# Patient Record
Sex: FEMALE | Race: WHITE | Employment: FULL TIME | URBAN - METROPOLITAN AREA
[De-identification: names, ages, dates, MRNs, and addresses within clinical notes are randomized per-mention and may not be internally consistent; named-entity substitution may affect disease eponyms.]

---

## 2017-01-03 ENCOUNTER — OFFICE VISIT (OUTPATIENT)
Dept: OBGYN | Facility: CLINIC | Age: 47
End: 2017-01-03
Payer: COMMERCIAL

## 2017-01-03 VITALS
BODY MASS INDEX: 23.32 KG/M2 | HEART RATE: 73 BPM | TEMPERATURE: 98.6 F | WEIGHT: 131.6 LBS | SYSTOLIC BLOOD PRESSURE: 147 MMHG | HEIGHT: 63 IN | DIASTOLIC BLOOD PRESSURE: 78 MMHG

## 2017-01-03 DIAGNOSIS — Z01.419 ENCOUNTER FOR GYNECOLOGICAL EXAMINATION WITHOUT ABNORMAL FINDING: Primary | ICD-10-CM

## 2017-01-03 DIAGNOSIS — E03.8 OTHER SPECIFIED HYPOTHYROIDISM: ICD-10-CM

## 2017-01-03 DIAGNOSIS — Z30.40 ENCOUNTER FOR SURVEILLANCE OF CONTRACEPTIVES: ICD-10-CM

## 2017-01-03 DIAGNOSIS — Z12.31 VISIT FOR SCREENING MAMMOGRAM: ICD-10-CM

## 2017-01-03 PROCEDURE — 36415 COLL VENOUS BLD VENIPUNCTURE: CPT | Performed by: NURSE PRACTITIONER

## 2017-01-03 PROCEDURE — 84443 ASSAY THYROID STIM HORMONE: CPT | Performed by: NURSE PRACTITIONER

## 2017-01-03 PROCEDURE — 99396 PREV VISIT EST AGE 40-64: CPT | Performed by: NURSE PRACTITIONER

## 2017-01-03 RX ORDER — LEVOTHYROXINE SODIUM 112 UG/1
112 TABLET ORAL DAILY
Qty: 90 TABLET | Refills: 3 | Status: SHIPPED | OUTPATIENT
Start: 2017-01-03 | End: 2017-12-21

## 2017-01-03 ASSESSMENT — PAIN SCALES - GENERAL: PAINLEVEL: NO PAIN (0)

## 2017-01-03 NOTE — Clinical Note
[facility]  [address] [facility] [id number] [phone]      January 4, 2017      Noni Mccormick  [address] [facility] [address]              Dear Noni,    Your thyroid level is normal. We will keep you on the same dose of medication. Please let me know if you have any questions.      Sincerely,      Miranda Uriostegui CNP

## 2017-01-03 NOTE — PROGRESS NOTES
S: Pt is a 46 year old  3 para 2 who presents today for an annual female exam. LMP: 16. Contraception: Pill.    Declines STD screening. Last Pap smear was 2014 and was NIL. No history of abnormal pap smear. Immunizations up to date. Dental exams: regular. Diet and calcium intake reviewed. Exercises: less in the winter.   Last mammogram: 3/2016. Last lipid panel: .   Past Medical History   Diagnosis Date     Grave's disease      Hypothyroid      Rheumatoid arthritis(714.0)      Contraception      Inflammatory arthritis      Nephrolithiasis 2011     Osteopenia 2013     Intermittent asthma 9/10/2013     Past Surgical History   Procedure Laterality Date     Joint replacement, hip rt/lt       (L)     C pelvis/hip joint surgery unlisted       Surgical history of -        Lithotripsy     Joint replacement, hip rt/lt  3/2013     (R)     Social History   Substance Use Topics     Smoking status: Never Smoker      Smokeless tobacco: Never Used     Alcohol Use: No       REVIEW OF SYSTEMS:  CONSTITUTIONAL:NEGATIVE for fever, chills, change in weight  EYES: NEGATIVE for vision changes or irritation  ENT/MOUTH: NEGATIVE for ear, mouth and throat problems  RESP:NEGATIVE for significant cough or SOB  CV: NEGATIVE for chest pain, palpitations or peripheral edema  GI: NEGATIVE for nausea, abdominal pain, heartburn, or change in bowel habits  Periods are regular q 28-30 days, Cyclic symptoms include none. No intermenstrual bleeding.  MUSCULOSKELATAL:NEGATIVE for significant arthralgias or myalgia  INTEGUMENTARY/SKIN: NEGATIVE for worrisome rashes, moles or lesions  NEURO: NEGATIVE for weakness, dizziness or paresthesias  ENDOCRINE: NEGATIVE for temperature intolerance, skin/hair changes  HEME/ALLERGY/IMMUNE: NEGATIVE for bleeding problems  PSYCHIATRIC: NEGATIVE for changes in mood or affect      OBJECTIVE: This is a well appearing female in no acute distress. Answers questions and maintains eye contact  appropriately. Vital signs noted.     EXAM:  EYES: Eyes grossly normal to inspection, PERRL and conjunctivae and sclerae normal  HENT: ear canals and TM's normal and nose and mouth without ulcers or lesions  NECK: no adenopathy, no asymmetry, masses, or scars and thyroid normal to palpation  RESP: lungs clear to auscultation - no rales, rhonchi or wheezes  CV: regular rates and rhythm, normal S1 S2, no S3 or S4 and no murmur, click or rub  LYMPH: normal ant/post cervical and supraclavicular nodes  ABD/GI: soft, nontender, without hepatosplenomegaly or masses  MS: extremities normal- no gross deformities noted  SKIN: no suspicious lesions or rashes  NEURO: Normal strength and tone, mentation intact and speech normal  PSYCH: mentation appears normal and affect normal/bright  Breast exam: Breasts are symmetrical without masses, lymphadenopathy, retraction, dimpling, or nipple discharge bilaterally.  Pelvic Exam: External genitalia without visible lesions or discharge. Normal BUS. Vaginal mucosa pink, rugated, moist, without lesions or discharge. Cervix is pink, multiparous, midline, without cervical motion tenderness. Pap smear is not obtained. Uterus normal size and shape without tenderness or masses. Adnexa without masses or tenderness bilaterally.    A/P:  1) Normal annual female exam. Health maintenance updated. Reviewed mammogram recommendations. Regular physical activity and healthy diet encouraged. Continue regular dental exams. Encouraged adequate calcium intake. New ACOG guidelines regarding cervical cancer screening discussed with patient. Discussed rationale for not doing pap smear annually as she is not high risk. Based on last pap smear, she is not due for pap smear this year.   2) Contraception Renewal. Refill oral contraceptive pills x 1 year.  3) Hypothyroid. Refill current dose of medication and check TSH today. If dose adjustment needed, will send in new prescription and notify patient.    Miranda  Moody UHSSEIN CNP

## 2017-01-03 NOTE — MR AVS SNAPSHOT
After Visit Summary   1/3/2017    Noni Mccormick    MRN: [id number]           Patient Information     Date Of Birth          1970        Visit Information        Provider Department      1/3/2017 3:50 PM Miranda Uriostegui APRN CNP [facility]        Today's Diagnoses     Encounter for gynecological examination without abnormal finding    -  1     Other specified hypothyroidism         Encounter for surveillance of contraceptives         Visit for screening mammogram           Care Instructions    Preventive Health Recommendations    Yearly exam:   See your health care provider every year in order to  1. Review health changes.   2. Discuss preventive care.   3. Review your medicines if your doctor prescribed any.  Get a Pap test every three years (unless you have an abnormal result and your provider advises testing more often).  If you get Pap tests with HPV test, you only need to test every 5 years, unless you have an abnormal result.  You should be tested each year for STDs (sexually transmitted diseases), if you're at risk.   Ask your doctor if you should have a mammogram.  Have a colonoscopy (test for colon cancer) if someone in your family has had colon cancer or polyps before age 50.   Have a cholesterol test every 5 years.   Have a diabetes test (fasting glucose) after age 45. If you are at risk for diabetes, you should have this test every 3 years.  Shots: Get a flu shot each year. Get a tetanus shot every 10 years.   Nutrition:   Eat at least 5 servings of fruits and vegetables each day.   Eat whole-grain bread, whole-wheat pasta and brown rice instead of white grains and rice.   For bone health: Eat calcium-rich foods or take calcium pills (500 to 600 mg) twice a day with food. Also take vitamin D (1000 IUs) each day.   Lifestyle   Exercise at least 150 minutes a week (an average of 30 minutes a day, 5 days a week). This will help you control your weight and  "prevent disease.  Limit alcohol to one drink per day.   No smoking.   Wear sunscreen to prevent skin cancer.   See your dentist every six months for an exam and cleaning.                  Follow-ups after your visit        Future tests that were ordered for you today     Open Future Orders        Priority Expected Expires Ordered    MA Screening Digital Bilateral Routine  1/3/2018 1/3/2017            Who to contact     If you have questions or need follow up information about today's clinic visit or your schedule please contact [facility] directly at [phone].  Normal or non-critical lab and imaging results will be communicated to you by [employer], letter or phone within 4 business days after the clinic has received the results. If you do not hear from us within 7 days, please contact the clinic through [employer] or phone. If you have a critical or abnormal lab result, we will notify you by phone as soon as possible.  Submit refill requests through [employer] or call your pharmacy and they will forward the refill request to us. Please allow 3 business days for your refill to be completed.          Additional Information About Your Visit        [employer] Information     [employer] lets you send messages to your doctor, view your test results, renew your prescriptions, schedule appointments and more. To sign up, go to [email or web address] . Click on \"Log in\" on the left side of the screen, which will take you to the Welcome page. Then click on \"Sign up Now\" on the right side of the page.     You will be asked to enter the access code listed below, as well as some personal information. Please follow the directions to create your username and password.     Your access code is: [email or web address]  Expires: 2017  9:24 AM     Your access code will  in 90 days. If you need help or a new code, please call your [facility] or [phone].        Care EveryWhere ID     This is your Care EveryWhere ID. This " "could be used by other organizations to access your [facility] medical records  [id number]        Your Vitals Were     Pulse Temperature Height BMI (Body Mass Index) Last Period       73 98.6  F (37  C) (Oral) 5' 3\" (1.6 m) 23.32 kg/m2 12/27/2016 (Exact Date)        Blood Pressure from Last 3 Encounters:   01/03/17 147/78   10/26/16 130/84   10/14/16 124/77    Weight from Last 3 Encounters:   01/03/17 131 lb 9.6 oz (59.693 kg)   10/26/16 131 lb (59.421 kg)   09/09/16 133 lb (60.328 kg)              We Performed the Following     TSH with free T4 reflex          Today's Medication Changes          These changes are accurate as of: 1/3/17  4:06 PM.  If you have any questions, ask your nurse or doctor.               Stop taking these medicines if you haven't already. Please contact your care team if you have questions.     azithromycin 250 MG tablet   Commonly known as:  ZITHROMAX   Stopped by:  Miranda Uriostegui APRN CNP                Where to get your medicines      These medications were sent to [facility] [address] [place] [address] [address]    Hours:  M-F 9-7 SAT 9-6 SUN 11-3 Phone:  [phone]    - levothyroxine 112 MCG tablet  - norethin-eth estrad triphasic 0.5/1/0.5-35 MG-MCG per tablet             Primary Care Provider Office Phone # Fax #    Sophia Baugh [phone]       [facility] [address] [facility] [address]        Thank you!     Thank you for choosing [facility]  for your care. Our goal is always to provide you with excellent care. Hearing back from our patients is one way we can continue to improve our services. Please take a few minutes to complete the written survey that you may receive in the mail after your visit with us. Thank you!             Your Updated Medication List - Protect others around you: Learn how to safely use, store and throw away your medicines at " www.disposemymeds.org.          This list is accurate as of: 1/3/17  4:06 PM.  Always use your most recent med list.                   Brand Name Dispense Instructions for use    albuterol 108 (90 BASE) MCG/ACT Inhaler    PROAIR HFA/PROVENTIL HFA/VENTOLIN HFA    1 Inhaler    Inhale 2 puffs into the lungs every 6 hours as needed for shortness of breath / dyspnea       folic acid 1 MG tablet    FOLVITE    90 tablet    Take 1 tablet (1 mg) by mouth daily       inFLIXimab 100 MG injection    REMICADE    30 mL    Inject 300 mg into the vein as needed       INFLIXIMAB IVPB NON-ONCOLOGY USE      Inject 300 mg into the vein once. Every 5 weeks.       levothyroxine 112 MCG tablet    SYNTHROID/LEVOTHROID    90 tablet    Take 1 tablet (112 mcg) by mouth daily       methotrexate 2.5 MG tablet CHEMO     72 tablet    6 tablets by mouth once weekly       norethin-eth estrad triphasic 0.5/1/0.5-35 MG-MCG per tablet    CHRISSY    84 tablet    Take 1 tablet by mouth daily       vitamin D 1000 UNITS capsule      Take 1 capsule by mouth daily.

## 2017-01-03 NOTE — PATIENT INSTRUCTIONS
Preventive Health Recommendations    Yearly exam:   See your health care provider every year in order to  1. Review health changes.   2. Discuss preventive care.   3. Review your medicines if your doctor prescribed any.  Get a Pap test every three years (unless you have an abnormal result and your provider advises testing more often).  If you get Pap tests with HPV test, you only need to test every 5 years, unless you have an abnormal result.  You should be tested each year for STDs (sexually transmitted diseases), if you're at risk.   Ask your doctor if you should have a mammogram.  Have a colonoscopy (test for colon cancer) if someone in your family has had colon cancer or polyps before age 50.   Have a cholesterol test every 5 years.   Have a diabetes test (fasting glucose) after age 45. If you are at risk for diabetes, you should have this test every 3 years.  Shots: Get a flu shot each year. Get a tetanus shot every 10 years.   Nutrition:   Eat at least 5 servings of fruits and vegetables each day.   Eat whole-grain bread, whole-wheat pasta and brown rice instead of white grains and rice.   For bone health: Eat calcium-rich foods or take calcium pills (500 to 600 mg) twice a day with food. Also take vitamin D (1000 IUs) each day.   Lifestyle   Exercise at least 150 minutes a week (an average of 30 minutes a day, 5 days a week). This will help you control your weight and prevent disease.  Limit alcohol to one drink per day.   No smoking.   Wear sunscreen to prevent skin cancer.   See your dentist every six months for an exam and cleaning.

## 2017-01-03 NOTE — NURSING NOTE
"Chief Complaint   Patient presents with     Physical       Initial /78 mmHg  Pulse 73  Temp(Src) 98.6  F (37  C) (Oral)  Ht 5' 3\" (1.6 m)  Wt 131 lb 9.6 oz (59.693 kg)  BMI 23.32 kg/m2  LMP 12/27/2016 (Exact Date) Estimated body mass index is 23.32 kg/(m^2) as calculated from the following:    Height as of this encounter: 5' 3\" (1.6 m).    Weight as of this encounter: 131 lb 9.6 oz (59.693 kg)..  BP completed using cuff size: regular    Last pap : 12/09/2014 DEEMTRIA Stockton CMA        "

## 2017-01-04 LAB — TSH SERPL DL<=0.005 MIU/L-ACNC: 0.61 MU/L (ref 0.4–4)

## 2017-01-26 ENCOUNTER — TELEPHONE (OUTPATIENT)
Dept: RHEUMATOLOGY | Facility: CLINIC | Age: 47
End: 2017-01-26

## 2017-01-26 DIAGNOSIS — M08.3 CHRONIC POLYARTICULAR JUVENILE RHEUMATOID ARTHRITIS (H): Primary | ICD-10-CM

## 2017-01-26 RX ORDER — INFLIXIMAB 100 MG/10ML
300 INJECTION, POWDER, LYOPHILIZED, FOR SOLUTION INTRAVENOUS PRN
Qty: 30 ML | Refills: 0 | Status: SHIPPED
Start: 2017-01-26

## 2017-01-26 NOTE — TELEPHONE ENCOUNTER
Home infusion is asking for an order to continue patient's Remicade.  They needed a verbal okay stat to get it ready for her to receive tomorrow and asked me to give the nod, knowing I'm a CMA.  I checked the chart and told them I didn't see any notes indicating a desire on your part to discontinue.  I told them I'd also have you send orders for tomorrow and however long you'd like her to continue.  She's scheduled to see you 2/13/17.

## 2017-01-26 NOTE — TELEPHONE ENCOUNTER
Reason for Call: Request for an order or referral:    Order or referral being requested: Pharmacy calling - pt is receiving a Remicade injection tomorrow.  Specialty pharmacy needs to make it up today and mail it out for tomorrow - states they faxed a form also - but would gladly take a verbal also.    Date needed: as soon as possible    Has the patient been seen by the PCP for this problem? YES    Additional comments:     Phone number Patient can be reached at:  [place] at pharmacy [phone]    Best Time:  any    Can we leave a detailed message on this number?  YES    Call taken on 1/26/2017 at 10:51 AM by Mimi Simon

## 2017-01-30 ENCOUNTER — MEDICAL CORRESPONDENCE (OUTPATIENT)
Dept: HEALTH INFORMATION MANAGEMENT | Facility: CLINIC | Age: 47
End: 2017-01-30

## 2017-02-07 ENCOUNTER — MEDICAL CORRESPONDENCE (OUTPATIENT)
Dept: HEALTH INFORMATION MANAGEMENT | Facility: CLINIC | Age: 47
End: 2017-02-07

## 2017-02-21 ENCOUNTER — OFFICE VISIT (OUTPATIENT)
Dept: FAMILY MEDICINE | Facility: CLINIC | Age: 47
End: 2017-02-21
Payer: COMMERCIAL

## 2017-02-21 VITALS
WEIGHT: 133 LBS | DIASTOLIC BLOOD PRESSURE: 84 MMHG | RESPIRATION RATE: 15 BRPM | SYSTOLIC BLOOD PRESSURE: 138 MMHG | TEMPERATURE: 98.7 F | BODY MASS INDEX: 23.56 KG/M2 | OXYGEN SATURATION: 97 % | HEART RATE: 90 BPM

## 2017-02-21 DIAGNOSIS — R05.9 COUGH: Primary | ICD-10-CM

## 2017-02-21 DIAGNOSIS — J11.1 INFLUENZA-LIKE ILLNESS: ICD-10-CM

## 2017-02-21 LAB
FLUAV+FLUBV AG SPEC QL: NORMAL
FLUAV+FLUBV AG SPEC QL: NORMAL
SPECIMEN SOURCE: NORMAL

## 2017-02-21 PROCEDURE — 99213 OFFICE O/P EST LOW 20 MIN: CPT | Performed by: PHYSICIAN ASSISTANT

## 2017-02-21 PROCEDURE — 87804 INFLUENZA ASSAY W/OPTIC: CPT | Performed by: PHYSICIAN ASSISTANT

## 2017-02-21 RX ORDER — OSELTAMIVIR PHOSPHATE 75 MG/1
75 CAPSULE ORAL 2 TIMES DAILY
Qty: 10 CAPSULE | Refills: 0 | Status: SHIPPED | OUTPATIENT
Start: 2017-02-21 | End: 2017-03-08

## 2017-02-21 ASSESSMENT — PAIN SCALES - GENERAL: PAINLEVEL: NO PAIN (0)

## 2017-02-21 NOTE — NURSING NOTE
"Chief Complaint   Patient presents with     URI     cough, congestion and fever x 3 days,  is being treated for influenza       Initial /84  Pulse 90  Temp 98.7  F (37.1  C) (Oral)  Resp 15  Wt 133 lb (60.3 kg)  SpO2 97%  Breastfeeding? No  BMI 23.56 kg/m2 Estimated body mass index is 23.56 kg/(m^2) as calculated from the following:    Height as of 1/3/17: 5' 3\" (1.6 m).    Weight as of this encounter: 133 lb (60.3 kg).  Medication Reconciliation: complete   Patient and/or MA were masked during rooming process  Marcin Levine MA        "

## 2017-02-21 NOTE — PROGRESS NOTES
SUBJECTIVE:                                                    Noni Mccormick is a 46 year old female who presents to clinic today for the following health issues:      RESPIRATORY SYMPTOMS      Duration: 3 days    Description  nasal congestion, cough, fever and fatigue/malaise    Severity: moderate    Accompanying signs and symptoms: None    History (predisposing factors):  Exposure to flu, asthma    Precipitating or alleviating factors: None    Therapies tried and outcome:  none      tested neg for influenza but they treated him and he feels much better    Allergies   Allergen Reactions     Amoxicillin Nausea and Cramps       Past Medical History   Diagnosis Date     Contraception      Grave's disease      Hypothyroid      Inflammatory arthritis      Intermittent asthma 9/10/2013     Nephrolithiasis 6/16/2011     Osteopenia 2/14/2013     Rheumatoid arthritis(714.0)          Current Outpatient Prescriptions on File Prior to Visit:  inFLIXimab (REMICADE) 100 MG injection Inject 300 mg into the vein as needed   levothyroxine (SYNTHROID/LEVOTHROID) 112 MCG tablet Take 1 tablet (112 mcg) by mouth daily   norethin-eth estrad triphasic (CHRISSY) 0.5/1/0.5-35 MG-MCG per tablet Take 1 tablet by mouth daily   folic acid (FOLVITE) 1 MG tablet Take 1 tablet (1 mg) by mouth daily   methotrexate 2.5 MG tablet 6 tablets by mouth once weekly   albuterol (PROAIR HFA, PROVENTIL HFA, VENTOLIN HFA) 108 (90 BASE) MCG/ACT inhaler Inhale 2 puffs into the lungs every 6 hours as needed for shortness of breath / dyspnea   Cholecalciferol (VITAMIN D) 1000 UNIT capsule Take 1 capsule by mouth daily.   INFLIXIMAB IVPB NON-ONCOLOGY USE Inject 300 mg into the vein once. Every 5 weeks.     No current facility-administered medications on file prior to visit.     Social History   Substance Use Topics     Smoking status: Never Smoker     Smokeless tobacco: Never Used     Alcohol use No       ROS:  Consitutional: As above  ENT: As  above  Respiratory: As above    OBJECTIVE:  /84  Pulse 90  Temp 98.7  F (37.1  C) (Oral)  Resp 15  Wt 133 lb (60.3 kg)  SpO2 97%  Breastfeeding? No  BMI 23.56 kg/m2  GENERAL APPEARANCE: healthy, alert and no distress  EYES: conjunctiva clear  EARS: No cerumen.   Ear canals w/o erythema, TM's intact w/o erythema.    NOSE/MOUTH: Nose and mouth without ulcers, erythema or lesions  SINUSES: No maxillary sinus tenderness.  THROAT: No erythema w/o tonsillar enlargement . No exudates  NECK: supple, nontender, no lymphadenopathy  RESP: lungs clear to auscultation - no rales, rhonchi or wheezes  CV: regular rates and rhythm, normal S1 S2, no murmur noted  NEURO: awake, alert    Results for orders placed or performed in visit on 02/21/17   Influenza A/B antigen   Result Value Ref Range    Influenza A/B Agn Specimen Nasal     Influenza A  NEG     Negative   Test results must be correlated with clinical data. If necessary, results   should be confirmed by a molecular assay or viral culture.      Influenza B  NEG     Negative   Test results must be correlated with clinical data. If necessary, results   should be confirmed by a molecular assay or viral culture.           ASSESSMENT: Well appearing.    ICD-10-CM    1. Cough R05 Influenza A/B antigen     oseltamivir (TAMIFLU) 75 MG capsule   2. Influenza-like illness R69        PLAN:Requests Tamiflu  Lots of rest and fluids.  RTC if any worsening symptoms or if not improving.    Rachael Flores PA-C

## 2017-02-21 NOTE — MR AVS SNAPSHOT
"              After Visit Summary   2/21/2017    Noni Mccormick    MRN: [id number]           Patient Information     Date Of Birth          1970        Visit Information        Provider Department      2/21/2017 4:00 PM Rachael Flores PA-C [facility]        Today's Diagnoses     Cough    -  1    Influenza-like illness           Follow-ups after your visit        Your next 10 appointments already scheduled     Feb 27, 2017  4:15 PM CST   [facility] Cano MD   [facility]    [address] [facility] [address] [phone]              Who to contact     If you have questions or need follow up information about today's clinic visit or your schedule please contact [facility] directly at [phone].  Normal or non-critical lab and imaging results will be communicated to you by MyChart, letter or phone within 4 business days after the clinic has received the results. If you do not hear from us within 7 days, please contact the clinic through MyChart or phone. If you have a critical or abnormal lab result, we will notify you by phone as soon as possible.  Submit refill requests through [employer] or call your pharmacy and they will forward the refill request to us. Please allow 3 business days for your refill to be completed.          Additional Information About Your Visit        MyChart Information     [employer] lets you send messages to your doctor, view your test results, renew your prescriptions, schedule appointments and more. To sign up, go to [email or web address] . Click on \"Log in\" on the left side of the screen, which will take you to the Welcome page. Then click on \"Sign up Now\" on the right side of the page.     You will be asked to enter the access code listed below, as well as some personal information. Please follow the directions to create your username and password.     Your access " code is: [email or web address]  Expires: 2017  4:53 PM     Your access code will  in 90 days. If you need help or a new code, please call your [facility] clinic or [phone].        Care EveryWhere ID     This is your Care EveryWhere ID. This could be used by other organizations to access your [facility] medical records  [id number]        Your Vitals Were     Pulse Temperature Respirations Pulse Oximetry Breastfeeding? BMI (Body Mass Index)    90 98.7  F (37.1  C) (Oral) 15 97% No 23.56 kg/m2       Blood Pressure from Last 3 Encounters:   17 138/84   17 147/78   10/26/16 130/84    Weight from Last 3 Encounters:   17 133 lb (60.3 kg)   17 131 lb 9.6 oz (59.7 kg)   10/26/16 131 lb (59.4 kg)              We Performed the Following     Influenza A/B antigen          Today's Medication Changes          These changes are accurate as of: 17  4:53 PM.  If you have any questions, ask your nurse or doctor.               Start taking these medicines.        Dose/Directions    oseltamivir 75 MG capsule   Commonly known as:  TAMIFLU   Used for:  Cough   Started by:  Rachael Flores PA-C        Dose:  75 mg   Take 1 capsule (75 mg) by mouth 2 times daily   Quantity:  10 capsule   Refills:  0            Where to get your medicines      These medications were sent to [address], [address]  [address] [place] [address], [facility] [address]     Phone:  [phone]     oseltamivir 75 MG capsule                Primary Care Provider Office Phone # Fax #    Sophia Baugh [phone]       [address] [address]        Thank you!     Thank you for choosing [facility]  for your care. Our goal is always to provide you with excellent care. Hearing back from our patients is one way we can continue to improve our services. Please take a few minutes to complete the written survey that you may  receive in the mail after your visit with us. Thank you!             Your Updated Medication List - Protect others around you: Learn how to safely use, store and throw away your medicines at www.disposemymeds.org.          This list is accurate as of: 2/21/17  4:53 PM.  Always use your most recent med list.                   Brand Name Dispense Instructions for use    albuterol 108 (90 BASE) MCG/ACT Inhaler    PROAIR HFA/PROVENTIL HFA/VENTOLIN HFA    1 Inhaler    Inhale 2 puffs into the lungs every 6 hours as needed for shortness of breath / dyspnea       folic acid 1 MG tablet    FOLVITE    90 tablet    Take 1 tablet (1 mg) by mouth daily       inFLIXimab 100 MG injection    REMICADE    30 mL    Inject 300 mg into the vein as needed       INFLIXIMAB IVPB NON-ONCOLOGY USE      Inject 300 mg into the vein once. Every 5 weeks.       levothyroxine 112 MCG tablet    SYNTHROID/LEVOTHROID    90 tablet    Take 1 tablet (112 mcg) by mouth daily       methotrexate 2.5 MG tablet CHEMO     72 tablet    6 tablets by mouth once weekly       norethin-eth estrad triphasic 0.5/1/0.5-35 MG-MCG per tablet    CHRISSY    84 tablet    Take 1 tablet by mouth daily       oseltamivir 75 MG capsule    TAMIFLU    10 capsule    Take 1 capsule (75 mg) by mouth 2 times daily       vitamin D 1000 UNITS capsule      Take 1 capsule by mouth daily.

## 2017-03-02 ENCOUNTER — TELEPHONE (OUTPATIENT)
Dept: RHEUMATOLOGY | Facility: CLINIC | Age: 47
End: 2017-03-02

## 2017-03-02 NOTE — TELEPHONE ENCOUNTER
Reason for Call:  Form, our goal is to have forms completed with 72 hours, however, some forms may require a visit or additional information.    Type of letter, form or note:  FMLA    Who is the form from?: Patient    Where did the form come from: form was faxed in    What clinic location was the form placed at?: [facility] (ENT, Neurology, Rheumatology, Surgery, Urology, Vascular Surgery)    Where the form was placed: Given to MA/RN    What number is listed as a contact on the form?: [phone]       Additional comments: NA    Call taken on 3/2/2017 at 8:15 AM by Idania Estevez

## 2017-03-07 NOTE — TELEPHONE ENCOUNTER
Repeated attempts to fax were unsuccessful.  Patient states she has has an appointment with Dr. Cano tomorrow.  I will set aside a copy for her to  at that time.

## 2017-03-08 ENCOUNTER — MEDICAL CORRESPONDENCE (OUTPATIENT)
Dept: HEALTH INFORMATION MANAGEMENT | Facility: CLINIC | Age: 47
End: 2017-03-08

## 2017-03-08 ENCOUNTER — OFFICE VISIT (OUTPATIENT)
Dept: RHEUMATOLOGY | Facility: CLINIC | Age: 47
End: 2017-03-08
Payer: COMMERCIAL

## 2017-03-08 ENCOUNTER — TELEPHONE (OUTPATIENT)
Dept: RHEUMATOLOGY | Facility: CLINIC | Age: 47
End: 2017-03-08

## 2017-03-08 VITALS
HEART RATE: 102 BPM | BODY MASS INDEX: 23.74 KG/M2 | TEMPERATURE: 97.6 F | WEIGHT: 134 LBS | SYSTOLIC BLOOD PRESSURE: 122 MMHG | DIASTOLIC BLOOD PRESSURE: 90 MMHG

## 2017-03-08 DIAGNOSIS — M08.3 CHRONIC POLYARTICULAR JUVENILE RHEUMATOID ARTHRITIS (H): ICD-10-CM

## 2017-03-08 LAB
ALBUMIN SERPL-MCNC: 3.2 G/DL (ref 3.4–5)
ALP SERPL-CCNC: 30 U/L (ref 40–150)
ALT SERPL W P-5'-P-CCNC: 19 U/L (ref 0–50)
ANION GAP SERPL CALCULATED.3IONS-SCNC: 8 MMOL/L (ref 3–14)
AST SERPL W P-5'-P-CCNC: 11 U/L (ref 0–45)
BASOPHILS # BLD AUTO: 0 10E9/L (ref 0–0.2)
BASOPHILS NFR BLD AUTO: 0.7 %
BILIRUB SERPL-MCNC: 0.3 MG/DL (ref 0.2–1.3)
BUN SERPL-MCNC: 9 MG/DL (ref 7–30)
CALCIUM SERPL-MCNC: 8.6 MG/DL (ref 8.5–10.1)
CHLORIDE SERPL-SCNC: 105 MMOL/L (ref 94–109)
CO2 SERPL-SCNC: 27 MMOL/L (ref 20–32)
CREAT SERPL-MCNC: 0.68 MG/DL (ref 0.52–1.04)
DIFFERENTIAL METHOD BLD: NORMAL
EOSINOPHIL # BLD AUTO: 0.1 10E9/L (ref 0–0.7)
EOSINOPHIL NFR BLD AUTO: 1.2 %
ERYTHROCYTE [DISTWIDTH] IN BLOOD BY AUTOMATED COUNT: 12.8 % (ref 10–15)
GFR SERPL CREATININE-BSD FRML MDRD: ABNORMAL ML/MIN/1.7M2
GLUCOSE SERPL-MCNC: 75 MG/DL (ref 70–99)
HCT VFR BLD AUTO: 42.9 % (ref 35–47)
HGB BLD-MCNC: 14.2 G/DL (ref 11.7–15.7)
LYMPHOCYTES # BLD AUTO: 1.2 10E9/L (ref 0.8–5.3)
LYMPHOCYTES NFR BLD AUTO: 19.7 %
MCH RBC QN AUTO: 29.6 PG (ref 26.5–33)
MCHC RBC AUTO-ENTMCNC: 33.1 G/DL (ref 31.5–36.5)
MCV RBC AUTO: 89 FL (ref 78–100)
MONOCYTES # BLD AUTO: 0.5 10E9/L (ref 0–1.3)
MONOCYTES NFR BLD AUTO: 7.5 %
NEUTROPHILS # BLD AUTO: 4.3 10E9/L (ref 1.6–8.3)
NEUTROPHILS NFR BLD AUTO: 70.9 %
PLATELET # BLD AUTO: 400 10E9/L (ref 150–450)
POTASSIUM SERPL-SCNC: 3.9 MMOL/L (ref 3.4–5.3)
PROT SERPL-MCNC: 8.1 G/DL (ref 6.8–8.8)
RBC # BLD AUTO: 4.8 10E12/L (ref 3.8–5.2)
SODIUM SERPL-SCNC: 140 MMOL/L (ref 133–144)
WBC # BLD AUTO: 6 10E9/L (ref 4–11)

## 2017-03-08 PROCEDURE — 36415 COLL VENOUS BLD VENIPUNCTURE: CPT | Performed by: INTERNAL MEDICINE

## 2017-03-08 PROCEDURE — 80053 COMPREHEN METABOLIC PANEL: CPT | Performed by: INTERNAL MEDICINE

## 2017-03-08 PROCEDURE — 85025 COMPLETE CBC W/AUTO DIFF WBC: CPT | Performed by: INTERNAL MEDICINE

## 2017-03-08 PROCEDURE — 99213 OFFICE O/P EST LOW 20 MIN: CPT | Performed by: INTERNAL MEDICINE

## 2017-03-08 NOTE — TELEPHONE ENCOUNTER
Request for infusion orders from [facility] infusion.  Orders signed by Dr. Cano and faxed back.  Anna Goodman LPN

## 2017-03-08 NOTE — NURSING NOTE
"Chief Complaint   Patient presents with     RECHECK     RA       Initial /90 (BP Location: Right arm, Patient Position: Chair, Cuff Size: Adult Regular)  Pulse 102  Temp 97.6  F (36.4  C) (Oral)  Wt 134 lb (60.8 kg)  BMI 23.74 kg/m2 Estimated body mass index is 23.74 kg/(m^2) as calculated from the following:    Height as of 1/3/17: 5' 3\" (1.6 m).    Weight as of this encounter: 134 lb (60.8 kg).  Medication Reconciliation: complete   Anna Goodman LPN    "

## 2017-03-08 NOTE — PROGRESS NOTES
HISTORY OF PRESENT ILLNESS:  Noni Mcnair is seen back in followup for chronic polyarticular juvenile arthritis, she remains on methotrexate 15 mg weekly and Remicade infusions every 5 weeks, things are going well.  She has no specific complaints.  The only real issue is that she has become difficult venous access, last visit did require 5 attempts.  She also been getting home infusions which she says has gone well.  She has no concerns regarding having it done in her home or the people caring for her and taking care of the infusion.      She has not had any infections from the Remicade.  No nausea, vomiting, diarrhea, stomatitis from the methotrexate.  No progressive dyspnea or chronic cough.      PHYSICAL EXAMINATION:   VITAL SIGNS:  Blood pressure 122/90, pulse 102, weight 134.     LUNGS:  Clear.   HEART:  Regular.   MUSCULOSKELETAL:  Joint exam there is no obvious synovitis of the wrists, MCPs, or PIPs.  There are no signs, shoulders, knees or ankles.   SKIN:  Without lesions.      IMPRESSION:  She does have the chronic deformities in the hands, especially the bilateral fifth PIP joints.      IMPRESSION:  Chronic polyarticular juvenile arthritis.      RECOMMENDATIONS:   1.  Continue methotrexate 15 mg weekly.   2.  Continue Remicade infusions.   3.  Check labs every 3 months.     4.  Followup with me in 6 months or sooner if there are problems.         ANGELA RUANO MD             D: 2017 12:44   T: 2017 12:59   MT: KAYLA#[id number]      Name:     NONI MCNAIR   MRN:      [id number]        Account:      [id number]   :      1970           Visit Date:   2017      Document: [id number]

## 2017-03-08 NOTE — LETTER
[facility]  [address] [address]  [phone]      March 9, 2017      Noni Mccormick  [address] [address]        Dear Noni,    Dr. Cano has reviewed the results of your labs of 3/8/17, and has made the following observations:      Labs are normal     Please don't hesitate to contact our office with any additional questions or concerns.           Sincerely,    Kathie Delcid, GUNNARA  For Ken Cano MD

## 2017-03-08 NOTE — MR AVS SNAPSHOT
"              After Visit Summary   3/8/2017    Noni Mccormick    MRN: [id number]           Patient Information     Date Of Birth          1970        Visit Information        Provider Department      3/8/2017 9:30 AM Ken Cano MD [facility]        Today's Diagnoses     Chronic polyarticular juvenile rheumatoid arthritis (H)           Follow-ups after your visit        Who to contact     If you have questions or need follow up information about today's clinic visit or your schedule please contact [facility] directly at [phone].  Normal or non-critical lab and imaging results will be communicated to you by Kimengihart, letter or phone within 4 business days after the clinic has received the results. If you do not hear from us within 7 days, please contact the clinic through Positron Dynamicst or phone. If you have a critical or abnormal lab result, we will notify you by phone as soon as possible.  Submit refill requests through Brazzlebox or call your pharmacy and they will forward the refill request to us. Please allow 3 business days for your refill to be completed.          Additional Information About Your Visit        MyChart Information     Brazzlebox lets you send messages to your doctor, view your test results, renew your prescriptions, schedule appointments and more. To sign up, go to [email or web address] . Click on \"Log in\" on the left side of the screen, which will take you to the Welcome page. Then click on \"Sign up Now\" on the right side of the page.     You will be asked to enter the access code listed below, as well as some personal information. Please follow the directions to create your username and password.     Your access code is: [email or web address]  Expires: 2017  4:53 PM     Your access code will  in 90 days. If you need help or a new code, please call your [facility] or [phone].        Care EveryWhere ID     This is your Care EveryWhere " ID. This could be used by other organizations to access your [place] medical records  [id number]        Your Vitals Were     Pulse Temperature BMI (Body Mass Index)             102 97.6  F (36.4  C) (Oral) 23.74 kg/m2          Blood Pressure from Last 3 Encounters:   03/08/17 122/90   02/21/17 138/84   01/03/17 147/78    Weight from Last 3 Encounters:   03/08/17 134 lb (60.8 kg)   02/21/17 133 lb (60.3 kg)   01/03/17 131 lb 9.6 oz (59.7 kg)              We Performed the Following     CBC with platelets differential     Comprehensive metabolic panel        Primary Care Provider Office Phone # Fax #    Sophia Baugh [phone]       [facility] [address] [address]        Thank you!     Thank you for choosing [facility]  for your care. Our goal is always to provide you with excellent care. Hearing back from our patients is one way we can continue to improve our services. Please take a few minutes to complete the written survey that you may receive in the mail after your visit with us. Thank you!             Your Updated Medication List - Protect others around you: Learn how to safely use, store and throw away your medicines at www.disposemymeds.org.          This list is accurate as of: 3/8/17 11:59 PM.  Always use your most recent med list.                   Brand Name Dispense Instructions for use    albuterol 108 (90 BASE) MCG/ACT Inhaler    PROAIR HFA/PROVENTIL HFA/VENTOLIN HFA    1 Inhaler    Inhale 2 puffs into the lungs every 6 hours as needed for shortness of breath / dyspnea       folic acid 1 MG tablet    FOLVITE    90 tablet    Take 1 tablet (1 mg) by mouth daily       inFLIXimab 100 MG injection    REMICADE    30 mL    Inject 300 mg into the vein as needed       INFLIXIMAB IVPB NON-ONCOLOGY USE      Inject 300 mg into the vein once. Every 5 weeks.       levothyroxine 112 MCG tablet    SYNTHROID/LEVOTHROID    90 tablet    Take 1 tablet (112  mcg) by mouth daily       methotrexate 2.5 MG tablet CHEMO     72 tablet    6 tablets by mouth once weekly       norethin-eth estrad triphasic 0.5/1/0.5-35 MG-MCG per tablet    CHRISSY    84 tablet    Take 1 tablet by mouth daily       vitamin D 1000 UNITS capsule      Take 1 capsule by mouth daily.

## 2017-03-13 DIAGNOSIS — M06.9 RHEUMATOID ARTHRITIS, INVOLVING UNSPECIFIED SITE, UNSPECIFIED RHEUMATOID FACTOR PRESENCE: ICD-10-CM

## 2017-03-15 RX ORDER — FOLIC ACID 1 MG/1
1 TABLET ORAL DAILY
Qty: 90 TABLET | Refills: 1 | Status: SHIPPED | OUTPATIENT
Start: 2017-03-15 | End: 2017-09-10

## 2017-03-27 ENCOUNTER — TELEPHONE (OUTPATIENT)
Dept: RHEUMATOLOGY | Facility: CLINIC | Age: 47
End: 2017-03-27

## 2017-03-27 NOTE — TELEPHONE ENCOUNTER
Reason for Call:  Form, our goal is to have forms completed with 72 hours, however, some forms may require a visit or additional information.    Type of letter, form or note:  employer    Who is the form from?: Patient    Where did the form come from: form was faxed in    What clinic location was the form placed at?: [facility] (ENT, Neurology, Rheumatology, Surgery, Urology, Vascular Surgery)    Where the form was placed: Given to MA/RN    What number is listed as a contact on the form? [phone]       Additional comments: NA    Call taken on 3/27/2017 at 10:31 AM by Idania Estevez

## 2017-04-07 ENCOUNTER — RADIANT APPOINTMENT (OUTPATIENT)
Dept: MAMMOGRAPHY | Facility: CLINIC | Age: 47
End: 2017-04-07
Attending: NURSE PRACTITIONER
Payer: COMMERCIAL

## 2017-04-07 DIAGNOSIS — Z12.31 VISIT FOR SCREENING MAMMOGRAM: ICD-10-CM

## 2017-04-07 PROCEDURE — G0202 SCR MAMMO BI INCL CAD: HCPCS | Mod: TC

## 2017-04-13 ENCOUNTER — HOSPITAL ENCOUNTER (OUTPATIENT)
Dept: MAMMOGRAPHY | Facility: CLINIC | Age: 47
Discharge: HOME | End: 2017-04-13
Attending: NURSE PRACTITIONER | Admitting: NURSE PRACTITIONER
Payer: COMMERCIAL

## 2017-04-13 ENCOUNTER — HOSPITAL ENCOUNTER (OUTPATIENT)
Dept: MAMMOGRAPHY | Facility: CLINIC | Age: 47
End: 2017-04-13
Attending: NURSE PRACTITIONER
Payer: COMMERCIAL

## 2017-04-13 DIAGNOSIS — R92.8 ABNORMAL MAMMOGRAM: ICD-10-CM

## 2017-04-13 PROCEDURE — 76642 ULTRASOUND BREAST LIMITED: CPT | Mod: RT

## 2017-04-13 PROCEDURE — G0279 TOMOSYNTHESIS, MAMMO: HCPCS

## 2017-05-12 LAB
ALBUMIN SERPL-MCNC: 3.1 G/DL (ref 3.4–5)
ALP SERPL-CCNC: 24 U/L (ref 40–150)
ALT SERPL W P-5'-P-CCNC: 12 U/L (ref 0–50)
ANION GAP SERPL CALCULATED.3IONS-SCNC: 9 MMOL/L (ref 3–14)
AST SERPL W P-5'-P-CCNC: 13 U/L (ref 0–45)
BASOPHILS # BLD AUTO: 0 10E9/L (ref 0–0.2)
BASOPHILS NFR BLD AUTO: 0.1 %
BILIRUB SERPL-MCNC: 0.2 MG/DL (ref 0.2–1.3)
BUN SERPL-MCNC: 7 MG/DL (ref 7–30)
CALCIUM SERPL-MCNC: 8.3 MG/DL (ref 8.5–10.1)
CHLORIDE SERPL-SCNC: 109 MMOL/L (ref 94–109)
CO2 SERPL-SCNC: 25 MMOL/L (ref 20–32)
CREAT SERPL-MCNC: 0.56 MG/DL (ref 0.52–1.04)
DIFFERENTIAL METHOD BLD: ABNORMAL
EOSINOPHIL # BLD AUTO: 0 10E9/L (ref 0–0.7)
EOSINOPHIL NFR BLD AUTO: 0 %
ERYTHROCYTE [DISTWIDTH] IN BLOOD BY AUTOMATED COUNT: 13.3 % (ref 10–15)
GFR SERPL CREATININE-BSD FRML MDRD: ABNORMAL ML/MIN/1.7M2
GLUCOSE SERPL-MCNC: 95 MG/DL (ref 70–99)
HCT VFR BLD AUTO: 40.7 % (ref 35–47)
HGB BLD-MCNC: 13.9 G/DL (ref 11.7–15.7)
IMM GRANULOCYTES # BLD: 0 10E9/L (ref 0–0.4)
IMM GRANULOCYTES NFR BLD: 0.3 %
LYMPHOCYTES # BLD AUTO: 0.4 10E9/L (ref 0.8–5.3)
LYMPHOCYTES NFR BLD AUTO: 6.3 %
MCH RBC QN AUTO: 31.4 PG (ref 26.5–33)
MCHC RBC AUTO-ENTMCNC: 34.2 G/DL (ref 31.5–36.5)
MCV RBC AUTO: 92 FL (ref 78–100)
MONOCYTES # BLD AUTO: 0 10E9/L (ref 0–1.3)
MONOCYTES NFR BLD AUTO: 0.4 %
NEUTROPHILS # BLD AUTO: 6.5 10E9/L (ref 1.6–8.3)
NEUTROPHILS NFR BLD AUTO: 92.9 %
NRBC # BLD AUTO: 0 10*3/UL
NRBC BLD AUTO-RTO: 0 /100
PLATELET # BLD AUTO: 408 10E9/L (ref 150–450)
POTASSIUM SERPL-SCNC: 3.4 MMOL/L (ref 3.4–5.3)
PROT SERPL-MCNC: 8 G/DL (ref 6.8–8.8)
RBC # BLD AUTO: 4.42 10E12/L (ref 3.8–5.2)
SODIUM SERPL-SCNC: 143 MMOL/L (ref 133–144)
WBC # BLD AUTO: 7 10E9/L (ref 4–11)

## 2017-05-12 PROCEDURE — 80053 COMPREHEN METABOLIC PANEL: CPT | Performed by: INTERNAL MEDICINE

## 2017-05-12 PROCEDURE — 85025 COMPLETE CBC W/AUTO DIFF WBC: CPT | Performed by: INTERNAL MEDICINE

## 2017-05-15 NOTE — PROGRESS NOTES
Tried to release results via UMicIt, but patient is inactive. Called and left , requested call back.     Claudette Mason MA

## 2017-06-15 ENCOUNTER — HOME INFUSION (PRE-WILLOW HOME INFUSION) (OUTPATIENT)
Dept: PHARMACY | Facility: CLINIC | Age: 47
End: 2017-06-15

## 2017-06-16 ENCOUNTER — HOME INFUSION (PRE-WILLOW HOME INFUSION) (OUTPATIENT)
Dept: PHARMACY | Facility: CLINIC | Age: 47
End: 2017-06-16

## 2017-07-21 LAB
ALBUMIN SERPL-MCNC: 3.3 G/DL (ref 3.4–5)
ALP SERPL-CCNC: 29 U/L (ref 40–150)
ALT SERPL W P-5'-P-CCNC: 19 U/L (ref 0–50)
ANION GAP SERPL CALCULATED.3IONS-SCNC: 11 MMOL/L (ref 3–14)
AST SERPL W P-5'-P-CCNC: 16 U/L (ref 0–45)
BASOPHILS # BLD AUTO: 0 10E9/L (ref 0–0.2)
BASOPHILS NFR BLD AUTO: 0.1 %
BILIRUB SERPL-MCNC: 0.2 MG/DL (ref 0.2–1.3)
BUN SERPL-MCNC: 10 MG/DL (ref 7–30)
CALCIUM SERPL-MCNC: 8.4 MG/DL (ref 8.5–10.1)
CHLORIDE SERPL-SCNC: 108 MMOL/L (ref 94–109)
CO2 SERPL-SCNC: 24 MMOL/L (ref 20–32)
CREAT SERPL-MCNC: 0.55 MG/DL (ref 0.52–1.04)
DIFFERENTIAL METHOD BLD: ABNORMAL
EOSINOPHIL # BLD AUTO: 0 10E9/L (ref 0–0.7)
EOSINOPHIL NFR BLD AUTO: 0 %
ERYTHROCYTE [DISTWIDTH] IN BLOOD BY AUTOMATED COUNT: 12.9 % (ref 10–15)
GFR SERPL CREATININE-BSD FRML MDRD: ABNORMAL ML/MIN/1.7M2
GLUCOSE SERPL-MCNC: 115 MG/DL (ref 70–99)
HCT VFR BLD AUTO: 37.4 % (ref 35–47)
HGB BLD-MCNC: 12.9 G/DL (ref 11.7–15.7)
IMM GRANULOCYTES # BLD: 0 10E9/L (ref 0–0.4)
IMM GRANULOCYTES NFR BLD: 0.1 %
LYMPHOCYTES # BLD AUTO: 0.3 10E9/L (ref 0.8–5.3)
LYMPHOCYTES NFR BLD AUTO: 3.4 %
MCH RBC QN AUTO: 32.3 PG (ref 26.5–33)
MCHC RBC AUTO-ENTMCNC: 34.5 G/DL (ref 31.5–36.5)
MCV RBC AUTO: 94 FL (ref 78–100)
MONOCYTES # BLD AUTO: 0.1 10E9/L (ref 0–1.3)
MONOCYTES NFR BLD AUTO: 0.5 %
NEUTROPHILS # BLD AUTO: 9.2 10E9/L (ref 1.6–8.3)
NEUTROPHILS NFR BLD AUTO: 95.9 %
NRBC # BLD AUTO: 0 10*3/UL
NRBC BLD AUTO-RTO: 0 /100
PLATELET # BLD AUTO: 407 10E9/L (ref 150–450)
POTASSIUM SERPL-SCNC: 3.4 MMOL/L (ref 3.4–5.3)
PROT SERPL-MCNC: 7.9 G/DL (ref 6.8–8.8)
RBC # BLD AUTO: 3.99 10E12/L (ref 3.8–5.2)
SODIUM SERPL-SCNC: 143 MMOL/L (ref 133–144)
WBC # BLD AUTO: 9.6 10E9/L (ref 4–11)

## 2017-07-21 PROCEDURE — 85025 COMPLETE CBC W/AUTO DIFF WBC: CPT | Performed by: INTERNAL MEDICINE

## 2017-07-21 PROCEDURE — 80053 COMPREHEN METABOLIC PANEL: CPT | Performed by: INTERNAL MEDICINE

## 2017-07-27 NOTE — PROGRESS NOTES
This is a recent snapshot of the patient's [facility] medical record.  For current drug dose and complete information and questions, call [phone] or In Basket pool, [facility] home infusion ([id number])  CSN Number:  [id number]

## 2017-08-14 DIAGNOSIS — M06.09 RHEUMATOID ARTHRITIS OF MULTIPLE SITES WITHOUT RHEUMATOID FACTOR (H): ICD-10-CM

## 2017-08-14 NOTE — TELEPHONE ENCOUNTER
methotrexate 2.5 MG tablet    Date Last Filled: 05/24/2017  QTY: 72    Natalie Northfield  [facility] [place]

## 2017-08-15 NOTE — TELEPHONE ENCOUNTER
Methotrexate        Last Written Prescription Date:  09-12-16  Last Fill Quantity: 72,   # refills: 1  Last Office Visit with [facility] prescribing provider: 03-08-17  Future Office visit:  Last office visit note indicates pt to f/u in 6 months (Sept) last labs done 07-21-17.       Routing refill request to provider for review/approval because:  Drug not on the [facility] refill protocol or controlled substance    Amparo Maldonado  [facility] RN

## 2017-08-15 NOTE — TELEPHONE ENCOUNTER
Called pt left message to return call.  Pt needs to schedule a 6 month f/u visit with Dr. Cano.    Amparo Maldonado  [facility] RN

## 2017-08-16 NOTE — TELEPHONE ENCOUNTER
Appt scheduled on 09-11-17 with Dr. Cano.    Amparo Maldonado  [facility] RN

## 2017-09-10 DIAGNOSIS — M06.9 RHEUMATOID ARTHRITIS, INVOLVING UNSPECIFIED SITE, UNSPECIFIED RHEUMATOID FACTOR PRESENCE: ICD-10-CM

## 2017-09-11 ENCOUNTER — OFFICE VISIT (OUTPATIENT)
Dept: RHEUMATOLOGY | Facility: CLINIC | Age: 47
End: 2017-09-11
Payer: COMMERCIAL

## 2017-09-11 VITALS
RESPIRATION RATE: 16 BRPM | BODY MASS INDEX: 23.38 KG/M2 | WEIGHT: 132 LBS | HEART RATE: 92 BPM | OXYGEN SATURATION: 99 % | DIASTOLIC BLOOD PRESSURE: 103 MMHG | SYSTOLIC BLOOD PRESSURE: 140 MMHG

## 2017-09-11 DIAGNOSIS — R03.0 ELEVATED BLOOD PRESSURE READING WITHOUT DIAGNOSIS OF HYPERTENSION: Primary | ICD-10-CM

## 2017-09-11 DIAGNOSIS — M06.09 RHEUMATOID ARTHRITIS OF MULTIPLE SITES WITHOUT RHEUMATOID FACTOR (H): ICD-10-CM

## 2017-09-11 PROCEDURE — 99214 OFFICE O/P EST MOD 30 MIN: CPT | Performed by: INTERNAL MEDICINE

## 2017-09-11 RX ORDER — IRBESARTAN 75 MG/1
75 TABLET ORAL DAILY
Qty: 30 TABLET | Refills: 1 | Status: SHIPPED | OUTPATIENT
Start: 2017-09-11 | End: 2017-09-27

## 2017-09-11 NOTE — NURSING NOTE
"Chief Complaint   Patient presents with     Recheck Medication     JRA       Initial BP (!) 145/98 (BP Location: Right arm, Patient Position: Chair)  Pulse 92  Resp 16  Wt 132 lb (59.9 kg)  SpO2 99%  BMI 23.38 kg/m2 Estimated body mass index is 23.38 kg/(m^2) as calculated from the following:    Height as of 1/3/17: 5' 3\" (1.6 m).    Weight as of this encounter: 132 lb (59.9 kg).  Medication Reconciliation: complete   Anna Goodman LPN    "

## 2017-09-11 NOTE — MR AVS SNAPSHOT
"              After Visit Summary   9/11/2017    Noni Mccormick    MRN: [id number]           Patient Information     Date Of Birth          1970        Visit Information        Provider Department      9/11/2017 2:45 PM Ken Cano MD [facility]        Today's Diagnoses     Elevated blood pressure reading without diagnosis of hypertension    -  1    Rheumatoid arthritis of multiple sites without rheumatoid factor (H)           Follow-ups after your visit        Your next 10 appointments already scheduled     Sep 27, 2017  3:40 PM CDT   SHORT with Sophia Baugh MD   [facility]    [address] [address] [phone]              Who to contact     If you have questions or need follow up information about today's clinic visit or your schedule please contact [facility] directly at [phone].  Normal or non-critical lab and imaging results will be communicated to you by MyChart, letter or phone within 4 business days after the clinic has received the results. If you do not hear from us within 7 days, please contact the clinic through MyChart or phone. If you have a critical or abnormal lab result, we will notify you by phone as soon as possible.  Submit refill requests through StackSafe or call your pharmacy and they will forward the refill request to us. Please allow 3 business days for your refill to be completed.          Additional Information About Your Visit        MyChart Information     StackSafe lets you send messages to your doctor, view your test results, renew your prescriptions, schedule appointments and more. To sign up, go to [email or web address] . Click on \"Log in\" on the left side of the screen, which will take you to the Welcome page. Then click on \"Sign up Now\" on the right side of the page.     You will be asked to enter the access code listed below, as well as some personal " information. Please follow the directions to create your username and password.     Your access code is: [email or web address]  Expires: 2017 10:22 AM     Your access code will  in 90 days. If you need help or a new code, please call your [place] clinic or [phone].        Care EveryWhere ID     This is your Care EveryWhere ID. This could be used by other organizations to access your [place] medical records  [id number]        Your Vitals Were     Pulse Respirations Pulse Oximetry BMI (Body Mass Index)          92 16 99% 23.38 kg/m2         Blood Pressure from Last 3 Encounters:   17 (!) 140/103   17 122/90   17 138/84    Weight from Last 3 Encounters:   17 59.9 kg (132 lb)   17 60.8 kg (134 lb)   17 60.3 kg (133 lb)              Today, you had the following     No orders found for display         Today's Medication Changes          These changes are accurate as of: 17 11:59 PM.  If you have any questions, ask your nurse or doctor.               Start taking these medicines.        Dose/Directions    irbesartan 75 MG tablet   Commonly known as:  AVAPRO   Used for:  Elevated blood pressure reading without diagnosis of hypertension   Started by:  Ken Cano MD        Dose:  75 mg   Take 1 tablet (75 mg) by mouth daily   Quantity:  30 tablet   Refills:  1            Where to get your medicines      These medications were sent to [place] [address] [place] [address] [address]    Hours:  M-F 9-7 SAT 9-6 SUN 11-3 Phone:  [phone]     irbesartan 75 MG tablet    methotrexate 2.5 MG tablet CHEMO                Primary Care Provider Office Phone # Fax #    Sophia Baugh [phone] [address] [facility] [address]        Equal Access to Services     ROSE JORGE AH: Pierre vincent Sochristina, waaxda luqadaha, qaybta radu pennington, flor meade  lalio deleon. So [facility] [phone].    ATENCIÓN: Si habla rupa, tiene a gutierrez disposición servicios gratuitos de asistencia lingüística. [place] [phone].    We comply with applicable federal civil rights laws and [place] laws. We do not discriminate on the basis of race, color, national origin, age, disability sex, sexual orientation or gender identity.            Thank you!     Thank you for choosing [facility]  for your care. Our goal is always to provide you with excellent care. Hearing back from our patients is one way we can continue to improve our services. Please take a few minutes to complete the written survey that you may receive in the mail after your visit with us. Thank you!             Your Updated Medication List - Protect others around you: Learn how to safely use, store and throw away your medicines at www.disposemymeds.org.          This list is accurate as of: 9/11/17 11:59 PM.  Always use your most recent med list.                   Brand Name Dispense Instructions for use Diagnosis    albuterol 108 (90 BASE) MCG/ACT Inhaler    PROAIR HFA/PROVENTIL HFA/VENTOLIN HFA    1 Inhaler    Inhale 2 puffs into the lungs every 6 hours as needed for shortness of breath / dyspnea    Intermittent asthma       folic acid 1 MG tablet    FOLVITE    90 tablet    Take 1 tablet (1 mg) by mouth daily    Rheumatoid arthritis, involving unspecified site, unspecified rheumatoid factor presence (H)       inFLIXimab 100 MG injection    REMICADE    30 mL    Inject 300 mg into the vein as needed    Chronic polyarticular juvenile rheumatoid arthritis (H)       irbesartan 75 MG tablet    AVAPRO    30 tablet    Take 1 tablet (75 mg) by mouth daily    Elevated blood pressure reading without diagnosis of hypertension       levothyroxine 112 MCG tablet    SYNTHROID/LEVOTHROID    90 tablet    Take 1 tablet (112 mcg) by mouth daily    Other specified hypothyroidism       methotrexate 2.5 MG tablet CHEMO     72  tablet    6 tablets by mouth once weekly    Rheumatoid arthritis of multiple sites without rheumatoid factor (H)       norethin-eth estrad triphasic 0.5/1/0.5-35 MG-MCG per tablet    CHRISSY    84 tablet    Take 1 tablet by mouth daily    Encounter for surveillance of contraceptives       vitamin D 1000 UNITS capsule      Take 1 capsule by mouth daily.

## 2017-09-12 RX ORDER — FOLIC ACID 1 MG/1
TABLET ORAL
Qty: 90 TABLET | Refills: 1 | Status: SHIPPED | OUTPATIENT
Start: 2017-09-12 | End: 2018-02-08

## 2017-09-12 NOTE — PROGRESS NOTES
REASON FOR VISIT:  Noni Mcnair is seen back in follow-up for chronic juvenile arthritis.      INTERVAL HISTORY:  She is doing reasonably well and does not really have any complaints as far as her arthritis is concerned.  However, it has been noted on her home infusions that she is hypertensive, and today, in fact, her blood pressure initially was 138/98, and with repeat it was 140/103.      She is otherwise on Remicade every 5 weeks, it is lasting the full interval, and methotrexate 15 mg weekly.  There is no nausea, vomiting, diarrhea, or stomatitis.  There is no progressive dyspnea or chronic cough.      PHYSICAL EXAM:   VITAL SIGNS:  Blood pressure is as noted.  Vital signs are otherwise stable.   NECK:  Supple without lymphadenopathy.   LUNGS:  Clear.   HEART:  Regular.   JOINTS:  There are deformities in her hands that are longstanding.  No active synovitis is noted.   SKIN:  Without lesions.      IMPRESSION:   1.  Chronic polyarticular juvenile arthritis.   2.  Hypertension.      RECOMMENDATIONS:   1.  We will start her on Avapro 75 mg day.  She will schedule a follow-up with her primary care physician to reassess in two weeks.   2.  Continue methotrexate.   3.  Check methotrexate labs with infusions every 10 weeks.   4.  Continue infusions every 5 weeks.         ANGELA RUANO MD             D: 2017 15:53   T: 2017 01:28   MT: KAYLA#101      Name:     NONI MCNAIR   MRN:      [id number]        Account:      [id number]   :      1970           Visit Date:   2017      Document: [id number]       cc: Sophia Baugh MD

## 2017-09-26 NOTE — PROGRESS NOTES
SUBJECTIVE:   Noni Mccormick is a 47 year old female who presents to clinic today for the following health issues:        Hypertension Follow-up      Outpatient blood pressures are being checked at store.  Results are <140/90.    Low Salt Diet: no added salt    Asthma Follow-Up    Was ACT completed today?    Yes    ACT Total Scores 9/27/2017   ACT TOTAL SCORE -   ASTHMA ER VISITS -   ASTHMA HOSPITALIZATIONS -   ACT TOTAL SCORE (Goal Greater than or Equal to 20) 18   In the past 12 months, how many times did you visit the emergency room for your asthma without being admitted to the hospital? 0   In the past 12 months, how many times were you hospitalized overnight because of your asthma? 0       Recent asthma triggers that patient is dealing with: dust mites, pollens, animal dander, humidity and Gastric Reflux              Amount of exercise or physical activity: climbing stairs at work. No set work out    Problems taking medications regularly: No    Medication side effects: none  Diet: regular (no restrictions)    Pt started on med for BP by rheumatology and pt is following up here  Is checking home BP and all at goal. Normal BP here  Pt tolerates medication  labwork utd  Will refill meds      Problem list and histories reviewed & adjusted, as indicated.  Additional history: as documented    Labs reviewed in EPIC    Reviewed and updated as needed this visit by clinical staffTobacco  Allergies  Meds  Med Hx  Surg Hx  Fam Hx  Soc Hx      Reviewed and updated as needed this visit by Provider         ROS:  Constitutional, HEENT, cardiovascular, pulmonary, gi and gu systems are negative, except as otherwise noted.      OBJECTIVE:   /78  Pulse 90  Temp 98.8  F (37.1  C) (Oral)  Wt 133 lb 6.4 oz (60.5 kg)  SpO2 100%  BMI 23.63 kg/m2  Body mass index is 23.63 kg/(m^2).  GENERAL: healthy, alert and no distress  NECK: no adenopathy, no asymmetry, masses, or scars and thyroid normal to palpation  RESP:  lungs clear to auscultation - no rales, rhonchi or wheezes  CV: regular rate and rhythm, normal S1 S2, no S3 or S4, no murmur, click or rub, no peripheral edema and peripheral pulses strong  ABDOMEN: soft, nontender, no hepatosplenomegaly, no masses and bowel sounds normal  MS: no gross musculoskeletal defects noted, no edema    Diagnostic Test Results:  none     ASSESSMENT/PLAN:     1. Hypertension goal BP (blood pressure) < 140/90  Continue meds, BP well controlled and pt tolerating  - irbesartan (AVAPRO) 75 MG tablet; Take 1 tablet (75 mg) by mouth daily  Dispense: 90 tablet; Refill: 1    2. Intermittent asthma, uncomplicated  Use as needed, Fu if using more then 4 times per week.  - albuterol (PROAIR HFA/PROVENTIL HFA/VENTOLIN HFA) 108 (90 BASE) MCG/ACT Inhaler; Inhale 2 puffs into the lungs every 6 hours as needed for shortness of breath / dyspnea  Dispense: 1 Inhaler; Refill: 4  - Asthma Action Plan (AAP)        Sophia Horne MD  [facility]

## 2017-09-27 ENCOUNTER — OFFICE VISIT (OUTPATIENT)
Dept: FAMILY MEDICINE | Facility: CLINIC | Age: 47
End: 2017-09-27
Payer: COMMERCIAL

## 2017-09-27 VITALS
BODY MASS INDEX: 23.63 KG/M2 | DIASTOLIC BLOOD PRESSURE: 78 MMHG | OXYGEN SATURATION: 100 % | WEIGHT: 133.4 LBS | HEART RATE: 90 BPM | TEMPERATURE: 98.8 F | SYSTOLIC BLOOD PRESSURE: 126 MMHG

## 2017-09-27 DIAGNOSIS — I10 HYPERTENSION GOAL BP (BLOOD PRESSURE) < 140/90: Primary | ICD-10-CM

## 2017-09-27 DIAGNOSIS — J45.20 INTERMITTENT ASTHMA, UNCOMPLICATED: ICD-10-CM

## 2017-09-27 PROCEDURE — 99213 OFFICE O/P EST LOW 20 MIN: CPT | Performed by: FAMILY MEDICINE

## 2017-09-27 RX ORDER — IRBESARTAN 75 MG/1
75 TABLET ORAL DAILY
Qty: 90 TABLET | Refills: 1 | Status: SHIPPED | OUTPATIENT
Start: 2017-09-27 | End: 2018-06-23

## 2017-09-27 RX ORDER — ALBUTEROL SULFATE 90 UG/1
2 AEROSOL, METERED RESPIRATORY (INHALATION) EVERY 6 HOURS PRN
Qty: 1 INHALER | Refills: 4 | Status: SHIPPED | OUTPATIENT
Start: 2017-09-27 | End: 2019-02-05

## 2017-09-27 RX ORDER — IRBESARTAN 75 MG/1
75 TABLET ORAL DAILY
Qty: 30 TABLET | Refills: 1 | Status: SHIPPED | OUTPATIENT
Start: 2017-09-27 | End: 2017-09-27

## 2017-09-27 NOTE — MR AVS SNAPSHOT
"              After Visit Summary   2017    Noni Mccormick    MRN: [id number]           Patient Information     Date Of Birth          1970        Visit Information        Provider Department      2017 3:40 PM Sophia Baugh MD [facility]        Today's Diagnoses     Hypertension goal BP (blood pressure) < 140/90    -  1    Intermittent asthma, uncomplicated           Follow-ups after your visit        Who to contact     If you have questions or need follow up information about today's clinic visit or your schedule please contact [facility] directly at [phone].  Normal or non-critical lab and imaging results will be communicated to you by [employer], letter or phone within 4 business days after the clinic has received the results. If you do not hear from us within 7 days, please contact the clinic through [employer] or phone. If you have a critical or abnormal lab result, we will notify you by phone as soon as possible.  Submit refill requests through [employer] or call your pharmacy and they will forward the refill request to us. Please allow 3 business days for your refill to be completed.          Additional Information About Your Visit        MyChart Information     [employer] lets you send messages to your doctor, view your test results, renew your prescriptions, schedule appointments and more. To sign up, go to [email or web address] . Click on \"Log in\" on the left side of the screen, which will take you to the Welcome page. Then click on \"Sign up Now\" on the right side of the page.     You will be asked to enter the access code listed below, as well as some personal information. Please follow the directions to create your username and password.     Your access code is: [email or web address]  Expires: 2017 10:22 AM     Your access code will  in 90 days. If you need help or a new code, please call your [facility] or [phone].        Care EveryWhere ID     " This is your Care EveryWhere ID. This could be used by other organizations to access your [place] medical records  [id number]        Your Vitals Were     Pulse Temperature Pulse Oximetry BMI (Body Mass Index)          90 98.8  F (37.1  C) (Oral) 100% 23.63 kg/m2         Blood Pressure from Last 3 Encounters:   09/27/17 126/78   09/11/17 (!) 140/103   03/08/17 122/90    Weight from Last 3 Encounters:   09/27/17 133 lb 6.4 oz (60.5 kg)   09/11/17 132 lb (59.9 kg)   03/08/17 134 lb (60.8 kg)              We Performed the Following     Asthma Action Plan (AAP)          Today's Medication Changes          These changes are accurate as of: 9/27/17  6:02 PM.  If you have any questions, ask your nurse or doctor.               Start taking these medicines.        Dose/Directions    irbesartan 75 MG tablet   Commonly known as:  AVAPRO   Used for:  Hypertension goal BP (blood pressure) < 140/90   Started by:  Sophia Baugh MD        Dose:  75 mg   Take 1 tablet (75 mg) by mouth daily   Quantity:  90 tablet   Refills:  1            Where to get your medicines      These medications were sent to [place] [address] [place] [address] [address]    Hours:  M-F 9-7 SAT 9-6 SUN 11-3 Phone:  [phone]     albuterol 108 (90 BASE) MCG/ACT Inhaler    irbesartan 75 MG tablet                Primary Care Provider Office Phone # Fax #    Sophia Baugh [phone] [address] [facility] [address]        Equal Access to Services     ROSE JORGE AH: Pierre Anne, true barbosa, qaflor cohen. So [facility] [phone].    ATENCIÓN: Si habla español, tiene a gutierrez disposición servicios gratuitos de asistencia lingüística. Ana Maria al [phone].    We comply with applicable federal civil rights laws and [place] laws. We do not discriminate on the basis of race, color, national origin,  age, disability sex, sexual orientation or gender identity.            Thank you!     [facility] you for [facility]  [facility]. Our goal is always to provide you with excellent care. Hearing back from our patients is one way we can continue to improve our services. Please take a few minutes to complete the written survey that you may receive in the mail after your visit with us. Thank you!             Your Updated Medication List - Protect others around you: Learn how to safely use, store and throw away your medicines at www.disposemymeds.org.          This list is accurate as of: 9/27/17  6:02 PM.  Always use your most recent med list.                   Brand Name Dispense Instructions for use Diagnosis    albuterol 108 (90 BASE) MCG/ACT Inhaler    PROAIR HFA/PROVENTIL HFA/VENTOLIN HFA    1 Inhaler    Inhale 2 puffs into the lungs every 6 hours as needed for shortness of breath / dyspnea    Intermittent asthma, uncomplicated       folic acid 1 MG tablet    FOLVITE    90 tablet    TAKE 1 TABLET (1 MG) BY MOUTH DAILY    Rheumatoid arthritis, involving unspecified site, unspecified rheumatoid factor presence (H)       inFLIXimab 100 MG injection    REMICADE    30 mL    Inject 300 mg into the vein as needed    Chronic polyarticular juvenile rheumatoid arthritis (H)       irbesartan 75 MG tablet    AVAPRO    90 tablet    Take 1 tablet (75 mg) by mouth daily    Hypertension goal BP (blood pressure) < 140/90       levothyroxine 112 MCG tablet    SYNTHROID/LEVOTHROID    90 tablet    Take 1 tablet (112 mcg) by mouth daily    Other specified hypothyroidism       methotrexate 2.5 MG tablet CHEMO     72 tablet    6 tablets by mouth once weekly    Rheumatoid arthritis of multiple sites without rheumatoid factor (H)       norethin-eth estrad triphasic 0.5/1/0.5-35 MG-MCG per tablet    CHRISSY    84 tablet    Take 1 tablet by mouth daily    Encounter for surveillance of contraceptives       vitamin D 1000  UNITS capsule      Take 1 capsule by mouth daily.

## 2017-09-27 NOTE — NURSING NOTE
"Chief Complaint   Patient presents with     Hypertension       Initial /78  Pulse 90  Temp 98.8  F (37.1  C) (Oral)  Wt 133 lb 6.4 oz (60.5 kg)  SpO2 100%  BMI 23.63 kg/m2 Estimated body mass index is 23.63 kg/(m^2) as calculated from the following:    Height as of 1/3/17: 5' 3\" (1.6 m).    Weight as of this encounter: 133 lb 6.4 oz (60.5 kg).  Medication Reconciliation: complete    Talya Bocanegra CMA  "

## 2017-09-28 ASSESSMENT — ASTHMA QUESTIONNAIRES: ACT_TOTALSCORE: 18

## 2017-10-02 ENCOUNTER — TELEPHONE (OUTPATIENT)
Dept: FAMILY MEDICINE | Facility: CLINIC | Age: 47
End: 2017-10-02

## 2017-10-02 NOTE — TELEPHONE ENCOUNTER
Patient was in to see Dr. Horne on 09-27-17 and failed their ACT by scoring 18. Please put in the failed ACT workflow for follow-up. Thank you.

## 2017-10-02 NOTE — LETTER
October 27, 2017      Noni Mccormick  [address] [facility] [address]      Dear Noni,     Your clinic record indicates that you are due for an asthma update. We have a survey tool called an ACT (or Asthma Control Test) we use to measure the level of control of your asthma. Please complete the enclosed questionnaire and mail it back to us in the self-addressed stamped envelope.     If you have questions about this letter please contact your provider.     Sincerely,       Your [facility] Team

## 2017-10-06 LAB
ALBUMIN SERPL-MCNC: 3.2 G/DL (ref 3.4–5)
ALP SERPL-CCNC: 32 U/L (ref 40–150)
ALT SERPL W P-5'-P-CCNC: 16 U/L (ref 0–50)
ANION GAP SERPL CALCULATED.3IONS-SCNC: 7 MMOL/L (ref 3–14)
AST SERPL W P-5'-P-CCNC: 38 U/L (ref 0–45)
BASOPHILS # BLD AUTO: 0 10E9/L (ref 0–0.2)
BASOPHILS NFR BLD AUTO: 0.1 %
BILIRUB SERPL-MCNC: 0.3 MG/DL (ref 0.2–1.3)
BUN SERPL-MCNC: 7 MG/DL (ref 7–30)
CALCIUM SERPL-MCNC: 8.1 MG/DL (ref 8.5–10.1)
CHLORIDE SERPL-SCNC: 108 MMOL/L (ref 94–109)
CO2 SERPL-SCNC: 24 MMOL/L (ref 20–32)
CREAT SERPL-MCNC: 0.46 MG/DL (ref 0.52–1.04)
DIFFERENTIAL METHOD BLD: ABNORMAL
EOSINOPHIL # BLD AUTO: 0 10E9/L (ref 0–0.7)
EOSINOPHIL NFR BLD AUTO: 0.1 %
ERYTHROCYTE [DISTWIDTH] IN BLOOD BY AUTOMATED COUNT: 13 % (ref 10–15)
GFR SERPL CREATININE-BSD FRML MDRD: >90 ML/MIN/1.7M2
GLUCOSE SERPL-MCNC: 139 MG/DL (ref 70–99)
HCT VFR BLD AUTO: 39.1 % (ref 35–47)
HGB BLD-MCNC: 13.4 G/DL (ref 11.7–15.7)
IMM GRANULOCYTES # BLD: 0 10E9/L (ref 0–0.4)
IMM GRANULOCYTES NFR BLD: 0.4 %
LYMPHOCYTES # BLD AUTO: 0.3 10E9/L (ref 0.8–5.3)
LYMPHOCYTES NFR BLD AUTO: 4.1 %
MCH RBC QN AUTO: 31.5 PG (ref 26.5–33)
MCHC RBC AUTO-ENTMCNC: 34.3 G/DL (ref 31.5–36.5)
MCV RBC AUTO: 92 FL (ref 78–100)
MONOCYTES # BLD AUTO: 0 10E9/L (ref 0–1.3)
MONOCYTES NFR BLD AUTO: 0.5 %
NEUTROPHILS # BLD AUTO: 7.6 10E9/L (ref 1.6–8.3)
NEUTROPHILS NFR BLD AUTO: 94.8 %
NRBC # BLD AUTO: 0 10*3/UL
NRBC BLD AUTO-RTO: 0 /100
PLATELET # BLD AUTO: 409 10E9/L (ref 150–450)
POTASSIUM SERPL-SCNC: 4.6 MMOL/L (ref 3.4–5.3)
PROT SERPL-MCNC: 8.3 G/DL (ref 6.8–8.8)
RBC # BLD AUTO: 4.26 10E12/L (ref 3.8–5.2)
SODIUM SERPL-SCNC: 139 MMOL/L (ref 133–144)
WBC # BLD AUTO: 8 10E9/L (ref 4–11)

## 2017-10-06 PROCEDURE — 80053 COMPREHEN METABOLIC PANEL: CPT | Performed by: INTERNAL MEDICINE

## 2017-10-06 PROCEDURE — 85025 COMPLETE CBC W/AUTO DIFF WBC: CPT | Performed by: INTERNAL MEDICINE

## 2017-10-18 RX ORDER — ACETAMINOPHEN 325 MG/1
650 TABLET ORAL ONCE
Status: CANCELLED
Start: 2017-10-18 | End: 2017-10-18

## 2017-11-09 ENCOUNTER — HOME INFUSION (PRE-WILLOW HOME INFUSION) (OUTPATIENT)
Dept: PHARMACY | Facility: CLINIC | Age: 47
End: 2017-11-09

## 2017-11-09 ASSESSMENT — ASTHMA QUESTIONNAIRES: ACT_TOTALSCORE: 23

## 2017-11-10 ENCOUNTER — HOME INFUSION (PRE-WILLOW HOME INFUSION) (OUTPATIENT)
Dept: PHARMACY | Facility: CLINIC | Age: 47
End: 2017-11-10

## 2017-11-10 NOTE — PROGRESS NOTES
This is a recent snapshot of the patient's [facility] medical record.  For current drug dose and complete information and questions, call [phone] or In Basket pool, fv home infusion ([id number])  CSN Number:  [id number]

## 2017-11-14 NOTE — PROGRESS NOTES
This is a recent snapshot of the patient's [facility] medical record.  For current drug dose and complete information and questions, call [phone] or In Basket pool, fv home infusion ([id number])  CSN Number:  [id number]

## 2017-12-14 ENCOUNTER — HOME INFUSION (PRE-WILLOW HOME INFUSION) (OUTPATIENT)
Dept: PHARMACY | Facility: CLINIC | Age: 47
End: 2017-12-14

## 2017-12-14 DIAGNOSIS — Z30.40 ENCOUNTER FOR SURVEILLANCE OF CONTRACEPTIVES: ICD-10-CM

## 2017-12-14 RX ORDER — NORETHINDRONE AND ETHINYL ESTRADIOL 7-9-5
KIT ORAL
Qty: 84 TABLET | Refills: 0 | Status: SHIPPED | OUTPATIENT
Start: 2017-12-14 | End: 2018-01-24

## 2017-12-15 ENCOUNTER — HOSPITAL ENCOUNTER (OUTPATIENT)
Dept: LAB | Facility: CLINIC | Age: 47
End: 2017-12-15
Attending: INTERNAL MEDICINE
Payer: COMMERCIAL

## 2017-12-15 ENCOUNTER — HOME INFUSION (PRE-WILLOW HOME INFUSION) (OUTPATIENT)
Dept: PHARMACY | Facility: CLINIC | Age: 47
End: 2017-12-15

## 2017-12-15 NOTE — PROGRESS NOTES
This is a recent snapshot of the patient's [facility] medical record.  For current drug dose and complete information and questions, call [phone] or In Basket pool, fv home infusion ([id number])  CSN Number:  [id number]

## 2017-12-19 NOTE — PROGRESS NOTES
This is a recent snapshot of the patient's [facility] medical record.  For current drug dose and complete information and questions, call [phone] or In Basket pool, fv home infusion ([id number])  CSN Number:  [id number]

## 2017-12-20 ENCOUNTER — TELEPHONE (OUTPATIENT)
Dept: FAMILY MEDICINE | Facility: CLINIC | Age: 47
End: 2017-12-20

## 2017-12-20 DIAGNOSIS — M06.09 RHEUMATOID ARTHRITIS OF MULTIPLE SITES WITHOUT RHEUMATOID FACTOR (H): Primary | ICD-10-CM

## 2017-12-20 RX ORDER — IRBESARTAN 75 MG/1
TABLET ORAL
Qty: 30 TABLET | Refills: 1 | OUTPATIENT
Start: 2017-12-20

## 2017-12-20 NOTE — TELEPHONE ENCOUNTER
Call placed to clarify if [facility] in [place]  (pt has appt with Moreno)  Generally not needed as [facility] refer is good for any of the [facility]. (Unless their insurance has told them they require Provider specific refer)  And they can view all of Dr Cano's notes and office visits.    Left message on answering machine for patient to call back.    Mandy Grey RN  [facility]

## 2017-12-20 NOTE — TELEPHONE ENCOUNTER
Patient states that she would like Dr. Ken Cano to put in a Rheumatology referral to the [facility].

## 2017-12-20 NOTE — TELEPHONE ENCOUNTER
Pt called back stated she called [facility] for an appt and they stated they cannot schedule without a refer in [facility].  Mandy Grey RN  [facility]

## 2017-12-21 DIAGNOSIS — E03.8 OTHER SPECIFIED HYPOTHYROIDISM: ICD-10-CM

## 2017-12-21 NOTE — LETTER
[facility] [place]  [address] [facility] [address]  Phone: [phone]    12/22/17    Noni Mccormick  [address] [facility] [address]      Dear Noni-    Regarding a recent refill request we received- one refill was sent to the pharmacy.  Please keep your upcoming scheduled appointment with Miranda.    Sincerely,      KEENAN Bah CNP

## 2017-12-21 NOTE — TELEPHONE ENCOUNTER
Patient's last AFE with Miranda Uriostegui on 1/3/17.  Patient has upcoming appointment with Miranda on 1/29/18.  Patient requesting extension refill to get her through to this appointment.

## 2017-12-22 RX ORDER — LEVOTHYROXINE SODIUM 112 UG/1
112 TABLET ORAL DAILY
Qty: 90 TABLET | Refills: 0 | Status: SHIPPED | OUTPATIENT
Start: 2017-12-22 | End: 2018-01-24

## 2018-01-01 NOTE — LETTER
[address] [address]  [phone]      January 22, 2018    Noni Mccormick                                                                                                                                                       [address] [place] [address]              Dear Noni,    Your Calcium is on the lower side for the past 8 months.   Your blood glucose and platelet count are elevated.   Otherwise, basic blood cell counts, liver and kidney function labs within normal limits.   Please follow up with your preferred rheumatologist. Dr. Cano is not available in [place] anymore.                      Sincerely,      Tony Gipson MD   Rheumatology

## 2018-01-18 ENCOUNTER — HOME INFUSION (PRE-WILLOW HOME INFUSION) (OUTPATIENT)
Dept: PHARMACY | Facility: CLINIC | Age: 48
End: 2018-01-18

## 2018-01-19 ENCOUNTER — HOME INFUSION (PRE-WILLOW HOME INFUSION) (OUTPATIENT)
Dept: PHARMACY | Facility: CLINIC | Age: 48
End: 2018-01-19

## 2018-01-19 LAB
ALBUMIN SERPL-MCNC: 3.1 G/DL (ref 3.4–5)
ALP SERPL-CCNC: 28 U/L (ref 40–150)
ALT SERPL W P-5'-P-CCNC: 12 U/L (ref 0–50)
ANION GAP SERPL CALCULATED.3IONS-SCNC: 9 MMOL/L (ref 3–14)
AST SERPL W P-5'-P-CCNC: 13 U/L (ref 0–45)
BASOPHILS # BLD AUTO: 0 10E9/L (ref 0–0.2)
BASOPHILS NFR BLD AUTO: 0.1 %
BILIRUB SERPL-MCNC: 0.2 MG/DL (ref 0.2–1.3)
BUN SERPL-MCNC: 9 MG/DL (ref 7–30)
CALCIUM SERPL-MCNC: 8.2 MG/DL (ref 8.5–10.1)
CHLORIDE SERPL-SCNC: 108 MMOL/L (ref 94–109)
CO2 SERPL-SCNC: 24 MMOL/L (ref 20–32)
CREAT SERPL-MCNC: 0.48 MG/DL (ref 0.52–1.04)
DIFFERENTIAL METHOD BLD: ABNORMAL
EOSINOPHIL # BLD AUTO: 0 10E9/L (ref 0–0.7)
EOSINOPHIL NFR BLD AUTO: 0 %
ERYTHROCYTE [DISTWIDTH] IN BLOOD BY AUTOMATED COUNT: 13.3 % (ref 10–15)
GFR SERPL CREATININE-BSD FRML MDRD: >90 ML/MIN/1.7M2
GLUCOSE SERPL-MCNC: 148 MG/DL (ref 70–99)
HCT VFR BLD AUTO: 38 % (ref 35–47)
HGB BLD-MCNC: 12.8 G/DL (ref 11.7–15.7)
IMM GRANULOCYTES # BLD: 0 10E9/L (ref 0–0.4)
IMM GRANULOCYTES NFR BLD: 0.5 %
LYMPHOCYTES # BLD AUTO: 0.2 10E9/L (ref 0.8–5.3)
LYMPHOCYTES NFR BLD AUTO: 2.7 %
MCH RBC QN AUTO: 31.7 PG (ref 26.5–33)
MCHC RBC AUTO-ENTMCNC: 33.7 G/DL (ref 31.5–36.5)
MCV RBC AUTO: 94 FL (ref 78–100)
MONOCYTES # BLD AUTO: 0 10E9/L (ref 0–1.3)
MONOCYTES NFR BLD AUTO: 0.3 %
NEUTROPHILS # BLD AUTO: 8.6 10E9/L (ref 1.6–8.3)
NEUTROPHILS NFR BLD AUTO: 96.4 %
NRBC # BLD AUTO: 0 10*3/UL
NRBC BLD AUTO-RTO: 0 /100
PLATELET # BLD AUTO: 486 10E9/L (ref 150–450)
POTASSIUM SERPL-SCNC: 3.6 MMOL/L (ref 3.4–5.3)
PROT SERPL-MCNC: 7.8 G/DL (ref 6.8–8.8)
RBC # BLD AUTO: 4.04 10E12/L (ref 3.8–5.2)
SODIUM SERPL-SCNC: 141 MMOL/L (ref 133–144)
WBC # BLD AUTO: 8.9 10E9/L (ref 4–11)

## 2018-01-19 PROCEDURE — 80053 COMPREHEN METABOLIC PANEL: CPT | Performed by: INTERNAL MEDICINE

## 2018-01-19 PROCEDURE — 85025 COMPLETE CBC W/AUTO DIFF WBC: CPT | Performed by: INTERNAL MEDICINE

## 2018-01-19 NOTE — PROGRESS NOTES
Please send a letter to the patient with a copy of the lab results and also the following note:  Calcium is on the lower side for the past 8 months.   Your blood glucose and platelet count are elevated.   Otherwise, basic blood cell counts, liver and kidney function labs within normal limits.  Please follow up with your preferred rheumatologist. Dr. Cano is not available in [place] anymore.

## 2018-01-19 NOTE — PROGRESS NOTES
This is a recent snapshot of the patient's [facility] medical record.  For current drug dose and complete information and questions, call [phone] or In Basket pool, fv home infusion ([id number])  CSN Number:  [id number]

## 2018-01-22 ENCOUNTER — PRE VISIT (OUTPATIENT)
Dept: RHEUMATOLOGY | Facility: CLINIC | Age: 48
End: 2018-01-22

## 2018-01-22 NOTE — TELEPHONE ENCOUNTER
PREVISIT INFORMATION                                                    Noni Mccormick scheduled for future visit at [facility].    Patient is scheduled to see Dr Gonzalez on Thurs 1/25/18  Reason for visit: Consult Arthritis, transfer from Dr Cano  Referring provider Dr Cano  Has patient seen previous specialist? Dr Horne  Medical Records:  Available in chart.  Patient was previously seen at a [facility] or [facility] facility.    REVIEW                                                      New patient packet mailed to patient: N/A  Medication reconciliation complete: Yes      Current Outpatient Prescriptions   Medication Sig Dispense Refill     levothyroxine (SYNTHROID/LEVOTHROID) 112 MCG tablet Take 1 tablet (112 mcg) by mouth daily Needs to be seen for further refills. 90 tablet 0     ARANELLE 0.5/1/0.5-35 MG-MCG per tablet TAKE 1 TABLET BY MOUTH DAILY 84 tablet 0     albuterol (PROAIR HFA/PROVENTIL HFA/VENTOLIN HFA) 108 (90 BASE) MCG/ACT Inhaler Inhale 2 puffs into the lungs every 6 hours as needed for shortness of breath / dyspnea 1 Inhaler 4     irbesartan (AVAPRO) 75 MG tablet Take 1 tablet (75 mg) by mouth daily 90 tablet 1     folic acid (FOLVITE) 1 MG tablet TAKE 1 TABLET (1 MG) BY MOUTH DAILY 90 tablet 1     methotrexate 2.5 MG tablet CHEMO 6 tablets by mouth once weekly 72 tablet 1     inFLIXimab (REMICADE) 100 MG injection Inject 300 mg into the vein as needed 30 mL 0     Cholecalciferol (VITAMIN D) 1000 UNIT capsule Take 1 capsule by mouth daily.         Allergies: Amoxicillin      Patient review:  Who is currently managing your care (update PCP if needed)? Dr Horne    Are you currently taking any medications to manage your rheumatology condition? Yes  MTX, Arava, Remicade  If not, have you previously taken any rheumatology medications like DMARD or biologic (type, dates, length of tx, effective or not)? Yes      Are you taking any  medications over-the-counter? No     Update home medications and allergies info: Yes     Have you had any recent rheumatology labs? Yes   Last lab results in chart:    Hemoglobin   Date Value Ref Range Status   01/19/2018 12.8 11.7 - 15.7 g/dL Final   10/06/2017 13.4 11.7 - 15.7 g/dL Final   07/21/2017 12.9 11.7 - 15.7 g/dL Final     Urea Nitrogen   Date Value Ref Range Status   01/19/2018 9 7 - 30 mg/dL Final   10/06/2017 7 7 - 30 mg/dL Final   07/21/2017 10 7 - 30 mg/dL Final     Sed Rate   Date Value Ref Range Status   02/27/2013 28 (H) 0 - 20 mm/h Final   01/02/2013 32 (H) 0 - 20 mm/h Final   11/07/2012 22 (H) 0 - 20 mm/h Final     C-Reactive Protein   Date Value Ref Range Status   11/04/2009 1.2 (A) 0 - 0.8 mg/dL      CRP Inflammation   Date Value Ref Range Status   02/27/2013 15.0 (H) 0.0 - 8.0 mg/L Final   01/02/2013 15.8 (H) 0.0 - 8.0 mg/L Final   11/07/2012 10.3 (H) 0.0 - 8.0 mg/L Final     AST   Date Value Ref Range Status   01/19/2018 13 0 - 45 U/L Final   10/06/2017 38 0 - 45 U/L Final     Comment:     Specimen is hemolyzed which can falsely elevate AST. Analysis of a   non-hemolyzed specimen may result in a lower value.     07/21/2017 16 0 - 45 U/L Final     Albumin   Date Value Ref Range Status   01/19/2018 3.1 (L) 3.4 - 5.0 g/dL Final   10/06/2017 3.2 (L) 3.4 - 5.0 g/dL Final   07/21/2017 3.3 (L) 3.4 - 5.0 g/dL Final     Alkaline Phosphatase   Date Value Ref Range Status   01/19/2018 28 (L) 40 - 150 U/L Final   10/06/2017 32 (L) 40 - 150 U/L Final   07/21/2017 29 (L) 40 - 150 U/L Final     ALT   Date Value Ref Range Status   01/19/2018 12 0 - 50 U/L Final   10/06/2017 16 0 - 50 U/L Final   07/21/2017 19 0 - 50 U/L Final     Rheumatoid Factor   Date Value Ref Range Status   12/13/2010 7 0 - 14 IU/mL Final     Recent Labs   Lab Test  01/19/18   1330  10/06/17   1120  07/21/17   1130   01/03/17   1614   12/16/15   0746   12/09/14   1604   WBC  8.9  8.0  9.6   < >   --    < >   --    < >   --    HGB  12.8   13.4  12.9   < >   --    < >   --    < >   --    HCT  38.0  39.1  37.4   < >   --    < >   --    < >   --    MCV  94  92  94   < >   --    < >   --    < >   --    PLT  486*  409  407   < >   --    < >   --    < >   --    BUN  9  7  10   < >   --    < >   --    < >   --    TSH   --    --    --    --   0.61   --   1.62   --   0.50   AST  13  38  16   < >   --    < >   --    < >   --    ALT  12  16  19   < >   --    < >   --    < >   --    ALKPHOS  28*  32*  29*   < >   --    < >   --    < >   --     < > = values in this interval not displayed.       Color Urine (no units)   Date Value   08/26/2014 Yellow     Appearance Urine (no units)   Date Value   08/26/2014 Clear     Glucose Urine (mg/dL)   Date Value   08/26/2014 Negative     Bilirubin Urine (no units)   Date Value   08/26/2014 Negative     Ketones Urine (mg/dL)   Date Value   08/26/2014 Negative     Specific Gravity Urine (no units)   Date Value   08/26/2014 1.010     pH Urine (pH)   Date Value   08/26/2014 7.0     Protein Albumin Urine (mg/dL)   Date Value   08/26/2014 Negative     Urobilinogen Urine (EU/dL)   Date Value   08/26/2014 0.2     Nitrite Urine (no units)   Date Value   08/26/2014 Negative     Leukocyte Esterase Urine (no units)   Date Value   08/26/2014 Negative       Have you had any recent x-rays related to your visit? No     Are your immunizations up-to-date? Yes    Do you have copy of your immunizations? N/A   Immunization History   Administered Date(s) Administered     Influenza (H1N1) 01/04/2010     Influenza (IIV3) PF 09/20/2010, 10/14/2012     Influenza Vaccine IM 3yrs+ 4 Valent IIV4 10/23/2017     Pneumococcal 23 valent 10/24/2007     TD (ADULT, 7+) 07/13/2004     TDAP Vaccine (Adacel) 11/14/2012           PLAN/FOLLOW-UP NEEDED                                                      Previsit review complete.  Patient will see provider at future scheduled appointment.     Patient Reminders Given:  Please, make sure you bring an updated list of  your medications.   Please, present 15 minutes early.  If you need to cancel or reschedule,please call [phone].

## 2018-01-24 NOTE — PROGRESS NOTES
This is a recent snapshot of the patient's [facility] medical record.  For current drug dose and complete information and questions, call [phone] or In Basket pool, fv home infusion ([id number])  CSN Number:  [id number]

## 2018-01-25 ENCOUNTER — OFFICE VISIT (OUTPATIENT)
Dept: RHEUMATOLOGY | Facility: CLINIC | Age: 48
End: 2018-01-25
Payer: COMMERCIAL

## 2018-01-25 VITALS
SYSTOLIC BLOOD PRESSURE: 129 MMHG | DIASTOLIC BLOOD PRESSURE: 82 MMHG | HEART RATE: 86 BPM | HEIGHT: 63 IN | TEMPERATURE: 97.6 F | BODY MASS INDEX: 23.96 KG/M2 | WEIGHT: 135.2 LBS | OXYGEN SATURATION: 96 %

## 2018-01-25 DIAGNOSIS — M08.00 JUVENILE RHEUMATOID ARTHRITIS (H): Primary | ICD-10-CM

## 2018-01-25 PROCEDURE — 99214 OFFICE O/P EST MOD 30 MIN: CPT | Performed by: STUDENT IN AN ORGANIZED HEALTH CARE EDUCATION/TRAINING PROGRAM

## 2018-01-25 ASSESSMENT — PAIN SCALES - GENERAL: PAINLEVEL: NO PAIN (0)

## 2018-01-25 NOTE — PATIENT INSTRUCTIONS
-- Will continue MTX + Remecaide infusions for JRA     -- Disease in remission.     -- Talk to your PCP regarding chronic cough. Last chest Xray in 2016 was normal.     -- RTC in 4 months

## 2018-01-25 NOTE — MR AVS SNAPSHOT
After Visit Summary   1/25/2018    Noni Mccormick    MRN: [id number]           Patient Information     Date Of Birth          1970        Visit Information        Provider Department      1/25/2018 3:00 PM Sterling Gonzalez MD [facility]        Today's Diagnoses     Juvenile rheumatoid arthritis (H)    -  1      Care Instructions    -- Will continue MTX + Remecaide infusions for JRA     -- Disease in remission.     -- Talk to your PCP regarding chronic cough. Last chest Xray in 2016 was normal.     -- RTC in 4 months           Follow-ups after your visit        Follow-up notes from your care team     Return in about 4 months (around 5/25/2018).      Your next 10 appointments already scheduled     Jan 29, 2018  8:10 AM CST   PHYSICAL with KEENAN Bah [facility]    [address] [facility] [id number] [phone]            Mar 20, 2018 12:40 PM CDT   [facility]    [address] [facility] [address] [phone]            May 23, 2018  4:00 PM CDT   Return Visit with Sterling Gonzalez MD   [facility]    [id number] [place] [address] [phone]              Who to contact     If you have questions or need follow up information about today's clinic visit or your schedule please contact [facility] directly at [phone].  Normal or non-critical lab and imaging results will be communicated to you by MyChart, letter or phone within 4 business days after the clinic has received the results. If you do not hear from us within 7 days, please contact the clinic through MyChart or phone. If you have a critical or abnormal lab result, we will notify you by phone as soon as possible.  Submit refill requests through  "[employer] or call your pharmacy and they will forward the refill request to us. Please allow 3 business days for your refill to be completed.          Additional Information About Your Visit        [employer] Information     [employer] is an electronic gateway that provides easy, online access to your medical records. With [employer], you can request a clinic appointment, read your test results, renew a prescription or communicate with your care team.     To sign up for [employer] visit the website at [email or web address]   You will be asked to enter the access code listed below, as well as some personal information. Please follow the directions to create your username and password.     Your access code is: [email or web address]  Expires: 2018  3:29 PM     Your access code will  in 90 days. If you need help or a new code, please contact your [facility] or call [phone] for assistance.        Care EveryWhere ID     This is your Care EveryWhere ID. This could be used by other organizations to access your [place] medical records  [id number]        Your Vitals Were     Pulse Temperature Height Pulse Oximetry BMI (Body Mass Index)       86 97.6  F (36.4  C) 1.6 m (5' 3\") 96% 23.95 kg/m2        Blood Pressure from Last 3 Encounters:   18 129/82   17 126/78   17 (!) 140/103    Weight from Last 3 Encounters:   18 61.3 kg (135 lb 3.2 oz)   17 60.5 kg (133 lb 6.4 oz)   17 59.9 kg (132 lb)              We Performed the Following     Protein  random urine with Creat Ratio     Routine UA with Micro Reflex to Culture        Primary Care Provider Office Phone # Fax #    Sophia Baugh [phone] [address] [facility] [address]        Equal Access to Services     ROSE JORGE : Pierre Anne, washanice luqjeny, qaybta kaflor sullivan. [facility] [phone].    ATENCIÓN: Si " samreen goode, tiene a gutierrez disposición servicios gratuitos de asistencia lingüística. Ana Maria grove [phone].    We comply with applicable federal civil rights laws and [place] laws. We do not discriminate on the basis of race, color, national origin, age, disability, sex, sexual orientation, or gender identity.            Thank you!     Thank you for choosing [facility]  for your care. Our goal is always to provide you with excellent care. Hearing back from our patients is one way we can continue to improve our services. Please take a few minutes to complete the written survey that you may receive in the mail after your visit with us. Thank you!             Your Updated Medication List - Protect others around you: Learn how to safely use, store and throw away your medicines at www.disposemymeds.org.          This list is accurate as of 1/25/18  3:29 PM.  Always use your most recent med list.                   Brand Name Dispense Instructions for use Diagnosis    albuterol 108 (90 BASE) MCG/ACT Inhaler    PROAIR HFA/PROVENTIL HFA/VENTOLIN HFA    1 Inhaler    Inhale 2 puffs into the lungs every 6 hours as needed for shortness of breath / dyspnea    Intermittent asthma, uncomplicated       ARANELLE 0.5/1/0.5-35 MG-MCG per tablet   Generic drug:  norethin-eth estrad triphasic     84 tablet    TAKE 1 TABLET BY MOUTH DAILY    Encounter for surveillance of contraceptives       folic acid 1 MG tablet    FOLVITE    90 tablet    TAKE 1 TABLET (1 MG) BY MOUTH DAILY    Rheumatoid arthritis, involving unspecified site, unspecified rheumatoid factor presence (H)       inFLIXimab 100 MG injection    REMICADE    30 mL    Inject 300 mg into the vein as needed    Chronic polyarticular juvenile rheumatoid arthritis (H)       irbesartan 75 MG tablet    AVAPRO    90 tablet    Take 1 tablet (75 mg) by mouth daily    Hypertension goal BP (blood pressure) < 140/90       levothyroxine 112 MCG tablet     SYNTHROID/LEVOTHROID    90 tablet    Take 1 tablet (112 mcg) by mouth daily Needs to be seen for further refills.    Other specified hypothyroidism       methotrexate 2.5 MG tablet CHEMO     72 tablet    6 tablets by mouth once weekly    Rheumatoid arthritis of multiple sites without rheumatoid factor (H)       vitamin D 1000 UNITS capsule      Take 1 capsule by mouth daily.

## 2018-01-25 NOTE — LETTER
1/25/2018      RE: Noni Mccormick  [address] [facility] [address]     Dear Colleague,    Thank you for referring your patient, Noni Mccormick, to the [facility]. Please see a copy of my visit note below.    Rheumatology Clinic Visit     Noni Mccormick MRN# [id number]   YOB: 1970 Age: 47 year old     Date of Visit: January 25, 2018  Primary care provider: Sophia Baugh          Assessment and Plan:   Assessment     -- Juvenile rheumatoid arthritis  -- high risk medications - Remicade infusion 300 mg every 5 weeks + methotrexate 15 mg/week  -- Chronic cough  -- Hypothyroidism    Ms Mccormick is 47-year-old female seen in clinic for management of juvenile rheumatoid arthritis.  She was diagnosed with JRA at 3 years of age.  She has been on a stable dose of Remicade 300 mg q. 5 weeks plus methotrexate 15 g per week for more than 10 years.  She denies any side effects with either of these drugs.  She takes Remicade as home infusions and take her methotrexate every Sunday.    She does have a chronic cough for more than 5 years.  She had a chest x-ray to evaluate for methotrexate associated lung disease which And was normal.  She tried antacids which seemed to help to some extent with her cough.  I recommended her to follow-up with her PCP and consider to see ENT or lung specialist for her cough.      Overall her joint pains are well controlled.  JRA is in remission.    Plan    -- Will continue MTX + Remecaide infusions for JRA     -- Disease in remission.     -- Follow up with PCP regarding chronic cough. Last chest Xray in 2016 was normal.     -- RTC in 4 months           Active Problem List:     Patient Active Problem List    Diagnosis Date Noted     Chronic polyarticular juvenile rheumatoid arthritis (H) 07/25/2016     Priority: Medium     Intermittent asthma 09/10/2013     Priority: Medium     Osteopenia 02/14/2013     Priority: Medium     Injury  of left hip 04/12/2012     Priority: Medium     High risk medications (not anticoagulants) long-term use 07/19/2011     Priority: Medium     Nephrolithiasis 06/16/2011     Priority: Medium     CARDIOVASCULAR SCREENING; LDL GOAL LESS THAN 160 10/31/2010     Priority: Medium     Hypothyroidism 10/15/2010     Priority: Medium            History of Present Illness:   Noni Mccormick is a 47 year old female with PMH of JRA seen in the clinic in consultation at request of Sophia Baugh MD for evaluation and management of her JRA.     She was diagnosed with JRA at 3 years of age. No hx of uveitis. Has been on Remeciade and MTX for more than 10 years. No side effects. She was seeing Dr Cano, Last visit was in September 2017.  At that time due to elevated blood pressure she was started on Avapro and since then her blood pressure is controlled.  She has chronic cough for more than 5 years, had a chest x-ray in 2016 which was unremarkable.  Dr. Cano prescribed her antacids to see if GERD is responsible for her symptoms, it seemed to help but then she discontinued it after few weeks.  She denies any history of smoking, chest pain, hemoptysis.    She is on Remicade every 5 weeks, it is lasting the full interval, and methotrexate 15 mg weekly.  There is no nausea, vomiting, diarrhea, or stomatitis.     No history of psoriasis, ulcerative colitis or chron's disease. No h/o iritis, enthesitis, finger or toe swelling like dactylitis, plantar fascitis or heel pain.                  Review of Systems:   Review Of Systems  Constitutional: denies fever, chills, night sweats and weight loss.  Skin: No skin rash.  Eyes: No dryness or irritation in eyes. No episode of eye inflammation or redness.   Ears/Nose/Throat: no recurrent sinus infections.  Respiratory: No shortness of breath, dyspnea on exertion, + cough, or hemoptysis  Cardiovascular: no chest pain or palpitations.  Gastrointestinal: no nausea, vomiting, abdominal pain.   Normal bowel movements.  Genitourinary: no dysuria, frequency  or hematuria.  Musculoskeletal: as in HPI  Neurologic: no numbness, tingling.  Psychiatric: no mood disorders.  Hematologic/Lymphatic/Immunologic: no history of easy bruising, petechia or purpura.  No abnormal bleeding.   Endocrine: no h/o thyroid disease or Diabetes.                  Past Medical History:     Past Medical History:   Diagnosis Date     Contraception      Grave's disease      Hypothyroid      Inflammatory arthritis      Intermittent asthma 9/10/2013     Nephrolithiasis 6/16/2011     Osteopenia 2/14/2013     Rheumatoid arthritis(714.0)      Past Surgical History:   Procedure Laterality Date     C PELVIS/HIP JOINT SURGERY UNLISTED       JOINT REPLACEMENT, HIP RT/LT      (L)     JOINT REPLACEMENT, HIP RT/LT  3/2013    (R)     SURGICAL HISTORY OF -       Lithotripsy            Social History:     Social History     Occupational History     Not on file.     Social History Main Topics     Smoking status: Never Smoker     Smokeless tobacco: Never Used     Alcohol use No     Drug use: No     Sexual activity: Yes     Partners: Male     Birth control/ protection: Pill            Family History:     Family History   Problem Relation Age of Onset     Breast Cancer Mother      C.A.D. Father             Allergies:     Allergies   Allergen Reactions     Amoxicillin Nausea and Cramps            Medications:     Current Outpatient Prescriptions   Medication Sig Dispense Refill     levothyroxine (SYNTHROID/LEVOTHROID) 112 MCG tablet Take 1 tablet (112 mcg) by mouth daily Needs to be seen for further refills. 90 tablet 0     ARANELLE 0.5/1/0.5-35 MG-MCG per tablet TAKE 1 TABLET BY MOUTH DAILY 84 tablet 0     albuterol (PROAIR HFA/PROVENTIL HFA/VENTOLIN HFA) 108 (90 BASE) MCG/ACT Inhaler Inhale 2 puffs into the lungs every 6 hours as needed for shortness of breath / dyspnea 1 Inhaler 4     irbesartan (AVAPRO) 75 MG tablet Take 1 tablet (75 mg) by mouth  "daily 90 tablet 1     folic acid (FOLVITE) 1 MG tablet TAKE 1 TABLET (1 MG) BY MOUTH DAILY 90 tablet 1     methotrexate 2.5 MG tablet CHEMO 6 tablets by mouth once weekly 72 tablet 1     inFLIXimab (REMICADE) 100 MG injection Inject 300 mg into the vein as needed 30 mL 0     Cholecalciferol (VITAMIN D) 1000 UNIT capsule Take 1 capsule by mouth daily.              Physical Exam:   Blood pressure 129/82, pulse 86, temperature 97.6  F (36.4  C), height 1.6 m (5' 3\"), weight 61.3 kg (135 lb 3.2 oz), SpO2 96 %, not currently breastfeeding.  Wt Readings from Last 4 Encounters:   01/25/18 61.3 kg (135 lb 3.2 oz)   09/27/17 60.5 kg (133 lb 6.4 oz)   09/11/17 59.9 kg (132 lb)   03/08/17 60.8 kg (134 lb)       Constitutional: well-developed, appearing stated age; cooperative  Eyes: nl EOM, PERRLA, vision, conjunctiva, sclera  ENT: nl external ears, nose, hearing, lips, teeth, gums, throat  No mucous membrane lesions, normal saliva pool  Neck: no mass or thyroid enlargement  Resp: lungs clear to auscultation, nl to palpation  CV: RRR, no murmurs, rubs or gallops, no edema  GI: no ABD mass or tenderness, no HSM  : not tested  Lymph: no cervical, supraclavicular, inguinal or epitrochlear nodes    MS: All TMJ, neck, shoulder, elbow, wrist, MCP/PIP/DIP, spine, hip, knee, ankle, and foot MTP/IP joints were examined.   -- She has congenital fifth finger flexion contracture B/L.  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  --She has hammertoes on both feet.  -- No active synovitis or deformity present over MCP, PIP joints, wrists, elbows, shoulders, ankles and MTP joints.. Full ROM.  Normal  strength.   -- No dactylitis,  tenosynovitis, enthesopathy.    Skin: no nail pitting, alopecia, rash, nodules or lesions  Neuro: nl cranial nerves, strength, sensation, DTRs.   Psych: nl judgement, orientation, memory, affect.         Data:     Results for orders placed or performed in visit on 01/01/18   CBC with " platelets differential   Result Value Ref Range    WBC 8.9 4.0 - 11.0 10e9/L    RBC Count 4.04 3.8 - 5.2 10e12/L    Hemoglobin 12.8 11.7 - 15.7 g/dL    Hematocrit 38.0 35.0 - 47.0 %    MCV 94 78 - 100 fl    MCH 31.7 26.5 - 33.0 pg    MCHC 33.7 31.5 - 36.5 g/dL    RDW 13.3 10.0 - 15.0 %    Platelet Count 486 (H) 150 - 450 10e9/L    Diff Method Automated Method     % Neutrophils 96.4 %    % Lymphocytes 2.7 %    % Monocytes 0.3 %    % Eosinophils 0.0 %    % Basophils 0.1 %    % Immature Granulocytes 0.5 %    Nucleated RBCs 0 0 /100    Absolute Neutrophil 8.6 (H) 1.6 - 8.3 10e9/L    Absolute Lymphocytes 0.2 (L) 0.8 - 5.3 10e9/L    Absolute Monocytes 0.0 0.0 - 1.3 10e9/L    Absolute Eosinophils 0.0 0.0 - 0.7 10e9/L    Absolute Basophils 0.0 0.0 - 0.2 10e9/L    Abs Immature Granulocytes 0.0 0 - 0.4 10e9/L    Absolute Nucleated RBC 0.0    Comprehensive metabolic panel   Result Value Ref Range    Sodium 141 133 - 144 mmol/L    Potassium 3.6 3.4 - 5.3 mmol/L    Chloride 108 94 - 109 mmol/L    Carbon Dioxide 24 20 - 32 mmol/L    Anion Gap 9 3 - 14 mmol/L    Glucose 148 (H) 70 - 99 mg/dL    Urea Nitrogen 9 7 - 30 mg/dL    Creatinine 0.48 (L) 0.52 - 1.04 mg/dL    GFR Estimate >90 >60 mL/min/1.7m2    GFR Estimate If Black >90 >60 mL/min/1.7m2    Calcium 8.2 (L) 8.5 - 10.1 mg/dL    Bilirubin Total 0.2 0.2 - 1.3 mg/dL    Albumin 3.1 (L) 3.4 - 5.0 g/dL    Protein Total 7.8 6.8 - 8.8 g/dL    Alkaline Phosphatase 28 (L) 40 - 150 U/L    ALT 12 0 - 50 U/L    AST 13 0 - 45 U/L       Recent Labs   Lab Test  01/19/18   1330  10/06/17   1120  07/21/17   1130   02/27/13   0934  01/02/13   0940  11/07/12   0957   WBC  8.9  8.0  9.6   < >  4.6  4.5  5.7   RBC  4.04  4.26  3.99   < >  4.78  4.43  4.71   HGB  12.8  13.4  12.9   < >  14.2  13.1  13.7   HCT  38.0  39.1  37.4   < >  42.5  39.4  41.8   MCV  94  92  94   < >  89  89  89   RDW  13.3  13.0  12.9   < >  12.9  13.0  12.8   PLT  486*  409  407   < >  409  383  448   ALBUMIN  3.1*  3.2*   3.3*   < >  4.4  4.0  4.1   CRP   --    --    --    --   15.0*  15.8*  10.3*   BUN  9  7  10   < >   --    --    --     < > = values in this interval not displayed.      Recent Labs   Lab Test  01/03/17   1614  12/16/15   0746  12/09/14   1604   TSH  0.61  1.62  0.50     Hemoglobin   Date Value Ref Range Status   01/19/2018 12.8 11.7 - 15.7 g/dL Final   10/06/2017 13.4 11.7 - 15.7 g/dL Final   07/21/2017 12.9 11.7 - 15.7 g/dL Final     Urea Nitrogen   Date Value Ref Range Status   01/19/2018 9 7 - 30 mg/dL Final   10/06/2017 7 7 - 30 mg/dL Final   07/21/2017 10 7 - 30 mg/dL Final     Sed Rate   Date Value Ref Range Status   02/27/2013 28 (H) 0 - 20 mm/h Final   01/02/2013 32 (H) 0 - 20 mm/h Final   11/07/2012 22 (H) 0 - 20 mm/h Final     C-Reactive Protein   Date Value Ref Range Status   11/04/2009 1.2 (A) 0 - 0.8 mg/dL      CRP Inflammation   Date Value Ref Range Status   02/27/2013 15.0 (H) 0.0 - 8.0 mg/L Final   01/02/2013 15.8 (H) 0.0 - 8.0 mg/L Final   11/07/2012 10.3 (H) 0.0 - 8.0 mg/L Final     AST   Date Value Ref Range Status   01/19/2018 13 0 - 45 U/L Final   10/06/2017 38 0 - 45 U/L Final     Comment:     Specimen is hemolyzed which can falsely elevate AST. Analysis of a   non-hemolyzed specimen may result in a lower value.     07/21/2017 16 0 - 45 U/L Final     Albumin   Date Value Ref Range Status   01/19/2018 3.1 (L) 3.4 - 5.0 g/dL Final   10/06/2017 3.2 (L) 3.4 - 5.0 g/dL Final   07/21/2017 3.3 (L) 3.4 - 5.0 g/dL Final     Alkaline Phosphatase   Date Value Ref Range Status   01/19/2018 28 (L) 40 - 150 U/L Final   10/06/2017 32 (L) 40 - 150 U/L Final   07/21/2017 29 (L) 40 - 150 U/L Final     ALT   Date Value Ref Range Status   01/19/2018 12 0 - 50 U/L Final   10/06/2017 16 0 - 50 U/L Final   07/21/2017 19 0 - 50 U/L Final     Rheumatoid Factor   Date Value Ref Range Status   12/13/2010 7 0 - 14 IU/mL Final     Recent Labs   Lab Test  01/19/18   1330  10/06/17   1120  07/21/17   1130   01/03/17   1614    12/16/15   0746   12/09/14   1604   WBC  8.9  8.0  9.6   < >   --    < >   --    < >   --    HGB  12.8  13.4  12.9   < >   --    < >   --    < >   --    HCT  38.0  39.1  37.4   < >   --    < >   --    < >   --    MCV  94  92  94   < >   --    < >   --    < >   --    PLT  486*  409  407   < >   --    < >   --    < >   --    BUN  9  7  10   < >   --    < >   --    < >   --    TSH   --    --    --    --   0.61   --   1.62   --   0.50   AST  13  38  16   < >   --    < >   --    < >   --    ALT  12  16  19   < >   --    < >   --    < >   --    ALKPHOS  28*  32*  29*   < >   --    < >   --    < >   --     < > = values in this interval not displayed.     Component      Latest Ref Rng & Units 12/13/2010   Rheumatoid Factor      0 - 14 IU/mL 7       Reviewed Rheumatology lab flowsheet    Sterling Gonzalez MD  [facility]  Department of Rheumatology & Autoimmune Disorders  [facility] [place]: [phone]   Pager - [phone]        Again, thank you for allowing me to participate in the care of your patient.      Sincerely,    Sterling Gonzalez MD

## 2018-01-25 NOTE — PROGRESS NOTES
Rheumatology Clinic Visit     Noni Mccormick MRN# [id number]   YOB: 1970 Age: 47 year old     Date of Visit: January 25, 2018  Primary care provider: Sophia Baugh          Assessment and Plan:   Assessment     -- Juvenile rheumatoid arthritis  -- high risk medications - Remicade infusion 300 mg every 5 weeks + methotrexate 15 mg/week  -- Chronic cough  -- Hypothyroidism    Ms Mccormick is 47-year-old female seen in clinic for management of juvenile rheumatoid arthritis.  She was diagnosed with JRA at 3 years of age.  She has been on a stable dose of Remicade 300 mg q. 5 weeks plus methotrexate 15 g per week for more than 10 years.  She denies any side effects with either of these drugs.  She takes Remicade as home infusions and take her methotrexate every Sunday.    She does have a chronic cough for more than 5 years.  She had a chest x-ray to evaluate for methotrexate associated lung disease which And was normal.  She tried antacids which seemed to help to some extent with her cough.  I recommended her to follow-up with her PCP and consider to see ENT or lung specialist for her cough.      Overall her joint pains are well controlled.  JRA is in remission.    Plan    -- Will continue MTX + Remecaide infusions for JRA     -- Disease in remission.     -- Follow up with PCP regarding chronic cough. Last chest Xray in 2016 was normal.     -- RTC in 4 months           Active Problem List:     Patient Active Problem List    Diagnosis Date Noted     Chronic polyarticular juvenile rheumatoid arthritis (H) 07/25/2016     Priority: Medium     Intermittent asthma 09/10/2013     Priority: Medium     Osteopenia 02/14/2013     Priority: Medium     Injury of left hip 04/12/2012     Priority: Medium     High risk medications (not anticoagulants) long-term use 07/19/2011     Priority: Medium     Nephrolithiasis 06/16/2011     Priority: Medium     CARDIOVASCULAR SCREENING; LDL GOAL LESS THAN 160 10/31/2010      Priority: Medium     Hypothyroidism 10/15/2010     Priority: Medium            History of Present Illness:   Noni Mccormick is a 47 year old female with PMH of JRA seen in the clinic in consultation at request of Sophia Baugh MD for evaluation and management of her JRA.     She was diagnosed with JRA at 3 years of age. No hx of uveitis. Has been on Remeciade and MTX for more than 10 years. No side effects. She was seeing Dr Cano, Last visit was in September 2017.  At that time due to elevated blood pressure she was started on Avapro and since then her blood pressure is controlled.  She has chronic cough for more than 5 years, had a chest x-ray in 2016 which was unremarkable.  Dr. Cano prescribed her antacids to see if GERD is responsible for her symptoms, it seemed to help but then she discontinued it after few weeks.  She denies any history of smoking, chest pain, hemoptysis.    She is on Remicade every 5 weeks, it is lasting the full interval, and methotrexate 15 mg weekly.  There is no nausea, vomiting, diarrhea, or stomatitis.     No history of psoriasis, ulcerative colitis or chron's disease. No h/o iritis, enthesitis, finger or toe swelling like dactylitis, plantar fascitis or heel pain.                  Review of Systems:   Review Of Systems  Constitutional: denies fever, chills, night sweats and weight loss.  Skin: No skin rash.  Eyes: No dryness or irritation in eyes. No episode of eye inflammation or redness.   Ears/Nose/Throat: no recurrent sinus infections.  Respiratory: No shortness of breath, dyspnea on exertion, + cough, or hemoptysis  Cardiovascular: no chest pain or palpitations.  Gastrointestinal: no nausea, vomiting, abdominal pain.  Normal bowel movements.  Genitourinary: no dysuria, frequency  or hematuria.  Musculoskeletal: as in HPI  Neurologic: no numbness, tingling.  Psychiatric: no mood disorders.  Hematologic/Lymphatic/Immunologic: no history of easy bruising, petechia or  purpura.  No abnormal bleeding.   Endocrine: no h/o thyroid disease or Diabetes.                  Past Medical History:     Past Medical History:   Diagnosis Date     Contraception      Grave's disease      Hypothyroid      Inflammatory arthritis      Intermittent asthma 9/10/2013     Nephrolithiasis 6/16/2011     Osteopenia 2/14/2013     Rheumatoid arthritis(714.0)      Past Surgical History:   Procedure Laterality Date     C PELVIS/HIP JOINT SURGERY UNLISTED       JOINT REPLACEMENT, HIP RT/LT      (L)     JOINT REPLACEMENT, HIP RT/LT  3/2013    (R)     SURGICAL HISTORY OF -       Lithotripsy            Social History:     Social History     Occupational History     Not on file.     Social History Main Topics     Smoking status: Never Smoker     Smokeless tobacco: Never Used     Alcohol use No     Drug use: No     Sexual activity: Yes     Partners: Male     Birth control/ protection: Pill            Family History:     Family History   Problem Relation Age of Onset     Breast Cancer Mother      C.A.D. Father             Allergies:     Allergies   Allergen Reactions     Amoxicillin Nausea and Cramps            Medications:     Current Outpatient Prescriptions   Medication Sig Dispense Refill     levothyroxine (SYNTHROID/LEVOTHROID) 112 MCG tablet Take 1 tablet (112 mcg) by mouth daily Needs to be seen for further refills. 90 tablet 0     ARANELLE 0.5/1/0.5-35 MG-MCG per tablet TAKE 1 TABLET BY MOUTH DAILY 84 tablet 0     albuterol (PROAIR HFA/PROVENTIL HFA/VENTOLIN HFA) 108 (90 BASE) MCG/ACT Inhaler Inhale 2 puffs into the lungs every 6 hours as needed for shortness of breath / dyspnea 1 Inhaler 4     irbesartan (AVAPRO) 75 MG tablet Take 1 tablet (75 mg) by mouth daily 90 tablet 1     folic acid (FOLVITE) 1 MG tablet TAKE 1 TABLET (1 MG) BY MOUTH DAILY 90 tablet 1     methotrexate 2.5 MG tablet CHEMO 6 tablets by mouth once weekly 72 tablet 1     inFLIXimab (REMICADE) 100 MG injection Inject 300 mg into the vein as  "needed 30 mL 0     Cholecalciferol (VITAMIN D) 1000 UNIT capsule Take 1 capsule by mouth daily.              Physical Exam:   Blood pressure 129/82, pulse 86, temperature 97.6  F (36.4  C), height 1.6 m (5' 3\"), weight 61.3 kg (135 lb 3.2 oz), SpO2 96 %, not currently breastfeeding.  Wt Readings from Last 4 Encounters:   01/25/18 61.3 kg (135 lb 3.2 oz)   09/27/17 60.5 kg (133 lb 6.4 oz)   09/11/17 59.9 kg (132 lb)   03/08/17 60.8 kg (134 lb)       Constitutional: well-developed, appearing stated age; cooperative  Eyes: nl EOM, PERRLA, vision, conjunctiva, sclera  ENT: nl external ears, nose, hearing, lips, teeth, gums, throat  No mucous membrane lesions, normal saliva pool  Neck: no mass or thyroid enlargement  Resp: lungs clear to auscultation, nl to palpation  CV: RRR, no murmurs, rubs or gallops, no edema  GI: no ABD mass or tenderness, no HSM  : not tested  Lymph: no cervical, supraclavicular, inguinal or epitrochlear nodes    MS: All TMJ, neck, shoulder, elbow, wrist, MCP/PIP/DIP, spine, hip, knee, ankle, and foot MTP/IP joints were examined.   -- She has congenital fifth finger flexion contracture B/L.  [place]-neck deformities present over third fourth fingers bilaterally.  --She has hammertoes on both feet.  -- No active synovitis or deformity present over MCP, PIP joints, wrists, elbows, shoulders, ankles and MTP joints.. Full ROM.  Normal  strength.   -- No dactylitis,  tenosynovitis, enthesopathy.    Skin: no nail pitting, alopecia, rash, nodules or lesions  Neuro: nl cranial nerves, strength, sensation, DTRs.   Psych: nl judgement, orientation, memory, affect.         Data:     Results for orders placed or performed in visit on 01/01/18   CBC with platelets differential   Result Value Ref Range    WBC 8.9 4.0 - 11.0 10e9/L    RBC Count 4.04 3.8 - 5.2 10e12/L    Hemoglobin 12.8 11.7 - 15.7 g/dL    Hematocrit 38.0 35.0 - 47.0 %    MCV 94 78 - 100 fl    MCH 31.7 26.5 - 33.0 pg    MCHC 33.7 31.5 - 36.5 " g/dL    RDW 13.3 10.0 - 15.0 %    Platelet Count 486 (H) 150 - 450 10e9/L    Diff Method Automated Method     % Neutrophils 96.4 %    % Lymphocytes 2.7 %    % Monocytes 0.3 %    % Eosinophils 0.0 %    % Basophils 0.1 %    % Immature Granulocytes 0.5 %    Nucleated RBCs 0 0 /100    Absolute Neutrophil 8.6 (H) 1.6 - 8.3 10e9/L    Absolute Lymphocytes 0.2 (L) 0.8 - 5.3 10e9/L    Absolute Monocytes 0.0 0.0 - 1.3 10e9/L    Absolute Eosinophils 0.0 0.0 - 0.7 10e9/L    Absolute Basophils 0.0 0.0 - 0.2 10e9/L    Abs Immature Granulocytes 0.0 0 - 0.4 10e9/L    Absolute Nucleated RBC 0.0    Comprehensive metabolic panel   Result Value Ref Range    Sodium 141 133 - 144 mmol/L    Potassium 3.6 3.4 - 5.3 mmol/L    Chloride 108 94 - 109 mmol/L    Carbon Dioxide 24 20 - 32 mmol/L    Anion Gap 9 3 - 14 mmol/L    Glucose 148 (H) 70 - 99 mg/dL    Urea Nitrogen 9 7 - 30 mg/dL    Creatinine 0.48 (L) 0.52 - 1.04 mg/dL    GFR Estimate >90 >60 mL/min/1.7m2    GFR Estimate If Black >90 >60 mL/min/1.7m2    Calcium 8.2 (L) 8.5 - 10.1 mg/dL    Bilirubin Total 0.2 0.2 - 1.3 mg/dL    Albumin 3.1 (L) 3.4 - 5.0 g/dL    Protein Total 7.8 6.8 - 8.8 g/dL    Alkaline Phosphatase 28 (L) 40 - 150 U/L    ALT 12 0 - 50 U/L    AST 13 0 - 45 U/L       Recent Labs   Lab Test  01/19/18   1330  10/06/17   1120  07/21/17   1130   02/27/13   0934  01/02/13   0940  11/07/12   0957   WBC  8.9  8.0  9.6   < >  4.6  4.5  5.7   RBC  4.04  4.26  3.99   < >  4.78  4.43  4.71   HGB  12.8  13.4  12.9   < >  14.2  13.1  13.7   HCT  38.0  39.1  37.4   < >  42.5  39.4  41.8   MCV  94  92  94   < >  89  89  89   RDW  13.3  13.0  12.9   < >  12.9  13.0  12.8   PLT  486*  409  407   < >  409  383  448   ALBUMIN  3.1*  3.2*  3.3*   < >  4.4  4.0  4.1   CRP   --    --    --    --   15.0*  15.8*  10.3*   BUN  9  7  10   < >   --    --    --     < > = values in this interval not displayed.      Recent Labs   Lab Test  01/03/17   1614  12/16/15   0746  12/09/14   1604   TSH  0.61   1.62  0.50     Hemoglobin   Date Value Ref Range Status   01/19/2018 12.8 11.7 - 15.7 g/dL Final   10/06/2017 13.4 11.7 - 15.7 g/dL Final   07/21/2017 12.9 11.7 - 15.7 g/dL Final     Urea Nitrogen   Date Value Ref Range Status   01/19/2018 9 7 - 30 mg/dL Final   10/06/2017 7 7 - 30 mg/dL Final   07/21/2017 10 7 - 30 mg/dL Final     Sed Rate   Date Value Ref Range Status   02/27/2013 28 (H) 0 - 20 mm/h Final   01/02/2013 32 (H) 0 - 20 mm/h Final   11/07/2012 22 (H) 0 - 20 mm/h Final     C-Reactive Protein   Date Value Ref Range Status   11/04/2009 1.2 (A) 0 - 0.8 mg/dL      CRP Inflammation   Date Value Ref Range Status   02/27/2013 15.0 (H) 0.0 - 8.0 mg/L Final   01/02/2013 15.8 (H) 0.0 - 8.0 mg/L Final   11/07/2012 10.3 (H) 0.0 - 8.0 mg/L Final     AST   Date Value Ref Range Status   01/19/2018 13 0 - 45 U/L Final   10/06/2017 38 0 - 45 U/L Final     Comment:     Specimen is hemolyzed which can falsely elevate AST. Analysis of a   non-hemolyzed specimen may result in a lower value.     07/21/2017 16 0 - 45 U/L Final     Albumin   Date Value Ref Range Status   01/19/2018 3.1 (L) 3.4 - 5.0 g/dL Final   10/06/2017 3.2 (L) 3.4 - 5.0 g/dL Final   07/21/2017 3.3 (L) 3.4 - 5.0 g/dL Final     Alkaline Phosphatase   Date Value Ref Range Status   01/19/2018 28 (L) 40 - 150 U/L Final   10/06/2017 32 (L) 40 - 150 U/L Final   07/21/2017 29 (L) 40 - 150 U/L Final     ALT   Date Value Ref Range Status   01/19/2018 12 0 - 50 U/L Final   10/06/2017 16 0 - 50 U/L Final   07/21/2017 19 0 - 50 U/L Final     Rheumatoid Factor   Date Value Ref Range Status   12/13/2010 7 0 - 14 IU/mL Final     Recent Labs   Lab Test  01/19/18   1330  10/06/17   1120  07/21/17   1130   01/03/17   1614   12/16/15   0746   12/09/14   1604   WBC  8.9  8.0  9.6   < >   --    < >   --    < >   --    HGB  12.8  13.4  12.9   < >   --    < >   --    < >   --    HCT  38.0  39.1  37.4   < >   --    < >   --    < >   --    MCV  94  92  94   < >   --    < >   --     < >   --    PLT  486*  409  407   < >   --    < >   --    < >   --    BUN  9  7  10   < >   --    < >   --    < >   --    TSH   --    --    --    --   0.61   --   1.62   --   0.50   AST  13  38  16   < >   --    < >   --    < >   --    ALT  12  16  19   < >   --    < >   --    < >   --    ALKPHOS  28*  32*  29*   < >   --    < >   --    < >   --     < > = values in this interval not displayed.     Component      Latest Ref Rng & Units 12/13/2010   Rheumatoid Factor      0 - 14 IU/mL 7       Reviewed Rheumatology lab flowsheet    Sterling Gonzalez MD  [facility]  Department of Rheumatology & Autoimmune Disorders  [facility]: [phone]   Pager - [phone]

## 2018-01-25 NOTE — NURSING NOTE
"Noni Mccormick's goals for this visit include:   She requests these members of her care team be copied on today's visit information:    PCP: Sophia Baugh    Referring Provider:  No referring provider defined for this encounter.    Chief Complaint   Patient presents with     Consult     Transfer from Dr Rachael Winston       Initial /82 (Patient Position: Sitting, Cuff Size: Adult Regular)  Pulse 86  Temp 97.6  F (36.4  C)  Ht 1.6 m (5' 3\")  Wt 61.3 kg (135 lb 3.2 oz)  SpO2 96%  BMI 23.95 kg/m2 Estimated body mass index is 23.95 kg/(m^2) as calculated from the following:    Height as of this encounter: 1.6 m (5' 3\").    Weight as of this encounter: 61.3 kg (135 lb 3.2 oz).  Medication Reconciliation: complete    Do you need any medication refills at today's visit? No    Chief Complaint   Patient presents with     Consult     Transfer from Dr Rachael Winston       "

## 2018-01-29 ENCOUNTER — OFFICE VISIT (OUTPATIENT)
Dept: OBGYN | Facility: CLINIC | Age: 48
End: 2018-01-29
Payer: COMMERCIAL

## 2018-01-29 VITALS
SYSTOLIC BLOOD PRESSURE: 138 MMHG | TEMPERATURE: 97.6 F | WEIGHT: 133.6 LBS | HEART RATE: 88 BPM | DIASTOLIC BLOOD PRESSURE: 84 MMHG | BODY MASS INDEX: 23.67 KG/M2 | HEIGHT: 63 IN

## 2018-01-29 DIAGNOSIS — Z12.39 BREAST CANCER SCREENING: ICD-10-CM

## 2018-01-29 DIAGNOSIS — Z30.41 ENCOUNTER FOR SURVEILLANCE OF CONTRACEPTIVE PILLS: ICD-10-CM

## 2018-01-29 DIAGNOSIS — E03.8 OTHER SPECIFIED HYPOTHYROIDISM: ICD-10-CM

## 2018-01-29 DIAGNOSIS — Z01.419 ENCOUNTER FOR GYNECOLOGICAL EXAMINATION WITHOUT ABNORMAL FINDING: Primary | ICD-10-CM

## 2018-01-29 DIAGNOSIS — Z78.0 ASYMPTOMATIC POSTMENOPAUSAL STATUS: ICD-10-CM

## 2018-01-29 LAB
T4 FREE SERPL-MCNC: 1.54 NG/DL (ref 0.76–1.46)
TSH SERPL DL<=0.005 MIU/L-ACNC: 0.38 MU/L (ref 0.4–4)

## 2018-01-29 PROCEDURE — G0145 SCR C/V CYTO,THINLAYER,RESCR: HCPCS | Performed by: NURSE PRACTITIONER

## 2018-01-29 PROCEDURE — 84443 ASSAY THYROID STIM HORMONE: CPT | Performed by: NURSE PRACTITIONER

## 2018-01-29 PROCEDURE — 99396 PREV VISIT EST AGE 40-64: CPT | Performed by: NURSE PRACTITIONER

## 2018-01-29 PROCEDURE — 84439 ASSAY OF FREE THYROXINE: CPT | Performed by: NURSE PRACTITIONER

## 2018-01-29 PROCEDURE — 87624 HPV HI-RISK TYP POOLED RSLT: CPT | Performed by: NURSE PRACTITIONER

## 2018-01-29 PROCEDURE — 36415 COLL VENOUS BLD VENIPUNCTURE: CPT | Performed by: NURSE PRACTITIONER

## 2018-01-29 PROCEDURE — G0124 SCREEN C/V THIN LAYER BY MD: HCPCS | Performed by: NURSE PRACTITIONER

## 2018-01-29 RX ORDER — LEVOTHYROXINE SODIUM 112 UG/1
112 TABLET ORAL DAILY
Qty: 90 TABLET | Refills: 3 | Status: SHIPPED | OUTPATIENT
Start: 2018-01-29 | End: 2019-02-15

## 2018-01-29 ASSESSMENT — PAIN SCALES - GENERAL: PAINLEVEL: NO PAIN (0)

## 2018-01-29 NOTE — MR AVS SNAPSHOT
After Visit Summary   1/29/2018    Noni Mccormick    MRN: [id number]           Patient Information     Date Of Birth          1970        Visit Information        Provider Department      1/29/2018 8:10 AM Miranda Uriostegui APRN CNP [facility]        Today's Diagnoses     Encounter for gynecological examination without abnormal finding    -  1    Asymptomatic postmenopausal status        Other specified hypothyroidism        Breast cancer screening        Encounter for surveillance of contraceptive pills           Follow-ups after your visit        Your next 10 appointments already scheduled     Mar 20, 2018 12:40 PM CDT   [facility] Visit [facility] Jeffery [facility]no MD   [facility]    [address] [facility] [id number] [phone]            May 23, 2018  4:00 PM CDT   [facility]    [address] [address] [place] [id number] [phone]              Future tests that were ordered for you today     Open Future Orders        Priority Expected Expires Ordered    DEXA HIP/PELVIS/SPINE - Future Routine  1/24/2019 1/29/2018    MA Screening Digital Bilateral Routine  1/29/2019 1/29/2018            Who to contact     If you have questions or need follow up information about today's clinic visit or your schedule please contact [facility] directly at [phone].  Normal or non-critical lab and imaging results will be communicated to you by MyChart, letter or phone within 4 business days after the clinic has received the results. If you do not hear from us within 7 days, please contact the clinic through MyChart or phone. If you have a critical or abnormal lab result, we will notify you by phone as soon as possible.  Submit refill requests through [employer] or call your pharmacy and they will forward the refill  "request to us. Please allow 3 business days for your refill to be completed.          Additional Information About Your Visit        [employer] Information     [employer] lets you send messages to your doctor, view your test results, renew your prescriptions, schedule appointments and more. To sign up, go to [email or web address] . Click on \"Log in\" on the left side of the screen, which will take you to the Welcome page. Then click on \"Sign up Now\" on the right side of the page.     You will be asked to enter the access code listed below, as well as some personal information. Please follow the directions to create your username and password.     Your access code is: [email or web address]  Expires: 2018  3:29 PM     Your access code will  in 90 days. If you need help or a new code, please call your [place] clinic or [phone].        Care EveryWhere ID     This is your Care EveryWhere ID. This could be used by other organizations to access your [place] medical records  [id number]        Your Vitals Were     Pulse Temperature Height Last Period BMI (Body Mass Index)       88 97.6  F (36.4  C) (Oral) 5' 3.25\" (1.607 m) 2018 (Exact Date) 23.48 kg/m2        Blood Pressure from Last 3 Encounters:   18 138/84   18 129/82   17 126/78    Weight from Last 3 Encounters:   18 133 lb 9.6 oz (60.6 kg)   18 135 lb 3.2 oz (61.3 kg)   17 133 lb 6.4 oz (60.5 kg)              We Performed the Following     HPV High Risk Types DNA Cervical     Pap imaged thin layer screen with HPV - recommended age 30 - 65 years (select HPV order below)     TSH WITH FREE T4 REFLEX          Today's Medication Changes          These changes are accurate as of 18  8:34 AM.  If you have any questions, ask your nurse or doctor.               These medicines have changed or have updated prescriptions.        Dose/Directions    levothyroxine 112 MCG tablet   Commonly known as:  SYNTHROID/LEVOTHROID   This " may have changed:  additional instructions   Used for:  Other specified hypothyroidism   Changed by:  Miranda Uriostegui APRN CNP        Dose:  112 mcg   Take 1 tablet (112 mcg) by mouth daily   Quantity:  90 tablet   Refills:  3       norethin-eth estrad triphasic 0.5/1/0.5-35 MG-MCG per tablet   Commonly known as:  ARANELLE   This may have changed:  See the new instructions.   Used for:  Encounter for surveillance of contraceptive pills   Changed by:  Miranda Uriostegui APRN CNP        Dose:  1 tablet   Take 1 tablet by mouth daily   Quantity:  84 tablet   Refills:  3            Where to get your medicines      These medications were sent to [place] [address] [place] [address] [address]    Hours:  M-F 9-7 SAT 9-6 SUN 11-3 Phone:  [phone]     levothyroxine 112 MCG tablet    norethin-eth estrad triphasic 0.5/1/0.5-35 MG-MCG per tablet                Primary Care Provider Office Phone # Fax #    Sophia Baugh [phone] [address] [facility] [address]        Equal Access to Services     [facility] ANIA : Hadii shaun shawo Sochristina, waaxda luqadaha, qaybta kaalmada adeegyada, flor deleon. So [facility] [phone].    ATENCIÓN: Si habla español, tiene a gutierrez disposición servicios gratuitos de asistencia lingüística. [facility] [phone].    We comply with applicable federal civil rights laws and [place] laws. We do not discriminate on the basis of race, color, national origin, age, disability, sex, sexual orientation, or gender identity.            Thank you!     Thank you for choosing [facility]  for your care. Our goal is always to provide you with excellent care. Hearing back from our patients is one way we can continue to improve our services. Please take a few minutes to complete the written survey that you may receive in the mail after your visit with us. Thank you!              Your Updated Medication List - Protect others around you: Learn how to safely use, store and throw away your medicines at www.disposemymeds.org.          This list is accurate as of 1/29/18  8:34 AM.  Always use your most recent med list.                   Brand Name Dispense Instructions for use Diagnosis    albuterol 108 (90 BASE) MCG/ACT Inhaler    PROAIR HFA/PROVENTIL HFA/VENTOLIN HFA    1 Inhaler    Inhale 2 puffs into the lungs every 6 hours as needed for shortness of breath / dyspnea    Intermittent asthma, uncomplicated       folic acid 1 MG tablet    FOLVITE    90 tablet    TAKE 1 TABLET (1 MG) BY MOUTH DAILY    Rheumatoid arthritis, involving unspecified site, unspecified rheumatoid factor presence (H)       inFLIXimab 100 MG injection    REMICADE    30 mL    Inject 300 mg into the vein as needed    Chronic polyarticular juvenile rheumatoid arthritis (H)       irbesartan 75 MG tablet    AVAPRO    90 tablet    Take 1 tablet (75 mg) by mouth daily    Hypertension goal BP (blood pressure) < 140/90       levothyroxine 112 MCG tablet    SYNTHROID/LEVOTHROID    90 tablet    Take 1 tablet (112 mcg) by mouth daily    Other specified hypothyroidism       methotrexate 2.5 MG tablet CHEMO     72 tablet    6 tablets by mouth once weekly    Rheumatoid arthritis of multiple sites without rheumatoid factor (H)       norethin-eth estrad triphasic 0.5/1/0.5-35 MG-MCG per tablet    ARANELLE    84 tablet    Take 1 tablet by mouth daily    Encounter for surveillance of contraceptive pills       vitamin D 1000 UNITS capsule      Take 1 capsule by mouth daily.

## 2018-01-29 NOTE — LETTER
February 7, 2018    Noni Mccormick  [address] [facility] [id number]    Dear Noni,  We are happy to inform you that your PAP smear result from 1/29/18 is normal.  We are now able to do a follow up test on PAP smears. The DNA test is for HPV (Human Papilloma Virus). Cervical cancer is closely linked with certain types of HPV. Your result showed no evidence of high risk HPV.  Therefore we recommend you return in 3 years for your next pap smear and HPV test.  You will still need to return to the clinic every year for an annual exam and other preventive tests.  Please contact the clinic at [phone] with any questions.  Sincerely,    KEENAN Bah CNP/rlm

## 2018-01-29 NOTE — PROGRESS NOTES
SUBJECTIVE:   CC: Noni Mccormick is an 47 year old woman who presents for preventive health visit.     Physical   Annual:     Getting at least 3 servings of Calcium per day::  Yes    Bi-annual eye exam::  Yes    Dental care twice a year::  Yes    Sleep apnea or symptoms of sleep apnea::  None    Diet::  Regular (no restrictions)    Frequency of exercise::  None    Taking medications regularly::  Yes    Medication side effects::  None    Additional concerns today::  No              Today's PHQ-2 Score:   PHQ-2 ( 1999 [employer]izer) 1/29/2018   Q1: Little interest or pleasure in doing things 0   Q2: Feeling down, depressed or hopeless 0   PHQ-2 Score 0   Q1: Little interest or pleasure in doing things Not at all   Q2: Feeling down, depressed or hopeless Not at all   PHQ-2 Score 0       Abuse: Current or Past(Physical, Sexual or Emotional)- No  Do you feel safe in your environment - Yes    Social History   Substance Use Topics     Smoking status: Never Smoker     Smokeless tobacco: Never Used     Alcohol use No     Alcohol Use 1/29/2018   If you drink alcohol, do you typically have greater than 3 drinks per day OR greater than 7 drinks per week?   No       Reviewed orders with patient.  Reviewed health maintenance and updated orders accordingly - Yes  Patient Active Problem List   Diagnosis     Hypothyroidism     CARDIOVASCULAR SCREENING; LDL GOAL LESS THAN 160     Nephrolithiasis     High risk medications (not anticoagulants) long-term use     Injury of left hip     Osteopenia     Intermittent asthma     Chronic polyarticular juvenile rheumatoid arthritis (H)     Past Surgical History:   Procedure Laterality Date     C PELVIS/HIP JOINT SURGERY UNLISTED       JOINT REPLACEMENT, HIP RT/LT      (L)     JOINT REPLACEMENT, HIP RT/LT  3/2013    (R)     SURGICAL HISTORY OF -       Lithotripsy       Social History   Substance Use Topics     Smoking status: Never Smoker     Smokeless tobacco: Never Used     Alcohol use No     " Family History   Problem Relation Age of Onset     Breast Cancer Mother      C.A.D. Father            Patient under age 50, mutual decision reflected in health maintenance.      Pertinent mammograms are reviewed under the imaging tab.  History of abnormal Pap smear:   NO - age 30-65 PAP every 5 years with negative HPV co-testing recommended  Last 3 Pap and HPV Results:   PAP / HPV 12/9/2014 11/2/2011 10/27/2010   PAP NIL NIL ASC-US(A)       Reviewed and updated as needed this visit by clinical staff  Tobacco  Allergies  Meds  Problems  Med Hx  Surg Hx  Fam Hx  Soc Hx          Reviewed and updated as needed this visit by Provider  Allergies  Meds  Problems        Past Medical History:   Diagnosis Date     Contraception      Grave's disease      Hypothyroid      Inflammatory arthritis      Intermittent asthma 9/10/2013     Nephrolithiasis 6/16/2011     Osteopenia 2/14/2013     Rheumatoid arthritis(714.0)       Past Surgical History:   Procedure Laterality Date     C PELVIS/HIP JOINT SURGERY UNLISTED       JOINT REPLACEMENT, HIP RT/LT      (L)     JOINT REPLACEMENT, HIP RT/LT  3/2013    (R)     SURGICAL HISTORY OF -       Lithotripsy       Review of Systems  C: NEGATIVE for fever, chills, change in weight  I: NEGATIVE for worrisome rashes, moles or lesions  E: NEGATIVE for vision changes or irritation  ENT: NEGATIVE for ear, mouth and throat problems  R: NEGATIVE for significant cough or SOB  B: NEGATIVE for masses, tenderness or discharge  CV: NEGATIVE for chest pain, palpitations or peripheral edema  GI: NEGATIVE for nausea, abdominal pain, heartburn, or change in bowel habits  : NEGATIVE for unusual urinary or vaginal symptoms. Periods are regular.  M: NEGATIVE for significant arthralgias or myalgia  N: NEGATIVE for weakness, dizziness or paresthesias  P: NEGATIVE for changes in mood or affect     OBJECTIVE:   /84  Pulse 88  Temp 97.6  F (36.4  C) (Oral)  Ht 5' 3.25\" (1.607 m)  Wt 133 lb 9.6 " oz (60.6 kg)  LMP 01/23/2018 (Exact Date)  BMI 23.48 kg/m2  Physical Exam  GENERAL: healthy, alert and no distress  EYES: Eyes grossly normal to inspection, PERRL and conjunctivae and sclerae normal  HENT: ear canals and TM's normal, nose and mouth without ulcers or lesions  NECK: no adenopathy, no asymmetry, masses, or scars and thyroid normal to palpation  RESP: lungs clear to auscultation - no rales, rhonchi or wheezes  BREAST: normal without masses, tenderness or nipple discharge and no palpable axillary masses or adenopathy  CV: regular rate and rhythm, normal S1 S2, no S3 or S4, no murmur, click or rub, no peripheral edema and peripheral pulses strong  ABDOMEN: soft, nontender, no hepatosplenomegaly, no masses and bowel sounds normal   (female): normal female external genitalia, normal urethral meatus, vaginal mucosa pink, moist, well rugated, and normal cervix/adnexa/uterus without masses or discharge  MS: no gross musculoskeletal defects noted, no edema  SKIN: no suspicious lesions or rashes  NEURO: Normal strength and tone, mentation intact and speech normal  PSYCH: mentation appears normal, affect normal/bright    ASSESSMENT/PLAN:   1. Encounter for gynecological examination without abnormal finding  - Pap imaged thin layer screen with HPV - recommended age 30 - 65 years (select HPV order below)  - HPV High Risk Types DNA Cervical    2. Asymptomatic postmenopausal status  Discussed imaging and patient will schedule  - DEXA HIP/PELVIS/SPINE - Future; Future    3. Other specified hypothyroidism  Refill current dose of medication and check TSH today. If dose adjustment needed, will send in new prescription and notify patient.  - TSH WITH FREE T4 REFLEX  - levothyroxine (SYNTHROID/LEVOTHROID) 112 MCG tablet; Take 1 tablet (112 mcg) by mouth daily  Dispense: 90 tablet; Refill: 3    4. Breast cancer screening  Aware of when imaging is due  - MA Screening Digital Bilateral; Future    5. Encounter for  surveillance of contraceptive pills  No issues with her pills, desires to continue. Refill x 1 year.  - norethin-eth estrad triphasic (ARANELLE) 0.5/1/0.5-35 MG-MCG per tablet; Take 1 tablet by mouth daily  Dispense: 84 tablet; Refill: 3    COUNSELING:  Reviewed preventive health counseling, as reflected in patient instructions  Special attention given to:        Regular exercise       Healthy diet/nutrition       Contraception    Counseling Resources:  ATP IV Guidelines  Pooled Cohorts Equation Calculator  Breast Cancer Risk Calculator  FRAX Risk Assessment  ICSI Preventive Guidelines  Dietary Guidelines for Americans, 2010  USDA's MyPlate  ASA Prophylaxis  Lung CA Screening    KEENAN Bah CNP  [facility]

## 2018-01-29 NOTE — NURSING NOTE
"Chief Complaint   Patient presents with     Physical       Initial /84  Pulse 88  Temp 97.6  F (36.4  C) (Oral)  Ht 5' 3.25\" (1.607 m)  Wt 133 lb 9.6 oz (60.6 kg)  LMP 01/23/2018 (Exact Date)  BMI 23.48 kg/m2 Estimated body mass index is 23.48 kg/(m^2) as calculated from the following:    Height as of this encounter: 5' 3.25\" (1.607 m).    Weight as of this encounter: 133 lb 9.6 oz (60.6 kg)..  BP completed using cuff size: rayna Stockton CMA    "

## 2018-01-29 NOTE — LETTER
February 7, 2018    Noni Mccormick  [address] [address] [address]    Dear Noni,  We are happy to inform you that your PAP smear result from 1/29/18 is normal.  The presence of endometrial cells was seen on your current pap smear.  This is most likely due to your menstrual cycle.  No follow up is recommended unless you have irregular periods or spotting/bleeding in between your cycles.  If this occurs, please contact the clinic for a follow up appointment.  We are now able to do a follow up test on PAP smears. The DNA test is for HPV (Human Papilloma Virus). Cervical cancer is closely linked with certain types of HPV. Your result showed no evidence of high risk HPV.  Therefore we recommend you return in 3 years for your next pap smear and HPV test.  You will still need to return to the clinic every year for an annual exam and other preventive tests.  Please contact the clinic at [phone] with any questions.  Sincerely,    KEENAN Bah CNP/rlm

## 2018-01-30 ENCOUNTER — TELEPHONE (OUTPATIENT)
Dept: OBGYN | Facility: CLINIC | Age: 48
End: 2018-01-30

## 2018-01-30 NOTE — TELEPHONE ENCOUNTER
Return call from pt. Gave results and orders per KEENAN Metzger, CNP as below. Pt verbalized understanding and agreed to follow plan. Lashell Crowder RN, BAN

## 2018-01-30 NOTE — TELEPHONE ENCOUNTER
Notes Recorded by Miranda Uriostegui APRN CNP on 1/30/2018 at 9:41 AM  Please notify patient. Her TSH is just slightly too low and free T4 slightly elevated. As she has been on the same dose of medication for a long time, want to have her continue this dose and recheck in 6 months with lab only appointment. If still abnormal at that time, would consider dose change. Thank you. Miranda HUSSEIN CNP    Left message asking pt to call back to clinic for information. Left clinic call back phone number and RN hours. Lashell Crowder RN, BAN

## 2018-02-01 LAB
COPATH REPORT: NORMAL
PAP: NORMAL

## 2018-02-02 LAB
FINAL DIAGNOSIS: NORMAL
HPV HR 12 DNA CVX QL NAA+PROBE: NEGATIVE
HPV16 DNA SPEC QL NAA+PROBE: NEGATIVE
HPV18 DNA SPEC QL NAA+PROBE: NEGATIVE
SPECIMEN DESCRIPTION: NORMAL
SPECIMEN SOURCE CVX/VAG CYTO: NORMAL

## 2018-02-06 ENCOUNTER — TELEPHONE (OUTPATIENT)
Dept: RHEUMATOLOGY | Facility: CLINIC | Age: 48
End: 2018-02-06

## 2018-02-06 NOTE — PROGRESS NOTES
"  SUBJECTIVE:   Noni Mccormick is a 47 year old female who presents to clinic today for the following health issues:      ENT Symptoms follow up  Patient saw rheumatology and they would like her to see a lung specialist              Symptoms: cc Present Absent Comment   Fever/Chills   x    Fatigue   x    Muscle Aches   x    Eye Irritation   x    Sneezing   x    Nasal Lazaro/Drg   x    Sinus Pressure/Pain   x    Loss of smell   x    Dental pain   x    Sore Throat   x    Swollen Glands   x    Ear Pain/Fullness   x    Cough  x     Wheeze   x    Chest Pain   x    Shortness of breath   x    Rash   x    Other         Symptom duration:  2 years   Symptom severity:  when she eats, several times a day   Treatments tried:  inhaler, drinking water   Contacts:  no       Pt with rheumatoid arthritis and was c/o chronic cough.  Rheumatologist worried about lung disease given meds she is on    Recommended she do trial of prilosec 20 mg daily on empty stomach to r/o GERD as cause of symptoms  If not helpful,consider trial of allergy med like claritin and flonase for 2 weeks  Cold also do trial of stopping BP medication to see if that is causing cough    Albuterol does not help her cough      Problem list and histories reviewed & adjusted, as indicated.  Additional history: as documented    Labs reviewed in EPIC    Reviewed and updated as needed this visit by clinical staff       Reviewed and updated as needed this visit by Provider         ROS:  Constitutional, HEENT, cardiovascular, pulmonary, gi and gu systems are negative, except as otherwise noted.    OBJECTIVE:     /86  Pulse 89  Temp 97.4  F (36.3  C) (Oral)  Ht 5' 3.25\" (1.607 m)  Wt 134 lb (60.8 kg)  LMP 01/23/2018 (Exact Date)  SpO2 99%  BMI 23.55 kg/m2  Body mass index is 23.55 kg/(m^2).  GENERAL: healthy, alert and no distress  HENT: ear canals and TM's normal, nose and mouth without ulcers or lesions  NECK: no adenopathy, no asymmetry, masses, or scars " and thyroid normal to palpation  RESP: lungs clear to auscultation - no rales, rhonchi or wheezes  CV: regular rate and rhythm, normal S1 S2, no S3 or S4, no murmur, click or rub, no peripheral edema and peripheral pulses strong    Diagnostic Test Results:  CXR - negative    ASSESSMENT/PLAN:     1. Chronic cough  As above, fu with pulm if above strategies do not resolve cough  - PULMONARY MEDICINE REFERRAL  - XR Chest 2 Views; Future    2. Mild intermittent asthma without complication    - Asthma Action Plan (AAP)    (M08.3) Chronic polyarticular juvenile rheumatoid arthritis (H)  Comment: per rheum.   Plan:     (Z79.899) High risk medications (not anticoagulants) long-term use  Comment: per rheum  Plan: ? Contributing to cough?        Sophia Horne MD  [facility]

## 2018-02-06 NOTE — TELEPHONE ENCOUNTER
Received faxed orders from [facility] office with notation that Dr. Cano's orders will be expiring and will need new orders.      Dr. Cano is no longer employed with [facility].  I spoke to Lisa with  home infusion and informed her that she would need to fax these orders to be reviewed by her current rheumatologist Dr. Sterling Gonzalez out of the [place] office and to call [phone] to get fax number to there office.    Amparo Maldonado  [facility] RN

## 2018-02-08 ENCOUNTER — RADIANT APPOINTMENT (OUTPATIENT)
Dept: GENERAL RADIOLOGY | Facility: CLINIC | Age: 48
End: 2018-02-08
Attending: FAMILY MEDICINE
Payer: COMMERCIAL

## 2018-02-08 ENCOUNTER — OFFICE VISIT (OUTPATIENT)
Dept: FAMILY MEDICINE | Facility: CLINIC | Age: 48
End: 2018-02-08
Payer: COMMERCIAL

## 2018-02-08 VITALS
OXYGEN SATURATION: 99 % | HEART RATE: 89 BPM | BODY MASS INDEX: 23.74 KG/M2 | TEMPERATURE: 97.4 F | DIASTOLIC BLOOD PRESSURE: 86 MMHG | SYSTOLIC BLOOD PRESSURE: 139 MMHG | HEIGHT: 63 IN | WEIGHT: 134 LBS

## 2018-02-08 DIAGNOSIS — M08.00 JUVENILE RHEUMATOID ARTHRITIS (H): ICD-10-CM

## 2018-02-08 DIAGNOSIS — R05.3 CHRONIC COUGH: ICD-10-CM

## 2018-02-08 DIAGNOSIS — M06.09 RHEUMATOID ARTHRITIS OF MULTIPLE SITES WITHOUT RHEUMATOID FACTOR (H): ICD-10-CM

## 2018-02-08 DIAGNOSIS — R05.3 CHRONIC COUGH: Primary | ICD-10-CM

## 2018-02-08 DIAGNOSIS — Z79.899 HIGH RISK MEDICATIONS (NOT ANTICOAGULANTS) LONG-TERM USE: ICD-10-CM

## 2018-02-08 DIAGNOSIS — J45.20 MILD INTERMITTENT ASTHMA WITHOUT COMPLICATION: ICD-10-CM

## 2018-02-08 DIAGNOSIS — M08.3 CHRONIC POLYARTICULAR JUVENILE RHEUMATOID ARTHRITIS (H): ICD-10-CM

## 2018-02-08 LAB
ALBUMIN UR-MCNC: NEGATIVE MG/DL
APPEARANCE UR: CLEAR
BILIRUB UR QL STRIP: NEGATIVE
COLOR UR AUTO: YELLOW
CREAT UR-MCNC: 11 MG/DL
GLUCOSE UR STRIP-MCNC: NEGATIVE MG/DL
HGB UR QL STRIP: ABNORMAL
KETONES UR STRIP-MCNC: NEGATIVE MG/DL
LEUKOCYTE ESTERASE UR QL STRIP: NEGATIVE
NITRATE UR QL: NEGATIVE
PH UR STRIP: 5.5 PH (ref 5–7)
PROT UR-MCNC: <0.05 G/L
PROT/CREAT 24H UR: NORMAL G/G CR (ref 0–0.2)
RBC #/AREA URNS AUTO: NORMAL /HPF
SOURCE: ABNORMAL
SP GR UR STRIP: <=1.005 (ref 1–1.03)
UROBILINOGEN UR STRIP-ACNC: 0.2 EU/DL (ref 0.2–1)
WBC #/AREA URNS AUTO: NORMAL /HPF

## 2018-02-08 PROCEDURE — 71046 X-RAY EXAM CHEST 2 VIEWS: CPT | Mod: FY

## 2018-02-08 PROCEDURE — 81001 URINALYSIS AUTO W/SCOPE: CPT | Performed by: STUDENT IN AN ORGANIZED HEALTH CARE EDUCATION/TRAINING PROGRAM

## 2018-02-08 PROCEDURE — 99213 OFFICE O/P EST LOW 20 MIN: CPT | Performed by: FAMILY MEDICINE

## 2018-02-08 PROCEDURE — 84156 ASSAY OF PROTEIN URINE: CPT | Performed by: STUDENT IN AN ORGANIZED HEALTH CARE EDUCATION/TRAINING PROGRAM

## 2018-02-08 NOTE — LETTER
My Asthma Action Plan  Name: Noni Mccormick   YOB: 1970  Date: 2/8/2018   My doctor: Sophia Horne MD   My clinic: [facility]        My Control Medicine: None  My Rescue Medicine: Albuterol (Proair/Ventolin/Proventil) inhaler 5dw6ltr   My Asthma Severity: intermittent  Avoid your asthma triggers: Patient is unaware of triggers  dust mites  pollens  animal dander  humidity  Gastric Reflux            GREEN ZONE   Good Control    I feel good    No cough or wheeze    Can work, sleep and play without asthma symptoms       Take your asthma control medicine every day.     1. If exercise triggers your asthma, take your rescue medication    15 minutes before exercise or sports, and    During exercise if you have asthma symptoms  2. Spacer to use with inhaler: If you have a spacer, make sure to use it with your inhaler             YELLOW ZONE Getting Worse  I have ANY of these:    I do not feel good    Cough or wheeze    Chest feels tight    Wake up at night   1. Keep taking your Green Zone medications  2. Start taking your rescue medicine:    every 20 minutes for up to 1 hour. Then every 4 hours for 24-48 hours.  3. If you stay in the Yellow Zone for more than 12-24 hours, contact your doctor.  4. If you do not return to the Green Zone in 12-24 hours or you get worse, start taking your oral steroid medicine if prescribed by your provider.           RED ZONE Medical Alert - Get Help  I have ANY of these:    I feel awful    Medicine is not helping    Breathing getting harder    Trouble walking or talking    Nose opens wide to breathe       1. Take your rescue medicine NOW  2. If your provider has prescribed an oral steroid medicine, start taking it NOW  3. Call your doctor NOW  4. If you are still in the Red Zone after 20 minutes and you have not reached your doctor:    Take your rescue medicine again and    Call 911 or go to the emergency room right away    See your regular doctor within 2  weeks of an Emergency Room or Urgent Care visit for follow-up treatment.        Electronically signed by: Sophia Horne, February 8, 2018    Annual Reminders:  Meet with Asthma Educator,  Flu Shot in the Fall, consider Pneumonia Vaccination for patients with asthma (aged 19 and older).    Pharmacy:    [id number] [place], [place] [facility] [address] [facility] [address] [facility]  [id number] [place] [facility] [address] [address] [facility] [facility]                    Asthma Triggers  How To Control Things That Make Your Asthma Worse    Triggers are things that make your asthma worse.  Look at the list below to help you find your triggers and what you can do about them.  You can help prevent asthma flare-ups by staying away from your triggers.      Trigger                                                          What you can do   Cigarette Smoke  Tobacco smoke can make asthma worse. Do not allow smoking in your home, car or around you.  Be sure no one smokes at a child s day care or school.  If you smoke, ask your health care provider for ways to help you quit.  Ask family members to quit too.  Ask your health care provider for a referral to Quit Plan to help you quit smoking, or call 0-426-997-PLAN.     Colds, Flu, Bronchitis  These are common triggers of asthma. Wash your hands often.  Don t touch your eyes, nose or mouth.  Get a flu shot every year.     Dust Mites  These are tiny bugs that live in cloth or carpet. They are too small to see. Wash sheets and blankets in hot water every week.   Encase pillows and mattress in dust mite proof covers.  Avoid having carpet if you can. If you have carpet, vacuum weekly.   Use a dust mask and HEPA vacuum.   Pollen and Outdoor Mold  Some people are allergic to trees, grass, or weed pollen, or molds. Try to keep your windows closed.  Limit time out doors when pollen count is high.   Ask you health care provider about taking medicine during allergy season.     Animal  Dander  Some people are allergic to skin flakes, urine or saliva from pets with fur or feathers. Keep pets with fur or feathers out of your home.    If you can t keep the pet outdoors, then keep the pet out of your bedroom.  Keep the bedroom door closed.  Keep pets off cloth furniture and away from stuffed toys.     Mice, Rats, and Cockroaches  Some people are allergic to the waste from these pests.   Cover food and garbage.  Clean up spills and food crumbs.  Store grease in the refrigerator.   Keep food out of the bedroom.   Indoor Mold  This can be a trigger if your home has high moisture. Fix leaking faucets, pipes, or other sources of water.   Clean moldy surfaces.  Dehumidify basement if it is damp and smelly.   Smoke, Strong Odors, and Sprays  These can reduce air quality. Stay away from strong odors and sprays, such as perfume, powder, hair spray, paints, smoke incense, paint, cleaning products, candles and new carpet.   Exercise or Sports  Some people with asthma have this trigger. Be active!  Ask your doctor about taking medicine before sports or exercise to prevent symptoms.    Warm up for 5-10 minutes before and after sports or exercise.     Other Triggers of Asthma  Cold air:  Cover your nose and mouth with a scarf.  Sometimes laughing or crying can be a trigger.  Some medicines and food can trigger asthma.

## 2018-02-08 NOTE — MR AVS SNAPSHOT
After Visit Summary   2/8/2018    Noni Mccormick    MRN: [id number]           Patient Information     Date Of Birth          1970        Visit Information        Provider Department      2/8/2018 8:00 AM Sophia Baugh MD [facility]        Today's Diagnoses     Chronic cough    -  1    Mild intermittent asthma without complication           Follow-ups after your visit        Additional Services     PULMONARY MEDICINE REFERRAL       Your provider has referred you to: [facility]: [facility] (Adults & Pediatrics) [facility] [phone]   [email or web address]    Please be aware that coverage of these services is subject to the terms and limitations of your health insurance plan.  Call member services at your health plan with any benefit or coverage questions.      Please bring the following with you to your appointment:    (1) Any X-Rays, CTs or MRIs which have been performed.  Contact the facility where they were done to arrange for  prior to your scheduled appointment.    (2) List of current medications   (3) This referral request   (4) Any documents/labs given to you for this referral                  Your next 10 appointments already scheduled     May 23, 2018  4:00 PM CDT   [facility]    [address] [place] [id number] [phone]              Future tests that were ordered for you today     Open Future Orders        Priority Expected Expires Ordered    XR Chest 2 Views Routine 2/8/2018 2/8/2019 2/8/2018            Who to contact     If you have questions or need follow up information about today's clinic visit or your schedule please contact [facility] directly at [phone].  Normal or non-critical lab and imaging results will be communicated to you by MyChart, letter or phone  "within 4 business days after the clinic has received the results. If you do not hear from us within 7 days, please contact the clinic through [employer] or phone. If you have a critical or abnormal lab result, we will notify you by phone as soon as possible.  Submit refill requests through [employer] or call your pharmacy and they will forward the refill request to us. Please allow 3 business days for your refill to be completed.          Additional Information About Your Visit        [employer] Information     [employer] lets you send messages to your doctor, view your test results, renew your prescriptions, schedule appointments and more. To sign up, go to [email or web address] . Click on \"Log in\" on the left side of the screen, which will take you to the Welcome page. Then click on \"Sign up Now\" on the right side of the page.     You will be asked to enter the access code listed below, as well as some personal information. Please follow the directions to create your username and password.     Your access code is: [email or web address]  Expires: 2018  3:29 PM     Your access code will  in 90 days. If you need help or a new code, please call your [facility] clinic or [phone].        Care EveryWhere ID     This is your Care EveryWhere ID. This could be used by other organizations to access your [facility] medical records  [id number]        Your Vitals Were     Pulse Temperature Height Last Period Pulse Oximetry BMI (Body Mass Index)    89 97.4  F (36.3  C) (Oral) 5' 3.25\" (1.607 m) 2018 (Exact Date) 99% 23.55 kg/m2       Blood Pressure from Last 3 Encounters:   18 139/86   18 138/84   18 129/82    Weight from Last 3 Encounters:   18 134 lb (60.8 kg)   18 133 lb 9.6 oz (60.6 kg)   18 135 lb 3.2 oz (61.3 kg)              We Performed the Following     Asthma Action Plan (AAP)     PULMONARY MEDICINE REFERRAL        Primary Care Provider Office Phone # Fax #    Sophia Baugh MD " [phone]       [address] [facility] [place] [address]        Equal Access to Services     ROSE JORGE : Hadcarlos aad ku hadham Anne, warockyda luivis, qaybta kaalmada aditi, flor law alfredokatie meade laAnamariastephy deleon. So [facility] [phone].    ATENCIÓN: Si habla español, tiene a gutierrez disposición servicios gratuitos de asistencia lingüística. Llame al [phone].    We comply with applicable federal civil rights laws and [place] laws. We do not discriminate on the basis of race, color, national origin, age, disability, sex, sexual orientation, or gender identity.            Thank you!     Thank you for choosing [facility]  for your care. Our goal is always to provide you with excellent care. Hearing back from our patients is one way we can continue to improve our services. Please take a few minutes to complete the written survey that you may receive in the mail after your visit with us. Thank you!             Your Updated Medication List - Protect others around you: Learn how to safely use, store and throw away your medicines at www.disposemymeds.org.          This list is accurate as of 2/8/18  8:31 AM.  Always use your most recent med list.                   Brand Name Dispense Instructions for use Diagnosis    albuterol 108 (90 BASE) MCG/ACT Inhaler    PROAIR HFA/PROVENTIL HFA/VENTOLIN HFA    1 Inhaler    Inhale 2 puffs into the lungs every 6 hours as needed for shortness of breath / dyspnea    Intermittent asthma, uncomplicated       folic acid 1 MG tablet    FOLVITE    90 tablet    TAKE 1 TABLET (1 MG) BY MOUTH DAILY    Rheumatoid arthritis, involving unspecified site, unspecified rheumatoid factor presence (H)       inFLIXimab 100 MG injection    REMICADE    30 mL    Inject 300 mg into the vein as needed    Chronic polyarticular juvenile rheumatoid arthritis (H)       irbesartan 75 MG tablet    AVAPRO    90 tablet    Take 1 tablet (75 mg) by mouth daily    Hypertension goal  BP (blood pressure) < 140/90       levothyroxine 112 MCG tablet    SYNTHROID/LEVOTHROID    90 tablet    Take 1 tablet (112 mcg) by mouth daily    Other specified hypothyroidism       methotrexate 2.5 MG tablet CHEMO     72 tablet    6 tablets by mouth once weekly    Rheumatoid arthritis of multiple sites without rheumatoid factor (H)       norethin-eth estrad triphasic 0.5/1/0.5-35 MG-MCG per tablet    ARANELLE    84 tablet    Take 1 tablet by mouth daily    Encounter for surveillance of contraceptive pills       vitamin D 1000 UNITS capsule      Take 1 capsule by mouth daily.

## 2018-02-08 NOTE — NURSING NOTE
"Chief Complaint   Patient presents with     Cough       Initial /86  Pulse 89  Temp 97.4  F (36.3  C) (Oral)  Ht 5' 3.25\" (1.607 m)  Wt 134 lb (60.8 kg)  LMP 01/23/2018 (Exact Date)  SpO2 99%  BMI 23.55 kg/m2 Estimated body mass index is 23.55 kg/(m^2) as calculated from the following:    Height as of this encounter: 5' 3.25\" (1.607 m).    Weight as of this encounter: 134 lb (60.8 kg).  Medication Reconciliation: complete  Sierra Mccarthy CMA    "

## 2018-02-09 ASSESSMENT — ASTHMA QUESTIONNAIRES: ACT_TOTALSCORE: 23

## 2018-02-09 NOTE — TELEPHONE ENCOUNTER
Medication/Dose:  METHOTREXATE 2.5 MG TABLET  Will file in chart as: methotrexate 2.5 MG tablet CHEMO  6 TABLETS BY MOUTH ONCE WEEKLY  Last Written Prescription Date: 9/11/17  Last Fill Quantity: 72, # refills: 1  Last Office Visit with Rheumatology Provider:  1/25/18      WBC   Date Value Ref Range Status   01/19/2018 8.9 4.0 - 11.0 10e9/L Final     RBC Count   Date Value Ref Range Status   01/19/2018 4.04 3.8 - 5.2 10e12/L Final     Hemoglobin   Date Value Ref Range Status   01/19/2018 12.8 11.7 - 15.7 g/dL Final     Hematocrit   Date Value Ref Range Status   01/19/2018 38.0 35.0 - 47.0 % Final     MCV   Date Value Ref Range Status   01/19/2018 94 78 - 100 fl Final     MCH   Date Value Ref Range Status   01/19/2018 31.7 26.5 - 33.0 pg Final     MCHC   Date Value Ref Range Status   01/19/2018 33.7 31.5 - 36.5 g/dL Final     RDW   Date Value Ref Range Status   01/19/2018 13.3 10.0 - 15.0 % Final     Platelet Count   Date Value Ref Range Status   01/19/2018 486 (H) 150 - 450 10e9/L Final     AST   Date Value Ref Range Status   01/19/2018 13 0 - 45 U/L Final     ALT   Date Value Ref Range Status   01/19/2018 12 0 - 50 U/L Final     Creatinine   Date Value Ref Range Status   01/19/2018 0.48 (L) 0.52 - 1.04 mg/dL Final     Albumin   Date Value Ref Range Status   01/19/2018 3.1 (L) 3.4 - 5.0 g/dL Final     Color Urine (no units)   Date Value   02/08/2018 Yellow     Appearance Urine (no units)   Date Value   02/08/2018 Clear     Glucose Urine (mg/dL)   Date Value   02/08/2018 Negative     Bilirubin Urine (no units)   Date Value   02/08/2018 Negative     Ketones Urine (mg/dL)   Date Value   02/08/2018 Negative     Specific Gravity Urine (no units)   Date Value   02/08/2018 <=1.005     pH Urine (pH)   Date Value   02/08/2018 5.5     Protein Albumin Urine (mg/dL)   Date Value   02/08/2018 Negative     Urobilinogen Urine (EU/dL)   Date Value   02/08/2018 0.2     Nitrite Urine (no units)   Date Value   02/08/2018 Negative      Leukocyte Esterase Urine (no units)   Date Value   02/08/2018 Negative       Prescription pended per Rheumatology Refill Protocol for 90 day supply and 1 refills.  Esther العلي LPN  [facility]  Rheumatology

## 2018-02-15 ENCOUNTER — TELEPHONE (OUTPATIENT)
Dept: RHEUMATOLOGY | Facility: CLINIC | Age: 48
End: 2018-02-15

## 2018-02-15 RX ORDER — DIPHENHYDRAMINE HCL 25 MG
25 CAPSULE ORAL ONCE
Status: CANCELLED
Start: 2018-02-15 | End: 2018-02-15

## 2018-02-15 RX ORDER — METHYLPREDNISOLONE SODIUM SUCCINATE 125 MG/2ML
125 INJECTION, POWDER, LYOPHILIZED, FOR SOLUTION INTRAMUSCULAR; INTRAVENOUS ONCE
Status: CANCELLED | OUTPATIENT
Start: 2018-02-15 | End: 2018-02-15

## 2018-02-15 RX ORDER — ACETAMINOPHEN 325 MG/1
650 TABLET ORAL ONCE
Status: CANCELLED
Start: 2018-02-15 | End: 2018-02-15

## 2018-02-15 NOTE — TELEPHONE ENCOUNTER
[facility]     Therapy plan:   Received fax orders from [facility] home infusion requests to update Remicade therapy plan orders and sign.       Medication: INFLIXIBAM (REMICADE) ORDERS  Dose: 300 mg  Frequency: every 5 weeks  Route:IV  Pre-medications: tylenol, benadryl, solu-medrol PRN  Standing lab orders: see open orders    Rheumatology Provider: Dr. Gonzalez    Last infusion: unknown date  Next infusion: unknown    Last Office Visit:  1/25/2018  Next Enc:  Visit date not found          Routed to provider for review and completion of therapy plan.      Monica Betancur, BSN, RN, PHN  Rheumatology Care Coordinator    [facility]

## 2018-02-19 NOTE — TELEPHONE ENCOUNTER
Therapy plan completed and signed by Dr. Gonzalez. Sent encounter to  home care as FYI.     Monica Betancur, ARIEN, RN, PHN  Rheumatology Care Coordinator    [facility]

## 2018-02-22 ENCOUNTER — HOME INFUSION (PRE-WILLOW HOME INFUSION) (OUTPATIENT)
Dept: PHARMACY | Facility: CLINIC | Age: 48
End: 2018-02-22

## 2018-02-22 ENCOUNTER — TELEPHONE (OUTPATIENT)
Dept: RHEUMATOLOGY | Facility: CLINIC | Age: 48
End: 2018-02-22

## 2018-02-22 NOTE — TELEPHONE ENCOUNTER
Chart reviewed with Dr. Gonzalez. Request more information re: home infusions. Is remicade reconstituted with NS if not giving additional fluids? Is it given over 2 hour period? Contact Denny See RN CC at Adult Rheumatology to discuss home infusions.   Discussed with [place] pharmacist with  home care; remicade is reconstituted in 250 mLs given over 2 hours, pre medication given, monitoring and assessed by trained home infusion nurse. Patient are called prior to each infusion-see scanned Media tab.     Reviewed home infusion orders with Denny See RN CC; we follow the ACR position statement, Patient Safety and Site of Services for Biologic recommendations. According to ACR, Intravenous (IV) biologic agents should be administered in a monitored health care setting with onsite supervision by a provider with appropriate training in biologic infusions.     Notified Dr. Gonzalez of Home infusion and [facility] Rheumatology follows ACR guidelines for patient safety. Ok for one time home infusion.   Plan to review with [facility] Rheumatology physicians to discuss whether we will follow ACR guidelines or will approve home infusions in our practice.     [facility] notified of remicade plan for one time dose through home infusion.    Attempted to contact patient, left detailed voicemail to return call to discuss therapy plan and next steps.    ARIE BrownN, RN, PHN  Rheumatology Care Coordinator    [facility]

## 2018-02-22 NOTE — TELEPHONE ENCOUNTER
Patient notified of remicade orders and plan. We will contact patient with next steps in therapy plan orders. She verbalizes understanding and agrees with plan.    ARIE BrownN, RN, PHN  Rheumatology Care Coordinator    [facility]  '

## 2018-02-22 NOTE — TELEPHONE ENCOUNTER
----- Message from Unique Estevez RPH sent at 2/22/2018  9:11 AM CST -----  Regarding: FW: Order for Noni Anderson-    I have not heard back from you and the patient is infusing tomorrow morning, therefore I will be sending her drug tonight. I will exclude the fluids at this time since she has never had this before and we are on a fluid shortage. I cannot open your dot phrase in the therapy plan, so please send that to me stat there are specifics you are needing.    Thanks,  [place]      ----- Message -----     From: Unique Estevez RPH     Sent: 2/21/2018  10:11 AM       To: Monica Betancur RN  Subject: RE: Order for Noni Anderson-    I do not have access to the dot phrase, but I do ask pts about any illness, exposure to TB, SE, vaccinations, preg/breastfeeding prior to the infusion. If questionable answers I will call and authorize with provider before the infusion. If you have more specific questions, please enter the dot phrase into your response and I can add it to my orders.    In regards to the concomitant fluids, we typically see the infusion centers use these to start the PIV, however, even they are cutting back due to the shortage. In past infusions for Noni, the nurse starts the PIV, and then starts the Remicade without giving the fluids. If it can't be removed from the order set, I would be happy to take an order clarification from you (your response to this email will work as the order) and I can make the change on my end.     Thank you,  [facility]            ----- Message -----     From: Monica Betancur RN     Sent: 2/21/2018   8:16 AM       To: Unique Estevez RPH  Subject: RE: Order for Noni [place],    This is my first home infusion order.     What is the alternative? Straight Remicade, no NS?     I don't know if we can change it in the therapy plan orders.     Also, we have pre and post infusion check list? Do you have  access to the dot phrase? (its in the therapy plan).     Let me know if you need anything else.     Thank you,    ARIE BrownN, RN, PHN  Rheumatology Care Coordinator    [facility]  [phone]  ----- Message -----     From: Unique Estevez, [facility], located within [facility]     Sent: 2/20/2018  11:10 AM       To: Monica Betancur, RN  Subject: Order for Noni Jace                        Hi Monica,    I saw Dr Iqbal's therapy plan for Noni Mccormick's orders. Thank you! I wanted to clarify one item on the orders... NS 0.9% 250ml as concomitant fluids with Remicade. The patient has never had this before, and currently we are in a national fluid shortage. We would prefer to reserve fluids if it is not necessary.    Please let me know if they can be removed from the order set.    Thank you!    [place] Estevez, PharmD  [facility]  [phone]

## 2018-02-23 ENCOUNTER — HOME INFUSION (PRE-WILLOW HOME INFUSION) (OUTPATIENT)
Dept: PHARMACY | Facility: CLINIC | Age: 48
End: 2018-02-23

## 2018-02-23 NOTE — PROGRESS NOTES
This is a recent snapshot of the patient's [facility] medical record.  For current drug dose and complete information and questions, call [phone] or In Basket pool, [facility]v home infusion ([id number])  CSN Number:  [id number]

## 2018-02-26 NOTE — PROGRESS NOTES
This is a recent snapshot of the patient's [facility] medical record.  For current drug dose and complete information and questions, call [phone] or In [facility] pool, fv home infusion ([id number])  CSN Number:  [id number]

## 2018-03-01 ENCOUNTER — APPOINTMENT (OUTPATIENT)
Dept: LAB | Facility: CLINIC | Age: 48
End: 2018-03-01
Attending: STUDENT IN AN ORGANIZED HEALTH CARE EDUCATION/TRAINING PROGRAM
Payer: COMMERCIAL

## 2018-03-07 NOTE — TELEPHONE ENCOUNTER
We will continue her home remeciade infusions since she is getting it for last many years.   But generally we do not order home infusions due to question of adequate physician supervision. If she can come to the infusion center and get it done here will prefer.     Will make exception in this case if she prefers to choose home infusions.

## 2018-03-09 NOTE — TELEPHONE ENCOUNTER
Attempted to contact patient, left detailed voicemail to discuss Dr. Gonzalez's message regarding home infusion. Advised to return call to discuss and will notify home infusion.    ARIE BrownN, RN, PHN  Rheumatology Care Coordinator    [facility]

## 2018-03-28 ENCOUNTER — TELEPHONE (OUTPATIENT)
Dept: RHEUMATOLOGY | Facility: CLINIC | Age: 48
End: 2018-03-28

## 2018-03-28 NOTE — LETTER
April 2, 2018      Noni Mccormick  [address] [address]              Dear Noni,      We faxed your FMLA forms. Here is the original forms and we saved a copy to your chart for future reference.    Please feel free to contact the office if you have any questions or concerns.     Sincerely,    PATTIE Adnerson, RN, PHN  Rheumatology Care Coordinator    [facility]  [phone]   For urgent after hour care please call the [place] [facility] at [phone].

## 2018-03-28 NOTE — TELEPHONE ENCOUNTER
Received by fax from pt FMLA forms from [employer] and [facility] for completing. Forms are given to Dr Gonzalez to review and sign.

## 2018-03-29 ENCOUNTER — HOME INFUSION (PRE-WILLOW HOME INFUSION) (OUTPATIENT)
Dept: PHARMACY | Facility: CLINIC | Age: 48
End: 2018-03-29

## 2018-03-29 NOTE — TELEPHONE ENCOUNTER
Patient is calling to follow up on FMLA forms. States that the forms need to be dated for 3/29/18. If they are dated 3/30 or after then she will lose her FMLA benefits. She states they are not due back to her workplace until 4/6 but need to be dated 3/29. Please advise.

## 2018-03-30 ENCOUNTER — HOME INFUSION (PRE-WILLOW HOME INFUSION) (OUTPATIENT)
Dept: PHARMACY | Facility: CLINIC | Age: 48
End: 2018-03-30

## 2018-03-30 LAB
ALBUMIN SERPL-MCNC: 3.2 G/DL (ref 3.4–5)
ALP SERPL-CCNC: 27 U/L (ref 40–150)
ALT SERPL W P-5'-P-CCNC: 18 U/L (ref 0–50)
ANION GAP SERPL CALCULATED.3IONS-SCNC: 11 MMOL/L (ref 3–14)
AST SERPL W P-5'-P-CCNC: 16 U/L (ref 0–45)
BASOPHILS # BLD AUTO: 0 10E9/L (ref 0–0.2)
BASOPHILS NFR BLD AUTO: 0.7 %
BILIRUB SERPL-MCNC: 0.5 MG/DL (ref 0.2–1.3)
BUN SERPL-MCNC: 8 MG/DL (ref 7–30)
CALCIUM SERPL-MCNC: 8.6 MG/DL (ref 8.5–10.1)
CHLORIDE SERPL-SCNC: 107 MMOL/L (ref 94–109)
CO2 SERPL-SCNC: 22 MMOL/L (ref 20–32)
CREAT SERPL-MCNC: 0.61 MG/DL (ref 0.52–1.04)
DIFFERENTIAL METHOD BLD: ABNORMAL
EOSINOPHIL # BLD AUTO: 0.1 10E9/L (ref 0–0.7)
EOSINOPHIL NFR BLD AUTO: 0.9 %
ERYTHROCYTE [DISTWIDTH] IN BLOOD BY AUTOMATED COUNT: 14 % (ref 10–15)
GFR SERPL CREATININE-BSD FRML MDRD: >90 ML/MIN/1.7M2
GLUCOSE SERPL-MCNC: 66 MG/DL (ref 70–99)
HCT VFR BLD AUTO: 40.6 % (ref 35–47)
HGB BLD-MCNC: 13.6 G/DL (ref 11.7–15.7)
IMM GRANULOCYTES # BLD: 0 10E9/L (ref 0–0.4)
IMM GRANULOCYTES NFR BLD: 0.2 %
LYMPHOCYTES # BLD AUTO: 1 10E9/L (ref 0.8–5.3)
LYMPHOCYTES NFR BLD AUTO: 17.6 %
MCH RBC QN AUTO: 33.5 PG (ref 26.5–33)
MCHC RBC AUTO-ENTMCNC: 33.5 G/DL (ref 31.5–36.5)
MCV RBC AUTO: 100 FL (ref 78–100)
MONOCYTES # BLD AUTO: 0.4 10E9/L (ref 0–1.3)
MONOCYTES NFR BLD AUTO: 7.1 %
NEUTROPHILS # BLD AUTO: 4 10E9/L (ref 1.6–8.3)
NEUTROPHILS NFR BLD AUTO: 73.5 %
NRBC # BLD AUTO: 0 10*3/UL
NRBC BLD AUTO-RTO: 0 /100
PLATELET # BLD AUTO: 362 10E9/L (ref 150–450)
POTASSIUM SERPL-SCNC: 3.8 MMOL/L (ref 3.4–5.3)
PROT SERPL-MCNC: 7.6 G/DL (ref 6.8–8.8)
RBC # BLD AUTO: 4.06 10E12/L (ref 3.8–5.2)
SODIUM SERPL-SCNC: 140 MMOL/L (ref 133–144)
WBC # BLD AUTO: 5.5 10E9/L (ref 4–11)

## 2018-03-30 PROCEDURE — 85025 COMPLETE CBC W/AUTO DIFF WBC: CPT | Performed by: STUDENT IN AN ORGANIZED HEALTH CARE EDUCATION/TRAINING PROGRAM

## 2018-03-30 PROCEDURE — 80053 COMPREHEN METABOLIC PANEL: CPT | Performed by: STUDENT IN AN ORGANIZED HEALTH CARE EDUCATION/TRAINING PROGRAM

## 2018-04-02 NOTE — PROGRESS NOTES
This is a recent snapshot of the patient's [facility] medical record.  For current drug dose and complete information and questions, call [phone] or In Basket pool, fv home infusion ([id number])  CSN Number:  [id number]

## 2018-04-02 NOTE — TELEPHONE ENCOUNTER
Forms completed by Dr. Gonzalez. Faxed to [phone], fax confirmation received. Original mailed to patient, copy sent to HIMs.    ARIE BrownN, RN, PHN  Rheumatology Care Coordinator    [facility]

## 2018-04-11 ENCOUNTER — RADIANT APPOINTMENT (OUTPATIENT)
Dept: BONE DENSITY | Facility: CLINIC | Age: 48
End: 2018-04-11
Attending: NURSE PRACTITIONER
Payer: COMMERCIAL

## 2018-04-11 ENCOUNTER — RADIANT APPOINTMENT (OUTPATIENT)
Dept: MAMMOGRAPHY | Facility: CLINIC | Age: 48
End: 2018-04-11
Payer: COMMERCIAL

## 2018-04-11 DIAGNOSIS — Z78.0 ASYMPTOMATIC POSTMENOPAUSAL STATUS: ICD-10-CM

## 2018-04-11 DIAGNOSIS — Z12.31 VISIT FOR SCREENING MAMMOGRAM: ICD-10-CM

## 2018-04-11 PROCEDURE — 77067 SCR MAMMO BI INCL CAD: CPT | Mod: TC

## 2018-04-11 PROCEDURE — 77081 DXA BONE DENSITY APPENDICULR: CPT | Performed by: INTERNAL MEDICINE

## 2018-04-11 PROCEDURE — 77080 DXA BONE DENSITY AXIAL: CPT | Mod: 59 | Performed by: INTERNAL MEDICINE

## 2018-04-13 ENCOUNTER — TELEPHONE (OUTPATIENT)
Dept: OBGYN | Facility: CLINIC | Age: 48
End: 2018-04-13

## 2018-04-13 NOTE — TELEPHONE ENCOUNTER
Notes Recorded by Miranda Uriostegui APRN CNP on 4/13/2018 at 2:23 PM  Noni,    Your DEXA is fairly stable when compared to imaging from 2015. Continue ensuring adequate calcium and Vitamin D intake. They recommended your next scan be 3-5 years after menopause. Please let me know if you have any questions.    Sincerely,    Miranda HUSSEIN CNP    Notified patient of above message from KEENAN Metzger, KATHY Cruz RN

## 2018-04-17 ENCOUNTER — RADIANT APPOINTMENT (OUTPATIENT)
Dept: MAMMOGRAPHY | Facility: CLINIC | Age: 48
End: 2018-04-17
Attending: NURSE PRACTITIONER
Payer: COMMERCIAL

## 2018-04-17 ENCOUNTER — RADIANT APPOINTMENT (OUTPATIENT)
Dept: ULTRASOUND IMAGING | Facility: CLINIC | Age: 48
End: 2018-04-17
Attending: NURSE PRACTITIONER
Payer: COMMERCIAL

## 2018-04-17 DIAGNOSIS — R92.8 ABNORMAL MAMMOGRAM: ICD-10-CM

## 2018-04-17 PROCEDURE — 76642 ULTRASOUND BREAST LIMITED: CPT | Mod: RT | Performed by: STUDENT IN AN ORGANIZED HEALTH CARE EDUCATION/TRAINING PROGRAM

## 2018-04-17 PROCEDURE — G0279 TOMOSYNTHESIS, MAMMO: HCPCS | Performed by: STUDENT IN AN ORGANIZED HEALTH CARE EDUCATION/TRAINING PROGRAM

## 2018-04-17 PROCEDURE — 77065 DX MAMMO INCL CAD UNI: CPT | Performed by: STUDENT IN AN ORGANIZED HEALTH CARE EDUCATION/TRAINING PROGRAM

## 2018-04-18 NOTE — PROGRESS NOTES
This is a recent snapshot of the patient's [facility] medical record.  For current drug dose and complete information and questions, call [phone] or In Basket pool, fv home infusion ([id number])  CSN Number:  [id number]

## 2018-04-20 ENCOUNTER — RADIANT APPOINTMENT (OUTPATIENT)
Dept: MAMMOGRAPHY | Facility: CLINIC | Age: 48
End: 2018-04-20
Attending: NURSE PRACTITIONER
Payer: COMMERCIAL

## 2018-04-20 ENCOUNTER — RADIANT APPOINTMENT (OUTPATIENT)
Dept: ULTRASOUND IMAGING | Facility: CLINIC | Age: 48
End: 2018-04-20
Attending: NURSE PRACTITIONER
Payer: COMMERCIAL

## 2018-04-20 DIAGNOSIS — R92.8 ABNORMAL MAMMOGRAM: ICD-10-CM

## 2018-04-20 DIAGNOSIS — N63.0 BREAST MASS: Primary | ICD-10-CM

## 2018-04-20 PROCEDURE — 19083 BX BREAST 1ST LESION US IMAG: CPT | Mod: RT | Performed by: RADIOLOGY

## 2018-04-20 PROCEDURE — 88305 TISSUE EXAM BY PATHOLOGIST: CPT | Performed by: NURSE PRACTITIONER

## 2018-04-20 PROCEDURE — 88341 IMHCHEM/IMCYTCHM EA ADD ANTB: CPT | Mod: 59 | Performed by: NURSE PRACTITIONER

## 2018-04-20 PROCEDURE — 88360 TUMOR IMMUNOHISTOCHEM/MANUAL: CPT | Performed by: NURSE PRACTITIONER

## 2018-04-20 PROCEDURE — 77066 DX MAMMO INCL CAD BI: CPT | Performed by: RADIOLOGY

## 2018-04-20 PROCEDURE — 88342 IMHCHEM/IMCYTCHM 1ST ANTB: CPT | Mod: 59 | Performed by: NURSE PRACTITIONER

## 2018-04-20 RX ORDER — IOPAMIDOL 755 MG/ML
82 INJECTION, SOLUTION INTRAVASCULAR ONCE
Status: COMPLETED | OUTPATIENT
Start: 2018-04-20 | End: 2018-04-20

## 2018-04-20 RX ADMIN — IOPAMIDOL 82 ML: 755 INJECTION, SOLUTION INTRAVASCULAR at 10:29

## 2018-04-25 ENCOUNTER — TELEPHONE (OUTPATIENT)
Dept: GENERAL RADIOLOGY | Facility: CLINIC | Age: 48
End: 2018-04-25

## 2018-04-25 LAB — COPATH REPORT: NORMAL

## 2018-04-25 NOTE — TELEPHONE ENCOUNTER
Spoke with patient regarding right breast biopsy results, which indicate a benign fibroadenoma with usual ductal hyperplasia. Notified pt that the radiologist recommendation is continued yearly screening mammogram. Pt verbalized understanding of these results and all questions answered to her satisfaction.

## 2018-05-03 ENCOUNTER — TELEPHONE (OUTPATIENT)
Dept: RHEUMATOLOGY | Facility: CLINIC | Age: 48
End: 2018-05-03

## 2018-05-03 ENCOUNTER — HOME INFUSION (PRE-WILLOW HOME INFUSION) (OUTPATIENT)
Dept: PHARMACY | Facility: CLINIC | Age: 48
End: 2018-05-03

## 2018-05-03 NOTE — TELEPHONE ENCOUNTER
Infusion Nurse Christal calling and has questions if Noni should get infusion tomorrow, due to having symptoms. Nurse can be called back at [phone]. Thanks!

## 2018-05-03 NOTE — TELEPHONE ENCOUNTER
Spoke with patient, notified of message received from Ria noted below regarding cold, congestion. She denies any signs or symptoms of infection: no fever, chills, night sweats, shortness of breath, productive cough, thighness of chests, no green/yellow drainage from sinuses or any other signs of illness at this time. No antibiotics. She reports history of seasonal allergies and asthma. She is taking zyrtec. Provided pharmacy direct line [phone].    Date: 5/3/2018    Time of Call: 4:22 PM     Diagnosis:  Possible allergies vs cold symptoms     [ VORB ] Ordering provider: Dr. Gonzalez  Order:   Ok to receive remicade infusion tomorrow. No signs or symptoms of infection reported at this time.     Order received by: PATTIE Brown RN     Follow-up/additional notes: Patient notified. Verbalizes understanding and agrees with plan. Contacted  home infusion pharmacist Roro, notified of Dr. Gonzalez's approval to proceed with Remicade infusion tomorrow.     PATTIE Brown, RN, PHN  Rheumatology Care Coordinator    [facility]

## 2018-05-04 ENCOUNTER — HOME INFUSION (PRE-WILLOW HOME INFUSION) (OUTPATIENT)
Dept: PHARMACY | Facility: CLINIC | Age: 48
End: 2018-05-04

## 2018-05-04 NOTE — PROGRESS NOTES
This is a recent snapshot of the patient's [facility] medical record.  For current drug dose and complete information and questions, call [phone] or In Basket pool, fv home infusion ([id number])  CSN Number:  [id number]

## 2018-05-07 ENCOUNTER — DOCUMENTATION ONLY (OUTPATIENT)
Dept: RHEUMATOLOGY | Facility: CLINIC | Age: 48
End: 2018-05-07

## 2018-05-07 NOTE — PROGRESS NOTES
This is a recent snapshot of the patient's [facility] medical record.  For current drug dose and complete information and questions, call [phone] or In Basket pool, fv home infusion ([id number])  CSN Number:  [id number]

## 2018-05-07 NOTE — PROGRESS NOTES
Dr. Gonzalez reviewed and signed home infusion orders. Faxed to [facility].     Monica Betancur, ARIEN, RN, PHN  Rheumatology Care Coordinator    [facility]

## 2018-05-22 ENCOUNTER — MEDICAL CORRESPONDENCE (OUTPATIENT)
Dept: HEALTH INFORMATION MANAGEMENT | Facility: CLINIC | Age: 48
End: 2018-05-22

## 2018-05-23 ENCOUNTER — OFFICE VISIT (OUTPATIENT)
Dept: RHEUMATOLOGY | Facility: CLINIC | Age: 48
End: 2018-05-23
Payer: COMMERCIAL

## 2018-05-23 VITALS
WEIGHT: 134.2 LBS | DIASTOLIC BLOOD PRESSURE: 78 MMHG | SYSTOLIC BLOOD PRESSURE: 120 MMHG | BODY MASS INDEX: 23.78 KG/M2 | HEART RATE: 91 BPM | OXYGEN SATURATION: 97 % | HEIGHT: 63 IN

## 2018-05-23 DIAGNOSIS — M08.3 CHRONIC POLYARTICULAR JUVENILE RHEUMATOID ARTHRITIS (H): ICD-10-CM

## 2018-05-23 DIAGNOSIS — Z79.899 HIGH RISK MEDICATIONS (NOT ANTICOAGULANTS) LONG-TERM USE: Primary | ICD-10-CM

## 2018-05-23 PROCEDURE — 99214 OFFICE O/P EST MOD 30 MIN: CPT | Performed by: STUDENT IN AN ORGANIZED HEALTH CARE EDUCATION/TRAINING PROGRAM

## 2018-05-23 ASSESSMENT — PAIN SCALES - GENERAL: PAINLEVEL: NO PAIN (0)

## 2018-05-23 NOTE — MR AVS SNAPSHOT
After Visit Summary   5/23/2018    Noni Mccormick    MRN: [id number]           Patient Information     Date Of Birth          1970        Visit Information        Provider Department      5/23/2018 4:00 PM Sterling Gonzalez MD [facility]        Today's Diagnoses     High risk medications (not anticoagulants) long-term use    -  1    Chronic polyarticular juvenile rheumatoid arthritis (H)          Care Instructions    The following is a summary of your office visit:    Medications started today:    Medications stopped today:    Medication dose change:     Appointments made today:     Patient instructions:      If you have had any blood work, imaging or other testing completed we will be in touch within 1-2 weeks regarding the results.     If you need refills please contact your pharmacy.     Urgent after hour care please call the [place] [facility] at [phone].    It was a pleasure meeting with you today. Please let us know if there is anything else we can do for you so that we can be sure you are leaving completely satisfied with your care experience.     If you have any questions, concerns or need to schedule a follow up, please contact us at [phone] and our fax [phone].    Your Rheumatology Team at [facility]            Follow-ups after your visit        Your next 10 appointments already scheduled     Nov 21, 2018  4:00 PM CST   Return Visit with Sterling Gonzalez MD   [facility] ([facility]    [address] [place] [id number] [phone]              Future tests that were ordered for you today     Open Standing Orders        Priority Remaining Interval Expires Ordered    AST Routine 99/99 10 weeks  5/23/2020 5/23/2018    ALT Routine 99/99 10 weeks  5/23/2020 5/23/2018    Creatinine Routine 99/99 10 weeks  5/23/2020 5/23/2018    CBC with platelets Routine 99/99 10 weeks  5/23/2020  "2018            Who to contact     If you have questions or need follow up information about today's clinic visit or your schedule please contact [facility] directly at [phone].  Normal or non-critical lab and imaging results will be communicated to you by MyChart, letter or phone within 4 business days after the clinic has received the results. If you do not hear from us within 7 days, please contact the clinic through MyChart or phone. If you have a critical or abnormal lab result, we will notify you by phone as soon as possible.  Submit refill requests through [employer] or call your pharmacy and they will forward the refill request to us. Please allow 3 business days for your refill to be completed.          Additional Information About Your Visit        [employer]ze Information     [employer]ze is an electronic gateway that provides easy, online access to your medical records. With Folloze, you can request a clinic appointment, read your test results, renew a prescription or communicate with your care team.     To sign up for [employer]ze visit the website at [email or web address]   You will be asked to enter the access code listed below, as well as some personal information. Please follow the directions to create your username and password.     Your access code is: [email or web address]  Expires: 2018  4:07 PM     Your access code will  in 90 days. If you need help or a new code, please contact your [facility] or call [phone] for assistance.        Care EveryWhere ID     This is your Care EveryWhere ID. This could be used by other organizations to access your [facility] medical records  [id number]        Your Vitals Were     Pulse Height Pulse Oximetry BMI (Body Mass Index)          91 1.607 m (5' 3.25\") 97% 23.58 kg/m2         Blood Pressure from Last 3 Encounters:   18 120/78   18 139/86   18 138/84    Weight from Last 3 Encounters: "   05/23/18 60.9 kg (134 lb 3.2 oz)   02/08/18 60.8 kg (134 lb)   01/29/18 60.6 kg (133 lb 9.6 oz)               Primary Care Provider Office Phone # Fax #    Sophia Baugh [phone] [address] [facility] [address]        Equal Access to Services     JOSE JORGE : Hadii aad ku hadasho Soomaali, waaxda luqadaha, qaybta kaalmada adeegyada, waxay idiin hayaan adeeg kharash la'aan ah. So [facility] [phone].    ATENCIÓN: Si habla espjohann, tiene a gutierrez disposición servicios gratuitos de asistencia lingüística. Llame al [phone].    We comply with applicable federal civil rights laws and [place] laws. We do not discriminate on the basis of race, color, national origin, age, disability, sex, sexual orientation, or gender identity.            Thank you!     Thank you for choosing [facility]  for your care. Our goal is always to provide you with excellent care. Hearing back from our patients is one way we can continue to improve our services. Please take a few minutes to complete the written survey that you may receive in the mail after your visit with us. Thank you!             Your Updated Medication List - Protect others around you: Learn how to safely use, store and throw away your medicines at www.disposemymeds.org.          This list is accurate as of 5/23/18  4:23 PM.  Always use your most recent med list.                   Brand Name Dispense Instructions for use Diagnosis    albuterol 108 (90 Base) MCG/ACT Inhaler    PROAIR HFA/PROVENTIL HFA/VENTOLIN HFA    1 Inhaler    Inhale 2 puffs into the lungs every 6 hours as needed for shortness of breath / dyspnea    Intermittent asthma, uncomplicated       folic acid 1 MG tablet    FOLVITE    90 tablet    TAKE 1 TABLET (1 MG) BY MOUTH DAILY    Rheumatoid arthritis, involving unspecified site, unspecified rheumatoid factor presence (H)       inFLIXimab 100 MG injection    REMICADE    30 mL    Inject 300 mg into the vein as  needed    Chronic polyarticular juvenile rheumatoid arthritis (H)       irbesartan 75 MG tablet    AVAPRO    90 tablet    Take 1 tablet (75 mg) by mouth daily    Hypertension goal BP (blood pressure) < 140/90       levothyroxine 112 MCG tablet    SYNTHROID/LEVOTHROID    90 tablet    Take 1 tablet (112 mcg) by mouth daily    Other specified hypothyroidism       methotrexate 2.5 MG tablet CHEMO     72 tablet    6 TABLETS BY MOUTH ONCE WEEKLY    Rheumatoid arthritis of multiple sites without rheumatoid factor (H)       norethin-eth estrad triphasic 0.5/1/0.5-35 MG-MCG per tablet    ARANELLE    84 tablet    Take 1 tablet by mouth daily    Encounter for surveillance of contraceptive pills       vitamin D 1000 units capsule      Take 1 capsule by mouth daily.

## 2018-05-23 NOTE — PATIENT INSTRUCTIONS
The following is a summary of your office visit:    Medications started today:    Medications stopped today:    Medication dose change:     Appointments made today:     Patient instructions:    Continue methotrexate 50 mg once a week.  Remicade infusions 300 mg every 5 weeks.     We will try to figure out about her Remicade infusions to be given in the clinic    RTC in 6 months and labs every 3 months      If you have had any blood work, imaging or other testing completed we will be in touch within 1-2 weeks regarding the results.     If you need refills please contact your pharmacy.     Urgent after hour care please call the [place] [facility] at [phone].    It was a pleasure meeting with you today. Please let us know if there is anything else we can do for you so that we can be sure you are leaving completely satisfied with your care experience.     If you have any questions, concerns or need to schedule a follow up, please contact us at [phone] and our fax [phone].    Your Rheumatology Team at [facility]

## 2018-05-23 NOTE — NURSING NOTE
"Noni Mccormick's goals for this visit include:   She requests these members of her care team be copied on today's visit information:     PCP: Sophia Baugh    Referring Provider:  No referring provider defined for this encounter.    /78  Pulse 91  Ht 1.607 m (5' 3.25\")  Wt 60.9 kg (134 lb 3.2 oz)  SpO2 97%  BMI 23.58 kg/m2    "

## 2018-05-23 NOTE — PROGRESS NOTES
Rheumatology Clinic Visit     Noni Mccormick MRN# [id number]   YOB: 1970 Age: 47 year old     Date of Visit:May 23, 2018  Primary care provider: Sophia Baugh          Assessment and Plan:   Assessment     -- Juvenile rheumatoid arthritis  -- high risk medications - Remicade infusion 300 mg every 5 weeks + methotrexate 15 mg/week  -- Chronic cough  -- Hypothyroidism    Ms Mccormick is 47-year-old female seen in clinic for management of juvenile rheumatoid arthritis.  She was diagnosed with JRA at 3 years of age.  She has been on a stable dose of Remicade 300 mg q. 5 weeks plus methotrexate 15 g per week for more than 10 years.  She denies any side effects with either of these drugs.  She takes Remicade as home infusions and take her methotrexate every Sunday. Lately she has been having difficulty with home infusions due to venous access. She is wondering about getting Remicade infusions in the clinics rather than at home.     She does have a chronic cough for more than 5 years.  She had a chest x-ray to evaluate for methotrexate associated lung disease which was normal.  She tried antacids which seemed to help to some extent with her cough.  I recommended her to follow-up with her PCP and consider to see ENT or lung specialist for her cough.      Overall her joint pains are well controlled.  JRA is in remission.    Plan    -- Will continue MTX 15 mg once a week+ Remecaide infusions for JRA   --q 3 mo AST/ALT, Albumin, CBC with plts  --Limit EtOH intake to 2 drinks weekly; use folate 1 mg daily.  --Tylenol 500 mg tid prn nausea/HA associated with dosing.      -- Disease in remission.     -- MTX labs every 10 weeks.    -- RTC in 6 months           Active Problem List:     Patient Active Problem List    Diagnosis Date Noted     Chronic polyarticular juvenile rheumatoid arthritis (H) 07/25/2016     Priority: Medium     Intermittent asthma 09/10/2013     Priority: Medium     Osteopenia 02/14/2013      Priority: Medium     Injury of left hip 04/12/2012     Priority: Medium     High risk medications (not anticoagulants) long-term use 07/19/2011     Priority: Medium     Nephrolithiasis 06/16/2011     Priority: Medium     CARDIOVASCULAR SCREENING; LDL GOAL LESS THAN 160 10/31/2010     Priority: Medium     Hypothyroidism 10/15/2010     Priority: Medium            History of Present Illness:   Noni Mccormick is a 47 year old female with PMH of JRA seen in the clinic in consultation at request of Sophia Baugh MD for evaluation and management of her JRA.     May 23, 2018 : She is doing fine. Denies any joint pains or morning stiffness.  She takes 15 mg methotrexate once a week along with Remicade infusion 300 mg every 5 weeks.  She was wondering about getting her Remicade infusions in the clinic rather than at home due to venous access issues that she has experienced with the past few infusions at home. She does not want to change to subcu injections like Humira or other TNF inhibitors as the Remicade works very well for her.    HPI from initial visit: She was diagnosed with JRA at 3 years of age. No hx of uveitis. Has been on Remeciade and MTX for more than 10 years. No side effects. She was seeing Dr Cano, Last visit was in September 2017.  At that time due to elevated blood pressure she was started on Avapro and since then her blood pressure is controlled.  She has chronic cough for more than 5 years, had a chest x-ray in 2016 which was unremarkable.  Dr. Cano prescribed her antacids to see if GERD is responsible for her symptoms, it seemed to help but then she discontinued it after few weeks.  She denies any history of smoking, chest pain, hemoptysis.    She is on Remicade every 5 weeks, it is lasting the full interval, and methotrexate 15 mg weekly.  There is no nausea, vomiting, diarrhea, or stomatitis.     No history of psoriasis, ulcerative colitis or chron's disease. No h/o iritis, enthesitis,  finger or toe swelling like dactylitis, plantar fascitis or heel pain.                  Review of Systems:   Review Of Systems  Constitutional: denies fever, chills, night sweats and weight loss.  Skin: No skin rash.  Eyes: No dryness or irritation in eyes. No episode of eye inflammation or redness.   Ears/Nose/Throat: no recurrent sinus infections.  Respiratory: No shortness of breath, dyspnea on exertion, + cough, or hemoptysis  Cardiovascular: no chest pain or palpitations.  Gastrointestinal: no nausea, vomiting, abdominal pain.  Normal bowel movements.  Genitourinary: no dysuria, frequency  or hematuria.  Musculoskeletal: as in HPI  Neurologic: no numbness, tingling.  Psychiatric: no mood disorders.  Hematologic/Lymphatic/Immunologic: no history of easy bruising, petechia or purpura.  No abnormal bleeding.   Endocrine: no h/o thyroid disease or Diabetes.                  Past Medical History:     Past Medical History:   Diagnosis Date     Contraception      Grave's disease      Hypothyroid      Inflammatory arthritis      Intermittent asthma 9/10/2013     Nephrolithiasis 6/16/2011     Osteopenia 2/14/2013     Rheumatoid arthritis(714.0)      Past Surgical History:   Procedure Laterality Date     C PELVIS/HIP JOINT SURGERY UNLISTED       JOINT REPLACEMENT, HIP RT/LT      (L)     JOINT REPLACEMENT, HIP RT/LT  3/2013    (R)     SURGICAL HISTORY OF -       Lithotripsy            Social History:     Social History     Occupational History     Not on file.     Social History Main Topics     Smoking status: Never Smoker     Smokeless tobacco: Never Used     Alcohol use No     Drug use: No     Sexual activity: Yes     Partners: Male     Birth control/ protection: Pill            Family History:     Family History   Problem Relation Age of Onset     Breast Cancer Mother      C.A.D. Father             Allergies:     Allergies   Allergen Reactions     Amoxicillin Nausea and Cramps            Medications:     Current  "Outpatient Prescriptions   Medication Sig Dispense Refill     albuterol (PROAIR HFA/PROVENTIL HFA/VENTOLIN HFA) 108 (90 BASE) MCG/ACT Inhaler Inhale 2 puffs into the lungs every 6 hours as needed for shortness of breath / dyspnea 1 Inhaler 4     Cholecalciferol (VITAMIN D) 1000 UNIT capsule Take 1 capsule by mouth daily.       folic acid (FOLVITE) 1 MG tablet TAKE 1 TABLET (1 MG) BY MOUTH DAILY 90 tablet 1     inFLIXimab (REMICADE) 100 MG injection Inject 300 mg into the vein as needed 30 mL 0     irbesartan (AVAPRO) 75 MG tablet Take 1 tablet (75 mg) by mouth daily 90 tablet 1     levothyroxine (SYNTHROID/LEVOTHROID) 112 MCG tablet Take 1 tablet (112 mcg) by mouth daily 90 tablet 3     methotrexate 2.5 MG tablet CHEMO 6 TABLETS BY MOUTH ONCE WEEKLY 72 tablet 1     norethin-eth estrad triphasic (ARANELLE) 0.5/1/0.5-35 MG-MCG per tablet Take 1 tablet by mouth daily 84 tablet 3            Physical Exam:   Blood pressure 120/78, pulse 91, height 1.607 m (5' 3.25\"), weight 60.9 kg (134 lb 3.2 oz), SpO2 97 %, not currently breastfeeding.  Wt Readings from Last 4 Encounters:   05/23/18 60.9 kg (134 lb 3.2 oz)   02/08/18 60.8 kg (134 lb)   01/29/18 60.6 kg (133 lb 9.6 oz)   01/25/18 61.3 kg (135 lb 3.2 oz)       Constitutional: well-developed, appearing stated age; cooperative  Eyes: nl EOM, PERRLA, vision, conjunctiva, sclera  ENT: nl external ears, nose, hearing, lips, teeth, gums, throat  No mucous membrane lesions, normal saliva pool  Neck: no mass or thyroid enlargement  Resp: lungs clear to auscultation, nl to palpation  CV: RRR, no murmurs, rubs or gallops, no edema  GI: no ABD mass or tenderness, no HSM  : not tested  Lymph: no cervical, supraclavicular, inguinal or epitrochlear nodes    MS: All TMJ, neck, shoulder, elbow, wrist, MCP/PIP/DIP, spine, hip, knee, ankle, and foot MTP/IP joints were examined.   -- She has congenital fifth finger flexion contracture B/L.  [place]-neck deformities present over third fourth " fingers bilaterally.  --She has hammertoes on both feet.  -- No active synovitis or deformity present over MCP, PIP joints, wrists, elbows, shoulders, ankles and MTP joints.. Full ROM.  Normal  strength.   -- No dactylitis,  tenosynovitis, enthesopathy.    Skin: no nail pitting, alopecia, rash, nodules or lesions  Neuro: nl cranial nerves, strength, sensation, DTRs.   Psych: nl judgement, orientation, memory, affect.         Data:     Results for orders placed or performed in visit on 04/20/18   MA Post Procedure Right    Addendum: 4/25/2018    Addendum: Pathology result of fibroadenoma is concordant with imaging.    Recommendations: Routine yearly mammography.    LAVONNE HERNANDEZ MD      Narrative    Ultrasound guided core needle right breast biopsy.    Comparisons: Contrast-enhanced mammogram 4/20/2018, mammogram and  ultrasound 4/17/2018    FINDINGS: 2nd look ultrasound of the right upper quadrant performed to  evaluate subtle equivocal asymmetric background enhancement seen on  contrast enhanced mammogram performed immediately prior.  No  suspicious findings in the upper outer quadrant were identified.     Procedure, risks, benefits and alternatives were discussed with the  patient and the patient gave written and verbal consent. Aseptic  technique was utilized. Approximately 10 cc of 1% lidocaine were  utilized for local anesthesia.  Under ultrasound guidance, a 14-gauge  core needle biopsy system was utilized to sample lesion located at the  8:00 position 3 cm from the nipple in the right breast. A biopsy  marking clip was placed.  Pressure was held over this area for  approximately 15 minutes. A dressing was placed and care instructions  were discussed with the patient.    Post biopsy mammogram demonstrates clip deployment at the targeted  biopsy site.      Impression    IMPRESSION:    Uncomplicated ultrasound guided core needle biopsy of  the right breast.    VANESSA BANDA MD ([place])       Recent  Labs   Lab Test  01/19/18   1330  10/06/17   1120  07/21/17   1130   02/27/13   0934  01/02/13   0940  11/07/12   0957   WBC  8.9  8.0  9.6   < >  4.6  4.5  5.7   RBC  4.04  4.26  3.99   < >  4.78  4.43  4.71   HGB  12.8  13.4  12.9   < >  14.2  13.1  13.7   HCT  38.0  39.1  37.4   < >  42.5  39.4  41.8   MCV  94  92  94   < >  89  89  89   RDW  13.3  13.0  12.9   < >  12.9  13.0  12.8   PLT  486*  409  407   < >  409  383  448   ALBUMIN  3.1*  3.2*  3.3*   < >  4.4  4.0  4.1   CRP   --    --    --    --   15.0*  15.8*  10.3*   BUN  9  7  10   < >   --    --    --     < > = values in this interval not displayed.      Recent Labs   Lab Test  01/03/17   1614  12/16/15   0746  12/09/14   1604   TSH  0.61  1.62  0.50     Hemoglobin   Date Value Ref Range Status   03/30/2018 13.6 11.7 - 15.7 g/dL Final   01/19/2018 12.8 11.7 - 15.7 g/dL Final   10/06/2017 13.4 11.7 - 15.7 g/dL Final     Urea Nitrogen   Date Value Ref Range Status   03/30/2018 8 7 - 30 mg/dL Final   01/19/2018 9 7 - 30 mg/dL Final   10/06/2017 7 7 - 30 mg/dL Final     Sed Rate   Date Value Ref Range Status   02/27/2013 28 (H) 0 - 20 mm/h Final   01/02/2013 32 (H) 0 - 20 mm/h Final   11/07/2012 22 (H) 0 - 20 mm/h Final     C-Reactive Protein   Date Value Ref Range Status   11/04/2009 1.2 (A) 0 - 0.8 mg/dL      CRP Inflammation   Date Value Ref Range Status   02/27/2013 15.0 (H) 0.0 - 8.0 mg/L Final   01/02/2013 15.8 (H) 0.0 - 8.0 mg/L Final   11/07/2012 10.3 (H) 0.0 - 8.0 mg/L Final     AST   Date Value Ref Range Status   03/30/2018 16 0 - 45 U/L Final   01/19/2018 13 0 - 45 U/L Final   10/06/2017 38 0 - 45 U/L Final     Comment:     Specimen is hemolyzed which can falsely elevate AST. Analysis of a   non-hemolyzed specimen may result in a lower value.       Albumin   Date Value Ref Range Status   03/30/2018 3.2 (L) 3.4 - 5.0 g/dL Final   01/19/2018 3.1 (L) 3.4 - 5.0 g/dL Final   10/06/2017 3.2 (L) 3.4 - 5.0 g/dL Final     Alkaline Phosphatase   Date Value  Ref Range Status   03/30/2018 27 (L) 40 - 150 U/L Final   01/19/2018 28 (L) 40 - 150 U/L Final   10/06/2017 32 (L) 40 - 150 U/L Final     ALT   Date Value Ref Range Status   03/30/2018 18 0 - 50 U/L Final   01/19/2018 12 0 - 50 U/L Final   10/06/2017 16 0 - 50 U/L Final     Rheumatoid Factor   Date Value Ref Range Status   12/13/2010 7 0 - 14 IU/mL Final     Recent Labs   Lab Test  03/30/18   0820  01/29/18   0837  01/19/18   1330  10/06/17   1120   01/03/17   1614   12/16/15   0746   WBC  5.5   --   8.9  8.0   < >   --    < >   --    HGB  13.6   --   12.8  13.4   < >   --    < >   --    HCT  40.6   --   38.0  39.1   < >   --    < >   --    MCV  100   --   94  92   < >   --    < >   --    PLT  362   --   486*  409   < >   --    < >   --    BUN  8   --   9  7   < >   --    < >   --    TSH   --   0.38*   --    --    --   0.61   --   1.62   AST  16   --   13  38   < >   --    < >   --    ALT  18   --   12  16   < >   --    < >   --    ALKPHOS  27*   --   28*  32*   < >   --    < >   --     < > = values in this interval not displayed.     Component      Latest Ref Rng & Units 12/13/2010   Rheumatoid Factor      0 - 14 IU/mL 7       Reviewed Rheumatology lab flowsheet    Sterling Gonzalez MD  [facility]  Department of Rheumatology & Autoimmune Disorders  [place]: [phone]   Pager - [phone]

## 2018-05-25 ENCOUNTER — TELEPHONE (OUTPATIENT)
Dept: RHEUMATOLOGY | Facility: CLINIC | Age: 48
End: 2018-05-25

## 2018-05-25 NOTE — TELEPHONE ENCOUNTER
Spoke with patient, notified of message received and discuss with Dr. Gonzalez about home infusion issues with accessing her veins. Dr. Gonzalez and patient talked about long term venous access such as port but first would like to try the infusion center, since more support staff is available to start IV. If patient requires port in the future then she will not be able to get an infusion in home setting due to safety.     Writer advises patient, first she needs to schedule an appointment then the infusion  will initiate PA if needed. She currently receives her infusions from [facility] and said her insurance requested her to receive them at home. She feels that it is getting complicated with venous access and worried about her infusions. Transferred call to infusion scheduling line to coordinate appointment. Will send message to infusion  to give up date.     ARIE BrownN, RN, PHN  Rheumatology Care Coordinator    [facility]

## 2018-05-25 NOTE — TELEPHONE ENCOUNTER
----- Message from Sterling Gonzalez MD sent at 5/23/2018  4:26 PM CDT -----  Lokesh Anderson,     Can you figure out with her insurance about Remecaide infusions in the clinic.     Sterling Gonzalez

## 2018-06-05 ENCOUNTER — HOME INFUSION (PRE-WILLOW HOME INFUSION) (OUTPATIENT)
Dept: PHARMACY | Facility: CLINIC | Age: 48
End: 2018-06-05

## 2018-06-05 ENCOUNTER — TELEPHONE (OUTPATIENT)
Dept: RHEUMATOLOGY | Facility: CLINIC | Age: 48
End: 2018-06-05

## 2018-06-05 NOTE — TELEPHONE ENCOUNTER
Reason for call:  Other   Patient called regarding (reason for call): [facility] CALLING ABOUT PATIENT. ORTEGA PT.  TRANSFERRING TO [facility]. NEXT APPT IS 06.15.18  Additional comments: ANY    Phone number to reach patient:  Other phone number:  [phone]    BLADE    Best Time:  ANY     Can we leave a detailed message on this number?  YES

## 2018-06-11 NOTE — TELEPHONE ENCOUNTER
"Called back number provided and spoke to Elba. She states call was \"a courtesy call\" to inform clinic that patient is transferring her infusion service to [facility]. She denies any further questions or concerns for this clinic at this time. Thanked Elba for this information.    Of note, patient is scheduled for Remicade infusion at [facility] 6/15/18.    Dee GARCIA RN, BSN  Pulmonary Care Coordinator     "

## 2018-06-15 ENCOUNTER — INFUSION THERAPY VISIT (OUTPATIENT)
Dept: INFUSION THERAPY | Facility: CLINIC | Age: 48
End: 2018-06-15
Payer: COMMERCIAL

## 2018-06-15 VITALS
RESPIRATION RATE: 16 BRPM | TEMPERATURE: 97.1 F | SYSTOLIC BLOOD PRESSURE: 131 MMHG | BODY MASS INDEX: 23.2 KG/M2 | HEART RATE: 89 BPM | OXYGEN SATURATION: 97 % | WEIGHT: 132 LBS | DIASTOLIC BLOOD PRESSURE: 82 MMHG

## 2018-06-15 DIAGNOSIS — M08.3 CHRONIC POLYARTICULAR JUVENILE RHEUMATOID ARTHRITIS (H): ICD-10-CM

## 2018-06-15 DIAGNOSIS — Z79.899 HIGH RISK MEDICATIONS (NOT ANTICOAGULANTS) LONG-TERM USE: ICD-10-CM

## 2018-06-15 DIAGNOSIS — M08.3 CHRONIC POLYARTICULAR JUVENILE RHEUMATOID ARTHRITIS (H): Primary | ICD-10-CM

## 2018-06-15 LAB
ALT SERPL W P-5'-P-CCNC: 14 U/L (ref 0–50)
AST SERPL W P-5'-P-CCNC: 15 U/L (ref 0–45)
CREAT SERPL-MCNC: 0.54 MG/DL (ref 0.52–1.04)
ERYTHROCYTE [DISTWIDTH] IN BLOOD BY AUTOMATED COUNT: 13.1 % (ref 10–15)
GFR SERPL CREATININE-BSD FRML MDRD: >90 ML/MIN/1.7M2
HCT VFR BLD AUTO: 40 % (ref 35–47)
HGB BLD-MCNC: 13.1 G/DL (ref 11.7–15.7)
MCH RBC QN AUTO: 29.8 PG (ref 26.5–33)
MCHC RBC AUTO-ENTMCNC: 32.8 G/DL (ref 31.5–36.5)
MCV RBC AUTO: 91 FL (ref 78–100)
PLATELET # BLD AUTO: 474 10E9/L (ref 150–450)
RBC # BLD AUTO: 4.4 10E12/L (ref 3.8–5.2)
WBC # BLD AUTO: 9 10E9/L (ref 4–11)

## 2018-06-15 PROCEDURE — 84460 ALANINE AMINO (ALT) (SGPT): CPT | Performed by: STUDENT IN AN ORGANIZED HEALTH CARE EDUCATION/TRAINING PROGRAM

## 2018-06-15 PROCEDURE — 96415 CHEMO IV INFUSION ADDL HR: CPT | Performed by: INTERNAL MEDICINE

## 2018-06-15 PROCEDURE — 36415 COLL VENOUS BLD VENIPUNCTURE: CPT | Performed by: STUDENT IN AN ORGANIZED HEALTH CARE EDUCATION/TRAINING PROGRAM

## 2018-06-15 PROCEDURE — 96413 CHEMO IV INFUSION 1 HR: CPT | Performed by: INTERNAL MEDICINE

## 2018-06-15 PROCEDURE — 84450 TRANSFERASE (AST) (SGOT): CPT | Performed by: STUDENT IN AN ORGANIZED HEALTH CARE EDUCATION/TRAINING PROGRAM

## 2018-06-15 PROCEDURE — 82565 ASSAY OF CREATININE: CPT | Performed by: STUDENT IN AN ORGANIZED HEALTH CARE EDUCATION/TRAINING PROGRAM

## 2018-06-15 PROCEDURE — 99207 ZZC NO CHARGE LOS: CPT

## 2018-06-15 PROCEDURE — 85027 COMPLETE CBC AUTOMATED: CPT | Performed by: STUDENT IN AN ORGANIZED HEALTH CARE EDUCATION/TRAINING PROGRAM

## 2018-06-15 PROCEDURE — 96375 TX/PRO/DX INJ NEW DRUG ADDON: CPT | Performed by: INTERNAL MEDICINE

## 2018-06-15 RX ORDER — ACETAMINOPHEN 325 MG/1
650 TABLET ORAL ONCE
Status: CANCELLED
Start: 2018-06-15 | End: 2018-06-15

## 2018-06-15 RX ORDER — DIPHENHYDRAMINE HCL 25 MG
25 CAPSULE ORAL ONCE
Status: CANCELLED
Start: 2018-06-15 | End: 2018-06-15

## 2018-06-15 RX ORDER — METHYLPREDNISOLONE SODIUM SUCCINATE 125 MG/2ML
125 INJECTION, POWDER, LYOPHILIZED, FOR SOLUTION INTRAMUSCULAR; INTRAVENOUS ONCE
Status: CANCELLED | OUTPATIENT
Start: 2018-06-15 | End: 2018-06-15

## 2018-06-15 RX ORDER — METHYLPREDNISOLONE SODIUM SUCCINATE 125 MG/2ML
125 INJECTION, POWDER, LYOPHILIZED, FOR SOLUTION INTRAMUSCULAR; INTRAVENOUS ONCE
Status: COMPLETED | OUTPATIENT
Start: 2018-06-15 | End: 2018-06-15

## 2018-06-15 RX ORDER — DIPHENHYDRAMINE HCL 25 MG
25 CAPSULE ORAL ONCE
Status: COMPLETED | OUTPATIENT
Start: 2018-06-15 | End: 2018-06-15

## 2018-06-15 RX ADMIN — Medication 250 ML: at 13:08

## 2018-06-15 RX ADMIN — Medication 25 MG: at 12:46

## 2018-06-15 RX ADMIN — METHYLPREDNISOLONE SODIUM SUCCINATE 125 MG: 125 INJECTION INTRAMUSCULAR; INTRAVENOUS at 13:09

## 2018-06-15 NOTE — PROGRESS NOTES
"Infusion Nursing Note:  Noni Mccormick presents today for Remicade.    Patient seen by provider today: No   present during visit today: Not Applicable.    Note: Noni informed me that she takes tylenol 650mg and 25 mg PO benadryl prior to coming in for Remicade. Then infusion is to administer an additional 25 mg PO benadryl and steroids prior to starting.     Intravenous Access:  Peripheral IV placed.    Treatment Conditions:  Lab Results   Component Value Date    HGB 13.1 06/15/2018     Lab Results   Component Value Date    WBC 9.0 06/15/2018      Lab Results   Component Value Date    ANEU 4.0 03/30/2018     Lab Results   Component Value Date     06/15/2018      Lab Results   Component Value Date     03/30/2018                   Lab Results   Component Value Date    POTASSIUM 3.8 03/30/2018           No results found for: MAG         Lab Results   Component Value Date    CR 0.54 06/15/2018                   Lab Results   Component Value Date    LUIS ENRIQUE 8.6 03/30/2018                Lab Results   Component Value Date    BILITOTAL 0.5 03/30/2018           Lab Results   Component Value Date    ALBUMIN 3.2 03/30/2018                    Lab Results   Component Value Date    ALT 14 06/15/2018           Lab Results   Component Value Date    AST 15 06/15/2018       Results reviewed, labs MET treatment parameters, ok to proceed with treatment.  Rheumatology Infusion Checklist: PRIOR TO INFUSION OF BIOLOGICAL MEDICATIONS OR ANY OF THESE AS LISTED: Remicaide (infliximab) \".rheumbiologicalchecklist\"    Prior to Infusion of biological medications or any of these as listed:    1. Elevated temperature, fever, chills, productive cough or abnormal vital signs, night sweats, coughing up blood or sputum, no appetite or abnormal vital signs : NO    2. Open wounds or new incisions: NO    3. Recent hospitalization: NO    4.  Recent surgeries:  NO    5. Any upcoming surgeries or dental procedures?:NO    6. Any " current or recent bouts of illness or infection? On any antibiotics? : NO    7. Any new, sudden or worsening abdominal pain :NO    8. Vaccination within 4 weeks? Patient or someone in the household is scheduled to receive vaccination? No live virus vaccines prior to or during treatment :NO    9. Any nervous system diseases [i.e. multiple sclerosis, Guillain-Lutz, seizures, neurological  changes]: NO    10. Pregnant or breast feeding; or plans on pregnancy in the future: NO    11. Signs of worsening depression or suicidal ideations while taking benlysta:N/A    12. New-onset medical symptoms: NO    13.  New cancer diagnosis or on chemotherapy or radiation NO    14.  Evaluate for any sign of active TB [Unexplained weight loss, Loss of appetite, Night sweats, Fever, Fatigue, Chills, Coughing for 3 weeks or longer, Hemoptysis (coughing up blood), Chest pain]: NO    **Note: If answered yes to any of the above, hold the infusion and contact ordering rheumatologist or on-call rheumatologist.   .      Post Infusion Assessment:  Patient tolerated infusion without incident.  No evidence of extravasations.  Access discontinued per protocol.  Rheumatology Post Infusion: POST-INFUSION OF BIOLOGICAL MEDICATION:    Reviewed with patient.Inform patient if any fever, chills or signs of infection, new symptoms, abdominal pain, heart palpitations, shortness of breath, reaction, weakness, neurological changes, seek medical attention immediately and should not receive infusions. No live virus vaccines prior to or during treatment or up to 6 months post infusion. If the patient has an upcoming procedure or surgery, this should be discussed with the rheumatologist and surgeon or provider..    Discharge Plan:   Discharge instructions reviewed with: Patient.  Patient and/or family verbalized understanding of discharge instructions and all questions answered.  Patient discharged in stable condition accompanied by: self.  Departure Mode:  Ambulatory.    Unique Min RN

## 2018-06-15 NOTE — LETTER
June 19, 2018      Noni Mccormick  [id number] [address] [id number]        Dear ,    We are writing to inform you of your test results.    Normal labs. Platelet number slightly elevated. Will follow over time. It can be high in chronic inflammation.     Resulted Orders   CBC with platelets   Result Value Ref Range    WBC 9.0 4.0 - 11.0 10e9/L    RBC Count 4.40 3.8 - 5.2 10e12/L    Hemoglobin 13.1 11.7 - 15.7 g/dL    Hematocrit 40.0 35.0 - 47.0 %    MCV 91 78 - 100 fl    MCH 29.8 26.5 - 33.0 pg    MCHC 32.8 31.5 - 36.5 g/dL    RDW 13.1 10.0 - 15.0 %    Platelet Count 474 (H) 150 - 450 10e9/L   AST   Result Value Ref Range    AST 15 0 - 45 U/L   ALT   Result Value Ref Range    ALT 14 0 - 50 U/L   Creatinine   Result Value Ref Range    Creatinine 0.54 0.52 - 1.04 mg/dL    GFR Estimate >90 >60 mL/min/1.7m2      Comment:      Non  GFR Calc    GFR Estimate If Black >90 >60 mL/min/1.7m2      Comment:       GFR Calc       If you have any questions or concerns, please call the clinic at the number listed above.       Sincerely,        Thanks,   Cindy Olmedo CMA.  [facility]   Rheumatology  Phone: [phone]  Fax: [phone]  (staff for Dr. Gonzalez)

## 2018-06-15 NOTE — MR AVS SNAPSHOT
After Visit Summary   6/15/2018    Noni Mccormick    MRN: [id number]           Patient Information     Date Of Birth          1970        Visit Information        Provider Department      6/15/2018 12:30 PM [address]        Today's Diagnoses     Chronic polyarticular juvenile rheumatoid arthritis (H)    -  1    High risk medications (not anticoagulants) long-term use           Follow-ups after your visit        Your next 10 appointments already scheduled     Jul 20, 2018  1:30 PM CDT   Level 3 with [address])    [address] [address] [place] [phone]            Aug 24, 2018  1:00 PM CDT   LAB with LAB ONC [facility])    [address] [place] [address] [phone]           Please do not eat 10-12 hours before your appointment if you are coming in fasting for labs on lipids, cholesterol, or glucose (sugar). This does not apply to pregnant women. Water, hot tea and black coffee (with nothing added) are okay. Do not drink other fluids, diet soda or chew gum.            Aug 24, 2018  1:30 PM CDT   Level 3 with [address])    [address] [address] [place] [address] [phone]            Nov 21, 2018  4:00 PM CST   Return Visit with Sterling Gonzalez MD   [facility])    [address] [place] [address] [phone]              Who to contact     If you have questions or need follow up information about today's clinic visit or your schedule please contact [facility] directly at [phone].  Normal or non-critical lab and imaging results will be communicated to you by MyChart, letter or phone within 4 business days after the clinic has received the  results. If you do not hear from us within 7 days, please contact the clinic through Att[employer] Live or phone. If you have a critical or abnormal lab result, we will notify you by phone as soon as possible.  Submit refill requests through Attune Live or call your pharmacy and they will forward the refill request to us. Please allow 3 business days for your refill to be completed.          Additional Information About Your Visit        Attune Live Information     Attune Live is an electronic gateway that provides easy, online access to your medical records. With Attune Live, you can request a clinic appointment, read your test results, renew a prescription or communicate with your care team.     To sign up for Attune Live visit the website at [email or web address]   You will be asked to enter the access code listed below, as well as some personal information. Please follow the directions to create your username and password.     Your access code is: [email or web address]  Expires: 2018  4:07 PM     Your access code will  in 90 days. If you need help or a new code, please contact your [facility] or call [phone] for assistance.        Care EveryWhere ID     This is your Care EveryWhere ID. This could be used by other organizations to access your [place] medical records  [id number]        Your Vitals Were     Pulse Temperature Respirations Pulse Oximetry BMI (Body Mass Index)       89 97.1  F (36.2  C) (Oral) 16 97% 23.2 kg/m2        Blood Pressure from Last 3 Encounters:   06/15/18 131/82   18 120/78   18 139/86    Weight from Last 3 Encounters:   06/15/18 59.9 kg (132 lb)   18 60.9 kg (134 lb 3.2 oz)   18 60.8 kg (134 lb)              Today, you had the following     No orders found for display       Primary Care Provider Office Phone # Fax #    Sophia Baugh [phone] [address] [address] [facility] [address]        Equal Access to Services     ROSE  GAAR : Hadii aad ku hadham Anne, waaxda luqadaha, qaybta kaahmetda aditi, flor domingo faheemtori beard filibertomanuel carver . So [facility] [phone].    ATENCIÓN: Si habla español, tiene a gutierrez disposición servicios gratuitos de asistencia lingüística. Llame al [phone].    We comply with applicable federal civil rights laws and [place] laws. We do not discriminate on the basis of race, color, national origin, age, disability, sex, sexual orientation, or gender identity.            Thank you!     Thank you for choosing [facility]  for your care. Our goal is always to provide you with excellent care. Hearing back from our patients is one way we can continue to improve our services. Please take a few minutes to complete the written survey that you may receive in the mail after your visit with us. Thank you!             Your Updated Medication List - Protect others around you: Learn how to safely use, store and throw away your medicines at www.disposemymeds.org.          This list is accurate as of 6/15/18  4:18 PM.  Always use your most recent med list.                   Brand Name Dispense Instructions for use Diagnosis    albuterol 108 (90 Base) MCG/ACT Inhaler    PROAIR HFA/PROVENTIL HFA/VENTOLIN HFA    1 Inhaler    Inhale 2 puffs into the lungs every 6 hours as needed for shortness of breath / dyspnea    Intermittent asthma, uncomplicated       folic acid 1 MG tablet    FOLVITE    90 tablet    TAKE 1 TABLET (1 MG) BY MOUTH DAILY    Rheumatoid arthritis, involving unspecified site, unspecified rheumatoid factor presence (H)       inFLIXimab 100 MG injection    REMICADE    30 mL    Inject 300 mg into the vein as needed    Chronic polyarticular juvenile rheumatoid arthritis (H)       irbesartan 75 MG tablet    AVAPRO    90 tablet    Take 1 tablet (75 mg) by mouth daily    Hypertension goal BP (blood pressure) < 140/90       levothyroxine 112 MCG tablet    SYNTHROID/LEVOTHROID    90 tablet    Take  1 tablet (112 mcg) by mouth daily    Other specified hypothyroidism       methotrexate 2.5 MG tablet CHEMO     72 tablet    6 TABLETS BY MOUTH ONCE WEEKLY    Rheumatoid arthritis of multiple sites without rheumatoid factor (H)       norethin-eth estrad triphasic 0.5/1/0.5-35 MG-MCG per tablet    ARANELLE    84 tablet    Take 1 tablet by mouth daily    Encounter for surveillance of contraceptive pills       vitamin D 1000 units capsule      Take 1 capsule by mouth daily.

## 2018-06-19 ENCOUNTER — TRANSFERRED RECORDS (OUTPATIENT)
Dept: HEALTH INFORMATION MANAGEMENT | Facility: CLINIC | Age: 48
End: 2018-06-19

## 2018-06-19 NOTE — PROGRESS NOTES
Normal labs. Platelet number slightly elevated. Will follow over time. It can be high in chronic inflammation.

## 2018-06-23 DIAGNOSIS — M06.9 RHEUMATOID ARTHRITIS, INVOLVING UNSPECIFIED SITE, UNSPECIFIED RHEUMATOID FACTOR PRESENCE: ICD-10-CM

## 2018-06-23 DIAGNOSIS — I10 HYPERTENSION GOAL BP (BLOOD PRESSURE) < 140/90: ICD-10-CM

## 2018-06-25 RX ORDER — FOLIC ACID 1 MG/1
TABLET ORAL
Qty: 90 TABLET | Refills: 3 | Status: SHIPPED | OUTPATIENT
Start: 2018-06-25 | End: 2019-06-07

## 2018-06-25 RX ORDER — IRBESARTAN 75 MG/1
TABLET ORAL
Qty: 90 TABLET | Refills: 1 | Status: SHIPPED | OUTPATIENT
Start: 2018-06-25 | End: 2018-12-18

## 2018-06-25 NOTE — TELEPHONE ENCOUNTER
Faxed refill request from: Jennifer Dorado RN  Medication requested: folic acid (FOLVITE) 1 MG tablet  Prescription Written: 2/8/18  Last filled: n/a  Last qty: 90 tablets, 1 refill  Pt's last office visit: 5/23/18  Next scheduled office visit: 11/21/18  Last ophthalmology visit: n/a    WBC   Date Value Ref Range Status   06/15/2018 9.0 4.0 - 11.0 10e9/L Final     RBC Count   Date Value Ref Range Status   06/15/2018 4.40 3.8 - 5.2 10e12/L Final     Hemoglobin   Date Value Ref Range Status   06/15/2018 13.1 11.7 - 15.7 g/dL Final     Hematocrit   Date Value Ref Range Status   06/15/2018 40.0 35.0 - 47.0 % Final     MCV   Date Value Ref Range Status   06/15/2018 91 78 - 100 fl Final     MCH   Date Value Ref Range Status   06/15/2018 29.8 26.5 - 33.0 pg Final     MCHC   Date Value Ref Range Status   06/15/2018 32.8 31.5 - 36.5 g/dL Final     RDW   Date Value Ref Range Status   06/15/2018 13.1 10.0 - 15.0 % Final     Platelet Count   Date Value Ref Range Status   06/15/2018 474 (H) 150 - 450 10e9/L Final     AST   Date Value Ref Range Status   06/15/2018 15 0 - 45 U/L Final     ALT   Date Value Ref Range Status   06/15/2018 14 0 - 50 U/L Final     Creatinine   Date Value Ref Range Status   06/15/2018 0.54 0.52 - 1.04 mg/dL Final     Albumin   Date Value Ref Range Status   03/30/2018 3.2 (L) 3.4 - 5.0 g/dL Final     Color Urine (no units)   Date Value   02/08/2018 Yellow     Appearance Urine (no units)   Date Value   02/08/2018 Clear     Glucose Urine (mg/dL)   Date Value   02/08/2018 Negative     Bilirubin Urine (no units)   Date Value   02/08/2018 Negative     Ketones Urine (mg/dL)   Date Value   02/08/2018 Negative     Specific Gravity Urine (no units)   Date Value   02/08/2018 <=1.005     pH Urine (pH)   Date Value   02/08/2018 5.5     Protein Albumin Urine (mg/dL)   Date Value   02/08/2018 Negative     Urobilinogen Urine (EU/dL)   Date Value   02/08/2018 0.2     Nitrite Urine (no units)   Date Value   02/08/2018  Negative     Leukocyte Esterase Urine (no units)   Date Value   02/08/2018 Negative           Rx sent via e-prescribe to patient's preferred pharmacy per Rheumatology medication refill protocol.    Dee GARCIA RN, BSN  Pulmonary Care Coordinator

## 2018-07-05 ENCOUNTER — MEDICAL CORRESPONDENCE (OUTPATIENT)
Dept: HEALTH INFORMATION MANAGEMENT | Facility: CLINIC | Age: 48
End: 2018-07-05

## 2018-07-06 ENCOUNTER — RADIANT APPOINTMENT (OUTPATIENT)
Dept: GENERAL RADIOLOGY | Facility: CLINIC | Age: 48
End: 2018-07-06
Attending: ORTHOPAEDIC SURGERY
Payer: COMMERCIAL

## 2018-07-06 DIAGNOSIS — Z96.643 HISTORY OF BILATERAL TOTAL HIP ARTHROPLASTY: ICD-10-CM

## 2018-07-06 PROCEDURE — 73523 X-RAY EXAM HIPS BI 5/> VIEWS: CPT | Mod: FY

## 2018-07-18 ENCOUNTER — TELEPHONE (OUTPATIENT)
Dept: FAMILY MEDICINE | Facility: CLINIC | Age: 48
End: 2018-07-18

## 2018-07-18 DIAGNOSIS — J45.20 MILD INTERMITTENT ASTHMA WITHOUT COMPLICATION: Primary | ICD-10-CM

## 2018-07-18 NOTE — TELEPHONE ENCOUNTER
----- Message from Lindsay Mendez, RT sent at 7/18/2018 11:47 AM CDT -----  Kishor Baugh,    Noni has a referral for a pulmonologist, but before scheduling the appt, they would like her to have PFT's done.  Would you mind putting in an order for the PFT?    Thank you,    Lindsay Mendez, RRT

## 2018-07-20 ENCOUNTER — INFUSION THERAPY VISIT (OUTPATIENT)
Dept: INFUSION THERAPY | Facility: CLINIC | Age: 48
End: 2018-07-20
Payer: COMMERCIAL

## 2018-07-20 VITALS
SYSTOLIC BLOOD PRESSURE: 120 MMHG | DIASTOLIC BLOOD PRESSURE: 80 MMHG | RESPIRATION RATE: 16 BRPM | TEMPERATURE: 98 F | BODY MASS INDEX: 24.04 KG/M2 | OXYGEN SATURATION: 97 % | HEART RATE: 78 BPM | WEIGHT: 136.8 LBS

## 2018-07-20 DIAGNOSIS — Z79.899 HIGH RISK MEDICATIONS (NOT ANTICOAGULANTS) LONG-TERM USE: ICD-10-CM

## 2018-07-20 DIAGNOSIS — M08.3 CHRONIC POLYARTICULAR JUVENILE RHEUMATOID ARTHRITIS (H): Primary | ICD-10-CM

## 2018-07-20 PROCEDURE — 96375 TX/PRO/DX INJ NEW DRUG ADDON: CPT | Performed by: INTERNAL MEDICINE

## 2018-07-20 PROCEDURE — 99207 ZZC NO CHARGE LOS: CPT

## 2018-07-20 PROCEDURE — 96415 CHEMO IV INFUSION ADDL HR: CPT | Performed by: INTERNAL MEDICINE

## 2018-07-20 PROCEDURE — 96413 CHEMO IV INFUSION 1 HR: CPT | Performed by: INTERNAL MEDICINE

## 2018-07-20 RX ORDER — METHYLPREDNISOLONE SODIUM SUCCINATE 125 MG/2ML
125 INJECTION, POWDER, LYOPHILIZED, FOR SOLUTION INTRAMUSCULAR; INTRAVENOUS ONCE
Status: COMPLETED | OUTPATIENT
Start: 2018-07-20 | End: 2018-07-20

## 2018-07-20 RX ORDER — METHYLPREDNISOLONE SODIUM SUCCINATE 125 MG/2ML
125 INJECTION, POWDER, LYOPHILIZED, FOR SOLUTION INTRAMUSCULAR; INTRAVENOUS ONCE
Status: CANCELLED | OUTPATIENT
Start: 2018-07-20 | End: 2018-07-20

## 2018-07-20 RX ORDER — ACETAMINOPHEN 325 MG/1
650 TABLET ORAL ONCE
Status: CANCELLED
Start: 2018-07-20 | End: 2018-07-20

## 2018-07-20 RX ORDER — DIPHENHYDRAMINE HCL 25 MG
25 CAPSULE ORAL ONCE
Status: COMPLETED | OUTPATIENT
Start: 2018-07-20 | End: 2018-07-20

## 2018-07-20 RX ORDER — DIPHENHYDRAMINE HCL 25 MG
25 CAPSULE ORAL ONCE
Status: CANCELLED
Start: 2018-07-20 | End: 2018-07-20

## 2018-07-20 RX ADMIN — Medication 25 MG: at 13:37

## 2018-07-20 RX ADMIN — METHYLPREDNISOLONE SODIUM SUCCINATE 125 MG: 125 INJECTION INTRAMUSCULAR; INTRAVENOUS at 13:56

## 2018-07-20 RX ADMIN — Medication 250 ML: at 13:56

## 2018-07-20 NOTE — MR AVS SNAPSHOT
After Visit Summary   7/20/2018    Noni Mccormick    MRN: [id number]           Patient Information     Date Of Birth          1970        Visit Information        Provider Department      7/20/2018 1:30 PM [address] INFUSION [facility]        Today's Diagnoses     Chronic polyarticular juvenile rheumatoid arthritis (H)    -  1    High risk medications (not anticoagulants) long-term use           Follow-ups after your visit        Your next 10 appointments already scheduled     Jul 30, 2018  3:30 PM CDT   Pulmonary Function with [place] PULMONARY FUNCTION   [facility] ([facility])    [address] [facility] [address]   [phone]           No Inhalers for 6 hours prior to test No Smoking 2 hours prior to test            Aug 24, 2018  1:00 PM CDT   LAB with LAB ONC [facility])    [address] [place] [address] [phone]           Please do not eat 10-12 hours before your appointment if you are coming in fasting for labs on lipids, cholesterol, or glucose (sugar). This does not apply to pregnant women. Water, hot tea and black coffee (with nothing added) are okay. Do not drink other fluids, diet soda or chew gum.            Aug 24, 2018  1:30 PM CDT   Level 3 with [place] [address] INFUSION   [facility])    [address] [address] [place] [phone]            Sep 28, 2018  1:30 PM CDT   Level 3 with [place] [address] INFUSION   [facility])    [address] [place] [phone]            Nov 21, 2018  4:00 PM CST   Return Visit with Sterling Gonzalez MD   [facility])    [address] [address] [place] [address] [phone]              Who to contact     If you have questions or need follow  up information about today's clinic visit or your schedule please contact [facility] directly at [phone].  Normal or non-critical lab and imaging results will be communicated to you by MyChart, letter or phone within 4 business days after the clinic has received the results. If you do not hear from us within 7 days, please contact the clinic through MyChart or phone. If you have a critical or abnormal lab result, we will notify you by phone as soon as possible.  Submit refill requests through Livekick or call your pharmacy and they will forward the refill request to us. Please allow 3 business days for your refill to be completed.          Additional Information About Your Visit        Care EveryWhere ID     This is your Care EveryWhere ID. This could be used by other organizations to access your [place] medical records  [id number]        Your Vitals Were     Pulse Temperature Respirations Pulse Oximetry BMI (Body Mass Index)       78 98  F (36.7  C) (Oral) 16 97% 24.04 kg/m2        Blood Pressure from Last 3 Encounters:   07/20/18 120/80   06/15/18 131/82   05/23/18 120/78    Weight from Last 3 Encounters:   07/20/18 62.1 kg (136 lb 12.8 oz)   06/15/18 59.9 kg (132 lb)   05/23/18 60.9 kg (134 lb 3.2 oz)              Today, you had the following     No orders found for display       Primary Care Provider Office Phone # Fax #    Sophia Baugh [phone] [address] [facility] [address]        Equal Access to Services     ROSE JORGE : Hadii aad ku hadasho Soomaali, waaxda luqadaha, qaybta kaalmada adeegyada, flor deleon. So [facility] [phone].    ATENCIÓN: Si habla español, tiene a gutierrez disposición servicios gratuitos de asistencia lingüística. Llame al [phone].    We comply with applicable federal civil rights laws and [place] laws. We do not discriminate on the basis of race, color, national origin, age, disability, sex,  sexual orientation, or gender identity.            Thank you!     Thank you for choosing [facility]  for your care. Our goal is always to provide you with excellent care. Hearing back from our patients is one way we can continue to improve our services. Please take a few minutes to complete the written survey that you may receive in the mail after your visit with us. Thank you!             Your Updated Medication List - Protect others around you: Learn how to safely use, store and throw away your medicines at www.disposemymeds.org.          This list is accurate as of 7/20/18  4:40 PM.  Always use your most recent med list.                   Brand Name Dispense Instructions for use Diagnosis    albuterol 108 (90 Base) MCG/ACT Inhaler    PROAIR HFA/PROVENTIL HFA/VENTOLIN HFA    1 Inhaler    Inhale 2 puffs into the lungs every 6 hours as needed for shortness of breath / dyspnea    Intermittent asthma, uncomplicated       folic acid 1 MG tablet    FOLVITE    90 tablet    TAKE 1 TABLET (1 MG) BY MOUTH DAILY    Rheumatoid arthritis, involving unspecified site, unspecified rheumatoid factor presence (H)       inFLIXimab 100 MG injection    REMICADE    30 mL    Inject 300 mg into the vein as needed    Chronic polyarticular juvenile rheumatoid arthritis (H)       irbesartan 75 MG tablet    AVAPRO    90 tablet    TAKE 1 TABLET (75 MG) BY MOUTH DAILY    Hypertension goal BP (blood pressure) < 140/90       levothyroxine 112 MCG tablet    SYNTHROID/LEVOTHROID    90 tablet    Take 1 tablet (112 mcg) by mouth daily    Other specified hypothyroidism       methotrexate 2.5 MG tablet CHEMO     72 tablet    6 TABLETS BY MOUTH ONCE WEEKLY    Rheumatoid arthritis of multiple sites without rheumatoid factor (H)       norethin-eth estrad triphasic 0.5/1/0.5-35 MG-MCG per tablet    ARANELLE    84 tablet    Take 1 tablet by mouth daily    Encounter for surveillance of contraceptive pills       vitamin D 1000 units capsule       Take 1 capsule by mouth daily.

## 2018-07-20 NOTE — PROGRESS NOTES
"Infusion Nursing Note:   Noni Mccormick presents today for Remicade.    Patient seen by provider today: No   present during visit today: Not Applicable.    Note: N/A.    Intravenous Access:  Peripheral IV placed.    Treatment Conditions:  Rheumatology Infusion Checklist: PRIOR TO INFUSION OF BIOLOGICAL MEDICATIONS OR ANY OF THESE AS LISTED: Remicaide (infliximab) \".rheumbiologicalchecklist\"    Prior to Infusion of biological medications or any of these as listed:    1. Elevated temperature, fever, chills, productive cough or abnormal vital signs, night sweats, coughing up blood or sputum, no appetite or abnormal vital signs : NO    2. Open wounds or new incisions: NO    3. Recent hospitalization: NO    4.  Recent surgeries:  NO    5. Any upcoming surgeries or dental procedures?:NO    6. Any current or recent bouts of illness or infection? On any antibiotics? : NO    7. Any new, sudden or worsening abdominal pain :NO    8. Vaccination within 4 weeks? Patient or someone in the household is scheduled to receive vaccination? No live virus vaccines prior to or during treatment :NO    9. Any nervous system diseases [i.e. multiple sclerosis, Guillain-Methuen, seizures, neurological  changes]: NO    10. Pregnant or breast feeding; or plans on pregnancy in the future: NO    11. Signs of worsening depression or suicidal ideations while taking benlysta:NO    12. New-onset medical symptoms: NO    13.  New cancer diagnosis or on chemotherapy or radiation NO    14.  Evaluate for any sign of active TB [Unexplained weight loss, Loss of appetite, Night sweats, Fever, Fatigue, Chills, Coughing for 3 weeks or longer, Hemoptysis (coughing up blood), Chest pain]: NO    **Note: If answered yes to any of the above, hold the infusion and contact ordering rheumatologist or on-call rheumatologist.   .      Post Infusion Assessment:  Patient tolerated infusion without incident.  Site patent and intact, free from redness, edema " or discomfort.  No evidence of extravasations.  Access discontinued per protocol.  Rheumatology Post Infusion: POST-INFUSION OF BIOLOGICAL MEDICATION:    Reviewed with patient.  Given biologic medication or medication hand-out. Inform patient if any fever, chills or signs of infection, new symptoms, abdominal pain, heart palpitations, shortness of breath, reaction, weakness, neurological changes, seek medical attention immediately and should not receive infusions. No live virus vaccines prior to or during treatment or up to 6 months post infusion. If the patient has an upcoming procedure or surgery, this should be discussed with the rheumatologist and surgeon or provider..    Discharge Plan:   Discharge instructions reviewed with: Patient.  Patient and/or family verbalized understanding of discharge instructions and all questions answered.  Patient discharged in stable condition accompanied by: self.  Departure Mode: Ambulatory.    Unique Min RN

## 2018-07-30 ENCOUNTER — HOSPITAL ENCOUNTER (OUTPATIENT)
Dept: RESPIRATORY THERAPY | Facility: CLINIC | Age: 48
Discharge: HOME | End: 2018-07-30
Attending: FAMILY MEDICINE | Admitting: FAMILY MEDICINE
Payer: COMMERCIAL

## 2018-07-30 DIAGNOSIS — J45.20 MILD INTERMITTENT ASTHMA WITHOUT COMPLICATION: ICD-10-CM

## 2018-07-30 PROCEDURE — 94060 EVALUATION OF WHEEZING: CPT | Mod: 26 | Performed by: INTERNAL MEDICINE

## 2018-07-30 PROCEDURE — 25000125 ZZHC RX 250: Performed by: FAMILY MEDICINE

## 2018-07-30 PROCEDURE — 94060 EVALUATION OF WHEEZING: CPT

## 2018-07-30 RX ORDER — ALBUTEROL SULFATE 0.83 MG/ML
2.5 SOLUTION RESPIRATORY (INHALATION) ONCE
Status: COMPLETED | OUTPATIENT
Start: 2018-07-30 | End: 2018-07-30

## 2018-07-30 RX ADMIN — ALBUTEROL SULFATE 2.5 MG: 2.5 SOLUTION RESPIRATORY (INHALATION) at 15:30

## 2018-08-01 LAB
EXPTIME-PRE: 7.7 SEC
FEF2575-%PRED-POST: 88 %
FEF2575-%PRED-PRE: 64 %
FEF2575-POST: 2.47 L/SEC
FEF2575-PRE: 1.8 L/SEC
FEF2575-PRED: 2.78 L/SEC
FEFMAX-%PRED-PRE: 99 %
FEFMAX-PRE: 6.64 L/SEC
FEFMAX-PRED: 6.68 L/SEC
FEV1-%PRED-PRE: 87 %
FEV1-PRE: 2.4 L
FEV1FEV6-PRE: 75 %
FEV1FEV6-PRED: 83 %
FEV1FVC-PRE: 75 %
FEV1FVC-PRED: 81 %
FIFMAX-PRE: 3.46 L/SEC
FVC-%PRED-PRE: 94 %
FVC-PRE: 3.22 L
FVC-PRED: 3.41 L

## 2018-08-20 DIAGNOSIS — E03.8 OTHER SPECIFIED HYPOTHYROIDISM: ICD-10-CM

## 2018-08-20 LAB — TSH SERPL DL<=0.005 MIU/L-ACNC: 0.5 MU/L (ref 0.4–4)

## 2018-08-20 PROCEDURE — 84443 ASSAY THYROID STIM HORMONE: CPT | Performed by: NURSE PRACTITIONER

## 2018-08-20 PROCEDURE — 36415 COLL VENOUS BLD VENIPUNCTURE: CPT | Performed by: NURSE PRACTITIONER

## 2018-08-24 ENCOUNTER — INFUSION THERAPY VISIT (OUTPATIENT)
Dept: INFUSION THERAPY | Facility: CLINIC | Age: 48
End: 2018-08-24
Payer: COMMERCIAL

## 2018-08-24 VITALS
BODY MASS INDEX: 24.32 KG/M2 | TEMPERATURE: 97.4 F | SYSTOLIC BLOOD PRESSURE: 108 MMHG | HEART RATE: 78 BPM | DIASTOLIC BLOOD PRESSURE: 69 MMHG | WEIGHT: 138.4 LBS

## 2018-08-24 DIAGNOSIS — Z79.899 HIGH RISK MEDICATIONS (NOT ANTICOAGULANTS) LONG-TERM USE: ICD-10-CM

## 2018-08-24 DIAGNOSIS — M08.3 CHRONIC POLYARTICULAR JUVENILE RHEUMATOID ARTHRITIS (H): ICD-10-CM

## 2018-08-24 DIAGNOSIS — M08.3 CHRONIC POLYARTICULAR JUVENILE RHEUMATOID ARTHRITIS (H): Primary | ICD-10-CM

## 2018-08-24 LAB
ALT SERPL W P-5'-P-CCNC: 15 U/L (ref 0–50)
AST SERPL W P-5'-P-CCNC: 13 U/L (ref 0–45)
CREAT SERPL-MCNC: 0.69 MG/DL (ref 0.52–1.04)
ERYTHROCYTE [DISTWIDTH] IN BLOOD BY AUTOMATED COUNT: 13.1 % (ref 10–15)
GFR SERPL CREATININE-BSD FRML MDRD: >90 ML/MIN/1.7M2
HCT VFR BLD AUTO: 38 % (ref 35–47)
HGB BLD-MCNC: 12.4 G/DL (ref 11.7–15.7)
MCH RBC QN AUTO: 29.9 PG (ref 26.5–33)
MCHC RBC AUTO-ENTMCNC: 32.6 G/DL (ref 31.5–36.5)
MCV RBC AUTO: 92 FL (ref 78–100)
PLATELET # BLD AUTO: 396 10E9/L (ref 150–450)
RBC # BLD AUTO: 4.15 10E12/L (ref 3.8–5.2)
WBC # BLD AUTO: 8.5 10E9/L (ref 4–11)

## 2018-08-24 PROCEDURE — 96413 CHEMO IV INFUSION 1 HR: CPT | Performed by: INTERNAL MEDICINE

## 2018-08-24 PROCEDURE — 85027 COMPLETE CBC AUTOMATED: CPT | Performed by: STUDENT IN AN ORGANIZED HEALTH CARE EDUCATION/TRAINING PROGRAM

## 2018-08-24 PROCEDURE — 82565 ASSAY OF CREATININE: CPT | Performed by: STUDENT IN AN ORGANIZED HEALTH CARE EDUCATION/TRAINING PROGRAM

## 2018-08-24 PROCEDURE — 96375 TX/PRO/DX INJ NEW DRUG ADDON: CPT | Performed by: INTERNAL MEDICINE

## 2018-08-24 PROCEDURE — 99207 ZZC NO CHARGE LOS: CPT

## 2018-08-24 PROCEDURE — 36415 COLL VENOUS BLD VENIPUNCTURE: CPT | Performed by: STUDENT IN AN ORGANIZED HEALTH CARE EDUCATION/TRAINING PROGRAM

## 2018-08-24 PROCEDURE — 84460 ALANINE AMINO (ALT) (SGPT): CPT | Performed by: STUDENT IN AN ORGANIZED HEALTH CARE EDUCATION/TRAINING PROGRAM

## 2018-08-24 PROCEDURE — 84450 TRANSFERASE (AST) (SGOT): CPT | Performed by: STUDENT IN AN ORGANIZED HEALTH CARE EDUCATION/TRAINING PROGRAM

## 2018-08-24 RX ORDER — METHYLPREDNISOLONE SODIUM SUCCINATE 125 MG/2ML
125 INJECTION, POWDER, LYOPHILIZED, FOR SOLUTION INTRAMUSCULAR; INTRAVENOUS ONCE
Status: COMPLETED | OUTPATIENT
Start: 2018-08-24 | End: 2018-08-24

## 2018-08-24 RX ORDER — DIPHENHYDRAMINE HCL 25 MG
25 CAPSULE ORAL ONCE
Status: COMPLETED | OUTPATIENT
Start: 2018-08-24 | End: 2018-08-24

## 2018-08-24 RX ORDER — ACETAMINOPHEN 325 MG/1
650 TABLET ORAL ONCE
Status: CANCELLED
Start: 2018-08-24 | End: 2018-08-24

## 2018-08-24 RX ORDER — DIPHENHYDRAMINE HCL 25 MG
25 CAPSULE ORAL ONCE
Status: CANCELLED
Start: 2018-08-24 | End: 2018-08-24

## 2018-08-24 RX ORDER — METHYLPREDNISOLONE SODIUM SUCCINATE 125 MG/2ML
125 INJECTION, POWDER, LYOPHILIZED, FOR SOLUTION INTRAMUSCULAR; INTRAVENOUS ONCE
Status: CANCELLED | OUTPATIENT
Start: 2018-08-24 | End: 2018-08-24

## 2018-08-24 RX ADMIN — Medication 25 MG: at 14:11

## 2018-08-24 RX ADMIN — Medication 250 ML: at 14:02

## 2018-08-24 RX ADMIN — METHYLPREDNISOLONE SODIUM SUCCINATE 125 MG: 125 INJECTION INTRAMUSCULAR; INTRAVENOUS at 14:03

## 2018-08-24 ASSESSMENT — PAIN SCALES - GENERAL: PAINLEVEL: MILD PAIN (2)

## 2018-08-24 NOTE — MR AVS SNAPSHOT
After Visit Summary   8/24/2018    Noni Mccormick    MRN: [id number]           Patient Information     Date Of Birth          1970        Visit Information        Provider Department      8/24/2018 1:30 PM [address]        Today's Diagnoses     Chronic polyarticular juvenile rheumatoid arthritis (H)    -  1    High risk medications (not anticoagulants) long-term use           Follow-ups after your visit        Your next 10 appointments already scheduled     Sep 28, 2018  1:30 PM CDT   Level 3 with [address] ([facility])    [address] [place] [address] [phone]            Nov 21, 2018  4:00 PM CST   Return Visit with Sterling Gonzalez MD   [facility])    [address] [place] [phone]              Who to contact     If you have questions or need follow up information about today's clinic visit or your schedule please contact [facility] directly at [phone].  Normal or non-critical lab and imaging results will be communicated to you by MyChart, letter or phone within 4 business days after the clinic has received the results. If you do not hear from us within 7 days, please contact the clinic through MyChart or phone. If you have a critical or abnormal lab result, we will notify you by phone as soon as possible.  Submit refill requests through BRIKAt or call your pharmacy and they will forward the refill request to us. Please allow 3 business days for your refill to be completed.          Additional Information About Your Visit        Care EveryWhere ID     This is your Care EveryWhere ID. This could be used by other organizations to access your [place] medical records  [id number]        Your Vitals Were     Pulse Temperature BMI (Body Mass Index)             78 97.4  F (36.3  C)  (Oral) 24.32 kg/m2          Blood Pressure from Last 3 Encounters:   08/24/18 108/69   07/20/18 120/80   06/15/18 131/82    Weight from Last 3 Encounters:   08/24/18 62.8 kg (138 lb 6.4 oz)   07/20/18 62.1 kg (136 lb 12.8 oz)   06/15/18 59.9 kg (132 lb)              Today, you had the following     No orders found for display       Primary Care Provider Office Phone # Fax #    Sophia Baugh [phone] [address] [facility] [address]        Equal Access to Services     [facility] HospitalPHYLLIS : Hadii shaun hernandez hadasho Sochristina, waaxda luqadaha, qaybta kaalmada aditi, flor carver . So [facility] [phone].    ATENCIÓN: Si habla español, tiene a gutierrez disposición servicios gratuitos de asistencia lingüística. LlSelect Medical [facility] [phone].    We comply with applicable federal civil rights laws and [place] laws. We do not discriminate on the basis of race, color, national origin, age, disability, sex, sexual orientation, or gender identity.            Thank you!     Thank you for choosing [facility]  for your care. Our goal is always to provide you with excellent care. Hearing back from our patients is one way we can continue to improve our services. Please take a few minutes to complete the written survey that you may receive in the mail after your visit with us. Thank you!             Your Updated Medication List - Protect others around you: Learn how to safely use, store and throw away your medicines at www.disposemymeds.org.          This list is accurate as of 8/24/18  3:18 PM.  Always use your most recent med list.                   Brand Name Dispense Instructions for use Diagnosis    albuterol 108 (90 Base) MCG/ACT inhaler    PROAIR HFA/PROVENTIL HFA/VENTOLIN HFA    1 Inhaler    Inhale 2 puffs into the lungs every 6 hours as needed for shortness of breath / dyspnea    Intermittent asthma, uncomplicated       folic acid 1 MG tablet    FOLVITE    90 tablet     TAKE 1 TABLET (1 MG) BY MOUTH DAILY    Rheumatoid arthritis, involving unspecified site, unspecified rheumatoid factor presence (H)       inFLIXimab 100 MG injection    REMICADE    30 mL    Inject 300 mg into the vein as needed    Chronic polyarticular juvenile rheumatoid arthritis (H)       irbesartan 75 MG tablet    AVAPRO    90 tablet    TAKE 1 TABLET (75 MG) BY MOUTH DAILY    Hypertension goal BP (blood pressure) < 140/90       levothyroxine 112 MCG tablet    SYNTHROID/LEVOTHROID    90 tablet    Take 1 tablet (112 mcg) by mouth daily    Other specified hypothyroidism       methotrexate 2.5 MG tablet CHEMO     72 tablet    6 TABLETS BY MOUTH ONCE WEEKLY    Rheumatoid arthritis of multiple sites without rheumatoid factor (H)       norethin-eth estrad triphasic 0.5/1/0.5-35 MG-MCG per tablet    ARANELLE    84 tablet    Take 1 tablet by mouth daily    Encounter for surveillance of contraceptive pills       vitamin D 1000 units capsule      Take 1 capsule by mouth daily.

## 2018-08-24 NOTE — PROGRESS NOTES
"Infusion Nursing Note:  Noni Mccormick presents today for rapid Remcade.    Patient seen by provider today: No   present during visit today: Not Applicable.    Note: tolerated 1 hr well.      Intravenous Access:  Peripheral IV placed.    Treatment Conditions:  Rheumatology Infusion Checklist: PRIOR TO INFUSION OF BIOLOGICAL MEDICATIONS OR ANY OF THESE AS LISTED: Remicaide (infliximab) \".rheumbiologicalchecklist\"    Prior to Infusion of biological medications or any of these as listed:    1. Elevated temperature, fever, chills, productive cough or abnormal vital signs, night sweats, coughing up blood or sputum, no appetite or abnormal vital signs : NO    2. Open wounds or new incisions: NO    3. Recent hospitalization: NO    4.  Recent surgeries:  NO    5. Any upcoming surgeries or dental procedures?:NO    6. Any current or recent bouts of illness or infection? On any antibiotics? : NO    7. Any new, sudden or worsening abdominal pain :NO    8. Vaccination within 4 weeks? Patient or someone in the household is scheduled to receive vaccination? No live virus vaccines prior to or during treatment :NO    9. Any nervous system diseases [i.e. multiple sclerosis, Guillain-[place], seizures, neurological  changes]: NO    10. Pregnant or breast feeding; or plans on pregnancy in the future: NO    11. Signs of worsening depression or suicidal ideations while taking benlysta:NO    12. New-onset medical symptoms: NO    13.  New cancer diagnosis or on chemotherapy or radiation NO    14.  Evaluate for any sign of active TB [Unexplained weight loss, Loss of appetite, Night sweats, Fever, Fatigue, Chills, Coughing for 3 weeks or longer, Hemoptysis (coughing up blood), Chest pain]: NO    **Note: If answered yes to any of the above, hold the infusion and contact ordering rheumatologist or on-call rheumatologist.     Post Infusion Assessment:  Patient tolerated infusion without incident.    Discharge Plan:   Pt directed " to scheduling staff to set up next appt.    ZAINAB KRISHNAN RN

## 2018-09-28 ENCOUNTER — INFUSION THERAPY VISIT (OUTPATIENT)
Dept: INFUSION THERAPY | Facility: CLINIC | Age: 48
End: 2018-09-28
Payer: COMMERCIAL

## 2018-09-28 VITALS
WEIGHT: 135.2 LBS | SYSTOLIC BLOOD PRESSURE: 121 MMHG | TEMPERATURE: 97.5 F | BODY MASS INDEX: 23.76 KG/M2 | HEART RATE: 76 BPM | DIASTOLIC BLOOD PRESSURE: 76 MMHG | OXYGEN SATURATION: 97 %

## 2018-09-28 DIAGNOSIS — Z79.899 HIGH RISK MEDICATIONS (NOT ANTICOAGULANTS) LONG-TERM USE: ICD-10-CM

## 2018-09-28 DIAGNOSIS — M08.3 CHRONIC POLYARTICULAR JUVENILE RHEUMATOID ARTHRITIS (H): Primary | ICD-10-CM

## 2018-09-28 PROCEDURE — 96375 TX/PRO/DX INJ NEW DRUG ADDON: CPT | Performed by: INTERNAL MEDICINE

## 2018-09-28 PROCEDURE — 96413 CHEMO IV INFUSION 1 HR: CPT | Performed by: INTERNAL MEDICINE

## 2018-09-28 PROCEDURE — 99207 ZZC NO CHARGE LOS: CPT

## 2018-09-28 RX ORDER — ACETAMINOPHEN 325 MG/1
650 TABLET ORAL ONCE
Status: CANCELLED
Start: 2018-09-28 | End: 2018-09-28

## 2018-09-28 RX ORDER — DIPHENHYDRAMINE HCL 25 MG
25 CAPSULE ORAL ONCE
Status: COMPLETED | OUTPATIENT
Start: 2018-09-28 | End: 2018-09-28

## 2018-09-28 RX ORDER — METHYLPREDNISOLONE SODIUM SUCCINATE 125 MG/2ML
125 INJECTION, POWDER, LYOPHILIZED, FOR SOLUTION INTRAMUSCULAR; INTRAVENOUS ONCE
Status: CANCELLED | OUTPATIENT
Start: 2018-09-28 | End: 2018-09-28

## 2018-09-28 RX ORDER — METHYLPREDNISOLONE SODIUM SUCCINATE 125 MG/2ML
125 INJECTION, POWDER, LYOPHILIZED, FOR SOLUTION INTRAMUSCULAR; INTRAVENOUS ONCE
Status: COMPLETED | OUTPATIENT
Start: 2018-09-28 | End: 2018-09-28

## 2018-09-28 RX ORDER — DIPHENHYDRAMINE HCL 25 MG
25 CAPSULE ORAL ONCE
Status: CANCELLED
Start: 2018-09-28 | End: 2018-09-28

## 2018-09-28 RX ADMIN — Medication 25 MG: at 13:50

## 2018-09-28 RX ADMIN — Medication 250 ML: at 14:17

## 2018-09-28 RX ADMIN — METHYLPREDNISOLONE SODIUM SUCCINATE 125 MG: 125 INJECTION INTRAMUSCULAR; INTRAVENOUS at 14:19

## 2018-09-28 ASSESSMENT — PAIN SCALES - GENERAL: PAINLEVEL: NO PAIN (0)

## 2018-09-28 NOTE — MR AVS SNAPSHOT
After Visit Summary   9/28/2018    Noni Mccormick    MRN: [id number]           Patient Information     Date Of Birth          1970        Visit Information        Provider Department      9/28/2018 1:30 PM [place] [facility]        Today's Diagnoses     Chronic polyarticular juvenile rheumatoid arthritis (H)    -  1    High risk medications (not anticoagulants) long-term use           Follow-ups after your visit        Your next 10 appointments already scheduled     Nov 02, 2018  1:30 PM CDT   LAB with LAB ONC [facility])    [id number] [place] [address]   [phone]           Please do not eat 10-12 hours before your appointment if you are coming in fasting for labs on lipids, cholesterol, or glucose (sugar). This does not apply to pregnant women. Water, hot tea and black coffee (with nothing added) are okay. Do not drink other fluids, diet soda or chew gum.            Nov 02, 2018  2:00 PM CDT   Level 2 with [address] ([facility])    [id number] [place] [address] [phone]            Nov 21, 2018  4:00 PM CST   Return Visit with Sterlign Gonzalez MD   [facility])    [id number] [place] [id number] [phone]            Dec 07, 2018  2:00 PM CST   Level 2 with [address])    [id number] [place] [address] [phone]              Who to contact     If you have questions or need follow up information about today's clinic visit or your schedule please contact [facility] directly at [phone].  Normal or non-critical lab and imaging results will be communicated to you by MyChart, letter or phone within 4 business days after the clinic has received the  results. If you do not hear from us within 7 days, please contact the clinic through [employer] or phone. If you have a critical or abnormal lab result, we will notify you by phone as soon as possible.  Submit refill requests through [employer] or call your pharmacy and they will forward the refill request to us. Please allow 3 business days for your refill to be completed.          Additional Information About Your Visit        Care EveryWhere ID     This is your Care EveryWhere ID. This could be used by other organizations to access your [place] medical records  [id number]        Your Vitals Were     Pulse Temperature Pulse Oximetry BMI (Body Mass Index)          76 97.5  F (36.4  C) (Oral) 97% 23.76 kg/m2         Blood Pressure from Last 3 Encounters:   09/28/18 121/76   08/24/18 108/69   07/20/18 120/80    Weight from Last 3 Encounters:   09/28/18 61.3 kg (135 lb 3.2 oz)   08/24/18 62.8 kg (138 lb 6.4 oz)   07/20/18 62.1 kg (136 lb 12.8 oz)              Today, you had the following     No orders found for display       Primary Care Provider Office Phone # Fax #    Sophia Baugh [phone] [address] [facility] [address]        Equal Access to Services     ROSE JORGE : Hadii shaun ku hadasho Soomaali, waaxda luqadaha, qaybta kaalmada adeegyada, [address] haystephy carver . So [facility] [phone].    ATENCIÓN: Si habla español, tiene a gutierrez disposición servicios gratuitos de asistencia lingüística. Llame al [phone].    We comply with applicable federal civil rights laws and [place] laws. We do not discriminate on the basis of race, color, national origin, age, disability, sex, sexual orientation, or gender identity.            Thank you!     Thank you for choosing [facility]  for your care. Our goal is always to provide you with excellent care. Hearing back from our patients is one way we can continue to improve our services. Please take a few minutes  to complete the written survey that you may receive in the mail after your visit with us. Thank you!             Your Updated Medication List - Protect others around you: Learn how to safely use, store and throw away your medicines at www.disposemymeds.org.          This list is accurate as of 9/28/18  5:22 PM.  Always use your most recent med list.                   Brand Name Dispense Instructions for use Diagnosis    albuterol 108 (90 Base) MCG/ACT inhaler    PROAIR HFA/PROVENTIL HFA/VENTOLIN HFA    1 Inhaler    Inhale 2 puffs into the lungs every 6 hours as needed for shortness of breath / dyspnea    Intermittent asthma, uncomplicated       folic acid 1 MG tablet    FOLVITE    90 tablet    TAKE 1 TABLET (1 MG) BY MOUTH DAILY    Rheumatoid arthritis, involving unspecified site, unspecified rheumatoid factor presence (H)       inFLIXimab 100 MG injection    REMICADE    30 mL    Inject 300 mg into the vein as needed    Chronic polyarticular juvenile rheumatoid arthritis (H)       irbesartan 75 MG tablet    AVAPRO    90 tablet    TAKE 1 TABLET (75 MG) BY MOUTH DAILY    Hypertension goal BP (blood pressure) < 140/90       levothyroxine 112 MCG tablet    SYNTHROID/LEVOTHROID    90 tablet    Take 1 tablet (112 mcg) by mouth daily    Other specified hypothyroidism       methotrexate 2.5 MG tablet CHEMO     72 tablet    6 TABLETS BY MOUTH ONCE WEEKLY    Rheumatoid arthritis of multiple sites without rheumatoid factor (H)       norethin-eth estrad triphasic 0.5/1/0.5-35 MG-MCG per tablet    ARANELLE    84 tablet    Take 1 tablet by mouth daily    Encounter for surveillance of contraceptive pills       vitamin D 1000 units capsule      Take 1 capsule by mouth daily.

## 2018-09-28 NOTE — PROGRESS NOTES
"Infusion Nursing Note:  Noni Mccormick presents today for Remicade.    Patient seen by provider today: No   present during visit today: Not Applicable.    Note: Patient took Tylenol 650 mg and Benadryl 25 mg at home about 1 hour ago.    Intravenous Access:  Peripheral IV placed.    Treatment Conditions:  Rheumatology Infusion Checklist: PRIOR TO INFUSION OF BIOLOGICAL MEDICATIONS OR ANY OF THESE AS LISTED: Remicaide (infliximab) \".rheumbiologicalchecklist\"    Prior to Infusion of biological medications or any of these as listed:    1. Elevated temperature, fever, chills, productive cough or abnormal vital signs, night sweats, coughing up blood or sputum, no appetite or abnormal vital signs : NO    2. Open wounds or new incisions: NO    3. Recent hospitalization: NO    4.  Recent surgeries:  NO    5. Any upcoming surgeries or dental procedures?:NO    6. Any current or recent bouts of illness or infection? On any antibiotics? : NO    7. Any new, sudden or worsening abdominal pain :NO    8. Vaccination within 4 weeks? Patient or someone in the household is scheduled to receive vaccination? No live virus vaccines prior to or during treatment :NO    9. Any nervous system diseases [i.e. multiple sclerosis, Guillain-[place], seizures, neurological  changes]: NO    10. Pregnant or breast feeding; or plans on pregnancy in the future: NO    11. Signs of worsening depression or suicidal ideations while taking benlysta:NO    12. New-onset medical symptoms: NO    13.  New cancer diagnosis or on chemotherapy or radiation NO    14.  Evaluate for any sign of active TB [Unexplained weight loss, Loss of appetite, Night sweats, Fever, Fatigue, Chills, Coughing for 3 weeks or longer, Hemoptysis (coughing up blood), Chest pain]: NO    **Note: If answered yes to any of the above, hold the infusion and contact ordering rheumatologist or on-call rheumatologist.     Post Infusion Assessment:  Patient tolerated infusion " without incident.  Blood return noted pre and post infusion.  Site patent and intact, free from redness, edema or discomfort.  Access discontinued per protocol.  Rheumatology Post Infusion: POST-INFUSION OF BIOLOGICAL MEDICATION:    Reviewed with patient.  Given biologic medication or medication hand-out. Inform patient if any fever, chills or signs of infection, new symptoms, abdominal pain, heart palpitations, shortness of breath, reaction, weakness, neurological changes, seek medical attention immediately and should not receive infusions. No live virus vaccines prior to or during treatment or up to 6 months post infusion. If the patient has an upcoming procedure or surgery, this should be discussed with the rheumatologist and surgeon or provider.    Discharge Plan:   Patient will return 11/2/18 for next appointment.   Patient discharged in stable condition accompanied by: self.  Departure Mode: Ambulatory.    Krista Dias RN

## 2018-10-07 DIAGNOSIS — M06.09 RHEUMATOID ARTHRITIS OF MULTIPLE SITES WITHOUT RHEUMATOID FACTOR (H): ICD-10-CM

## 2018-10-09 NOTE — TELEPHONE ENCOUNTER
Medication/Dose:     methotrexate 2.5 MG tablet CHEMO 72 tablet 1 2018  No      Si TABLETS BY MOUTH ONCE WEEKLY       Last Written Prescription Date: 2018  Last Fill Quantity: 72, # refills: 1  Last Office Visit: 2018  Next Enc:      Next 5 appointments (look out 90 days)     2018  2:30 PM CST   [facility]    [id number] [place] [address] [phone]                    Standing lab orders every 10 weeks    WBC   Date Value Ref Range Status   2018 8.5 4.0 - 11.0 10e9/L Final     RBC Count   Date Value Ref Range Status   2018 4.15 3.8 - 5.2 10e12/L Final     Hemoglobin   Date Value Ref Range Status   2018 12.4 11.7 - 15.7 g/dL Final     Hematocrit   Date Value Ref Range Status   2018 38.0 35.0 - 47.0 % Final     MCV   Date Value Ref Range Status   2018 92 78 - 100 fl Final     MCH   Date Value Ref Range Status   2018 29.9 26.5 - 33.0 pg Final     MCHC   Date Value Ref Range Status   2018 32.6 31.5 - 36.5 g/dL Final     RDW   Date Value Ref Range Status   2018 13.1 10.0 - 15.0 % Final     Platelet Count   Date Value Ref Range Status   2018 396 150 - 450 10e9/L Final     AST   Date Value Ref Range Status   2018 13 0 - 45 U/L Final     ALT   Date Value Ref Range Status   2018 15 0 - 50 U/L Final     Creatinine   Date Value Ref Range Status   2018 0.69 0.52 - 1.04 mg/dL Final     Albumin   Date Value Ref Range Status   2018 3.2 (L) 3.4 - 5.0 g/dL Final     No results found for: CKTOTAL  C-Reactive Protein   Date Value Ref Range Status   2009 1.2 (A) 0 - 0.8 mg/dL      CRP Inflammation   Date Value Ref Range Status   2013 15.0 (H) 0.0 - 8.0 mg/L Final   2013 15.8 (H) 0.0 - 8.0 mg/L Final   2012 10.3 (H) 0.0 - 8.0 mg/L Final     Sed Rate   Date Value Ref Range Status   2013 28 (H)  0 - 20 mm/h Final   01/02/2013 32 (H) 0 - 20 mm/h Final   11/07/2012 22 (H) 0 - 20 mm/h Final     Color Urine (no units)   Date Value   02/08/2018 Yellow     Appearance Urine (no units)   Date Value   02/08/2018 Clear     Glucose Urine (mg/dL)   Date Value   02/08/2018 Negative     Bilirubin Urine (no units)   Date Value   02/08/2018 Negative     Ketones Urine (mg/dL)   Date Value   02/08/2018 Negative     Specific Gravity Urine (no units)   Date Value   02/08/2018 <=1.005     pH Urine (pH)   Date Value   02/08/2018 5.5     Protein Albumin Urine (mg/dL)   Date Value   02/08/2018 Negative     Urobilinogen Urine (EU/dL)   Date Value   02/08/2018 0.2     Nitrite Urine (no units)   Date Value   02/08/2018 Negative     Leukocyte Esterase Urine (no units)   Date Value   02/08/2018 Negative

## 2018-11-02 ENCOUNTER — INFUSION THERAPY VISIT (OUTPATIENT)
Dept: INFUSION THERAPY | Facility: CLINIC | Age: 48
End: 2018-11-02
Payer: COMMERCIAL

## 2018-11-02 VITALS
HEART RATE: 76 BPM | OXYGEN SATURATION: 99 % | DIASTOLIC BLOOD PRESSURE: 69 MMHG | SYSTOLIC BLOOD PRESSURE: 110 MMHG | RESPIRATION RATE: 16 BRPM | WEIGHT: 133.7 LBS | TEMPERATURE: 97.3 F | BODY MASS INDEX: 23.5 KG/M2

## 2018-11-02 DIAGNOSIS — Z79.899 HIGH RISK MEDICATIONS (NOT ANTICOAGULANTS) LONG-TERM USE: ICD-10-CM

## 2018-11-02 DIAGNOSIS — M08.3 CHRONIC POLYARTICULAR JUVENILE RHEUMATOID ARTHRITIS (H): ICD-10-CM

## 2018-11-02 DIAGNOSIS — M08.3 CHRONIC POLYARTICULAR JUVENILE RHEUMATOID ARTHRITIS (H): Primary | ICD-10-CM

## 2018-11-02 LAB
ALT SERPL W P-5'-P-CCNC: 15 U/L (ref 0–50)
AST SERPL W P-5'-P-CCNC: 12 U/L (ref 0–45)
CREAT SERPL-MCNC: 0.72 MG/DL (ref 0.52–1.04)
ERYTHROCYTE [DISTWIDTH] IN BLOOD BY AUTOMATED COUNT: 12.9 % (ref 10–15)
GFR SERPL CREATININE-BSD FRML MDRD: 86 ML/MIN/1.7M2
HCT VFR BLD AUTO: 41.7 % (ref 35–47)
HGB BLD-MCNC: 13.6 G/DL (ref 11.7–15.7)
MCH RBC QN AUTO: 30 PG (ref 26.5–33)
MCHC RBC AUTO-ENTMCNC: 32.6 G/DL (ref 31.5–36.5)
MCV RBC AUTO: 92 FL (ref 78–100)
PLATELET # BLD AUTO: 478 10E9/L (ref 150–450)
RBC # BLD AUTO: 4.54 10E12/L (ref 3.8–5.2)
WBC # BLD AUTO: 8.5 10E9/L (ref 4–11)

## 2018-11-02 PROCEDURE — 36415 COLL VENOUS BLD VENIPUNCTURE: CPT | Performed by: STUDENT IN AN ORGANIZED HEALTH CARE EDUCATION/TRAINING PROGRAM

## 2018-11-02 PROCEDURE — 82565 ASSAY OF CREATININE: CPT | Performed by: STUDENT IN AN ORGANIZED HEALTH CARE EDUCATION/TRAINING PROGRAM

## 2018-11-02 PROCEDURE — 84460 ALANINE AMINO (ALT) (SGPT): CPT | Performed by: STUDENT IN AN ORGANIZED HEALTH CARE EDUCATION/TRAINING PROGRAM

## 2018-11-02 PROCEDURE — 99207 ZZC NO CHARGE LOS: CPT

## 2018-11-02 PROCEDURE — 96375 TX/PRO/DX INJ NEW DRUG ADDON: CPT | Performed by: INTERNAL MEDICINE

## 2018-11-02 PROCEDURE — 85027 COMPLETE CBC AUTOMATED: CPT | Performed by: STUDENT IN AN ORGANIZED HEALTH CARE EDUCATION/TRAINING PROGRAM

## 2018-11-02 PROCEDURE — 96413 CHEMO IV INFUSION 1 HR: CPT | Performed by: INTERNAL MEDICINE

## 2018-11-02 PROCEDURE — 84450 TRANSFERASE (AST) (SGOT): CPT | Performed by: STUDENT IN AN ORGANIZED HEALTH CARE EDUCATION/TRAINING PROGRAM

## 2018-11-02 RX ORDER — DIPHENHYDRAMINE HCL 25 MG
25 CAPSULE ORAL ONCE
Status: COMPLETED | OUTPATIENT
Start: 2018-11-02 | End: 2018-11-02

## 2018-11-02 RX ORDER — METHYLPREDNISOLONE SODIUM SUCCINATE 125 MG/2ML
125 INJECTION, POWDER, LYOPHILIZED, FOR SOLUTION INTRAMUSCULAR; INTRAVENOUS ONCE
Status: CANCELLED | OUTPATIENT
Start: 2018-11-02 | End: 2018-11-02

## 2018-11-02 RX ORDER — ACETAMINOPHEN 325 MG/1
650 TABLET ORAL ONCE
Status: CANCELLED
Start: 2018-11-02 | End: 2018-11-02

## 2018-11-02 RX ORDER — DIPHENHYDRAMINE HCL 25 MG
25 CAPSULE ORAL ONCE
Status: CANCELLED
Start: 2018-11-02 | End: 2018-11-02

## 2018-11-02 RX ORDER — METHYLPREDNISOLONE SODIUM SUCCINATE 125 MG/2ML
125 INJECTION, POWDER, LYOPHILIZED, FOR SOLUTION INTRAMUSCULAR; INTRAVENOUS ONCE
Status: COMPLETED | OUTPATIENT
Start: 2018-11-02 | End: 2018-11-02

## 2018-11-02 RX ADMIN — METHYLPREDNISOLONE SODIUM SUCCINATE 125 MG: 125 INJECTION INTRAMUSCULAR; INTRAVENOUS at 14:45

## 2018-11-02 RX ADMIN — Medication 250 ML: at 14:31

## 2018-11-02 RX ADMIN — Medication 25 MG: at 14:14

## 2018-11-02 ASSESSMENT — PAIN SCALES - GENERAL: PAINLEVEL: NO PAIN (0)

## 2018-11-02 NOTE — PROGRESS NOTES
Infusion Nursing Note:  Noni Mccormick presents today for Remicade infusion.    Patient seen by provider today: No   present during visit today: Not Applicable.    Note: Patient took Tylenol, and 25 mg of Benadryl, at home.  States she takes another 25 mg of Benadryl when she is at the infusion center.  Thus I did not give Tylenol but did give Benadryl 25 mg.    Intravenous Access:  Peripheral IV placed.    Treatment Conditions:  Biological Infusion Checklist:    ~~~ NOTE: If the patient answers yes to any of the questions below, hold the infusion and contact ordering provider or on-call provider.    1. Have you recently had an elevated temperature, fever, chills, productive cough, coughing for 3 weeks or longer or hemoptysis,  abnormal vital signs, night sweats,  chest pain or have you noticed a decrease in your appetite, unexplained weight loss or fatigue? No  2. Do you have any open wounds or new incisions? No  3. Do you have any recent or upcoming hospitalizations, surgeries or dental procedures? No  4. Do you currently have or recently have had any signs of illness or infection or are you on any antibiotics? No  5. Have you had any new, sudden or worsening abdominal pain? No  6. Have you or anyone in your household received a live vaccination in the past 4 weeks? Please note:  No live vaccines while on biologic/chemotherapy until 6 months after the last treatment.  Patient can receive the flu vaccine (shot only) and the pneumovax.  It is optimal for the patient to get these vaccines mid cycle, but they can be given at any time as long as it is not on the day of the infusion. No  7. Have you recently been diagnosed with any new nervous system diseases (ie. Multiple sclerosis, Guillain [place], seizures, neurological changes) or cancer diagnosis? Are you on any form of radiation or chemotherapy? No  8. Are you pregnant or breast feeding or do you have plans of pregnancy in the future? No  9. Have  there been any other new onset medical symptoms? No        Post Infusion Assessment:  Patient tolerated infusion without incident.  Blood return noted pre and post infusion.  Site patent and intact, free from redness, edema or discomfort.  No evidence of extravasations.  Access discontinued per protocol.    Discharge Plan:   Patient will return 12/7/18 for next appointment.   Patient discharged in stable condition accompanied by: self.  Departure Mode: Ambulatory.    Maria Del Carmen Estrada RN

## 2018-11-02 NOTE — LETTER
November 29, 2018      Noni Mccormick  [address] [facility] [address]        Dear ,    We are writing to inform you of your test results.    NORMAL Labs.     Resulted Orders   CBC with platelets   Result Value Ref Range    WBC 8.5 4.0 - 11.0 10e9/L    RBC Count 4.54 3.8 - 5.2 10e12/L    Hemoglobin 13.6 11.7 - 15.7 g/dL    Hematocrit 41.7 35.0 - 47.0 %    MCV 92 78 - 100 fl    MCH 30.0 26.5 - 33.0 pg    MCHC 32.6 31.5 - 36.5 g/dL    RDW 12.9 10.0 - 15.0 %    Platelet Count 478 (H) 150 - 450 10e9/L   AST   Result Value Ref Range    AST 12 0 - 45 U/L   ALT   Result Value Ref Range    ALT 15 0 - 50 U/L   Creatinine   Result Value Ref Range    Creatinine 0.72 0.52 - 1.04 mg/dL    GFR Estimate 86 >60 mL/min/1.7m2      Comment:      Non  GFR Calc    GFR Estimate If Black >90 >60 mL/min/1.7m2      Comment:       GFR Calc                 If you have any questions or concerns, please call the clinic at the number listed above.       Sincerely,        Thanks,   Cindy Olmedo CMA.  [facility]   Rheumatology  Phone: [phone]  Fax: [phone]  (Staff for Dr. Gonzalez)

## 2018-11-02 NOTE — MR AVS SNAPSHOT
After Visit Summary   11/2/2018    Noni Mccormick    MRN: [id number]           Patient Information     Date Of Birth          1970        Visit Information        Provider Department      11/2/2018 2:00 PM [facility] INFUSION [facility]        Today's Diagnoses     Chronic polyarticular juvenile rheumatoid arthritis (H)    -  1    High risk medications (not anticoagulants) long-term use           Follow-ups after your visit        Your next 10 appointments already scheduled     Nov 29, 2018  2:30 PM CST   Return Visit with Sterling Gonzalez MD   [facility]    [address] [place] [address]   [phone]            Dec 07, 2018  2:00 PM CST   Level 2 with [facility] [address] INFUSION   [facility]    [address] [place] [address] [phone]            Jan 11, 2019  1:30 PM CST   LAB with LAB ONC [facility])    [address] [place] [address] [phone]           Please do not eat 10-12 hours before your appointment if you are coming in fasting for labs on lipids, cholesterol, or glucose (sugar). This does not apply to pregnant women. Water, hot tea and black coffee (with nothing added) are okay. Do not drink other fluids, diet soda or chew gum.            Jan 11, 2019  2:00 PM CST   Level 2 with [facility] [address] INFUSION   [facility]    [address] [place] [address] [phone]              Who to contact     If you have questions or need follow up information about today's clinic visit or your schedule please contact [facility] directly at [phone].  Normal or non-critical lab and imaging results will be communicated to you by MyChart, letter or phone within 4 business days after the clinic has received the  results. If you do not hear from us within 7 days, please contact the clinic through [employer] or phone. If you have a critical or abnormal lab result, we will notify you by phone as soon as possible.  Submit refill requests through [employer] or call your pharmacy and they will forward the refill request to us. Please allow 3 business days for your refill to be completed.          Additional Information About Your Visit        Care EveryWhere ID     This is your Care EveryWhere ID. This could be used by other organizations to access your [place] medical records  [id number]        Your Vitals Were     Pulse Temperature Respirations Pulse Oximetry BMI (Body Mass Index)       76 97.3  F (36.3  C) (Oral) 16 99% 23.5 kg/m2        Blood Pressure from Last 3 Encounters:   11/02/18 110/69   09/28/18 121/76   08/24/18 108/69    Weight from Last 3 Encounters:   11/02/18 60.6 kg (133 lb 11.2 oz)   09/28/18 61.3 kg (135 lb 3.2 oz)   08/24/18 62.8 kg (138 lb 6.4 oz)              Today, you had the following     No orders found for display       Primary Care Provider Office Phone # Fax #    Sophia Baugh [phone] [address] [facility] [address]        Equal Access to Services     ROSE JORGE : Hadii aad ku hadasho Soomaali, waaxda luqadaha, qaybta kaalmada adeegyada, [address] haystephy carver . So [facility] [phone].    ATENCIÓN: Si habla español, tiene a gutierrez disposición servicios gratuitos de asistencia lingüística. Llame al [phone].    We comply with applicable federal civil rights laws and [place] laws. We do not discriminate on the basis of race, color, national origin, age, disability, sex, sexual orientation, or gender identity.            Thank you!     Thank you for choosing [facility]  for your care. Our goal is always to provide you with excellent care. Hearing back from our patients is one way we can continue to improve our services. Please take a  few minutes to complete the written survey that you may receive in the mail after your visit with us. Thank you!             Your Updated Medication List - Protect others around you: Learn how to safely use, store and throw away your medicines at www.disposemymeds.org.          This list is accurate as of 11/2/18  4:09 PM.  Always use your most recent med list.                   Brand Name Dispense Instructions for use Diagnosis    albuterol 108 (90 Base) MCG/ACT inhaler    PROAIR HFA/PROVENTIL HFA/VENTOLIN HFA    1 Inhaler    Inhale 2 puffs into the lungs every 6 hours as needed for shortness of breath / dyspnea    Intermittent asthma, uncomplicated       folic acid 1 MG tablet    FOLVITE    90 tablet    TAKE 1 TABLET (1 MG) BY MOUTH DAILY    Rheumatoid arthritis, involving unspecified site, unspecified rheumatoid factor presence (H)       inFLIXimab 100 MG injection    REMICADE    30 mL    Inject 300 mg into the vein as needed    Chronic polyarticular juvenile rheumatoid arthritis (H)       irbesartan 75 MG tablet    AVAPRO    90 tablet    TAKE 1 TABLET (75 MG) BY MOUTH DAILY    Hypertension goal BP (blood pressure) < 140/90       levothyroxine 112 MCG tablet    SYNTHROID/LEVOTHROID    90 tablet    Take 1 tablet (112 mcg) by mouth daily    Other specified hypothyroidism       methotrexate 2.5 MG tablet CHEMO     72 tablet    TAKE 6 TABLETS BY MOUTH ONCE WEEKLY    Rheumatoid arthritis of multiple sites without rheumatoid factor (H)       norethin-eth estrad triphasic 0.5/1/0.5-35 MG-MCG per tablet    ARANELLE    84 tablet    Take 1 tablet by mouth daily    Encounter for surveillance of contraceptive pills       vitamin D 1000 units capsule      Take 1 capsule by mouth daily.

## 2018-11-29 ENCOUNTER — OFFICE VISIT (OUTPATIENT)
Dept: RHEUMATOLOGY | Facility: CLINIC | Age: 48
End: 2018-11-29
Payer: COMMERCIAL

## 2018-11-29 VITALS
BODY MASS INDEX: 23.35 KG/M2 | SYSTOLIC BLOOD PRESSURE: 114 MMHG | OXYGEN SATURATION: 100 % | HEART RATE: 67 BPM | WEIGHT: 131.8 LBS | DIASTOLIC BLOOD PRESSURE: 75 MMHG | HEIGHT: 63 IN

## 2018-11-29 DIAGNOSIS — R05.3 CHRONIC COUGH: Primary | ICD-10-CM

## 2018-11-29 PROCEDURE — 99214 OFFICE O/P EST MOD 30 MIN: CPT | Performed by: STUDENT IN AN ORGANIZED HEALTH CARE EDUCATION/TRAINING PROGRAM

## 2018-11-29 ASSESSMENT — PAIN SCALES - GENERAL: PAINLEVEL: NO PAIN (0)

## 2018-11-29 NOTE — PROGRESS NOTES
Rheumatology Clinic Visit     Noni Mccormick MRN# [id number]   YOB: 1970 Age: 47 year old     Date of Visit:November 29, 2018  Primary care provider: Sophia Baugh          Assessment and Plan:   Assessment     -- Juvenile rheumatoid arthritis   -- high risk medications - Remicade infusion 300 mg every 5 weeks + methotrexate 15 mg/week  -- Chronic cough  -- Hypothyroidism    Ms Mccormick is 47-year-old female seen in clinic for management of juvenile rheumatoid arthritis.      JRA : She was diagnosed with JRA at 3 years of age.  She has been on a stable dose of Remicade 300 mg q. 5 weeks plus methotrexate 15 g per week for more than 10 years.  She denies any side effects with either of these drugs.  She takes Remicade as home infusions and take her methotrexate every Sunday. Overall her joint pains are well controlled.  JRA is in remission.    Chronic cough : She does have a chronic cough for more than 5 years.  She had chest x-ray to evaluate for methotrexate associated lung disease which was normal.  She tried antacids which seemed to help to some extent with her cough. She has noted cough on swallowing and drinking. I have given her swallow evaluation referral as well as referral to see pulmonary specialist.          Plan    -- Will continue MTX 15 mg once a week+ Remecaide infusions for JRA   --q 3 mo AST/ALT, Albumin, CBC with plts  --Limit EtOH intake to 2 drinks weekly; use folate 1 mg daily.  --Tylenol 500 mg tid prn nausea/HA associated with dosing.      -- Swallow evaluation and pulmonary referral given.     -- MTX labs every 10 weeks.    -- RTC in 6 months           Active Problem List:     Patient Active Problem List    Diagnosis Date Noted     Chronic polyarticular juvenile rheumatoid arthritis (H) 07/25/2016     Priority: Medium     Intermittent asthma 09/10/2013     Priority: Medium     Osteopenia 02/14/2013     Priority: Medium     Injury of left hip 04/12/2012     Priority:  Medium     High risk medications (not anticoagulants) long-term use 07/19/2011     Priority: Medium     Nephrolithiasis 06/16/2011     Priority: Medium     CARDIOVASCULAR SCREENING; LDL GOAL LESS THAN 160 10/31/2010     Priority: Medium     Hypothyroidism 10/15/2010     Priority: Medium            History of Present Illness:   Noni Mccormick is a 47 year old female with PMH of JRA seen in the clinic in consultation at request of Sophia Baugh MD for evaluation and management of her JRA.     November 29, 2018 -  She had flare up of her arthritis 2 weeks ago in her right hand which improved after few days. Denies any other joint issues. She is getting remicade infusions now through [facility]. She still has chronic cough which is worse on swallowing.     May 23, 2018 : She is doing fine. Denies any joint pains or morning stiffness.  She takes 15 mg methotrexate once a week along with Remicade infusion 300 mg every 5 weeks.  She was wondering about getting her Remicade infusions in the clinic rather than at home due to venous access issues that she has experienced with the past few infusions at home. She does not want to change to subcu injections like Humira or other TNF inhibitors as the Remicade works very well for her.    HPI from initial visit: She was diagnosed with JRA at 3 years of age. No hx of uveitis. Has been on Remeciade and MTX for more than 10 years. No side effects. She was seeing Dr Cano, Last visit was in September 2017.  At that time due to elevated blood pressure she was started on Avapro and since then her blood pressure is controlled.  She has chronic cough for more than 5 years, had a chest x-ray in 2016 which was unremarkable.  Dr. Cano prescribed her antacids to see if GERD is responsible for her symptoms, it seemed to help but then she discontinued it after few weeks.  She denies any history of smoking, chest pain, hemoptysis.    She is on Remicade every 5 weeks, it is  lasting the full interval, and methotrexate 15 mg weekly.  There is no nausea, vomiting, diarrhea, or stomatitis.     No history of psoriasis, ulcerative colitis or chron's disease. No h/o iritis, enthesitis, finger or toe swelling like dactylitis, plantar fascitis or heel pain.                  Review of Systems:   Review Of Systems  Constitutional: denies fever, chills, night sweats and weight loss.  Skin: No skin rash.  Eyes: No dryness or irritation in eyes. No episode of eye inflammation or redness.   Ears/Nose/Throat: no recurrent sinus infections.  Respiratory: No shortness of breath, dyspnea on exertion, + cough, or hemoptysis  Cardiovascular: no chest pain or palpitations.  Gastrointestinal: no nausea, vomiting, abdominal pain.  Normal bowel movements.  Genitourinary: no dysuria, frequency  or hematuria.  Musculoskeletal: as in HPI  Neurologic: no numbness, tingling.  Psychiatric: no mood disorders.  Hematologic/Lymphatic/Immunologic: no history of easy bruising, petechia or purpura.  No abnormal bleeding.   Endocrine: no h/o thyroid disease or Diabetes.                  Past Medical History:     Past Medical History:   Diagnosis Date     Contraception      Grave's disease      Hypothyroid      Inflammatory arthritis      Intermittent asthma 9/10/2013     Nephrolithiasis 6/16/2011     Osteopenia 2/14/2013     Rheumatoid arthritis(714.0)      Past Surgical History:   Procedure Laterality Date     C PELVIS/HIP JOINT SURGERY UNLISTED       JOINT REPLACEMENT, HIP RT/LT      (L)     JOINT REPLACEMENT, HIP RT/LT  3/2013    (R)     SURGICAL HISTORY OF -       Lithotripsy            Social History:     Social History     Occupational History     Not on file.     Social History Main Topics     Smoking status: Never Smoker     Smokeless tobacco: Never Used     Alcohol use No     Drug use: No     Sexual activity: Yes     Partners: Male     Birth control/ protection: Pill            Family History:     Family History  "  Problem Relation Age of Onset     Breast Cancer Mother      C.A.D. Father             Allergies:     Allergies   Allergen Reactions     Amoxicillin Nausea and Cramps            Medications:     Current Outpatient Prescriptions   Medication Sig Dispense Refill     albuterol (PROAIR HFA/PROVENTIL HFA/VENTOLIN HFA) 108 (90 BASE) MCG/ACT Inhaler Inhale 2 puffs into the lungs every 6 hours as needed for shortness of breath / dyspnea 1 Inhaler 4     Cholecalciferol (VITAMIN D) 1000 UNIT capsule Take 1 capsule by mouth daily.       folic acid (FOLVITE) 1 MG tablet TAKE 1 TABLET (1 MG) BY MOUTH DAILY 90 tablet 3     inFLIXimab (REMICADE) 100 MG injection Inject 300 mg into the vein as needed 30 mL 0     irbesartan (AVAPRO) 75 MG tablet TAKE 1 TABLET (75 MG) BY MOUTH DAILY 90 tablet 1     levothyroxine (SYNTHROID/LEVOTHROID) 112 MCG tablet Take 1 tablet (112 mcg) by mouth daily 90 tablet 3     methotrexate 2.5 MG tablet CHEMO TAKE 6 TABLETS BY MOUTH ONCE WEEKLY 72 tablet 1     norethin-eth estrad triphasic (ARANELLE) 0.5/1/0.5-35 MG-MCG per tablet Take 1 tablet by mouth daily 84 tablet 3            Physical Exam:   Blood pressure 114/75, pulse 67, height 1.6 m (5' 3\"), weight 59.8 kg (131 lb 12.8 oz), SpO2 100 %, not currently breastfeeding.  Wt Readings from Last 4 Encounters:   11/29/18 59.8 kg (131 lb 12.8 oz)   11/02/18 60.6 kg (133 lb 11.2 oz)   09/28/18 61.3 kg (135 lb 3.2 oz)   08/24/18 62.8 kg (138 lb 6.4 oz)       Constitutional: well-developed, appearing stated age; cooperative  Eyes: nl EOM, PERRLA, vision, conjunctiva, sclera  ENT: nl external ears, nose, hearing, lips, teeth, gums, throat  No mucous membrane lesions, normal saliva pool  Neck: no mass or thyroid enlargement  Resp: lungs clear to auscultation, nl to palpation  CV: RRR, no murmurs, rubs or gallops, no edema  GI: no ABD mass or tenderness, no HSM  : not tested  Lymph: no cervical, supraclavicular, inguinal or epitrochlear nodes    MS: All TMJ, " neck, shoulder, elbow, wrist, MCP/PIP/DIP, spine, hip, knee, ankle, and foot MTP/IP joints were examined.   -- She has congenital fifth finger flexion contracture B/L.  [place]-neck deformities present over third fourth fingers bilaterally.  --She has hammertoes on both feet.  -- No active synovitis or deformity present over MCP, PIP joints, wrists, elbows, shoulders, ankles and MTP joints.. Full ROM.  Normal  strength.   -- No dactylitis,  tenosynovitis, enthesopathy.    Skin: no nail pitting, alopecia, rash, nodules or lesions  Neuro: nl cranial nerves, strength, sensation, DTRs.   Psych: nl judgement, orientation, memory, affect.         Data:     Results for orders placed or performed in visit on 11/02/18   CBC with platelets   Result Value Ref Range    WBC 8.5 4.0 - 11.0 10e9/L    RBC Count 4.54 3.8 - 5.2 10e12/L    Hemoglobin 13.6 11.7 - 15.7 g/dL    Hematocrit 41.7 35.0 - 47.0 %    MCV 92 78 - 100 fl    MCH 30.0 26.5 - 33.0 pg    MCHC 32.6 31.5 - 36.5 g/dL    RDW 12.9 10.0 - 15.0 %    Platelet Count 478 (H) 150 - 450 10e9/L   AST   Result Value Ref Range    AST 12 0 - 45 U/L   ALT   Result Value Ref Range    ALT 15 0 - 50 U/L   Creatinine   Result Value Ref Range    Creatinine 0.72 0.52 - 1.04 mg/dL    GFR Estimate 86 >60 mL/min/1.7m2    GFR Estimate If Black >90 >60 mL/min/1.7m2       Recent Labs   Lab Test  01/19/18   1330  10/06/17   1120  07/21/17   1130   02/27/13   0934  01/02/13   0940  11/07/12   0957   WBC  8.9  8.0  9.6   < >  4.6  4.5  5.7   RBC  4.04  4.26  3.99   < >  4.78  4.43  4.71   HGB  12.8  13.4  12.9   < >  14.2  13.1  13.7   HCT  38.0  39.1  37.4   < >  42.5  39.4  41.8   MCV  94  92  94   < >  89  89  89   RDW  13.3  13.0  12.9   < >  12.9  13.0  12.8   PLT  486*  409  407   < >  409  383  448   ALBUMIN  3.1*  3.2*  3.3*   < >  4.4  4.0  4.1   CRP   --    --    --    --   15.0*  15.8*  10.3*   BUN  9  7  10   < >   --    --    --     < > = values in this interval not displayed.       Recent Labs   Lab Test  01/03/17   1614  12/16/15   0746  12/09/14   1604   TSH  0.61  1.62  0.50     Hemoglobin   Date Value Ref Range Status   11/02/2018 13.6 11.7 - 15.7 g/dL Final   08/24/2018 12.4 11.7 - 15.7 g/dL Final   06/15/2018 13.1 11.7 - 15.7 g/dL Final     Urea Nitrogen   Date Value Ref Range Status   03/30/2018 8 7 - 30 mg/dL Final   01/19/2018 9 7 - 30 mg/dL Final   10/06/2017 7 7 - 30 mg/dL Final     Sed Rate   Date Value Ref Range Status   02/27/2013 28 (H) 0 - 20 mm/h Final   01/02/2013 32 (H) 0 - 20 mm/h Final   11/07/2012 22 (H) 0 - 20 mm/h Final     C-Reactive Protein   Date Value Ref Range Status   11/04/2009 1.2 (A) 0 - 0.8 mg/dL      CRP Inflammation   Date Value Ref Range Status   02/27/2013 15.0 (H) 0.0 - 8.0 mg/L Final   01/02/2013 15.8 (H) 0.0 - 8.0 mg/L Final   11/07/2012 10.3 (H) 0.0 - 8.0 mg/L Final     AST   Date Value Ref Range Status   11/02/2018 12 0 - 45 U/L Final   08/24/2018 13 0 - 45 U/L Final   06/15/2018 15 0 - 45 U/L Final     Albumin   Date Value Ref Range Status   03/30/2018 3.2 (L) 3.4 - 5.0 g/dL Final   01/19/2018 3.1 (L) 3.4 - 5.0 g/dL Final   10/06/2017 3.2 (L) 3.4 - 5.0 g/dL Final     Alkaline Phosphatase   Date Value Ref Range Status   03/30/2018 27 (L) 40 - 150 U/L Final   01/19/2018 28 (L) 40 - 150 U/L Final   10/06/2017 32 (L) 40 - 150 U/L Final     ALT   Date Value Ref Range Status   11/02/2018 15 0 - 50 U/L Final   08/24/2018 15 0 - 50 U/L Final   06/15/2018 14 0 - 50 U/L Final     Rheumatoid Factor   Date Value Ref Range Status   12/13/2010 7 0 - 14 IU/mL Final     Recent Labs   Lab Test  11/02/18   1318  08/24/18   1308  08/20/18   0912  06/15/18   1210  03/30/18   0820  01/29/18   0837  01/19/18   1330  10/06/17   1120   01/03/17   1614   WBC  8.5  8.5   --   9.0  5.5   --   8.9  8.0   < >   --    HGB  13.6  12.4   --   13.1  13.6   --   12.8  13.4   < >   --    HCT  41.7  38.0   --   40.0  40.6   --   38.0  39.1   < >   --    MCV  92  92   --   91  100    --   94  92   < >   --    PLT  478*  396   --   474*  362   --   486*  409   < >   --    BUN   --    --    --    --   8   --   9  7   < >   --    TSH   --    --   0.50   --    --   0.38*   --    --    --   0.61   AST  12  13   --   15  16   --   13  38   < >   --    ALT  15  15   --   14  18   --   12  16   < >   --    ALKPHOS   --    --    --    --   27*   --   28*  32*   < >   --     < > = values in this interval not displayed.     Component      Latest Ref Rng & Units 12/13/2010   Rheumatoid Factor      0 - 14 IU/mL 7       Reviewed Rheumatology lab flowsheet    Sterling Gonzalez MD  [facility]  Department of Rheumatology & Autoimmune Disorders  [facility]: [phone]   Pager - [phone]

## 2018-11-29 NOTE — MR AVS SNAPSHOT
After Visit Summary   11/29/2018    Noni Mccormick    MRN: [id number]           Patient Information     Date Of Birth          1970        Visit Information        Provider Department      11/29/2018 2:30 PM Sterling Gonzalez MD [facility]        Today's Diagnoses     Chronic cough    -  1      Care Instructions    -- I have ordered swallow evaluation and pulmonary referral for chronic cough     -- Will continue Remicade and Methotrexate 15 mg / week     -- RTC in 6 months           Follow-ups after your visit        Additional Services     PULMONARY MEDICINE REFERRAL       Your provider has referred you to: [facility]: [facility] (Adults & Pediatric[facility]) - [place] [phone]   [email or web address]    Please be aware that coverage of these services is subject to the terms and limitations of your health insurance plan.  Call member services at your health plan with any benefit or coverage questions.      Please bring the following with you to your appointment:    (1) Any X-Rays, CTs or MRIs which have been performed.  Contact the facility where they were done to arrange for  prior to your scheduled appointment.    (2) List of current medications   (3) This referral request   (4) Any documents/labs given to you for this referral            SPEECH THERAPY REFERRAL       If you have not heard from the scheduling office within 2 business days, please call [phone] for all locations, with the exception of [place], please call [phone] and [place], please call [phone].    Please be aware that coverage of these services is subject to the terms and limitations of your health insurance plan.  Call member services at your health plan with any benefit or coverage questions.                  Your next 10 appointments already scheduled     Dec 07, 2018  2:00 PM CST   Level 2 with BAY 7 INFUSION   M  [facility])    [address] [place] [address]   [phone]            Jan 11, 2019  1:30 PM CST   LAB with LAB [facility])    [address] [place] [address]   [phone]           Please do not eat 10-12 hours before your appointment if you are coming in fasting for labs on lipids, cholesterol, or glucose (sugar). This does not apply to pregnant women. Water, hot tea and black coffee (with nothing added) are okay. Do not drink other fluids, diet soda or chew gum.            Jan 11, 2019  2:00 PM CST   Level 2 with BAY 5 INFUSION   [facility])    [address] [place] [address]   [phone]            Feb 05, 2019  3:00 PM CST   Office Visit with PFT LAB   [facility])    [address] [place] [address]   [phone]           Bring a current list of meds and any records pertaining to this visit. For Physicals, please bring immunization records and any forms needing to be filled out. Please arrive 10 minutes early to complete paperwork.            Feb 05, 2019  4:00 PM CST   New Visit with Le Garza MD   [facility])    [address] [place] [address]   [phone]            May 29, 2019  4:00 PM CDT   Return Visit with Sterling Gonzalez MD   [facility])    [address] [place] [address]   [phone]              Future tests that were ordered for you today     Open Future Orders        Priority Expected Expires Ordered    SPEECH THERAPY REFERRAL Routine  11/29/2019 11/29/2018    XR Video Swallow w Esophagram Routine 11/29/2018 11/29/2019 11/29/2018            Who to contact     If you have questions or need follow  "up information about today's clinic visit or your schedule please contact [facility] directly at [phone].  Normal or non-critical lab and imaging results will be communicated to you by MyChart, letter or phone within 4 business days after the clinic has received the results. If you do not hear from us within 7 days, please contact the clinic through MyChart or phone. If you have a critical or abnormal lab result, we will notify you by phone as soon as possible.  Submit refill requests through [employer] or call your pharmacy and they will forward the refill request to us. Please allow 3 business days for your refill to be completed.          Additional Information About Your Visit        Care EveryWhere ID     This is your Care EveryWhere ID. This could be used by other organizations to access your [facility] medical records  [id number]        Your Vitals Were     Pulse Height Pulse Oximetry BMI (Body Mass Index)          67 1.6 m (5' 3\") 100% 23.35 kg/m2         Blood Pressure from Last 3 Encounters:   11/29/18 114/75   11/02/18 110/69   09/28/18 121/76    Weight from Last 3 Encounters:   11/29/18 59.8 kg (131 lb 12.8 oz)   11/02/18 60.6 kg (133 lb 11.2 oz)   09/28/18 61.3 kg (135 lb 3.2 oz)              We Performed the Following     PULMONARY MEDICINE REFERRAL        Primary Care Provider Office Phone # Fax #    Sophia Baugh [phone] [address] [facility] [address]        Equal Access to Services     [facility] ANIA AH: Hadii aad ku hadasho Soomaali, waaxda luqadaha, qaybta kaalmada adeegyada, flor deleon. So [facility] [phone].    ATENCIÓN: Si habla español, tiene a gutierrez disposición servicios gratuitos de asistencia lingüística. Llame al [phone].    We comply with applicable federal civil rights laws and [place] laws. We do not discriminate on the basis of race, color, national origin, age, disability, sex, sexual orientation, or " gender identity.            Thank you!     Thank you for choosing [facility]  for your care. Our goal is always to provide you with excellent care. Hearing back from our patients is one way we can continue to improve our services. Please take a few minutes to complete the written survey that you may receive in the mail after your visit with us. Thank you!             Your Updated Medication List - Protect others around you: Learn how to safely use, store and throw away your medicines at www.disposemymeds.org.          This list is accurate as of 11/29/18  3:46 PM.  Always use your most recent med list.                   Brand Name Dispense Instructions for use Diagnosis    albuterol 108 (90 Base) MCG/ACT inhaler    PROAIR HFA/PROVENTIL HFA/VENTOLIN HFA    1 Inhaler    Inhale 2 puffs into the lungs every 6 hours as needed for shortness of breath / dyspnea    Intermittent asthma, uncomplicated       folic acid 1 MG tablet    FOLVITE    90 tablet    TAKE 1 TABLET (1 MG) BY MOUTH DAILY    Rheumatoid arthritis, involving unspecified site, unspecified rheumatoid factor presence (H)       inFLIXimab 100 MG injection    REMICADE    30 mL    Inject 300 mg into the vein as needed    Chronic polyarticular juvenile rheumatoid arthritis (H)       irbesartan 75 MG tablet    AVAPRO    90 tablet    TAKE 1 TABLET (75 MG) BY MOUTH DAILY    Hypertension goal BP (blood pressure) < 140/90       levothyroxine 112 MCG tablet    SYNTHROID/LEVOTHROID    90 tablet    Take 1 tablet (112 mcg) by mouth daily    Other specified hypothyroidism       methotrexate 2.5 MG tablet     72 tablet    TAKE 6 TABLETS BY MOUTH ONCE WEEKLY    Rheumatoid arthritis of multiple sites without rheumatoid factor (H)       norethin-eth estrad triphasic 0.5/1/0.5-35 MG-MCG tablet    ARANELLE    84 tablet    Take 1 tablet by mouth daily    Encounter for surveillance of contraceptive pills       vitamin D 1000 units capsule      Take 1 capsule by  mouth daily.

## 2018-11-29 NOTE — PATIENT INSTRUCTIONS
-- I have ordered swallow evaluation and pulmonary referral for chronic cough     -- Will continue Remicade and Methotrexate 15 mg / week     -- RTC in 6 months

## 2018-12-07 ENCOUNTER — INFUSION THERAPY VISIT (OUTPATIENT)
Dept: INFUSION THERAPY | Facility: CLINIC | Age: 48
End: 2018-12-07
Payer: COMMERCIAL

## 2018-12-07 VITALS
RESPIRATION RATE: 16 BRPM | SYSTOLIC BLOOD PRESSURE: 110 MMHG | TEMPERATURE: 97.8 F | BODY MASS INDEX: 23.26 KG/M2 | DIASTOLIC BLOOD PRESSURE: 70 MMHG | OXYGEN SATURATION: 100 % | WEIGHT: 131.3 LBS | HEART RATE: 86 BPM

## 2018-12-07 DIAGNOSIS — Z79.899 HIGH RISK MEDICATIONS (NOT ANTICOAGULANTS) LONG-TERM USE: ICD-10-CM

## 2018-12-07 DIAGNOSIS — M08.3 CHRONIC POLYARTICULAR JUVENILE RHEUMATOID ARTHRITIS (H): Primary | ICD-10-CM

## 2018-12-07 PROCEDURE — 96375 TX/PRO/DX INJ NEW DRUG ADDON: CPT | Performed by: INTERNAL MEDICINE

## 2018-12-07 PROCEDURE — 99207 ZZC NO CHARGE LOS: CPT

## 2018-12-07 PROCEDURE — 96413 CHEMO IV INFUSION 1 HR: CPT | Performed by: INTERNAL MEDICINE

## 2018-12-07 RX ORDER — DIPHENHYDRAMINE HCL 25 MG
25 CAPSULE ORAL ONCE
Status: CANCELLED
Start: 2018-12-07 | End: 2018-12-07

## 2018-12-07 RX ORDER — METHYLPREDNISOLONE SODIUM SUCCINATE 125 MG/2ML
125 INJECTION, POWDER, LYOPHILIZED, FOR SOLUTION INTRAMUSCULAR; INTRAVENOUS ONCE
Status: CANCELLED | OUTPATIENT
Start: 2018-12-07 | End: 2018-12-07

## 2018-12-07 RX ORDER — DIPHENHYDRAMINE HCL 25 MG
25 CAPSULE ORAL ONCE
Status: COMPLETED | OUTPATIENT
Start: 2018-12-07 | End: 2018-12-07

## 2018-12-07 RX ORDER — METHYLPREDNISOLONE SODIUM SUCCINATE 125 MG/2ML
125 INJECTION, POWDER, LYOPHILIZED, FOR SOLUTION INTRAMUSCULAR; INTRAVENOUS ONCE
Status: COMPLETED | OUTPATIENT
Start: 2018-12-07 | End: 2018-12-07

## 2018-12-07 RX ORDER — ACETAMINOPHEN 325 MG/1
650 TABLET ORAL ONCE
Status: CANCELLED
Start: 2018-12-07 | End: 2018-12-07

## 2018-12-07 RX ADMIN — Medication 250 ML: at 14:44

## 2018-12-07 RX ADMIN — METHYLPREDNISOLONE SODIUM SUCCINATE 125 MG: 125 INJECTION INTRAMUSCULAR; INTRAVENOUS at 14:54

## 2018-12-07 RX ADMIN — Medication 25 MG: at 14:44

## 2018-12-07 ASSESSMENT — PAIN SCALES - GENERAL: PAINLEVEL: NO PAIN (0)

## 2018-12-07 NOTE — MR AVS SNAPSHOT
After Visit Summary   12/7/2018    Noni Mccormick    MRN: [id number]           Patient Information     Date Of Birth          1970        Visit Information        Provider Department      12/7/2018 2:00 PM [address]        Today's Diagnoses     Chronic polyarticular juvenile rheumatoid arthritis (H)    -  1    High risk medications (not anticoagulants) long-term use           Follow-ups after your visit        Your next 10 appointments already scheduled     Dec 14, 2018 10:30 AM CST   Video Swallow with RK Sanchez   [facility] ([facility])    [address] [facility] [address]   [phone]            Dec 14, 2018 10:30 AM CST   XR VIDEO SPEECH EVALUATION WITH ESOPHAGRAM with MASSIEL CHRISTENSEN RAD   [facility])    [address] [facility] [address]   [phone]           How do I prepare for my exam? (Food and drink instructions) Do not eat for 4 hours before the exam. Keep drinking clear liquids until 2 hours before the exam.  How do I prepare for my exam? (Other instructions) You may take pain medicine (with a sip of water) up to 4 hours before the exam. Do not swallow any other medicines unless your doctor tells you to. Talk to your doctor to be sure it s safe to stop your medicines.  What should I wear: Wear comfortable clothes.  How long does the exam take: The exam usually takes about 30-45 minutes.  What should I bring: Bring a list of your current medicines to your exam. (Include vitamins, minerals and over-the-counter medicines.) Leave your valuables at home. Do I need a :  No  is needed.  What do I need to tell my doctor:  If you think you might be pregnant, tell your doctor.  What should I do after the exam: Drink lots of fluids in the next one to two days. This will help you pass the rest of the barium. Your stool may be white. Take walks if  possible. You may take a mild laxative (medicine to loosten your stools), but check with your doctor first. If you don t have a bowel movement within two days, call your doctor.  What is this test: This X-ray exam look at your esophagus. This exam uses barium (a white liquid) to help the X-rays show up more clearly.  Who should I call with questions: If you have any questions, please call the Imaging Department where you will have your exam. Directions, parking instructions, and other information is available on our website, [email or web address].            Jan 11, 2019  1:30 PM CST   LAB with LAB ONC [facility] ([facility])    [address] [place] [address] [phone]           Please do not eat 10-12 hours before your appointment if you are coming in fasting for labs on lipids, cholesterol, or glucose (sugar). This does not apply to pregnant women. Water, hot tea and black coffee (with nothing added) are okay. Do not drink other fluids, diet soda or chew gum.            Jan 11, 2019  2:00 PM CST   Level 2 with BAY 5 INFUSION   [facility] ([facility])    [address] [place] [id number] [phone]            Feb 05, 2019  3:00 PM CST   Office Visit with PFT LAB   [facility])    [address] [place] [id number] [phone]           Bring a current list of meds and any records pertaining to this visit. For Physicals, please bring immunization records and any forms needing to be filled out. Please arrive 10 minutes early to complete paperwork.            Feb 05, 2019  4:00 PM CST   New Visit with Le Garza MD   [facility])    [address] [place] [id number] [phone]            May 29, 2019  4:00 PM CDT   Return Visit with Sterling Gonzalez MD   [facility]  [facility] ([facility])    [address] [address] [place] [address] [phone]              Who to contact     If you have questions or need follow up information about today's clinic visit or your schedule please contact [facility] directly at [phone].  Normal or non-critical lab and imaging results will be communicated to you by MyChart, letter or phone within 4 business days after the clinic has received the results. If you do not hear from us within 7 days, please contact the clinic through MyChart or phone. If you have a critical or abnormal lab result, we will notify you by phone as soon as possible.  Submit refill requests through [facility] or call your pharmacy and they will forward the refill request to us. Please allow 3 business days for your refill to be completed.          Additional Information About Your Visit        Care EveryWhere ID     This is your Care EveryWhere ID. This could be used by other organizations to access your [facility] medical records  [id number]        Your Vitals Were     Pulse Temperature Respirations Pulse Oximetry BMI (Body Mass Index)       86 97.8  F (36.6  C) (Oral) 16 100% 23.26 kg/m2        Blood Pressure from Last 3 Encounters:   12/07/18 110/70   11/29/18 114/75   11/02/18 110/69    Weight from Last 3 Encounters:   12/07/18 59.6 kg (131 lb 4.8 oz)   11/29/18 59.8 kg (131 lb 12.8 oz)   11/02/18 60.6 kg (133 lb 11.2 oz)              Today, you had the following     No orders found for display       Primary Care Provider Office Phone # Fax #    Sophia Baugh [phone] [address] [facility] [address]        Equal Access to Services     ROSE JORGE : Pierre Anne, true barbosa, iam pennington, flor deleon. So [facility] [phone].    ATENCIÓN: Si habla español, tiene a gutierrez disposición servicios gratuitos de asistencia lingüística.  [place] [phone].    We comply with applicable federal civil rights laws and [place] laws. We do not discriminate on the basis of race, color, national origin, age, disability, sex, sexual orientation, or gender identity.            Thank you!     Thank you for choosing [facility]  for your care. Our goal is always to provide you with excellent care. Hearing back from our patients is one way we can continue to improve our services. Please take a few minutes to complete the written survey that you may receive in the mail after your visit with us. Thank you!             Your Updated Medication List - Protect others around you: Learn how to safely use, store and throw away your medicines at www.disposemymeds.org.          This list is accurate as of 12/7/18  4:17 PM.  Always use your most recent med list.                   Brand Name Dispense Instructions for use Diagnosis    albuterol 108 (90 Base) MCG/ACT inhaler    PROAIR HFA/PROVENTIL HFA/VENTOLIN HFA    1 Inhaler    Inhale 2 puffs into the lungs every 6 hours as needed for shortness of breath / dyspnea    Intermittent asthma, uncomplicated       folic acid 1 MG tablet    FOLVITE    90 tablet    TAKE 1 TABLET (1 MG) BY MOUTH DAILY    Rheumatoid arthritis, involving unspecified site, unspecified rheumatoid factor presence (H)       inFLIXimab 100 MG injection    REMICADE    30 mL    Inject 300 mg into the vein as needed    Chronic polyarticular juvenile rheumatoid arthritis (H)       irbesartan 75 MG tablet    AVAPRO    90 tablet    TAKE 1 TABLET (75 MG) BY MOUTH DAILY    Hypertension goal BP (blood pressure) < 140/90       levothyroxine 112 MCG tablet    SYNTHROID/LEVOTHROID    90 tablet    Take 1 tablet (112 mcg) by mouth daily    Other specified hypothyroidism       methotrexate 2.5 MG tablet     72 tablet    TAKE 6 TABLETS BY MOUTH ONCE WEEKLY    Rheumatoid arthritis of multiple sites without rheumatoid factor (H)       norethin-eth  estrad triphasic 0.5/1/0.5-35 MG-MCG tablet    ARANELLE    84 tablet    Take 1 tablet by mouth daily    Encounter for surveillance of contraceptive pills       vitamin D 1000 units capsule      Take 1 capsule by mouth daily.

## 2018-12-07 NOTE — PROGRESS NOTES
Infusion Nursing Note:  Noni Mccormick presents today for Remicade.    Patient seen by provider today: No   present during visit today: Not Applicable.    Note: N/A.    Intravenous Access:  Peripheral IV placed.    Treatment Conditions:  Biological Infusion Checklist:    ~~~ NOTE: If the patient answers yes to any of the questions below, hold the infusion and contact ordering provider or on-call provider.    1. Have you recently had an elevated temperature, fever, chills, productive cough, coughing for 3 weeks or longer or hemoptysis,  abnormal vital signs, night sweats,  chest pain or have you noticed a decrease in your appetite, unexplained weight loss or fatigue? No  2. Do you have any open wounds or new incisions? No  3. Do you have any recent or upcoming hospitalizations, surgeries or dental procedures? No  4. Do you currently have or recently have had any signs of illness or infection or are you on any antibiotics? No  5. Have you had any new, sudden or worsening abdominal pain? No  6. Have you or anyone in your household received a live vaccination in the past 4 weeks? Please note:  No live vaccines while on biologic/chemotherapy until 6 months after the last treatment.  Patient can receive the flu vaccine (shot only) and the pneumovax.  It is optimal for the patient to get these vaccines mid cycle, but they can be given at any time as long as it is not on the day of the infusion. No  7. Have you recently been diagnosed with any new nervous system diseases (ie. Multiple sclerosis, Guillain Egan, seizures, neurological changes) or cancer diagnosis? Are you on any form of radiation or chemotherapy? No  8. Are you pregnant or breast feeding or do you have plans of pregnancy in the future? No  9. Have you been having any signs of worsening depression or suicidal ideations?  (benlysta only) No  10. Have there been any other new onset medical symptoms? No      Post Infusion Assessment:  Patient  tolerated infusion without incident.  Site patent and intact, free from redness, edema or discomfort.  No evidence of extravasations.  Access discontinued per protocol.    Discharge Plan:   Discharge instructions reviewed with: Patient.  Patient and/or family verbalized understanding of discharge instructions and all questions answered.    Krista Thomas RN

## 2018-12-14 ENCOUNTER — HOSPITAL ENCOUNTER (OUTPATIENT)
Dept: GENERAL RADIOLOGY | Facility: CLINIC | Age: 48
Discharge: HOME | End: 2018-12-14
Attending: STUDENT IN AN ORGANIZED HEALTH CARE EDUCATION/TRAINING PROGRAM | Admitting: STUDENT IN AN ORGANIZED HEALTH CARE EDUCATION/TRAINING PROGRAM
Payer: COMMERCIAL

## 2018-12-14 ENCOUNTER — HOSPITAL ENCOUNTER (OUTPATIENT)
Dept: SPEECH THERAPY | Facility: CLINIC | Age: 48
Setting detail: THERAPIES SERIES
End: 2018-12-14
Attending: STUDENT IN AN ORGANIZED HEALTH CARE EDUCATION/TRAINING PROGRAM
Payer: COMMERCIAL

## 2018-12-14 DIAGNOSIS — R05.3 CHRONIC COUGH: ICD-10-CM

## 2018-12-14 PROCEDURE — 92611 MOTION FLUOROSCOPY/SWALLOW: CPT | Mod: GN | Performed by: SPEECH-LANGUAGE PATHOLOGIST

## 2018-12-14 PROCEDURE — 74230 X-RAY XM SWLNG FUNCJ C+: CPT

## 2018-12-14 NOTE — PROGRESS NOTES
Impression:  Oral and pharyngeal stage of swallow are WNL.  Swallow response it timely with no pharyngeal residue, penetration, or aspiration across consistencies.  No cough noted during exam.  Pt reported no symptoms.  Continue regular consistency diet.     Video Swallow Study  12/14/18    General Information   Type Of Visit Initial   Start Of Care Date 12/14/18   Referring Physician Sterling Gonzalez MD   Orders Evaluate And Treat   Medical Diagnosis Chronic Cough   Onset Of Illness/injury Or Date Of Surgery 12/14/18  (order date)   Precautions/limitations No Known Precautions/limitations   Hearing WFL for exam   Pertinent History of Current Problem/OT: Additional Occupational Profile Info Pt referred by her rheumatology provider due to dx of chronic cough and noted to cough when swallowing liquids.  Pt reports she has more difficulty with dry, solid foods.   Respiratory Status Room air   Living Environment [place]   General Observations Pt pleasant and cooperative.   Patient/family Goals to determine reason for coughing when eating.   Pain Assessment   Pain Reported No   Fall Risk Screen   Fall screen completed by SLP   Have you fallen 2 or more times in the past year? No   Have you fallen and had an injury in the past year? No   Is patient a fall risk? No   Clinical Swallow Evaluation   Oral Musculature generally intact   Structural Abnormalities none present   Dentition present and adequate   Mucosal Quality good   Mandibular Strength and Mobility intact   Oral Labial Strength and Mobility WFL   Lingual Strength and Mobility WFL   Buccal Strength and Mobility intact   VFSS Evaluation   VFSS Additional Documentation Yes   VFSS Eval: Radiology   Radiologist Dr. Zhu   Views Taken left lateral   Physical Location of Procedure [facility]   VFSS Eval: Thin Liquid Texture Trial   Mode of Presentation, Thin Liquid straw;self-fed   Order of Presentation 1   Preparatory Phase WFL   Oral Phase, Thin  Liquid WFL   Pharyngeal Phase, Thin Liquid WFL   Rosenbek's Penetration Aspiration Scale: Thin Liquid Trial Results 1 - no aspiration, contrast does not enter airway   Diagnostic Statement [facility]   VFSS Eval: Pudding Thick Liquid Texture Trial   Mode of Presentation, Pudding self-fed   Order of Presentation 2   Preparatory Phase WFL   Oral Phase, Pudding WFL   Pharyngeal Phase, Pudding WFL   Rosenbek's Penetration Aspiration Scale: Pudding-Thick Liquid Trial Results 1 - no aspiration, contrast does not enter airway   Diagnostic Statement [facility]   VFSS Eval: Solid Food Texture Trial   Mode of Presentation, Solid self-fed   Order of Presentation 3,4,5   Preparatory Phase WFL   Oral Phase, Solid WFL   Pharyngeal Phase, Solid WFL   Rosenbek's Penetration Aspiration Scale: Solid Food Trial Results 1 - no aspiration, contrast does not enter airway   Diagnostic Statement WNL   Educational Assessment   Barriers to Learning No barriers   Esophageal Phase of Swallow   Patient reports or presents with symptoms of esophageal dysphagia No   Esophageal sweep performed during today s vidofluoroscopic exam  No   Swallow Eval: Clinical Impressions   Skilled Criteria for Therapy Intervention No problems identified which require skilled intervention   Functional Assessment Scale (FAS) 7   Dysphagia Outcome Severity Scale (NARCISO) Level 7 - NARCISO   Diet texture recommendations Regular diet   Clinical Impression Comments Oral and pharyngeal stage of swallow are WNL.  Swallow response it timely with no pharyngeal residue, penetration, or aspiration across consistencies.  No cough noted during exam.  Pt reported no symptoms.    Total Session Time   SLP Eval: VideoFluoroscopic Swallow function Minutes (36941) 20   Total Evaluation Time 20

## 2018-12-18 DIAGNOSIS — I10 HYPERTENSION GOAL BP (BLOOD PRESSURE) < 140/90: ICD-10-CM

## 2018-12-20 RX ORDER — IRBESARTAN 75 MG/1
TABLET ORAL
Qty: 90 TABLET | Refills: 0 | Status: SHIPPED | OUTPATIENT
Start: 2018-12-20 | End: 2019-03-11

## 2019-01-11 ENCOUNTER — INFUSION THERAPY VISIT (OUTPATIENT)
Dept: INFUSION THERAPY | Facility: CLINIC | Age: 49
End: 2019-01-11
Payer: COMMERCIAL

## 2019-01-11 VITALS
BODY MASS INDEX: 23.74 KG/M2 | DIASTOLIC BLOOD PRESSURE: 81 MMHG | TEMPERATURE: 98.1 F | OXYGEN SATURATION: 97 % | HEART RATE: 69 BPM | RESPIRATION RATE: 16 BRPM | WEIGHT: 134 LBS | SYSTOLIC BLOOD PRESSURE: 124 MMHG

## 2019-01-11 DIAGNOSIS — M08.3 CHRONIC POLYARTICULAR JUVENILE RHEUMATOID ARTHRITIS (H): ICD-10-CM

## 2019-01-11 DIAGNOSIS — M08.3 CHRONIC POLYARTICULAR JUVENILE RHEUMATOID ARTHRITIS (H): Primary | ICD-10-CM

## 2019-01-11 DIAGNOSIS — Z79.899 HIGH RISK MEDICATIONS (NOT ANTICOAGULANTS) LONG-TERM USE: ICD-10-CM

## 2019-01-11 LAB
ALT SERPL W P-5'-P-CCNC: 11 U/L (ref 0–50)
AST SERPL W P-5'-P-CCNC: 7 U/L (ref 0–45)
CREAT SERPL-MCNC: 0.74 MG/DL (ref 0.52–1.04)
ERYTHROCYTE [DISTWIDTH] IN BLOOD BY AUTOMATED COUNT: 12.8 % (ref 10–15)
GFR SERPL CREATININE-BSD FRML MDRD: >90 ML/MIN/{1.73_M2}
HCT VFR BLD AUTO: 38.9 % (ref 35–47)
HGB BLD-MCNC: 12.8 G/DL (ref 11.7–15.7)
MCH RBC QN AUTO: 30.1 PG (ref 26.5–33)
MCHC RBC AUTO-ENTMCNC: 32.9 G/DL (ref 31.5–36.5)
MCV RBC AUTO: 92 FL (ref 78–100)
PLATELET # BLD AUTO: 423 10E9/L (ref 150–450)
RBC # BLD AUTO: 4.25 10E12/L (ref 3.8–5.2)
WBC # BLD AUTO: 6.2 10E9/L (ref 4–11)

## 2019-01-11 PROCEDURE — 85027 COMPLETE CBC AUTOMATED: CPT | Performed by: STUDENT IN AN ORGANIZED HEALTH CARE EDUCATION/TRAINING PROGRAM

## 2019-01-11 PROCEDURE — 36415 COLL VENOUS BLD VENIPUNCTURE: CPT | Performed by: STUDENT IN AN ORGANIZED HEALTH CARE EDUCATION/TRAINING PROGRAM

## 2019-01-11 PROCEDURE — 96375 TX/PRO/DX INJ NEW DRUG ADDON: CPT | Performed by: INTERNAL MEDICINE

## 2019-01-11 PROCEDURE — 84450 TRANSFERASE (AST) (SGOT): CPT | Performed by: STUDENT IN AN ORGANIZED HEALTH CARE EDUCATION/TRAINING PROGRAM

## 2019-01-11 PROCEDURE — 82565 ASSAY OF CREATININE: CPT | Performed by: STUDENT IN AN ORGANIZED HEALTH CARE EDUCATION/TRAINING PROGRAM

## 2019-01-11 PROCEDURE — 96413 CHEMO IV INFUSION 1 HR: CPT | Performed by: INTERNAL MEDICINE

## 2019-01-11 PROCEDURE — 84460 ALANINE AMINO (ALT) (SGPT): CPT | Performed by: STUDENT IN AN ORGANIZED HEALTH CARE EDUCATION/TRAINING PROGRAM

## 2019-01-11 PROCEDURE — 99207 ZZC NO CHARGE LOS: CPT

## 2019-01-11 RX ORDER — DIPHENHYDRAMINE HCL 25 MG
25 CAPSULE ORAL ONCE
Status: COMPLETED | OUTPATIENT
Start: 2019-01-11 | End: 2019-01-11

## 2019-01-11 RX ORDER — METHYLPREDNISOLONE SODIUM SUCCINATE 125 MG/2ML
125 INJECTION, POWDER, LYOPHILIZED, FOR SOLUTION INTRAMUSCULAR; INTRAVENOUS ONCE
Status: CANCELLED | OUTPATIENT
Start: 2019-01-11 | End: 2019-01-11

## 2019-01-11 RX ORDER — DIPHENHYDRAMINE HCL 25 MG
25 CAPSULE ORAL ONCE
Status: CANCELLED
Start: 2019-01-11 | End: 2019-01-11

## 2019-01-11 RX ORDER — ACETAMINOPHEN 325 MG/1
650 TABLET ORAL ONCE
Status: CANCELLED
Start: 2019-01-11 | End: 2019-01-11

## 2019-01-11 RX ORDER — METHYLPREDNISOLONE SODIUM SUCCINATE 125 MG/2ML
125 INJECTION, POWDER, LYOPHILIZED, FOR SOLUTION INTRAMUSCULAR; INTRAVENOUS ONCE
Status: COMPLETED | OUTPATIENT
Start: 2019-01-11 | End: 2019-01-11

## 2019-01-11 RX ADMIN — Medication 250 ML: at 14:30

## 2019-01-11 RX ADMIN — METHYLPREDNISOLONE SODIUM SUCCINATE 125 MG: 125 INJECTION INTRAMUSCULAR; INTRAVENOUS at 14:31

## 2019-01-11 RX ADMIN — Medication 25 MG: at 14:18

## 2019-01-11 ASSESSMENT — PAIN SCALES - GENERAL: PAINLEVEL: NO PAIN (0)

## 2019-01-11 NOTE — PROGRESS NOTES
Infusion Nursing Note:  Noni Mccormick presents today for Remicade- rapid.    Patient seen by provider today: No   present during visit today: Not Applicable.    Note: N/A.    Intravenous Access:  Peripheral IV placed.    Treatment Conditions:  Lab Results   Component Value Date    HGB 12.8 01/11/2019     Lab Results   Component Value Date    WBC 6.2 01/11/2019      Lab Results   Component Value Date    ANEU 4.0 03/30/2018     Lab Results   Component Value Date     01/11/2019      Lab Results   Component Value Date     03/30/2018                   Lab Results   Component Value Date    POTASSIUM 3.8 03/30/2018           No results found for: MAG         Lab Results   Component Value Date    CR 0.74 01/11/2019                   Lab Results   Component Value Date    LUIS ENRIQUE 8.6 03/30/2018                Lab Results   Component Value Date    BILITOTAL 0.5 03/30/2018           Lab Results   Component Value Date    ALBUMIN 3.2 03/30/2018                    Lab Results   Component Value Date    ALT 11 01/11/2019           Lab Results   Component Value Date    AST 7 01/11/2019       Results reviewed, labs MET treatment parameters, ok to proceed with treatment.  Biological Infusion Checklist:    ~~~ NOTE: If the patient answers yes to any of the questions below, hold the infusion and contact ordering provider or on-call provider.    1. Have you recently had an elevated temperature, fever, chills, productive cough, coughing for 3 weeks or longer or hemoptysis,  abnormal vital signs, night sweats,  chest pain or have you noticed a decrease in your appetite, unexplained weight loss or fatigue? No  2. Do you have any open wounds or new incisions? No  3. Do you have any recent or upcoming hospitalizations, surgeries or dental procedures? No  4. Do you currently have or recently have had any signs of illness or infection or are you on any antibiotics? No  5. Have you had any new, sudden or worsening  abdominal pain? No  6. Have you or anyone in your household received a live vaccination in the past 4 weeks? Please note:  No live vaccines while on biologic/chemotherapy until 6 months after the last treatment.  Patient can receive the flu vaccine (shot only) and the pneumovax.  It is optimal for the patient to get these vaccines mid cycle, but they can be given at any time as long as it is not on the day of the infusion. No  7. Have you recently been diagnosed with any new nervous system diseases (ie. Multiple sclerosis, Guillain Dent, seizures, neurological changes) or cancer diagnosis? Are you on any form of radiation or chemotherapy? No  8. Are you pregnant or breast feeding or do you have plans of pregnancy in the future? No  9. Have you been having any signs of worsening depression or suicidal ideations?  (benlysta only) No  10. Have there been any other new onset medical symptoms? No        Post Infusion Assessment:  Patient tolerated infusion without incident.  No evidence of extravasations.  Access discontinued per protocol.  Biologic Infusion Post Education: Call the triage nurse at your clinic or seek medical attention if you have chills and/or temperature greater than or equal to 100.5, uncontrolled nausea/vomiting, diarrhea, constipation, dizziness, shortness of breath, chest pain, heart palpitations, weakness or any other new or concerning symptoms, questions or concerns.  You cannot have any live virus vaccines prior to or during treatment or up to 6 months post infusion.  If you have an upcoming surgery, medical procedure or dental procedure during treatment, this should be discussed with your ordering physician and your surgeon/dentist.  If you are having any concerning symptom, if you are unsure if you should get your next infusion or wish to speak to a provider before your next infusion, please call your care coordinator or triage nurse at your clinic to notify them so we can adequately serve  you.    Discharge Plan:   Discharge instructions reviewed with: Patient and Family.  Patient and/or family verbalized understanding of discharge instructions and all questions answered.  Patient discharged in stable condition accompanied by: daughter.  Departure Mode: Ambulatory.    Unique Min RN

## 2019-01-29 DIAGNOSIS — R05.3 CHRONIC COUGH: Primary | ICD-10-CM

## 2019-02-04 ENCOUNTER — PATIENT OUTREACH (OUTPATIENT)
Dept: PULMONOLOGY | Facility: CLINIC | Age: 49
End: 2019-02-04

## 2019-02-04 NOTE — PROGRESS NOTES
for patient reminding her of her appointment on Tuesday. Asked patient to call back if she needs to reschedule or has questions.       PREVISIT INFORMATION                                                      Noni Mccormick scheduled for future visit at [facility] specialty clinics.    Patient is scheduled to see Dr. Garza on 2/5/19  Reason for visit: Chronic Cough  Referring provider Dr. Gonzalez   Has patient seen previous specialist? No  Medical Records:  Available in chart.  Patient was previously seen at a [facility] or [facility] facility.     REVIEW                                                      New patient packet mailed to patient: N/A  Medication reconciliation complete: No      Current Outpatient Medications   Medication Sig Dispense Refill     albuterol (PROAIR HFA/PROVENTIL HFA/VENTOLIN HFA) 108 (90 BASE) MCG/ACT Inhaler Inhale 2 puffs into the lungs every 6 hours as needed for shortness of breath / dyspnea 1 Inhaler 4     Cholecalciferol (VITAMIN D) 1000 UNIT capsule Take 1 capsule by mouth daily.       folic acid (FOLVITE) 1 MG tablet TAKE 1 TABLET (1 MG) BY MOUTH DAILY 90 tablet 3     inFLIXimab (REMICADE) 100 MG injection Inject 300 mg into the vein as needed 30 mL 0     irbesartan (AVAPRO) 75 MG tablet TAKE 1 TABLET (75 MG) BY MOUTH DAILY 90 tablet 0     levothyroxine (SYNTHROID/LEVOTHROID) 112 MCG tablet Take 1 tablet (112 mcg) by mouth daily 90 tablet 3     methotrexate 2.5 MG tablet CHEMO TAKE 6 TABLETS BY MOUTH ONCE WEEKLY 72 tablet 1     norethin-eth estrad triphasic (ARANELLE) 0.5/1/0.5-35 MG-MCG per tablet Take 1 tablet by mouth daily 84 tablet 3       Allergies: Amoxicillin    Imaging/Testing:     PFT: 3:00p, 2/5/19      PLAN/FOLLOW-UP NEEDED                                                      Previsit review complete.  Patient will see provider at future scheduled appointment.     Patient Reminders Given:  Please, make sure you bring an  updated list of your medications.   If you are having a procedure, please, present 15 minutes early.  If you need to cancel or reschedule,please call [phone].    Nini José

## 2019-02-05 ENCOUNTER — ANCILLARY PROCEDURE (OUTPATIENT)
Dept: GENERAL RADIOLOGY | Facility: CLINIC | Age: 49
End: 2019-02-05
Attending: INTERNAL MEDICINE
Payer: COMMERCIAL

## 2019-02-05 ENCOUNTER — OFFICE VISIT (OUTPATIENT)
Dept: NURSING | Facility: CLINIC | Age: 49
End: 2019-02-05
Payer: COMMERCIAL

## 2019-02-05 ENCOUNTER — OFFICE VISIT (OUTPATIENT)
Dept: PULMONOLOGY | Facility: CLINIC | Age: 49
End: 2019-02-05
Attending: STUDENT IN AN ORGANIZED HEALTH CARE EDUCATION/TRAINING PROGRAM
Payer: COMMERCIAL

## 2019-02-05 VITALS
DIASTOLIC BLOOD PRESSURE: 74 MMHG | WEIGHT: 133.6 LBS | HEART RATE: 67 BPM | OXYGEN SATURATION: 100 % | RESPIRATION RATE: 16 BRPM | HEIGHT: 63 IN | TEMPERATURE: 98 F | SYSTOLIC BLOOD PRESSURE: 126 MMHG | BODY MASS INDEX: 23.67 KG/M2

## 2019-02-05 DIAGNOSIS — R05.3 CHRONIC COUGH: ICD-10-CM

## 2019-02-05 DIAGNOSIS — J45.20 INTERMITTENT ASTHMA, UNCOMPLICATED: ICD-10-CM

## 2019-02-05 DIAGNOSIS — R05.3 CHRONIC COUGH: Primary | ICD-10-CM

## 2019-02-05 DIAGNOSIS — K21.9 GASTROESOPHAGEAL REFLUX DISEASE, ESOPHAGITIS PRESENCE NOT SPECIFIED: ICD-10-CM

## 2019-02-05 DIAGNOSIS — R09.82 POST-NASAL DRIP: ICD-10-CM

## 2019-02-05 LAB
DLCOUNC-%PRED-PRE: 108 %
DLCOUNC-PRE: 22.41 ML/MIN/MMHG
DLCOUNC-PRED: 20.69 ML/MIN/MMHG
ERV-%PRED-PRE: 76 %
ERV-PRE: 0.78 L
ERV-PRED: 1.01 L
EXPTIME-PRE: 6.44 SEC
FEF2575-%PRED-PRE: 72 %
FEF2575-PRE: 2.02 L/SEC
FEF2575-PRED: 2.78 L/SEC
FEFMAX-%PRED-PRE: 96 %
FEFMAX-PRE: 6.42 L/SEC
FEFMAX-PRED: 6.68 L/SEC
FEV1-%PRED-PRE: 86 %
FEV1-PRE: 2.38 L
FEV1FEV6-PRE: 77 %
FEV1FEV6-PRED: 83 %
FEV1FVC-PRE: 78 %
FEV1FVC-PRED: 81 %
FEV1SVC-PRE: 81 %
FEV1SVC-PRED: 80 %
FIFMAX-PRE: 2.61 L/SEC
FRCPLETH-%PRED-PRE: 98 %
FRCPLETH-PRE: 2.61 L
FRCPLETH-PRED: 2.65 L
FVC-%PRED-PRE: 89 %
FVC-PRE: 3.05 L
FVC-PRED: 3.41 L
IC-%PRED-PRE: 89 %
IC-PRE: 2.15 L
IC-PRED: 2.41 L
RVPLETH-%PRED-PRE: 109 %
RVPLETH-PRE: 1.83 L
RVPLETH-PRED: 1.68 L
TLCPLETH-%PRED-PRE: 98 %
TLCPLETH-PRE: 4.76 L
TLCPLETH-PRED: 4.81 L
VA-%PRED-PRE: 89 %
VA-PRE: 4.25 L
VC-%PRED-PRE: 85 %
VC-PRE: 2.93 L
VC-PRED: 3.43 L

## 2019-02-05 PROCEDURE — 94726 PLETHYSMOGRAPHY LUNG VOLUMES: CPT | Performed by: INTERNAL MEDICINE

## 2019-02-05 PROCEDURE — 94729 DIFFUSING CAPACITY: CPT | Performed by: INTERNAL MEDICINE

## 2019-02-05 PROCEDURE — 94375 RESPIRATORY FLOW VOLUME LOOP: CPT | Performed by: INTERNAL MEDICINE

## 2019-02-05 PROCEDURE — 71046 X-RAY EXAM CHEST 2 VIEWS: CPT

## 2019-02-05 PROCEDURE — 99205 OFFICE O/P NEW HI 60 MIN: CPT | Performed by: INTERNAL MEDICINE

## 2019-02-05 RX ORDER — FAMOTIDINE 20 MG/1
20 TABLET, FILM COATED ORAL 2 TIMES DAILY
Qty: 180 TABLET | Refills: 1 | Status: SHIPPED | OUTPATIENT
Start: 2019-02-05 | End: 2019-07-24

## 2019-02-05 RX ORDER — FLUTICASONE PROPIONATE 50 MCG
2 SPRAY, SUSPENSION (ML) NASAL DAILY
Qty: 1 BOTTLE | Refills: 1 | Status: SHIPPED | OUTPATIENT
Start: 2019-02-05 | End: 2019-04-24

## 2019-02-05 RX ORDER — ALBUTEROL SULFATE 90 UG/1
2 AEROSOL, METERED RESPIRATORY (INHALATION) EVERY 6 HOURS PRN
Qty: 1 INHALER | Refills: 4 | Status: SHIPPED | OUTPATIENT
Start: 2019-02-05 | End: 2020-04-09

## 2019-02-05 ASSESSMENT — PAIN SCALES - GENERAL: PAINLEVEL: NO PAIN (0)

## 2019-02-05 ASSESSMENT — MIFFLIN-ST. JEOR: SCORE: 1205.14

## 2019-02-05 NOTE — NURSING NOTE
"Noni Paytontori MonteroMccormick's goals for this visit include: Consult  She requests these members of her care team be copied on today's visit information: PCP    PCP: Sophia Baugh    Referring Provider:  Sterling Gonzalez MD  [address] [place] [address]    /74   Pulse 67   Temp 98  F (36.7  C)   Resp 16   Ht 1.6 m (5' 3\")   Wt 60.6 kg (133 lb 9.6 oz)   SpO2 100%   BMI 23.67 kg/m      Do you need any medication refills at today's visit? Y -  albuterol (PROAIR HFA/PROVENTIL HFA/VENTOLIN HFA) 108 (90 BASE) MCG/ACT Inhaler    "

## 2019-02-05 NOTE — PROGRESS NOTES
Pulmonary Clinic New Patient Consult  Reason for Consult: chronic cough  History of Present Illness    Ms. 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  Review of records is notable for the following:    -CXR 2/2018: negative for any acute cardiopulmonary process.    -PFT 7/2018: Ratio 75%, FVC 94%, FEV1 87%.  BD + 7%. Study demonstrates normal spirometry.  Bronchodilator response testing does not meet ATS criteria for reversibility.    -12/14: VFSS: normal swallow function without elicited cough.    Ms. Mccormick presents to clinic today with complaint of dry cough dating back for about 5 years.  She states that she just woke up one day with a cough and does not recall any thing that seemed to trigger it at the time that it started.  Over the course of the last few year she feels the cough is gotten worse in severity.  The cough is worse with eating, drinking, exposure to humidity, and exposure to dry air.  Drinking water and lubricating the back of her throat seems to help with the cough.  She has a diagnosis of asthma going back 10 years ago.  At the time of her diagnosis she was having episodes of recurrent bronchitis.  She has been maintained on albuterol as needed only.  At present she only requires albuterol use prior to exercise.  She otherwise denies any shortness of breath at rest, shortness of breath with exertion, hemoptysis, fevers, or chills.  She does note a history of seasonal allergies/hayfever as a child but has not had significant issues with this as an adult.  She denies any history of recurrent sinopulmonary infections or further recurrent bronchitis episodes.  She denies any daily variation in her cough but and states that it occasionally wakes her up at night.    She does endorse acid reflux for which she takes Tums on an as-needed basis.  Other providers' documentation suggest that  the Tums seems to help the cough a little bit.  She previously did a 2-week trial of Prilosec in the past which she also noticed seem to help both her cough and reflux symptoms but then stopped this due to concerns related to taking the medication in the long-term.  She eats dinner at 530 and goes to bed around 10 PM.  She denies any caffeine, alcohol, or snacks in proximity to bedtime.    She is a never smoker.  She lives in [place], [place] in a house that was built 15 years ago.  She has 1 cat at home.  She denies any  known exposure to asbestos.  She is currently employed as a physical therapist assistant.  She denies any significant exposure to birds, pillows or comfort or stuff with down feathers, hot tub or Jacuzzi she is on a regular basis, recent travel outside the area.    No known family history of lung disease.    Her rheumatoid arthritis has been well controlled on methotrexate and Remicade for the better part of the last 20 years.  She does not have any history of known rheumatoid associated lung disease.        Review of Systems:  10 of 14 systems reviewed and are negative unless otherwise stated in HPI.    Past Medical History:   Diagnosis Date     Contraception      Grave's disease      Hypothyroid      Inflammatory arthritis      Intermittent asthma 9/10/2013     Nephrolithiasis 6/16/2011     Osteopenia 2/14/2013     Rheumatoid arthritis(714.0)        Past Surgical History:   Procedure Laterality Date     C PELVIS/HIP JOINT SURGERY UNLISTED       JOINT REPLACEMENT, HIP RT/LT      (L)     JOINT REPLACEMENT, HIP RT/LT  3/2013    (R)     SURGICAL HISTORY OF -       Lithotripsy       Family History   Problem Relation Age of Onset     Breast Cancer Mother      C.A.D. Father        Social History     Socioeconomic History     Marital status:      Spouse name: Not on file     Number of children: Not on file     Years of education: Not on file     Highest education level: Not on file   Social  Needs     Financial resource strain: Not on file     Food insecurity - worry: Not on file     Food insecurity - inability: Not on file     Transportation needs - medical: Not on file     Transportation needs - non-medical: Not on file   Occupational History     Not on file   Tobacco Use     Smoking status: Never Smoker     Smokeless tobacco: Never Used   Substance and Sexual Activity     Alcohol use: No     Drug use: No     Sexual activity: Yes     Partners: Male     Birth control/protection: Pill   Other Topics Concern     Parent/sibling w/ CABG, MI or angioplasty before 65F 55M? Yes      Service No     Blood Transfusions Yes     Caffeine Concern No     Occupational Exposure No     Hobby Hazards No     Sleep Concern No     Stress Concern No     Weight Concern No     Special Diet No     Back Care No     Exercise No     Bike Helmet No     Seat Belt Yes     Self-Exams No   Social History Narrative     Not on file         Allergies   Allergen Reactions     Amoxicillin Nausea and Cramps         Current Outpatient Medications:      albuterol (PROAIR HFA/PROVENTIL HFA/VENTOLIN HFA) 108 (90 BASE) MCG/ACT Inhaler, Inhale 2 puffs into the lungs every 6 hours as needed for shortness of breath / dyspnea, Disp: 1 Inhaler, Rfl: 4     Cholecalciferol (VITAMIN D) 1000 UNIT capsule, Take 1 capsule by mouth daily., Disp: , Rfl:      folic acid (FOLVITE) 1 MG tablet, TAKE 1 TABLET (1 MG) BY MOUTH DAILY, Disp: 90 tablet, Rfl: 3     inFLIXimab (REMICADE) 100 MG injection, Inject 300 mg into the vein as needed, Disp: 30 mL, Rfl: 0     irbesartan (AVAPRO) 75 MG tablet, TAKE 1 TABLET (75 MG) BY MOUTH DAILY, Disp: 90 tablet, Rfl: 0     levothyroxine (SYNTHROID/LEVOTHROID) 112 MCG tablet, Take 1 tablet (112 mcg) by mouth daily, Disp: 90 tablet, Rfl: 3     methotrexate 2.5 MG tablet CHEMO, TAKE 6 TABLETS BY MOUTH ONCE WEEKLY, Disp: 72 tablet, Rfl: 1     norethin-eth estrad triphasic (ARANELLE) 0.5/1/0.5-35 MG-MCG per tablet, Take 1  "tablet by mouth daily, Disp: 84 tablet, Rfl: 3    Current Facility-Administered Medications:      barium sulfate (EZ PAQUE) oral suspension 96% 50 mL, 50 mL, Oral, Once, Darwin Zhu MD     barium sulfate (VARIBAR) 40 % pudding/paste 50 mL, 50 mL, Oral, Once, Darwin Zhu MD     barium sulfate 40% (VARIBAR NECTAR) oral suspension 50 mL, 50 mL, Oral, Once, Darwin Zhu MD     barium sulfate 40% (VARIBAR THIN HONEY) oral suspension 50 mL, 50 mL, Oral, Once, Darwin Zhu MD      Physical Exam:  /74   Pulse 67   Temp 98  F (36.7  C)   Resp 16   Ht 1.6 m (5' 3\")   Wt 60.6 kg (133 lb 9.6 oz)   SpO2 100%   BMI 23.67 kg/m    GENERAL: Well developed, well nourished, alert, and in no apparent distress.  HEENT: Normocephalic, atraumatic. PERRL, EOMI. Oral mucosa is moist. No perioral cyanosis.  NECK: supple, no masses, no thyromegaly.  RESP:  Normal respiratory effort.  CTAB.  No rales, wheezes, rhonchi.  No cyanosis or clubbing.  CV: Normal S1, S2, regular rhythm, normal rate. No murmur.  No LE edema.   ABDOMEN:  Soft, non-tender, non-distended.   SKIN: warm and dry. No rash.  NEURO: AAOx3.  Normal gait.  No focal neuro deficits.  PSYCH: mentation appears normal. and affect normal/bright  MSK: rheumatoid deformities of both hands.    Results:  PFTs: Ratio 78%, FVC 89%, FEV1 86%, DLCO 108%, TLC 98%, %.  Study demonstrates normal pulmonary mechanics with normal gas exchange.  When compared to previous pulmonary function testing from July 2018 there has been no significant change in overall pulmonary function.      Assessment and Plan:   Noni Mccormick is a 48 year old female with a history of acid reflux and rheumatoid arthritis maintained on Remicade and methotrexate who presents to pulmonary clinic today for further evaluation of chronic cough dating back 5 years.  Explained to Ms. Mccormick that in the setting of normal chest x-ray and completely normal pulmonary " function testing, the 3 most common causes of chronic cough in adults are acid reflux, postnasal drip, and asthma/reactive airways disease.  Given her history and symptoms I suspect the 2 main driving etiologies of her cough are acid reflux and asthma or reactive airways disease. Regardless, current recommendations suggest the best benefit is obtained when treating for all 3 causes concurrently.  For acid reflux I have initiated Pepcid 20 mg twice daily and recommended lifestyle modifications aimed at reducing acid reflux.  These modifications include avoidance of trigger foods, avoidance of eating or drinking anything within 2 hours of bedtime, and head of bed elevation, with regard to her asthma I have suggested escalation in therapy to include  low-dose ICS as a maintenance inhaler.  A prescription for Qvar was provided  for her.  She was instructed to rinse her mouth vigorously with water following use to prevent thrush.  Finally, for any components of postnasal drip or allergic rhinitis I recommended Flonase 2 squirts in each nostril once daily.  I did let her know that the longer she has been coughing the longer it takes for the cough to get better.  I do expect, that with time, her cough should improve on this regimen though.    If, in the future her cough does not improve then we could consider further evaluation which could possibly include CT of the chest to look for subtle abnormalities which were missed on chest x-ray or pH and impedance testing versus referral to GI for further evaluation of the significance of her acid reflux.  If additional work up returns negative, then neurogenic cough with laryngeal hypersensitivity would be the most likely diagnosis.  If this is the case then referral to speech-language therapy plus or minus a trial of amitriptyline or gabapentin would be recommended.  Finally, as her last chest x-ray was 1 year ago, I would like to obtain repeat chest x-ray today to ensure there  have been no changes.  Rheumatoid associated lung disease or methotrexate induced lung toxicity were also considered at causes of her cough, but both seem very unlikely in light of previously normal chest imaging as well as completely normal pulmonary function testing on 2 occasions in 2018.  The above findings and plan were discussed w/ Ms. Mccormick who voiced understanding and agreement.  Questions and concerns were answered to her satisfaction.  she was provided with my contact information should new questions or concerns arise in the interim.  she should return to clinic in 3-4 months for follow up.    Batsheva Garza MD  Pulmonary and Critical Care Medicine    The above note was dictated using voice recognition software and may include typographical errors. Please contact the author for any clarifications.    I spent a total of 60 minutes face to face with Noni Mccormick during today's office visit. Over 50% of this time was spent counseling the patient and/or coordinating care regarding their pulmonary disease.

## 2019-02-06 DIAGNOSIS — Z30.41 ENCOUNTER FOR SURVEILLANCE OF CONTRACEPTIVE PILLS: ICD-10-CM

## 2019-02-06 RX ORDER — NORETHINDRONE AND ETHINYL ESTRADIOL 7-9-5
KIT ORAL
Qty: 84 TABLET | Refills: 0 | Status: SHIPPED | OUTPATIENT
Start: 2019-02-06 | End: 2019-02-14

## 2019-02-06 NOTE — TELEPHONE ENCOUNTER
"Contraceptives Protocol Passed   ARANELLE 0.5/1/0.5-35 MG-MCG tablet [Pharmacy Med Name: ARANELLE 28 TABLET]   Rerun Protocol (2/6/2019 2:36 PM)      Patient is not a current smoker if age is 35 or older         Recent (12 mo) or future (30 days) visit within the authorizing provider's specialty      Patient had office visit in the last 12 months or has a visit in the next 30 days with authorizing provider or within the authorizing provider's specialty.  See \"Patient Info\" tab in inbasket, or \"Choose Columns\" in Meds & Orders section of the refill encounter.              Medication is active on med list         No active pregnancy on record         No positive pregnancy test in past 12 months        Prescription approved per [facility] Protocol.  Pt is due for an annual physical exam.    Jennifer Marcus RN    "

## 2019-02-15 ENCOUNTER — INFUSION THERAPY VISIT (OUTPATIENT)
Dept: INFUSION THERAPY | Facility: CLINIC | Age: 49
End: 2019-02-15
Payer: COMMERCIAL

## 2019-02-15 ENCOUNTER — OFFICE VISIT (OUTPATIENT)
Dept: OBGYN | Facility: CLINIC | Age: 49
End: 2019-02-15
Payer: COMMERCIAL

## 2019-02-15 VITALS
SYSTOLIC BLOOD PRESSURE: 111 MMHG | WEIGHT: 134.8 LBS | BODY MASS INDEX: 23.88 KG/M2 | OXYGEN SATURATION: 97 % | RESPIRATION RATE: 16 BRPM | DIASTOLIC BLOOD PRESSURE: 75 MMHG | HEART RATE: 70 BPM | TEMPERATURE: 97.8 F

## 2019-02-15 VITALS
WEIGHT: 132.8 LBS | OXYGEN SATURATION: 100 % | HEART RATE: 67 BPM | SYSTOLIC BLOOD PRESSURE: 133 MMHG | BODY MASS INDEX: 23.53 KG/M2 | DIASTOLIC BLOOD PRESSURE: 91 MMHG | TEMPERATURE: 98.1 F | HEIGHT: 63 IN

## 2019-02-15 DIAGNOSIS — Z79.899 HIGH RISK MEDICATIONS (NOT ANTICOAGULANTS) LONG-TERM USE: ICD-10-CM

## 2019-02-15 DIAGNOSIS — Z01.419 ENCOUNTER FOR GYNECOLOGICAL EXAMINATION WITHOUT ABNORMAL FINDING: Primary | ICD-10-CM

## 2019-02-15 DIAGNOSIS — M08.3 CHRONIC POLYARTICULAR JUVENILE RHEUMATOID ARTHRITIS (H): Primary | ICD-10-CM

## 2019-02-15 DIAGNOSIS — Z30.41 ENCOUNTER FOR SURVEILLANCE OF CONTRACEPTIVE PILLS: ICD-10-CM

## 2019-02-15 DIAGNOSIS — E03.8 OTHER SPECIFIED HYPOTHYROIDISM: ICD-10-CM

## 2019-02-15 DIAGNOSIS — Z12.39 BREAST CANCER SCREENING: ICD-10-CM

## 2019-02-15 LAB — TSH SERPL DL<=0.005 MIU/L-ACNC: 0.54 MU/L (ref 0.4–4)

## 2019-02-15 PROCEDURE — 96375 TX/PRO/DX INJ NEW DRUG ADDON: CPT | Performed by: INTERNAL MEDICINE

## 2019-02-15 PROCEDURE — 99396 PREV VISIT EST AGE 40-64: CPT | Performed by: NURSE PRACTITIONER

## 2019-02-15 PROCEDURE — 96413 CHEMO IV INFUSION 1 HR: CPT | Performed by: INTERNAL MEDICINE

## 2019-02-15 PROCEDURE — 84443 ASSAY THYROID STIM HORMONE: CPT | Performed by: NURSE PRACTITIONER

## 2019-02-15 PROCEDURE — 99207 ZZC NO CHARGE LOS: CPT

## 2019-02-15 PROCEDURE — 36415 COLL VENOUS BLD VENIPUNCTURE: CPT | Performed by: NURSE PRACTITIONER

## 2019-02-15 RX ORDER — DIPHENHYDRAMINE HCL 25 MG
25 CAPSULE ORAL ONCE
Status: CANCELLED
Start: 2019-02-15 | End: 2019-02-15

## 2019-02-15 RX ORDER — DIPHENHYDRAMINE HCL 25 MG
25 CAPSULE ORAL ONCE
Status: COMPLETED | OUTPATIENT
Start: 2019-02-15 | End: 2019-02-15

## 2019-02-15 RX ORDER — ACETAMINOPHEN 325 MG/1
650 TABLET ORAL ONCE
Status: CANCELLED
Start: 2019-02-15 | End: 2019-02-15

## 2019-02-15 RX ORDER — METHYLPREDNISOLONE SODIUM SUCCINATE 125 MG/2ML
125 INJECTION, POWDER, LYOPHILIZED, FOR SOLUTION INTRAMUSCULAR; INTRAVENOUS ONCE
Status: CANCELLED | OUTPATIENT
Start: 2019-02-15 | End: 2019-02-15

## 2019-02-15 RX ORDER — METHYLPREDNISOLONE SODIUM SUCCINATE 125 MG/2ML
125 INJECTION, POWDER, LYOPHILIZED, FOR SOLUTION INTRAMUSCULAR; INTRAVENOUS ONCE
Status: COMPLETED | OUTPATIENT
Start: 2019-02-15 | End: 2019-02-15

## 2019-02-15 RX ORDER — LEVOTHYROXINE SODIUM 112 UG/1
112 TABLET ORAL DAILY
Qty: 90 TABLET | Refills: 3 | Status: SHIPPED | OUTPATIENT
Start: 2019-02-15 | End: 2020-02-24

## 2019-02-15 RX ADMIN — Medication 25 MG: at 14:10

## 2019-02-15 RX ADMIN — METHYLPREDNISOLONE SODIUM SUCCINATE 125 MG: 125 INJECTION INTRAMUSCULAR; INTRAVENOUS at 14:34

## 2019-02-15 RX ADMIN — Medication 250 ML: at 14:30

## 2019-02-15 ASSESSMENT — PAIN SCALES - GENERAL
PAINLEVEL: MILD PAIN (2)
PAINLEVEL: NO PAIN (0)

## 2019-02-15 ASSESSMENT — MIFFLIN-ST. JEOR: SCORE: 1201.51

## 2019-02-15 NOTE — PROGRESS NOTES
Infusion Nursing Note:  Noni Mccormick presents today for Rapid Remicade.    Patient seen by provider today: No   present during visit today: Not Applicable.    Note: N/A.    Intravenous Access:  Peripheral IV placed.    Treatment Conditions:  Biological Infusion Checklist:    ~~~ NOTE: If the patient answers yes to any of the questions below, hold the infusion and contact ordering provider or on-call provider.    1. Have you recently had an elevated temperature, fever, chills, productive cough, coughing for 3 weeks or longer or hemoptysis,  abnormal vital signs, night sweats,  chest pain or have you noticed a decrease in your appetite, unexplained weight loss or fatigue? No  2. Do you have any open wounds or new incisions? No  3. Do you have any recent or upcoming hospitalizations, surgeries or dental procedures? No  4. Do you currently have or recently have had any signs of illness or infection or are you on any antibiotics? No  5. Have you had any new, sudden or worsening abdominal pain? No  6. Have you or anyone in your household received a live vaccination in the past 4 weeks? Please note:  No live vaccines while on biologic/chemotherapy until 6 months after the last treatment.  Patient can receive the flu vaccine (shot only) and the pneumovax.  It is optimal for the patient to get these vaccines mid cycle, but they can be given at any time as long as it is not on the day of the infusion. No  7. Have you recently been diagnosed with any new nervous system diseases (ie. Multiple sclerosis, Guillain [place], seizures, neurological changes) or cancer diagnosis? Are you on any form of radiation or chemotherapy? No  8. Are you pregnant or breast feeding or do you have plans of pregnancy in the future? No  9. Have you been having any signs of worsening depression or suicidal ideations?  (benlysta only) No  10. Have there been any other new onset medical symptoms? No        Post Infusion  Assessment:  Patient tolerated infusion without incident.  No evidence of extravasations.  Access discontinued per protocol.  Biologic Infusion Post Education: Call the triage nurse at your clinic or seek medical attention if you have chills and/or temperature greater than or equal to 100.5, uncontrolled nausea/vomiting, diarrhea, constipation, dizziness, shortness of breath, chest pain, heart palpitations, weakness or any other new or concerning symptoms, questions or concerns.  You cannot have any live virus vaccines prior to or during treatment or up to 6 months post infusion.  If you have an upcoming surgery, medical procedure or dental procedure during treatment, this should be discussed with your ordering physician and your surgeon/dentist.  If you are having any concerning symptom, if you are unsure if you should get your next infusion or wish to speak to a provider before your next infusion, please call your care coordinator or triage nurse at your clinic to notify them so we can adequately serve you.    Discharge Plan:   Discharge instructions reviewed with: Patient.  Patient and/or family verbalized understanding of discharge instructions and all questions answered.  Patient discharged in stable condition accompanied by: self.  Departure Mode: Ambulatory.    Unique Min RN

## 2019-02-15 NOTE — PROGRESS NOTES
SUBJECTIVE:   CC: Noni Mccormick is an 48 year old woman who presents for preventive health visit.     Physical   Annual:     Getting at least 3 servings of Calcium per day:  Yes    Bi-annual eye exam:  Yes    Dental care twice a year:  Yes    Sleep apnea or symptoms of sleep apnea:  None    Diet:  Regular (no restrictions)    Frequency of exercise:  2-3 days/week    Duration of exercise:  Other    Taking medications regularly:  Yes    Medication side effects:  Not applicable    Additional concerns today:  No    PHQ-2 Total Score: 0      Today's PHQ-2 Score:   PHQ-2 ( 1999 [employer]) 2/15/2019   Q1: Little interest or pleasure in doing things 0   Q2: Feeling down, depressed or hopeless 0   PHQ-2 Score 0   Q1: Little interest or pleasure in doing things Not at all   Q2: Feeling down, depressed or hopeless Not at all   PHQ-2 Score 0       Abuse: Current or Past(Physical, Sexual or Emotional)- No  Do you feel safe in your environment? Yes    Social History     Tobacco Use     Smoking status: Never Smoker     Smokeless tobacco: Never Used   Substance Use Topics     Alcohol use: No     Alcohol Use 2/15/2019   If you drink alcohol do you typically have greater than 3 drinks per day OR greater than 7 drinks per week? No   No flowsheet data found.    Reviewed orders with patient.  Reviewed health maintenance and updated orders accordingly - Yes  Patient Active Problem List   Diagnosis     Hypothyroidism     CARDIOVASCULAR SCREENING; LDL GOAL LESS THAN 160     Nephrolithiasis     High risk medications (not anticoagulants) long-term use     Injury of left hip     Osteopenia     Intermittent asthma     Chronic polyarticular juvenile rheumatoid arthritis (H)     Past Surgical History:   Procedure Laterality Date     C PELVIS/HIP JOINT SURGERY UNLISTED       JOINT REPLACEMENT, HIP RT/LT      (L)     JOINT REPLACEMENT, HIP RT/LT  3/2013    (R)     SURGICAL HISTORY OF -       Lithotripsy       Social History     Tobacco Use      Smoking status: Never Smoker     Smokeless tobacco: Never Used   Substance Use Topics     Alcohol use: No     Family History   Problem Relation Age of Onset     Breast Cancer Mother      C.A.D. Father            Mammogram Screening: Patient under age 50, mutual decision reflected in health maintenance.      Pertinent mammograms are reviewed under the imaging tab.  History of abnormal Pap smear: Endometrial cells last year-in phase with menstrual cycle  PAP / HPV Latest Ref Rng & Units 1/29/2018 12/9/2014 11/2/2011   PAP - OTHER-NIL, See Result NIL NIL   HPV 16 DNA NEG:Negative Negative - -   HPV 18 DNA NEG:Negative Negative - -   OTHER HR HPV NEG:Negative Negative - -     Reviewed and updated as needed this visit by clinical staff  Tobacco  Allergies  Meds  Med Hx  Surg Hx  Fam Hx  Soc Hx        Reviewed and updated as needed this visit by Provider        Past Medical History:   Diagnosis Date     Contraception      Grave's disease      Hypothyroid      Inflammatory arthritis      Intermittent asthma 9/10/2013     Nephrolithiasis 6/16/2011     Osteopenia 2/14/2013     Rheumatoid arthritis(714.0)       Past Surgical History:   Procedure Laterality Date     C PELVIS/HIP JOINT SURGERY UNLISTED       JOINT REPLACEMENT, HIP RT/LT      (L)     JOINT REPLACEMENT, HIP RT/LT  3/2013    (R)     SURGICAL HISTORY OF -       Lithotripsy       Review of Systems  CONSTITUTIONAL: NEGATIVE for fever, chills, change in weight  INTEGUMENTARU/SKIN: NEGATIVE for worrisome rashes, moles or lesions  EYES: NEGATIVE for vision changes or irritation  ENT: NEGATIVE for ear, mouth and throat problems  RESP: NEGATIVE for significant cough or SOB  BREAST: NEGATIVE for masses, tenderness or discharge  CV: NEGATIVE for chest pain, palpitations or peripheral edema  GI: NEGATIVE for nausea, abdominal pain, heartburn, or change in bowel habits  : NEGATIVE for unusual urinary or vaginal symptoms. Periods are regular.  MUSCULOSKELETAL:  "NEGATIVE for significant arthralgias or myalgia  NEURO: NEGATIVE for weakness, dizziness or paresthesias  ENDOCRINE: NEGATIVE for temperature intolerance, skin/hair changes  PSYCHIATRIC: NEGATIVE for changes in mood or affect     OBJECTIVE:   BP (!) 133/91 (BP Location: Right arm, Patient Position: Sitting, Cuff Size: Adult Regular)   Pulse 67   Temp 98.1  F (36.7  C) (Oral)   Ht 1.6 m (5' 3\")   Wt 60.2 kg (132 lb 12.8 oz)   LMP 01/22/2019 (Exact Date)   SpO2 100%   BMI 23.52 kg/m    Physical Exam  GENERAL: healthy, alert and no distress  EYES: Eyes grossly normal to inspection, PERRL and conjunctivae and sclerae normal  HENT: ear canals and TM's normal, nose and mouth without ulcers or lesions  NECK: no adenopathy, no asymmetry, masses, or scars and thyroid normal to palpation  RESP: lungs clear to auscultation - no rales, rhonchi or wheezes  BREAST: normal without masses, tenderness or nipple discharge and no palpable axillary masses or adenopathy  CV: regular rate and rhythm, normal S1 S2, no S3 or S4, no murmur, click or rub, no peripheral edema and peripheral pulses strong  ABDOMEN: soft, nontender, no hepatosplenomegaly, no masses and bowel sounds normal   (female): normal female external genitalia, normal urethral meatus, vaginal mucosa pink, moist, well rugated, and normal cervix/adnexa/uterus without masses or discharge  MS: no gross musculoskeletal defects noted, no edema  SKIN: no suspicious lesions or rashes  NEURO: Normal strength and tone, mentation intact and speech normal  PSYCH: mentation appears normal, affect normal/bright    ASSESSMENT/PLAN:   1. Encounter for gynecological examination without abnormal finding  Pap smear up to date  DEXA done last year due to her arthritis and medication use.    2. Encounter for surveillance of contraceptive pills  Desires to continue same pills. Refill x 1 year.  - norethin-eth estrad triphasic (ARANELLE) 0.5/1/0.5-35 MG-MCG tablet; Take 1 tablet by " "mouth daily  Dispense: 84 tablet; Refill: 3    3. Other specified hypothyroidism  Refill current dose of medication and check TSH today. If dose adjustment needed, will send in new prescription and notify patient.  - TSH with free T4 reflex  - levothyroxine (SYNTHROID/LEVOTHROID) 112 MCG tablet; Take 1 tablet (112 mcg) by mouth daily  Dispense: 90 tablet; Refill: 3    4. Breast cancer screening  - MA Screening Digital Bilateral; Future    COUNSELING:  Reviewed preventive health counseling, as reflected in patient instructions  Special attention given to:        Regular exercise       Healthy diet/nutrition       Contraception       Osteoporosis Prevention/Bone Health       HIV screeninx in teen years, 1x in adult years, and at intervals if high risk    BP Readings from Last 1 Encounters:   02/15/19 (!) 133/91     Estimated body mass index is 23.52 kg/m  as calculated from the following:    Height as of this encounter: 1.6 m (5' 3\").    Weight as of this encounter: 60.2 kg (132 lb 12.8 oz).    BP Screening:   Last 3 BP Readings:    BP Readings from Last 3 Encounters:   02/15/19 (!) 133/91   19 126/74   19 124/81       The following was recommended to the patient:  Re-screen within 4 weeks and recommend lifestyle modifications       reports that  has never smoked. she has never used smokeless tobacco.      Counseling Resources:  ATP IV Guidelines  Pooled Cohorts Equation Calculator  Breast Cancer Risk Calculator  FRAX Risk Assessment  ICSI Preventive Guidelines  Dietary Guidelines for Americans,   USDA's MyPlate  ASA Prophylaxis  Lung CA Screening    KEENAN Bah CNP  [facility]  "

## 2019-02-15 NOTE — LETTER
[facility]  [address] [address]  Phone: [phone]    02/20/19    Noni Mccormick  [address] [address]      Dear Noni-    Your thyroid function is normal. No change to your medication dose. Please let me know if you have any questions.     Sincerely,      KEENAN Bah CNP

## 2019-03-08 RX ORDER — METHYLPREDNISOLONE SODIUM SUCCINATE 125 MG/2ML
125 INJECTION, POWDER, LYOPHILIZED, FOR SOLUTION INTRAMUSCULAR; INTRAVENOUS ONCE
Status: CANCELLED | OUTPATIENT
Start: 2019-03-08 | End: 2019-03-08

## 2019-03-08 RX ORDER — DIPHENHYDRAMINE HCL 25 MG
25 CAPSULE ORAL ONCE
Status: CANCELLED
Start: 2019-03-08 | End: 2019-03-08

## 2019-03-08 RX ORDER — ACETAMINOPHEN 325 MG/1
650 TABLET ORAL ONCE
Status: CANCELLED
Start: 2019-03-08 | End: 2019-03-08

## 2019-03-11 ENCOUNTER — TELEPHONE (OUTPATIENT)
Dept: FAMILY MEDICINE | Facility: CLINIC | Age: 49
End: 2019-03-11

## 2019-03-11 DIAGNOSIS — I10 HYPERTENSION GOAL BP (BLOOD PRESSURE) < 140/90: Primary | ICD-10-CM

## 2019-03-11 RX ORDER — LOSARTAN POTASSIUM 25 MG/1
25 TABLET ORAL DAILY
Qty: 30 TABLET | Refills: 0 | Status: SHIPPED | OUTPATIENT
Start: 2019-03-11 | End: 2019-04-07

## 2019-03-11 NOTE — TELEPHONE ENCOUNTER
Pharmacy confirms he discussed with pt that there is a cheaper alternate for irbesartan.  She can get Losartan 25 mg for a $3 copay.  Pt requested message be sent to her pmd.  Sierra BLANKENSHIP, RN

## 2019-03-11 NOTE — TELEPHONE ENCOUNTER
Left message on patient's voice mail of new prescription sent to pharmacy and needs follow up appointment for more refills    Sadaf SARGENTN, RN, CPN

## 2019-03-11 NOTE — TELEPHONE ENCOUNTER
Alt req for Irbes 75mg. Pt is paying $19 for Irbes 75mg. Pt requesting lower cost alternative: Losar 25mg $3.

## 2019-03-19 DIAGNOSIS — M06.09 RHEUMATOID ARTHRITIS OF MULTIPLE SITES WITHOUT RHEUMATOID FACTOR (H): ICD-10-CM

## 2019-03-20 NOTE — TELEPHONE ENCOUNTER
Methotrexate 2.5mg, TAKE 6 TABLETS BY MOUTH ONCE WEEKLY  Last Written Prescription Date: 10/9/2018  Last Fill Quantity: 72, # refills: 1  Last Office Visit with Rheumatology Provider:  11/29/2018    Next 5 appointments (look out 90 days)    Apr 24, 2019  2:40 PM CDT  [facility] Visit with Sophia [facility]augh [facility]  [facility] [address] [facility] [address]  [phone]   May 29, 2019  4:00 PM CDT  Return Visit with Sterling Gonzalez MD  [facility] [address] [facility] [address]  [phone]   Jun 04, 2019  4:30 PM CDT  Return Visit with Le Garza MD  [facility] [address] [facility] [address]  [phone]           Creatinine   Date Value Ref Range Status   01/11/2019 0.74 0.52 - 1.04 mg/dL Final     Lab Results   Component Value Date    AST 7 01/11/2019     Lab Results   Component Value Date    ALT 11 01/11/2019     Lab Results   Component Value Date    ALBUMIN 3.2 03/30/2018     Lab Results   Component Value Date    WBC 6.2 01/11/2019     Lab Results   Component Value Date    RBC 4.25 01/11/2019     Lab Results   Component Value Date    HGB 12.8 01/11/2019     Lab Results   Component Value Date    HCT 38.9 01/11/2019     Lab Results   Component Value Date    MCV 92 01/11/2019     Lab Results   Component Value Date    MCH 30.1 01/11/2019     Lab Results   Component Value Date    MCHC 32.9 01/11/2019     Lab Results   Component Value Date    RDW 12.8 01/11/2019     Lab Results   Component Value Date     01/11/2019     Lab Results   Component Value Date    GLC 66 03/30/2018     No components found for: URINE

## 2019-03-22 ENCOUNTER — INFUSION THERAPY VISIT (OUTPATIENT)
Dept: INFUSION THERAPY | Facility: CLINIC | Age: 49
End: 2019-03-22
Payer: COMMERCIAL

## 2019-03-22 VITALS
BODY MASS INDEX: 23.74 KG/M2 | HEART RATE: 74 BPM | SYSTOLIC BLOOD PRESSURE: 124 MMHG | WEIGHT: 134 LBS | RESPIRATION RATE: 16 BRPM | OXYGEN SATURATION: 97 % | DIASTOLIC BLOOD PRESSURE: 74 MMHG | TEMPERATURE: 97.2 F

## 2019-03-22 DIAGNOSIS — M06.09 RHEUMATOID ARTHRITIS OF MULTIPLE SITES WITHOUT RHEUMATOID FACTOR (H): ICD-10-CM

## 2019-03-22 DIAGNOSIS — M08.3 CHRONIC POLYARTICULAR JUVENILE RHEUMATOID ARTHRITIS (H): Primary | ICD-10-CM

## 2019-03-22 DIAGNOSIS — Z79.899 HIGH RISK MEDICATIONS (NOT ANTICOAGULANTS) LONG-TERM USE: ICD-10-CM

## 2019-03-22 LAB
ALBUMIN SERPL-MCNC: 3.4 G/DL (ref 3.4–5)
ALT SERPL W P-5'-P-CCNC: 16 U/L (ref 0–50)
AST SERPL W P-5'-P-CCNC: 14 U/L (ref 0–45)
CREAT SERPL-MCNC: 0.65 MG/DL (ref 0.52–1.04)
ERYTHROCYTE [DISTWIDTH] IN BLOOD BY AUTOMATED COUNT: 13 % (ref 10–15)
GFR SERPL CREATININE-BSD FRML MDRD: >90 ML/MIN/{1.73_M2}
HCT VFR BLD AUTO: 42.5 % (ref 35–47)
HGB BLD-MCNC: 13.9 G/DL (ref 11.7–15.7)
MCH RBC QN AUTO: 29.7 PG (ref 26.5–33)
MCHC RBC AUTO-ENTMCNC: 32.7 G/DL (ref 31.5–36.5)
MCV RBC AUTO: 91 FL (ref 78–100)
PLATELET # BLD AUTO: 500 10E9/L (ref 150–450)
RBC # BLD AUTO: 4.68 10E12/L (ref 3.8–5.2)
WBC # BLD AUTO: 6.8 10E9/L (ref 4–11)

## 2019-03-22 PROCEDURE — 85027 COMPLETE CBC AUTOMATED: CPT | Performed by: STUDENT IN AN ORGANIZED HEALTH CARE EDUCATION/TRAINING PROGRAM

## 2019-03-22 PROCEDURE — 84460 ALANINE AMINO (ALT) (SGPT): CPT | Performed by: STUDENT IN AN ORGANIZED HEALTH CARE EDUCATION/TRAINING PROGRAM

## 2019-03-22 PROCEDURE — 96375 TX/PRO/DX INJ NEW DRUG ADDON: CPT | Performed by: INTERNAL MEDICINE

## 2019-03-22 PROCEDURE — 84450 TRANSFERASE (AST) (SGOT): CPT | Performed by: STUDENT IN AN ORGANIZED HEALTH CARE EDUCATION/TRAINING PROGRAM

## 2019-03-22 PROCEDURE — 96413 CHEMO IV INFUSION 1 HR: CPT | Performed by: INTERNAL MEDICINE

## 2019-03-22 PROCEDURE — 36415 COLL VENOUS BLD VENIPUNCTURE: CPT | Performed by: STUDENT IN AN ORGANIZED HEALTH CARE EDUCATION/TRAINING PROGRAM

## 2019-03-22 PROCEDURE — 99207 ZZC NO CHARGE LOS: CPT

## 2019-03-22 PROCEDURE — 82040 ASSAY OF SERUM ALBUMIN: CPT | Performed by: STUDENT IN AN ORGANIZED HEALTH CARE EDUCATION/TRAINING PROGRAM

## 2019-03-22 PROCEDURE — 82565 ASSAY OF CREATININE: CPT | Performed by: STUDENT IN AN ORGANIZED HEALTH CARE EDUCATION/TRAINING PROGRAM

## 2019-03-22 RX ORDER — METHYLPREDNISOLONE SODIUM SUCCINATE 125 MG/2ML
125 INJECTION, POWDER, LYOPHILIZED, FOR SOLUTION INTRAMUSCULAR; INTRAVENOUS ONCE
Status: CANCELLED | OUTPATIENT
Start: 2019-03-22 | End: 2019-03-22

## 2019-03-22 RX ORDER — DIPHENHYDRAMINE HCL 25 MG
25 CAPSULE ORAL ONCE
Status: CANCELLED
Start: 2019-03-22 | End: 2019-03-22

## 2019-03-22 RX ORDER — METHYLPREDNISOLONE SODIUM SUCCINATE 125 MG/2ML
125 INJECTION, POWDER, LYOPHILIZED, FOR SOLUTION INTRAMUSCULAR; INTRAVENOUS ONCE
Status: COMPLETED | OUTPATIENT
Start: 2019-03-22 | End: 2019-03-22

## 2019-03-22 RX ORDER — DIPHENHYDRAMINE HCL 25 MG
25 CAPSULE ORAL ONCE
Status: COMPLETED | OUTPATIENT
Start: 2019-03-22 | End: 2019-03-22

## 2019-03-22 RX ORDER — ACETAMINOPHEN 325 MG/1
650 TABLET ORAL ONCE
Status: CANCELLED
Start: 2019-03-22 | End: 2019-03-22

## 2019-03-22 RX ADMIN — METHYLPREDNISOLONE SODIUM SUCCINATE 125 MG: 125 INJECTION INTRAMUSCULAR; INTRAVENOUS at 14:04

## 2019-03-22 RX ADMIN — Medication 250 ML: at 14:02

## 2019-03-22 RX ADMIN — Medication 25 MG: at 13:54

## 2019-03-22 ASSESSMENT — PAIN SCALES - GENERAL: PAINLEVEL: MODERATE PAIN (4)

## 2019-03-22 NOTE — PROGRESS NOTES
Infusion Nursing Note:  Noni Mccormick presents today for Remicade-rapid.  Patient seen by provider today: No   present during visit today: Not Applicable.    Note: N/A.    Intravenous Access:  Peripheral IV placed.    Treatment Conditions:  Lab Results   Component Value Date    HGB 13.9 03/22/2019     Lab Results   Component Value Date    WBC 6.8 03/22/2019      Lab Results   Component Value Date    ANEU 4.0 03/30/2018     Lab Results   Component Value Date     03/22/2019      Lab Results   Component Value Date     03/30/2018                   Lab Results   Component Value Date    POTASSIUM 3.8 03/30/2018           No results found for: MAG         Lab Results   Component Value Date    CR 0.65 03/22/2019                   Lab Results   Component Value Date    LUIS ENRIQUE 8.6 03/30/2018                Lab Results   Component Value Date    BILITOTAL 0.5 03/30/2018           Lab Results   Component Value Date    ALBUMIN 3.4 03/22/2019                    Lab Results   Component Value Date    ALT 16 03/22/2019           Lab Results   Component Value Date    AST 14 03/22/2019       Results reviewed, labs MET treatment parameters, ok to proceed with treatment.  ~~~ NOTE: If the patient answers yes to any of the questions below, hold the infusion and contact ordering provider or on-call provider.    1. Have you recently had an elevated temperature, fever, chills, productive cough, coughing for 3 weeks or longer or hemoptysis,  abnormal vital signs, night sweats,  chest pain or have you noticed a decrease in your appetite, unexplained weight loss or fatigue? No  2. Do you have any open wounds or new incisions? No  3. Do you have any recent or upcoming hospitalizations, surgeries or dental procedures? No  4. Do you currently have or recently have had any signs of illness or infection or are you on any antibiotics? No  5. Have you had any new, sudden or worsening abdominal pain? No  6. Have you or anyone  in your household received a live vaccination in the past 4 weeks? Please note:  No live vaccines while on biologic/chemotherapy until 6 months after the last treatment.  Patient can receive the flu vaccine (shot only) and the pneumovax.  It is optimal for the patient to get these vaccines mid cycle, but they can be given at any time as long as it is not on the day of the infusion. No  7. Have you recently been diagnosed with any new nervous system diseases (ie. Multiple sclerosis, Guillain [place], seizures, neurological changes) or cancer diagnosis? Are you on any form of radiation or chemotherapy? No  8. Are you pregnant or breast feeding or do you have plans of pregnancy in the future? No  9. Have you been having any signs of worsening depression or suicidal ideations?  (benlysta only) N/A  10. Have there been any other new onset medical symptoms? No      Post Infusion Assessment:  Patient tolerated infusion without incident.  Site patent and intact, free from redness, edema or discomfort.  No evidence of extravasations.  Access discontinued per protocol.  Biologic Infusion Post Education: Call the triage nurse at your clinic or seek medical attention if you have chills and/or temperature greater than or equal to 100.5, uncontrolled nausea/vomiting, diarrhea, constipation, dizziness, shortness of breath, chest pain, heart palpitations, weakness or any other new or concerning symptoms, questions or concerns.  You cannot have any live virus vaccines prior to or during treatment or up to 6 months post infusion.  If you have an upcoming surgery, medical procedure or dental procedure during treatment, this should be discussed with your ordering physician and your surgeon/dentist.  If you are having any concerning symptom, if you are unsure if you should get your next infusion or wish to speak to a provider before your next infusion, please call your care coordinator or triage nurse at your clinic to notify them so we  can adequately serve you.       Discharge Plan:   Discharge instructions reviewed with: Patient.  Patient and/or family verbalized understanding of discharge instructions and all questions answered.  Patient discharged in stable condition accompanied by: self.  Departure Mode: Ambulatory.    Unique Min RN

## 2019-03-22 NOTE — LETTER
June 5, 2019      Noni Mccormick  [address] [facility] [address]        Dear ,    We are writing to inform you of your test results.    Normal monitoring labs - normal liver, kidney tests and cell counts.     Resulted Orders   Creatinine   Result Value Ref Range    Creatinine 0.65 0.52 - 1.04 mg/dL    GFR Estimate >90 >60 mL/min/[1.73_m2]      Comment:      Non  GFR Calc  Starting 12/18/2018, serum creatinine based estimated GFR (eGFR) will be   calculated using the Chronic Kidney Disease Epidemiology Collaboration   (CKD-EPI) equation.      GFR Estimate If Black >90 >60 mL/min/[1.73_m2]      Comment:       GFR Calc  Starting 12/18/2018, serum creatinine based estimated GFR (eGFR) will be   calculated using the Chronic Kidney Disease Epidemiology Collaboration   (CKD-EPI) equation.     CBC with platelets   Result Value Ref Range    WBC 6.8 4.0 - 11.0 10e9/L    RBC Count 4.68 3.8 - 5.2 10e12/L    Hemoglobin 13.9 11.7 - 15.7 g/dL    Hematocrit 42.5 35.0 - 47.0 %    MCV 91 78 - 100 fl    MCH 29.7 26.5 - 33.0 pg    MCHC 32.7 31.5 - 36.5 g/dL    RDW 13.0 10.0 - 15.0 %    Platelet Count 500 (H) 150 - 450 10e9/L   AST   Result Value Ref Range    AST 14 0 - 45 U/L   Albumin level   Result Value Ref Range    Albumin 3.4 3.4 - 5.0 g/dL   ALT   Result Value Ref Range    ALT 16 0 - 50 U/L       If you have any questions or concerns, please call the clinic at the number listed above.       Sincerely,      Thanks,   Cindy Olmedo CMA.  [facility]   Rheumatology  Phone: [phone]  Fax: [phone]  (staff for Dr. Gonzalez)

## 2019-04-07 DIAGNOSIS — I10 HYPERTENSION GOAL BP (BLOOD PRESSURE) < 140/90: ICD-10-CM

## 2019-04-08 RX ORDER — LOSARTAN POTASSIUM 25 MG/1
TABLET ORAL
Qty: 30 TABLET | Refills: 0 | Status: SHIPPED | OUTPATIENT
Start: 2019-04-08 | End: 2019-04-21

## 2019-04-09 ENCOUNTER — TELEPHONE (OUTPATIENT)
Dept: PULMONOLOGY | Facility: CLINIC | Age: 49
End: 2019-04-09

## 2019-04-09 DIAGNOSIS — R05.3 CHRONIC COUGH: ICD-10-CM

## 2019-04-09 DIAGNOSIS — J45.20 INTERMITTENT ASTHMA, UNCOMPLICATED: ICD-10-CM

## 2019-04-09 DIAGNOSIS — R09.82 POST-NASAL DRIP: ICD-10-CM

## 2019-04-09 ASSESSMENT — ASTHMA QUESTIONNAIRES
QUESTION_1 LAST FOUR WEEKS HOW MUCH OF THE TIME DID YOUR ASTHMA KEEP YOU FROM GETTING AS MUCH DONE AT WORK, SCHOOL OR AT HOME: A LITTLE OF THE TIME
QUESTION_3 LAST FOUR WEEKS HOW OFTEN DID YOUR ASTHMA SYMPTOMS (WHEEZING, COUGHING, SHORTNESS OF BREATH, CHEST TIGHTNESS OR PAIN) WAKE YOU UP AT NIGHT OR EARLIER THAN USUAL IN THE MORNING: NOT AT ALL
ACT_TOTALSCORE: 18
ACUTE_EXACERBATION_TODAY: NO
QUESTION_4 LAST FOUR WEEKS HOW OFTEN HAVE YOU USED YOUR RESCUE INHALER OR NEBULIZER MEDICATION (SUCH AS ALBUTEROL): TWO OR THREE TIMES PER WEEK
QUESTION_5 LAST FOUR WEEKS HOW WOULD YOU RATE YOUR ASTHMA CONTROL: SOMEWHAT CONTROLLED
QUESTION_2 LAST FOUR WEEKS HOW OFTEN HAVE YOU HAD SHORTNESS OF BREATH: THREE TO SIX TIMES A WEEK

## 2019-04-09 NOTE — TELEPHONE ENCOUNTER
[facility] Call Center    Phone Message    May a detailed message be left on voicemail: no    Reason for Call: Other: Pt returning call to Pulmonary.  Please call back.      Action Taken:

## 2019-04-09 NOTE — TELEPHONE ENCOUNTER
Patient in need of a refill of her QVAR inhler. Patient due for an updated asthma control test (ACT). QVAR cannot be refilled by nursing staff w/o the ACT being completed.    Voice mail left for patient requesting a call back to the clinic in order to go over the ACT.     Will await call back to the clinic.    Sally Atkins LPN    Rheumatology / Pulmonology  [facility]

## 2019-04-09 NOTE — TELEPHONE ENCOUNTER
ACT flowsheet completed with the pt and medication refill sent to her pharmacy by Dr Garza.  The pt had no further questions at this time.   Sadaf Husain RN

## 2019-04-09 NOTE — TELEPHONE ENCOUNTER
Left voice mail for patient regarding her QVAR inhaler refill.     Dr. Garza refilled patient's QVAR inhaler because the inhaled steroid protocol was failed.     LM for patient stating that her QVAR refill was sent to the [facility] in [facility].    Requested call back to the clinic with any further questions / concerns.    Sally Atkins LPN    Rheumatology / Pulmonology  [facility]

## 2019-04-10 ASSESSMENT — ASTHMA QUESTIONNAIRES: ACT_TOTALSCORE: 18

## 2019-04-19 ENCOUNTER — TELEPHONE (OUTPATIENT)
Dept: RHEUMATOLOGY | Facility: CLINIC | Age: 49
End: 2019-04-19

## 2019-04-24 ENCOUNTER — OFFICE VISIT (OUTPATIENT)
Dept: FAMILY MEDICINE | Facility: CLINIC | Age: 49
End: 2019-04-24
Payer: COMMERCIAL

## 2019-04-24 VITALS
WEIGHT: 129 LBS | TEMPERATURE: 98.9 F | DIASTOLIC BLOOD PRESSURE: 83 MMHG | HEART RATE: 69 BPM | HEIGHT: 63 IN | SYSTOLIC BLOOD PRESSURE: 133 MMHG | BODY MASS INDEX: 22.86 KG/M2

## 2019-04-24 DIAGNOSIS — I10 HYPERTENSION GOAL BP (BLOOD PRESSURE) < 140/90: Primary | ICD-10-CM

## 2019-04-24 DIAGNOSIS — J45.20 MILD INTERMITTENT ASTHMA WITHOUT COMPLICATION: ICD-10-CM

## 2019-04-24 DIAGNOSIS — R09.82 POST-NASAL DRIP: ICD-10-CM

## 2019-04-24 DIAGNOSIS — M08.3 CHRONIC POLYARTICULAR JUVENILE RHEUMATOID ARTHRITIS (H): ICD-10-CM

## 2019-04-24 LAB
ANION GAP SERPL CALCULATED.3IONS-SCNC: 6 MMOL/L (ref 3–14)
BUN SERPL-MCNC: 7 MG/DL (ref 7–30)
CALCIUM SERPL-MCNC: 8.8 MG/DL (ref 8.5–10.1)
CHLORIDE SERPL-SCNC: 107 MMOL/L (ref 94–109)
CO2 SERPL-SCNC: 26 MMOL/L (ref 20–32)
CREAT SERPL-MCNC: 0.68 MG/DL (ref 0.52–1.04)
GFR SERPL CREATININE-BSD FRML MDRD: >90 ML/MIN/{1.73_M2}
GLUCOSE SERPL-MCNC: 78 MG/DL (ref 70–99)
POTASSIUM SERPL-SCNC: 3.3 MMOL/L (ref 3.4–5.3)
SODIUM SERPL-SCNC: 139 MMOL/L (ref 133–144)

## 2019-04-24 PROCEDURE — 80048 BASIC METABOLIC PNL TOTAL CA: CPT | Performed by: FAMILY MEDICINE

## 2019-04-24 PROCEDURE — 36415 COLL VENOUS BLD VENIPUNCTURE: CPT | Performed by: FAMILY MEDICINE

## 2019-04-24 PROCEDURE — 99214 OFFICE O/P EST MOD 30 MIN: CPT | Performed by: FAMILY MEDICINE

## 2019-04-24 RX ORDER — FLUTICASONE PROPIONATE 50 MCG
2 SPRAY, SUSPENSION (ML) NASAL DAILY
Qty: 15.8 ML | Refills: 11 | Status: SHIPPED | OUTPATIENT
Start: 2019-04-24 | End: 2019-06-04

## 2019-04-24 RX ORDER — LOSARTAN POTASSIUM 25 MG/1
25 TABLET ORAL DAILY
Qty: 90 TABLET | Refills: 3 | Status: SHIPPED | OUTPATIENT
Start: 2019-04-24 | End: 2020-05-21

## 2019-04-24 ASSESSMENT — ASTHMA QUESTIONNAIRES
QUESTION_2 LAST FOUR WEEKS HOW OFTEN HAVE YOU HAD SHORTNESS OF BREATH: ONCE OR TWICE A WEEK
ACUTE_EXACERBATION_TODAY: NO
QUESTION_3 LAST FOUR WEEKS HOW OFTEN DID YOUR ASTHMA SYMPTOMS (WHEEZING, COUGHING, SHORTNESS OF BREATH, CHEST TIGHTNESS OR PAIN) WAKE YOU UP AT NIGHT OR EARLIER THAN USUAL IN THE MORNING: NOT AT ALL
QUESTION_5 LAST FOUR WEEKS HOW WOULD YOU RATE YOUR ASTHMA CONTROL: WELL CONTROLLED
QUESTION_1 LAST FOUR WEEKS HOW MUCH OF THE TIME DID YOUR ASTHMA KEEP YOU FROM GETTING AS MUCH DONE AT WORK, SCHOOL OR AT HOME: A LITTLE OF THE TIME
ACT_TOTALSCORE: 20
QUESTION_4 LAST FOUR WEEKS HOW OFTEN HAVE YOU USED YOUR RESCUE INHALER OR NEBULIZER MEDICATION (SUCH AS ALBUTEROL): TWO OR THREE TIMES PER WEEK

## 2019-04-24 ASSESSMENT — MIFFLIN-ST. JEOR: SCORE: 1184.27

## 2019-04-24 NOTE — PROGRESS NOTES
"  SUBJECTIVE:   Noni Mccormick is a 48 year old female who presents to clinic today for the following   health issues:        Hypertension Follow-up      Outpatient blood pressures are being checked at home.  Results are elevated last week  146/93 and 137/87  .    Low Salt Diet: no added salt    Asthma Follow-Up    Was ACT completed today?    Yes    ACT Total Scores 4/24/2019   ACT TOTAL SCORE -   ASTHMA ER VISITS -   ASTHMA HOSPITALIZATIONS -   ACT TOTAL SCORE (Goal Greater than or Equal to 20) 20   In the past 12 months, how many times did you visit the emergency room for your asthma without being admitted to the hospital? 0   In the past 12 months, how many times were you hospitalized overnight because of your asthma? 0       Recent asthma triggers that patient is dealing with: None        Amount of exercise or physical activity: 2-3 days/week for an average of 45-60 minutes    Problems taking medications regularly: No    Medication side effects: none    Diet: regular (no restrictions)    Pt with htn, well controlled on meds. No concerns   Due for labwork    Pt with asthma well controlled on meds ACT UTD    Pt with chronic post nasal drip. Treated with flonase    Pt with RA, sees rheum, meds reviewed        Additional history: as documented    Reviewed  and updated as needed this visit by clinical staff  Tobacco         Reviewed and updated as needed this visit by Provider         Labs reviewed in EPIC    ROS:  Constitutional, HEENT, cardiovascular, pulmonary, gi and gu systems are negative, except as otherwise noted.    OBJECTIVE:     /83   Pulse 69   Temp 98.9  F (37.2  C) (Oral)   Ht 1.6 m (5' 3\")   Wt 58.5 kg (129 lb)   BMI 22.85 kg/m    Body mass index is 22.85 kg/m .  GENERAL: healthy, alert and no distress  NECK: no adenopathy, no asymmetry, masses, or scars and thyroid normal to palpation  RESP: lungs clear to auscultation - no rales, rhonchi or wheezes  CV: regular rate and rhythm, " normal S1 S2, no S3 or S4, no murmur, click or rub, no peripheral edema and peripheral pulses strong  ABDOMEN: soft, nontender, no hepatosplenomegaly, no masses and bowel sounds normal  MS: no gross musculoskeletal defects noted, no edema    Diagnostic Test Results:  none     ASSESSMENT/PLAN:     1. Hypertension goal BP (blood pressure) < 140/90  At goal on meds, follow-up in one year  - losartan (COZAAR) 25 MG tablet; Take 1 tablet (25 mg) by mouth daily  Dispense: 90 tablet; Refill: 3  - Basic metabolic panel    2. Mild intermittent asthma without complication  Well controlled with medication  - beclomethasone HFA (QVAR REDIHALER) 40 MCG/ACT inhaler; Inhale 1 puff into the lungs 2 times daily  Dispense: 3 Inhaler; Refill: 3    3. Post-nasal drip  refill  - fluticasone (FLONASE) 50 MCG/ACT nasal spray; Spray 2 sprays into both nostrils daily  Dispense: 15.8 mL; Refill: 11    4. Chronic polyarticular juvenile rheumatoid arthritis (H)  Sees rheum          Sophia Horne MD  [facility]

## 2019-04-24 NOTE — LETTER
My Asthma Action Plan  Name: Noni Mccormick   YOB: 1970  Date: 4/24/2019   My doctor: Sophia Horne MD   My clinic: [facility]        My Control Medicine: Beclomethasone (QVar) -  80 mcg bid  My Rescue Medicine: Albuterol (Proair/Ventolin/Proventil) inhaler 2pufffs   My Asthma Severity: mild persistent  Avoid your asthma triggers: exercise or sports  None            GREEN ZONE   Good Control    I feel good    No cough or wheeze    Can work, sleep and play without asthma symptoms       Take your asthma control medicine every day.     1. If exercise triggers your asthma, take your rescue medication    15 minutes before exercise or sports, and    During exercise if you have asthma symptoms  2. Spacer to use with inhaler: If you have a spacer, make sure to use it with your inhaler             YELLOW ZONE Getting Worse  I have ANY of these:    I do not feel good    Cough or wheeze    Chest feels tight    Wake up at night   1. Keep taking your Green Zone medications  2. Start taking your rescue medicine:    every 20 minutes for up to 1 hour. Then every 4 hours for 24-48 hours.  3. If you stay in the Yellow Zone for more than 12-24 hours, contact your doctor.  4. If you do not return to the Green Zone in 12-24 hours or you get worse, start taking your oral steroid medicine if prescribed by your provider.           RED ZONE Medical Alert - Get Help  I have ANY of these:    I feel awful    Medicine is not helping    Breathing getting harder    Trouble walking or talking    Nose opens wide to breathe       1. Take your rescue medicine NOW  2. If your provider has prescribed an oral steroid medicine, start taking it NOW  3. Call your doctor NOW  4. If you are still in the Red Zone after 20 minutes and you have not reached your doctor:    Take your rescue medicine again and    Call 911 or go to the emergency room right away    See your regular doctor within 2 weeks of an Emergency Room or  Urgent Care visit for follow-up treatment.          Annual Reminders:  Meet with Asthma Educator,  Flu Shot in the Fall, consider Pneumonia Vaccination for patients with asthma (aged 19 and older).    Pharmacy:    [facility] [place], [place] [address] [facility]  [facility] [place], [place] [facility] [address] [facility]                      Asthma Triggers  How To Control Things That Make Your Asthma Worse    Triggers are things that make your asthma worse.  Look at the list below to help you find your triggers and what you can do about them.  You can help prevent asthma flare-ups by staying away from your triggers.      Trigger                                                          What you can do   Cigarette Smoke  Tobacco smoke can make asthma worse. Do not allow smoking in your home, car or around you.  Be sure no one smokes at a child s day care or school.  If you smoke, ask your health care provider for ways to help you quit.  Ask family members to quit too.  Ask your health care provider for a referral to Quit Plan to help you quit smoking, or call 1-322-457-PLAN.     Colds, Flu, Bronchitis  These are common triggers of asthma. Wash your hands often.  Don t touch your eyes, nose or mouth.  Get a flu shot every year.     Dust Mites  These are tiny bugs that live in cloth or carpet. They are too small to see. Wash sheets and blankets in hot water every week.   Encase pillows and mattress in dust mite proof covers.  Avoid having carpet if you can. If you have carpet, vacuum weekly.   Use a dust mask and HEPA vacuum.   Pollen and Outdoor Mold  Some people are allergic to trees, grass, or weed pollen, or molds. Try to keep your windows closed.  Limit time out doors when pollen count is high.   Ask you health care provider about taking medicine during allergy season.     Animal Dander  Some people are allergic to skin flakes, urine or saliva from pets with fur or feathers.  Keep pets with fur or feathers out of your home.    If you can t keep the pet outdoors, then keep the pet out of your bedroom.  Keep the bedroom door closed.  Keep pets off cloth furniture and away from stuffed toys.     Mice, Rats, and Cockroaches  Some people are allergic to the waste from these pests.   Cover food and garbage.  Clean up spills and food crumbs.  Store grease in the refrigerator.   Keep food out of the bedroom.   Indoor Mold  This can be a trigger if your home has high moisture. Fix leaking faucets, pipes, or other sources of water.   Clean moldy surfaces.  Dehumidify basement if it is damp and smelly.   Smoke, Strong Odors, and Sprays  These can reduce air quality. Stay away from strong odors and sprays, such as perfume, powder, hair spray, paints, smoke incense, paint, cleaning products, candles and new carpet.   Exercise or Sports  Some people with asthma have this trigger. Be active!  Ask your doctor about taking medicine before sports or exercise to prevent symptoms.    Warm up for 5-10 minutes before and after sports or exercise.     Other Triggers of Asthma  Cold air:  Cover your nose and mouth with a scarf.  Sometimes laughing or crying can be a trigger.  Some medicines and food can trigger asthma.

## 2019-04-25 ASSESSMENT — ASTHMA QUESTIONNAIRES: ACT_TOTALSCORE: 20

## 2019-04-26 ENCOUNTER — INFUSION THERAPY VISIT (OUTPATIENT)
Dept: INFUSION THERAPY | Facility: CLINIC | Age: 49
End: 2019-04-26
Payer: COMMERCIAL

## 2019-04-26 VITALS
RESPIRATION RATE: 16 BRPM | SYSTOLIC BLOOD PRESSURE: 113 MMHG | OXYGEN SATURATION: 99 % | WEIGHT: 131.2 LBS | BODY MASS INDEX: 23.24 KG/M2 | DIASTOLIC BLOOD PRESSURE: 75 MMHG | TEMPERATURE: 98.3 F | HEART RATE: 83 BPM

## 2019-04-26 DIAGNOSIS — M08.3 CHRONIC POLYARTICULAR JUVENILE RHEUMATOID ARTHRITIS (H): Primary | ICD-10-CM

## 2019-04-26 DIAGNOSIS — Z79.899 HIGH RISK MEDICATIONS (NOT ANTICOAGULANTS) LONG-TERM USE: ICD-10-CM

## 2019-04-26 PROCEDURE — 96375 TX/PRO/DX INJ NEW DRUG ADDON: CPT | Performed by: PHYSICIAN ASSISTANT

## 2019-04-26 PROCEDURE — 99207 ZZC NO CHARGE LOS: CPT

## 2019-04-26 PROCEDURE — 96413 CHEMO IV INFUSION 1 HR: CPT | Performed by: PHYSICIAN ASSISTANT

## 2019-04-26 RX ORDER — METHYLPREDNISOLONE SODIUM SUCCINATE 125 MG/2ML
125 INJECTION, POWDER, LYOPHILIZED, FOR SOLUTION INTRAMUSCULAR; INTRAVENOUS ONCE
Status: COMPLETED | OUTPATIENT
Start: 2019-04-26 | End: 2019-04-26

## 2019-04-26 RX ORDER — DIPHENHYDRAMINE HCL 25 MG
25 CAPSULE ORAL ONCE
Status: COMPLETED | OUTPATIENT
Start: 2019-04-26 | End: 2019-04-26

## 2019-04-26 RX ORDER — METHYLPREDNISOLONE SODIUM SUCCINATE 125 MG/2ML
125 INJECTION, POWDER, LYOPHILIZED, FOR SOLUTION INTRAMUSCULAR; INTRAVENOUS ONCE
Status: CANCELLED | OUTPATIENT
Start: 2019-04-26

## 2019-04-26 RX ORDER — DIPHENHYDRAMINE HCL 25 MG
25 CAPSULE ORAL ONCE
Status: CANCELLED
Start: 2019-04-26

## 2019-04-26 RX ORDER — ACETAMINOPHEN 325 MG/1
650 TABLET ORAL ONCE
Status: CANCELLED
Start: 2019-04-26

## 2019-04-26 RX ADMIN — METHYLPREDNISOLONE SODIUM SUCCINATE 125 MG: 125 INJECTION INTRAMUSCULAR; INTRAVENOUS at 13:39

## 2019-04-26 RX ADMIN — Medication 250 ML: at 13:41

## 2019-04-26 RX ADMIN — Medication 25 MG: at 13:06

## 2019-04-26 ASSESSMENT — PAIN SCALES - GENERAL: PAINLEVEL: NO PAIN (0)

## 2019-04-26 NOTE — PROGRESS NOTES
Infusion Nursing Note:  Noni Mccormick presents today for rapid Remicade.    Patient seen by provider today: No   present during visit today: Not Applicable.    Note: Takes Tylenol at home, prior to coming. Also takes one Benadryl prior to coming, and the second Benadryl (for a total of 50 mg) upon arrival.    Intravenous Access:  Peripheral IV placed.    Treatment Conditions:  Biological Infusion Checklist:  ~~~ NOTE: If the patient answers yes to any of the questions below, hold the infusion and contact ordering provider or on-call provider.    1. Have you recently had an elevated temperature, fever, chills, productive cough, coughing for 3 weeks or longer or hemoptysis, abnormal vital signs, night sweats,  chest pain or have you noticed a decrease in your appetite, unexplained weight loss or fatigue? No  2. Do you have any open wounds or new incisions? No  3. Do you have any recent or upcoming hospitalizations, surgeries or dental procedures? No  4. Do you currently have or recently have had any signs of illness or infection or are you on any antibiotics? No  5. Have you had any new, sudden or worsening abdominal pain? No  6. Have you or anyone in your household received a live vaccination in the past 4 weeks? Please note:  No live vaccines while on biologic/chemotherapy until 6 months after the last treatment.  Patient can receive the flu vaccine (shot only) and the pneumovax.  It is optimal for the patient to get these vaccines mid cycle, but they can be given at any time as long as it is not on the day of the infusion. No  7. Have you recently been diagnosed with any new nervous system diseases (ie. Multiple sclerosis, Guillain [place], seizures, neurological changes) or cancer diagnosis? No  8. Are you on any form of radiation or chemotherapy? No  9. Are you pregnant or breast feeding or do you have plans of pregnancy in the future? No  10. Have there been any other new onset medical symptoms?  No        Post Infusion Assessment:  Patient tolerated infusion without incident.  Blood return noted pre and post infusion.  Site patent and intact, free from redness, edema or discomfort.  No evidence of extravasations.  Access discontinued per protocol.       Discharge Plan:   Patient will return 5/31/19 for next appointment.   Patient discharged in stable condition accompanied by: self.  Departure Mode: Ambulatory.    Maria Del Carmen Estrada RN

## 2019-05-28 NOTE — PROGRESS NOTES
Situation: Patient triggered for high risk readmission.    Patient is enrolled in the High Risk Transitions in Care program.  Background: SOB , HF    Assessment:   - Primary Caregiver: self and daughter Sierra  - Since discharge, patient is feeling : was doing well until last night and was SOB  - Patient described recent doctor visit  - Activity: stayed the same  - The patient is taking all medications as prescribed.  Any new medications and instructions were reviewed with the patient.  - The patient had questions or concerns regarding the medications prescribed..  Says she has a proair inhaler.  Hasn't used it.  Has over 100 puffs left.  Noted it was . Contacted the pharmacy as she wanted to use it.  Pharmacist recommended to not use... Informed pt.  She will call MD and get order for new one.  - The patient is not having pain at this time.  - The patient's appetite is Normal  - Patient is having bowel movements.  - Follow up care:   Upcoming appointments 19 Dr. Senior   - Patient verbalized understanding of appointment date/time and will be able to attend the appointment.  - How will the patient get to the appointment? daughter will drive.  - Reviewed diagnosis specific education :  Discussed her HF.  States her wt was only one pound off.  Denies swelling.  Did not eat any more salt yesterday.   - Reviewed goals of care (i.e. Power of  for Health Care)  - Reminded patient to call appropriate venue of care for any decline in medical status.  - There were no further questions or concerns at this time.    Recommendation: Will follow-up with patient in 1 days.           This is a recent snapshot of the patient's [facility] medical record.  For current drug dose and complete information and questions, call [phone] or In Basket pool, [facility] home infusion ([id number])  CSN Number:  [id number]

## 2019-05-31 ENCOUNTER — INFUSION THERAPY VISIT (OUTPATIENT)
Dept: INFUSION THERAPY | Facility: CLINIC | Age: 49
End: 2019-05-31
Payer: COMMERCIAL

## 2019-05-31 VITALS
BODY MASS INDEX: 23.11 KG/M2 | DIASTOLIC BLOOD PRESSURE: 75 MMHG | RESPIRATION RATE: 16 BRPM | OXYGEN SATURATION: 98 % | SYSTOLIC BLOOD PRESSURE: 120 MMHG | HEART RATE: 74 BPM | TEMPERATURE: 97.6 F | WEIGHT: 130.44 LBS

## 2019-05-31 DIAGNOSIS — M06.09 RHEUMATOID ARTHRITIS OF MULTIPLE SITES WITHOUT RHEUMATOID FACTOR (H): ICD-10-CM

## 2019-05-31 DIAGNOSIS — M08.3 CHRONIC POLYARTICULAR JUVENILE RHEUMATOID ARTHRITIS (H): Primary | ICD-10-CM

## 2019-05-31 DIAGNOSIS — Z79.899 HIGH RISK MEDICATIONS (NOT ANTICOAGULANTS) LONG-TERM USE: ICD-10-CM

## 2019-05-31 LAB
ALBUMIN SERPL-MCNC: 3.5 G/DL (ref 3.4–5)
ALT SERPL W P-5'-P-CCNC: 18 U/L (ref 0–50)
AST SERPL W P-5'-P-CCNC: 10 U/L (ref 0–45)
CREAT SERPL-MCNC: 0.66 MG/DL (ref 0.52–1.04)
ERYTHROCYTE [DISTWIDTH] IN BLOOD BY AUTOMATED COUNT: 13 % (ref 10–15)
GFR SERPL CREATININE-BSD FRML MDRD: >90 ML/MIN/{1.73_M2}
HCT VFR BLD AUTO: 38.6 % (ref 35–47)
HGB BLD-MCNC: 12.6 G/DL (ref 11.7–15.7)
MCH RBC QN AUTO: 29.7 PG (ref 26.5–33)
MCHC RBC AUTO-ENTMCNC: 32.6 G/DL (ref 31.5–36.5)
MCV RBC AUTO: 91 FL (ref 78–100)
PLATELET # BLD AUTO: 410 10E9/L (ref 150–450)
RBC # BLD AUTO: 4.24 10E12/L (ref 3.8–5.2)
WBC # BLD AUTO: 6.3 10E9/L (ref 4–11)

## 2019-05-31 PROCEDURE — 36415 COLL VENOUS BLD VENIPUNCTURE: CPT | Performed by: STUDENT IN AN ORGANIZED HEALTH CARE EDUCATION/TRAINING PROGRAM

## 2019-05-31 PROCEDURE — 99207 ZZC NO CHARGE NURSE ONLY: CPT

## 2019-05-31 PROCEDURE — 82040 ASSAY OF SERUM ALBUMIN: CPT | Performed by: STUDENT IN AN ORGANIZED HEALTH CARE EDUCATION/TRAINING PROGRAM

## 2019-05-31 PROCEDURE — 85027 COMPLETE CBC AUTOMATED: CPT | Performed by: STUDENT IN AN ORGANIZED HEALTH CARE EDUCATION/TRAINING PROGRAM

## 2019-05-31 PROCEDURE — 96375 TX/PRO/DX INJ NEW DRUG ADDON: CPT | Performed by: INTERNAL MEDICINE

## 2019-05-31 PROCEDURE — 96413 CHEMO IV INFUSION 1 HR: CPT | Performed by: INTERNAL MEDICINE

## 2019-05-31 PROCEDURE — 84450 TRANSFERASE (AST) (SGOT): CPT | Performed by: STUDENT IN AN ORGANIZED HEALTH CARE EDUCATION/TRAINING PROGRAM

## 2019-05-31 PROCEDURE — 82565 ASSAY OF CREATININE: CPT | Performed by: STUDENT IN AN ORGANIZED HEALTH CARE EDUCATION/TRAINING PROGRAM

## 2019-05-31 PROCEDURE — 84460 ALANINE AMINO (ALT) (SGPT): CPT | Performed by: STUDENT IN AN ORGANIZED HEALTH CARE EDUCATION/TRAINING PROGRAM

## 2019-05-31 RX ORDER — METHYLPREDNISOLONE SODIUM SUCCINATE 125 MG/2ML
125 INJECTION, POWDER, LYOPHILIZED, FOR SOLUTION INTRAMUSCULAR; INTRAVENOUS ONCE
Status: CANCELLED | OUTPATIENT
Start: 2019-05-31

## 2019-05-31 RX ORDER — DIPHENHYDRAMINE HCL 25 MG
25 CAPSULE ORAL ONCE
Status: CANCELLED
Start: 2019-05-31

## 2019-05-31 RX ORDER — ACETAMINOPHEN 325 MG/1
650 TABLET ORAL ONCE
Status: COMPLETED | OUTPATIENT
Start: 2019-05-31 | End: 2019-05-31

## 2019-05-31 RX ORDER — METHYLPREDNISOLONE SODIUM SUCCINATE 125 MG/2ML
125 INJECTION, POWDER, LYOPHILIZED, FOR SOLUTION INTRAMUSCULAR; INTRAVENOUS ONCE
Status: COMPLETED | OUTPATIENT
Start: 2019-05-31 | End: 2019-05-31

## 2019-05-31 RX ORDER — ACETAMINOPHEN 325 MG/1
650 TABLET ORAL ONCE
Status: CANCELLED
Start: 2019-05-31

## 2019-05-31 RX ADMIN — Medication 250 ML: at 14:46

## 2019-05-31 RX ADMIN — ACETAMINOPHEN 650 MG: 325 TABLET ORAL at 14:35

## 2019-05-31 RX ADMIN — METHYLPREDNISOLONE SODIUM SUCCINATE 125 MG: 125 INJECTION INTRAMUSCULAR; INTRAVENOUS at 15:01

## 2019-05-31 ASSESSMENT — PAIN SCALES - GENERAL: PAINLEVEL: NO PAIN (0)

## 2019-05-31 NOTE — PROGRESS NOTES
Infusion Nursing Note:  Noni Mccormick presents today for Rapid Remicade.    Patient seen by provider today: No   present during visit today: Not Applicable.    Note: Patient's typical pre-medication regimen is to take Tylenol and 25 mg Benadryl at home, then 25 mg Benadryl upon arrival at infusion center.  She forgot to take her medications at home today, discussed with pharmacist, decision was made to give IV Benadryl as a pre-med and oral tylenol in addition to IV push steroid.    Intravenous Access:  Peripheral IV placed.    Treatment Conditions:  Biological Infusion Checklist:  ~~~ NOTE: If the patient answers yes to any of the questions below, hold the infusion and contact ordering provider or on-call provider.    1. Have you recently had an elevated temperature, fever, chills, productive cough, coughing for 3 weeks or longer or hemoptysis, abnormal vital signs, night sweats,  chest pain or have you noticed a decrease in your appetite, unexplained weight loss or fatigue? No  2. Do you have any open wounds or new incisions? No  3. Do you have any recent or upcoming hospitalizations, surgeries or dental procedures? No  4. Do you currently have or recently have had any signs of illness or infection or are you on any antibiotics? No  5. Have you had any new, sudden or worsening abdominal pain? No  6. Have you or anyone in your household received a live vaccination in the past 4 weeks? Please note:  No live vaccines while on biologic/chemotherapy until 6 months after the last treatment.  Patient can receive the flu vaccine (shot only) and the pneumovax.  It is optimal for the patient to get these vaccines mid cycle, but they can be given at any time as long as it is not on the day of the infusion. No  7. Have you recently been diagnosed with any new nervous system diseases (ie. Multiple sclerosis, Guillain [place], seizures, neurological changes) or cancer diagnosis? No  8. Are you on any form of  radiation or chemotherapy? No  9. Are you pregnant or breast feeding or do you have plans of pregnancy in the future? No  10. Have you been having any signs of worsening depression or suicidal ideations?  (benlysta only) No  11. Have there been any other new onset medical symptoms? No        Post Infusion Assessment:  Patient tolerated infusion without incident.  Blood return noted pre and post infusion.  Site patent and intact, free from redness, edema or discomfort.  No evidence of extravasations.  Access discontinued per protocol.       Discharge Plan:   Patient will return 7/5/2019 for next appointment.   Patient discharged in stable condition accompanied by: self.  Departure Mode: Ambulatory.    Soledad Mendez, RN-BSN, PHN, OCN  [facility]

## 2019-06-04 ENCOUNTER — OFFICE VISIT (OUTPATIENT)
Dept: PULMONOLOGY | Facility: CLINIC | Age: 49
End: 2019-06-04
Payer: COMMERCIAL

## 2019-06-04 VITALS
HEART RATE: 67 BPM | OXYGEN SATURATION: 99 % | SYSTOLIC BLOOD PRESSURE: 125 MMHG | WEIGHT: 130.4 LBS | BODY MASS INDEX: 23.1 KG/M2 | DIASTOLIC BLOOD PRESSURE: 77 MMHG

## 2019-06-04 DIAGNOSIS — R05.3 CHRONIC COUGH: Primary | ICD-10-CM

## 2019-06-04 DIAGNOSIS — J39.2 THROAT DRYNESS: ICD-10-CM

## 2019-06-04 PROCEDURE — 99214 OFFICE O/P EST MOD 30 MIN: CPT | Performed by: INTERNAL MEDICINE

## 2019-06-04 ASSESSMENT — PAIN SCALES - GENERAL: PAINLEVEL: NO PAIN (0)

## 2019-06-04 NOTE — NURSING NOTE
Noni Mccormick's goals for this visit include:   Chief Complaint   Patient presents with     RECHECK     4 month f/u- Chronic cough       She requests these members of her care team be copied on today's visit information: Yes    PCP: Sophia Baugh    Referring Provider:  No referring provider defined for this encounter.    /77 (BP Location: Left arm, Patient Position: Sitting, Cuff Size: Adult Regular)   Pulse 67   Wt 59.1 kg (130 lb 6.4 oz)   SpO2 99%   BMI 23.10 kg/m      Do you need any medication refills at today's visit? No

## 2019-06-04 NOTE — PROGRESS NOTES
Pulmonary Clinic  Return Visit  History of Present Illness    Ms. 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 To briefly review, her cough started about 5 years ago. Previous CXRs have been negative, a VFSS was negative and PFTs have been normal (bronchodilator +7%).  At our last visit, I recommended concurrent treatment for the 3 usual causes of chronic cough and she was started on Flonase, QVar and Pepcid.     She returns to clinic today feeling perhaps 30% better.  The cough seems to be less bothersome when she is eating.  She still has a persistent dry cough though.  The back of her throat feels dry and the only thing that seems to help is water.  She denies fevers, chills, shortness of breath, hemoptysis or sputum production.  Of the therapies we initiated at our last visit, she feels that the pepcid is most helpful and the Flonase is least helpful.      She is a never smoker.  No known family history of lung disease.    Her rheumatoid arthritis has been well controlled on methotrexate and Remicade for the better part of the last 20 years.  She does not have any history of known rheumatoid associated lung disease.        Review of Systems:  10 of 14 systems reviewed and are negative unless otherwise stated in HPI.    Past Medical History:   Diagnosis Date     Contraception      Grave's disease      Hypothyroid      Inflammatory arthritis      Intermittent asthma 9/10/2013     Nephrolithiasis 6/16/2011     Osteopenia 2/14/2013     Rheumatoid arthritis(714.0)        Past Surgical History:   Procedure Laterality Date     C PELVIS/HIP JOINT SURGERY UNLISTED       JOINT REPLACEMENT, HIP RT/LT      (L)     JOINT REPLACEMENT, HIP RT/LT  3/2013    (R)     SURGICAL HISTORY OF -       Lithotripsy       Family History   Problem Relation Age of Onset     Breast Cancer Mother      C.A.D. Father        Social History      Socioeconomic History     Marital status:      Spouse name: Not on file     Number of children: Not on file     Years of education: Not on file     Highest education level: Not on file   Social Needs     Financial resource strain: Not on file     Food insecurity - worry: Not on file     Food insecurity - inability: Not on file     Transportation needs - medical: Not on file     Transportation needs - non-medical: Not on file   Occupational History     Not on file   Tobacco Use     Smoking status: Never Smoker     Smokeless tobacco: Never Used   Substance and Sexual Activity     Alcohol use: No     Drug use: No     Sexual activity: Yes     Partners: Male     Birth control/protection: Pill   Other Topics Concern     Parent/sibling w/ CABG, MI or angioplasty before 65F 55M? Yes      Service No     Blood Transfusions Yes     Caffeine Concern No     Occupational Exposure No     Hobby Hazards No     Sleep Concern No     Stress Concern No     Weight Concern No     Special Diet No     Back Care No     Exercise No     Bike Helmet No     Seat Belt Yes     Self-Exams No   Social History Narrative     Not on file         Allergies   Allergen Reactions     Amoxicillin Nausea and Cramps         Current Outpatient Medications:      albuterol (PROAIR HFA/PROVENTIL HFA/VENTOLIN HFA) 108 (90 Base) MCG/ACT inhaler, Inhale 2 puffs into the lungs every 6 hours as needed for shortness of breath / dyspnea, Disp: 1 Inhaler, Rfl: 4     beclomethasone HFA (QVAR REDIHALER) 40 MCG/ACT inhaler, Inhale 1 puff into the lungs 2 times daily, Disp: 3 Inhaler, Rfl: 3     Cholecalciferol (VITAMIN D) 1000 UNIT capsule, Take 1 capsule by mouth daily., Disp: , Rfl:      famotidine (PEPCID) 20 MG tablet, Take 1 tablet (20 mg) by mouth 2 times daily, Disp: 180 tablet, Rfl: 1     fluticasone (FLONASE) 50 MCG/ACT nasal spray, Spray 2 sprays into both nostrils daily, Disp: 15.8 mL, Rfl: 11     folic acid (FOLVITE) 1 MG tablet, TAKE 1 TABLET  (1 MG) BY MOUTH DAILY, Disp: 90 tablet, Rfl: 3     inFLIXimab (REMICADE) 100 MG injection, Inject 300 mg into the vein as needed, Disp: 30 mL, Rfl: 0     levothyroxine (SYNTHROID/LEVOTHROID) 112 MCG tablet, Take 1 tablet (112 mcg) by mouth daily, Disp: 90 tablet, Rfl: 3     losartan (COZAAR) 25 MG tablet, Take 1 tablet (25 mg) by mouth daily, Disp: 90 tablet, Rfl: 3     methotrexate 2.5 MG tablet, TAKE 6 TABLETS BY MOUTH ONCE WEEKLY, Disp: 72 tablet, Rfl: 0     norethin-eth estrad triphasic (ARANELLE) 0.5/1/0.5-35 MG-MCG tablet, Take 1 tablet by mouth daily, Disp: 84 tablet, Rfl: 3    Current Facility-Administered Medications:      barium sulfate (EZ PAQUE) oral suspension 96% 50 mL, 50 mL, Oral, Once, Darwin Zhu MD     barium sulfate (VARIBAR) 40 % pudding/paste 50 mL, 50 mL, Oral, Once, Darwin Zhu MD     barium sulfate 40% (VARIBAR NECTAR) oral suspension 50 mL, 50 mL, Oral, Once, Darwin Zhu MD     barium sulfate 40% (VARIBAR THIN HONEY) oral suspension 50 mL, 50 mL, Oral, Once, Darwin Zhu MD      Physical Exam:  /77 (BP Location: Left arm, Patient Position: Sitting, Cuff Size: Adult Regular)   Pulse 67   Wt 59.1 kg (130 lb 6.4 oz)   SpO2 99%   BMI 23.10 kg/m    GENERAL: Well developed, well nourished, alert, and in no apparent distress.  HEENT: Normocephalic, atraumatic. PERRL, EOMI. Oral mucosa is moist. No perioral cyanosis.  NECK: supple, no masses, no thyromegaly.  RESP:  Normal respiratory effort.  CTAB.  No rales, wheezes, rhonchi.  No cyanosis or clubbing.  CV: Normal S1, S2, regular rhythm, normal rate. No murmur.  No LE edema.   ABDOMEN:  Soft, non-tender, non-distended.   SKIN: warm and dry. No rash.  NEURO: AAOx3.  Normal gait.  No focal neuro deficits.  PSYCH: mentation appears normal. and affect normal/bright  MSK: rheumatoid deformities of both hands.    Results:  None.      Assessment and Plan:   Noni Mccormick is a 48 year old female with  a history of acid reflux and rheumatoid arthritis maintained on Remicade and methotrexate who presents to pulmonary clinic today for follow up of chronic cough dating back 5 years.  Since initiating Flonase, Pepcid and Qvar, her cough has improved mildly, perhaps 30%.  She is still bothered by the cough and throat dryness.  As she feels that Flonase has been least helpful, I think it would be reasonable to stop it at this time. I would otherwise like for her to continue on Pepcid BID and QVar.  As cough is not significantly improved despite 3 months of adequate therapy, it would be reasonable to proceed with CT chest at this time to ensure there is no underlying pulmonary parenchymal disease that was missed on previous CXRs.  Since throat dryness is a predominant symptom and main trigger of her cough, I also think we should refer her to ENT for further evaluation.  At some point in the future, obtaining a methacholine challenge test to more definitively evaluate for asthma would probably also make sense.  We could also consider formal GI evaluation for acid and non-acid reflux, if necessary. For now though, we will start with ENT referral and CT chest.  Given lack of response to usual modalities and duration of cough, I suspect that Ms. Mccormick may end up having neurogenic cough with laryngeal hypersensitivity but would like to ensure that all other organic causes of cough have been sufficiently excluded.    The above findings and plan were discussed w/ Ms. Mccormick who voiced understanding and agreement.  Questions and concerns were answered to her satisfaction.  she was provided with my contact information should new questions or concerns arise in the interim.  she should return to clinic in 6 months for follow up.    Batsheva Garza MD  Pulmonary and Critical Care Medicine    The above note was dictated using voice recognition software and may include typographical errors. Please contact the author for any  clarifications.  I spent a total of 30 minutes face to face with Noni Mccormick during today's office visit. Over 50% of this time was spent counseling the patient and/or coordinating care regarding their pulmonary disease.

## 2019-06-07 ENCOUNTER — OFFICE VISIT (OUTPATIENT)
Dept: RHEUMATOLOGY | Facility: CLINIC | Age: 49
End: 2019-06-07
Payer: COMMERCIAL

## 2019-06-07 ENCOUNTER — ANCILLARY PROCEDURE (OUTPATIENT)
Dept: CT IMAGING | Facility: CLINIC | Age: 49
End: 2019-06-07
Attending: INTERNAL MEDICINE
Payer: COMMERCIAL

## 2019-06-07 VITALS
BODY MASS INDEX: 23.04 KG/M2 | WEIGHT: 130 LBS | DIASTOLIC BLOOD PRESSURE: 78 MMHG | HEART RATE: 71 BPM | SYSTOLIC BLOOD PRESSURE: 138 MMHG | HEIGHT: 63 IN

## 2019-06-07 DIAGNOSIS — R05.3 CHRONIC COUGH: ICD-10-CM

## 2019-06-07 DIAGNOSIS — M06.09 RHEUMATOID ARTHRITIS OF MULTIPLE SITES WITHOUT RHEUMATOID FACTOR (H): ICD-10-CM

## 2019-06-07 DIAGNOSIS — J39.2 THROAT DRYNESS: ICD-10-CM

## 2019-06-07 DIAGNOSIS — M06.9 RHEUMATOID ARTHRITIS, INVOLVING UNSPECIFIED SITE, UNSPECIFIED RHEUMATOID FACTOR PRESENCE: ICD-10-CM

## 2019-06-07 PROCEDURE — 99214 OFFICE O/P EST MOD 30 MIN: CPT | Performed by: STUDENT IN AN ORGANIZED HEALTH CARE EDUCATION/TRAINING PROGRAM

## 2019-06-07 PROCEDURE — 71250 CT THORAX DX C-: CPT | Performed by: RADIOLOGY

## 2019-06-07 RX ORDER — FOLIC ACID 1 MG/1
1 TABLET ORAL DAILY
Qty: 90 TABLET | Refills: 11 | Status: SHIPPED | OUTPATIENT
Start: 2019-06-07 | End: 2020-06-04

## 2019-06-07 RX ORDER — METHYLPREDNISOLONE SODIUM SUCCINATE 125 MG/2ML
80 INJECTION, POWDER, LYOPHILIZED, FOR SOLUTION INTRAMUSCULAR; INTRAVENOUS ONCE
Status: CANCELLED | OUTPATIENT
Start: 2019-06-07

## 2019-06-07 ASSESSMENT — PAIN SCALES - GENERAL: PAINLEVEL: NO PAIN (0)

## 2019-06-07 ASSESSMENT — MIFFLIN-ST. JEOR: SCORE: 1188.81

## 2019-06-07 NOTE — NURSING NOTE
"Noni Mccormick's goals for this visit include:   Chief Complaint   Patient presents with     RECHECK     Noni is returning for 6 month recheck; no changes noted.     She requests these members of her care team be copied on today's visit information:     PCP: Sophia Baugh    Referring Provider:  No referring provider defined for this encounter.    /78   Pulse 71   Ht 1.6 m (5' 3\")   Wt 59 kg (130 lb)   BMI 23.03 kg/m      Do you need any medication refills at today's visit? No    "

## 2019-06-07 NOTE — PROGRESS NOTES
Rheumatology Clinic Visit     Noni Mccormick MRN# [id number]   YOB: 1970 Age: 47 year old     Date of Visit: Jun 7, 2019  Primary care provider: Sophia Baugh          Assessment and Plan:   Assessment     -- Juvenile rheumatoid arthritis   -- high risk medications - Remicade infusion 300 mg every 5 weeks + methotrexate 15 mg/week  -- Chronic cough  -- Hypothyroidism    Ms Mccormick is 47-year-old female seen in clinic for management of juvenile rheumatoid arthritis.      JRA : She was diagnosed with JRA at 3 years of age.  She has been on a stable dose of Remicade 300 mg q. 5 weeks plus methotrexate 15 g per week for more than 10 years.  She denies any side effects with either of these drugs.  She is on  Remicade infusions and take methotrexate every Sunday. Overall her joint pains are well controlled.  JRA is in remission.    Chronic cough : She does have a chronic cough for more than 5 years.  She had chest x-ray to evaluate for methotrexate associated lung disease which was normal.  She tried antacids which seemed to help to some extent with her cough. She has noted cough on swallowing and drinking. Her swallow evaluation test is normal. She is following with Dr Garza in Pulmonology and is scheduled to get CT chest today.     Paresthesia : C/o numbness, tingling in her feet, feels like she is wearing a sock on her feet all the time. I recommended her to get diabetes checked by her PCP. I will reduce the dose of methylprednisolone that she receive with her remicade infusion to 80 mg. She can try Vitamin B complex supplements. If nothing helps then she can see a neurologist.     Vaccinations: Vaccinations reviewed with Ms Mccormick. Risks and benefits of vaccinations were discussed.  - Influenza: encouraged yearly vaccination  - [id number]: did not receive.   - [id number]: received in 10/2007  - Zostavax: after 50       Plan    -- Will continue MTX 15 mg once a week+ Remecaide infusions for  JRA   --q 3 mo AST/ALT, Albumin, CBC with plts  --Limit EtOH intake to 2 drinks weekly; use folate 1 mg daily.  --Tylenol 500 mg tid prn nausea/HA associated with dosing.      -- Follow up with Dr Garza for chronic cough.     -- MTX labs every 10 weeks.    -- RTC in a year.           Active Problem List:     Patient Active Problem List    Diagnosis Date Noted     Chronic polyarticular juvenile rheumatoid arthritis (H) 07/25/2016     Priority: Medium     Intermittent asthma 09/10/2013     Priority: Medium     Osteopenia 02/14/2013     Priority: Medium     Injury of left hip 04/12/2012     Priority: Medium     High risk medications (not anticoagulants) long-term use 07/19/2011     Priority: Medium     Nephrolithiasis 06/16/2011     Priority: Medium     CARDIOVASCULAR SCREENING; LDL GOAL LESS THAN 160 10/31/2010     Priority: Medium     Hypothyroidism 10/15/2010     Priority: Medium            History of Present Illness:   Noni Mccormick is a 47 year old female with PMH of JRA seen in the clinic in consultation at request of Sophia Baugh MD for evaluation and management of her JRA.     June 7, 2019 - She is doing well, no pain in her joints, no morning stiffness. She still has chronic cough and follows up with Dr Garza in Pulmonology. She has numbness in her feet for few years. Denies any side effects with Methotrexate and remicade use.     November 29, 2018 -  She had flare up of her arthritis 2 weeks ago in her right hand which improved after few days. Denies any other joint issues. She is getting remicade infusions now through [facility]. She still has chronic cough which is worse on swallowing.     May 23, 2018 : She is doing fine. Denies any joint pains or morning stiffness.  She takes 15 mg methotrexate once a week along with Remicade infusion 300 mg every 5 weeks.  She was wondering about getting her Remicade infusions in the clinic rather than at home due to venous access issues that  she has experienced with the past few infusions at home. She does not want to change to subcu injections like Humira or other TNF inhibitors as the Remicade works very well for her.    HPI from initial visit: She was diagnosed with JRA at 3 years of age. No hx of uveitis. Has been on Remeciade and MTX for more than 10 years. No side effects. She was seeing Dr Cano, Last visit was in September 2017.  At that time due to elevated blood pressure she was started on Avapro and since then her blood pressure is controlled.  She has chronic cough for more than 5 years, had a chest x-ray in 2016 which was unremarkable.  Dr. Cano prescribed her antacids to see if GERD is responsible for her symptoms, it seemed to help but then she discontinued it after few weeks.  She denies any history of smoking, chest pain, hemoptysis.    She is on Remicade every 5 weeks, it is lasting the full interval, and methotrexate 15 mg weekly.  There is no nausea, vomiting, diarrhea, or stomatitis.     No history of psoriasis, ulcerative colitis or chron's disease. No h/o iritis, enthesitis, finger or toe swelling like dactylitis, plantar fascitis or heel pain.                  Review of Systems:   Review Of Systems  Constitutional: denies fever, chills, night sweats and weight loss.  Skin: No skin rash.  Eyes: No dryness or irritation in eyes. No episode of eye inflammation or redness.   Ears/Nose/Throat: no recurrent sinus infections.  Respiratory: No shortness of breath, dyspnea on exertion, + cough, or hemoptysis  Cardiovascular: no chest pain or palpitations.  Gastrointestinal: no nausea, vomiting, abdominal pain.  Normal bowel movements.  Genitourinary: no dysuria, frequency  or hematuria.  Musculoskeletal: as in HPI  Neurologic: no numbness, tingling.  Psychiatric: no mood disorders.  Hematologic/Lymphatic/Immunologic: no history of easy bruising, petechia or purpura.  No abnormal bleeding.   Endocrine: no h/o thyroid disease or  Diabetes.                  Past Medical History:     Past Medical History:   Diagnosis Date     Contraception      Grave's disease      Hypothyroid      Inflammatory arthritis      Intermittent asthma 9/10/2013     Nephrolithiasis 6/16/2011     Osteopenia 2/14/2013     Rheumatoid arthritis(714.0)      Past Surgical History:   Procedure Laterality Date     C PELVIS/HIP JOINT SURGERY UNLISTED       JOINT REPLACEMENT, HIP RT/LT      (L)     JOINT REPLACEMENT, HIP RT/LT  3/2013    (R)     SURGICAL HISTORY OF -       Lithotripsy            Social History:     Social History     Occupational History     Not on file   Tobacco Use     Smoking status: Never Smoker     Smokeless tobacco: Never Used   Substance and Sexual Activity     Alcohol use: No     Drug use: No     Sexual activity: Yes     Partners: Male     Birth control/protection: Pill            Family History:     Family History   Problem Relation Age of Onset     Breast Cancer Mother      C.A.D. Father             Allergies:     Allergies   Allergen Reactions     Amoxicillin Nausea and Cramps            Medications:     Current Outpatient Medications   Medication Sig Dispense Refill     albuterol (PROAIR HFA/PROVENTIL HFA/VENTOLIN HFA) 108 (90 Base) MCG/ACT inhaler Inhale 2 puffs into the lungs every 6 hours as needed for shortness of breath / dyspnea 1 Inhaler 4     beclomethasone HFA (QVAR REDIHALER) 40 MCG/ACT inhaler Inhale 1 puff into the lungs 2 times daily 3 Inhaler 3     Cholecalciferol (VITAMIN D) 1000 UNIT capsule Take 1 capsule by mouth daily.       famotidine (PEPCID) 20 MG tablet Take 1 tablet (20 mg) by mouth 2 times daily 180 tablet 1     folic acid (FOLVITE) 1 MG tablet TAKE 1 TABLET (1 MG) BY MOUTH DAILY 90 tablet 3     inFLIXimab (REMICADE) 100 MG injection Inject 300 mg into the vein as needed 30 mL 0     levothyroxine (SYNTHROID/LEVOTHROID) 112 MCG tablet Take 1 tablet (112 mcg) by mouth daily 90 tablet 3     losartan (COZAAR) 25 MG tablet Take  "1 tablet (25 mg) by mouth daily 90 tablet 3     methotrexate 2.5 MG tablet TAKE 6 TABLETS BY MOUTH ONCE WEEKLY 72 tablet 0     norethin-eth estrad triphasic (ARANELLE) 0.5/1/0.5-35 MG-MCG tablet Take 1 tablet by mouth daily 84 tablet 3            Physical Exam:   Blood pressure 138/78, pulse 71, height 1.6 m (5' 3\"), weight 59 kg (130 lb), not currently breastfeeding.  Wt Readings from Last 4 Encounters:   06/07/19 59 kg (130 lb)   06/04/19 59.1 kg (130 lb 6.4 oz)   05/31/19 59.2 kg (130 lb 7 oz)   04/26/19 59.5 kg (131 lb 3.2 oz)       Constitutional: thin build, appearing stated age; cooperative  Eyes: nl EOM, PERRLA, vision, conjunctiva, sclera  ENT: nl external ears, nose, hearing, lips, teeth, gums, throat  No mucous membrane lesions, normal saliva pool  Neck: no mass or thyroid enlargement  Resp: lungs clear to auscultation, nl to palpation  CV: RRR, no murmurs, rubs or gallops, no edema  GI: no ABD mass or tenderness, no HSM  : not tested  Lymph: no cervical, supraclavicular, inguinal or epitrochlear nodes    MS: All TMJ, neck, shoulder, elbow, wrist, MCP/PIP/DIP, spine, hip, knee, ankle, and foot MTP/IP joints were examined.   -- She has congenital fifth finger flexion contracture B/L.  [place]-neck deformities present over third fourth fingers bilaterally.  --She has hammertoes on both feet.  -- No active synovitis or deformity present over MCP, PIP joints, wrists, elbows, shoulders, ankles and MTP joints.. Full ROM.  Normal  strength.   -- No dactylitis,  tenosynovitis, enthesopathy.    Skin: no nail pitting, alopecia, rash, nodules or lesions  Neuro: nl cranial nerves, strength, sensation, DTRs.   Psych: nl judgement, orientation, memory, affect.         Data:     Results for orders placed or performed in visit on 05/31/19   Creatinine   Result Value Ref Range    Creatinine 0.66 0.52 - 1.04 mg/dL    GFR Estimate >90 >60 mL/min/[1.73_m2]    GFR Estimate If Black >90 >60 mL/min/[1.73_m2]   CBC with " platelets   Result Value Ref Range    WBC 6.3 4.0 - 11.0 10e9/L    RBC Count 4.24 3.8 - 5.2 10e12/L    Hemoglobin 12.6 11.7 - 15.7 g/dL    Hematocrit 38.6 35.0 - 47.0 %    MCV 91 78 - 100 fl    MCH 29.7 26.5 - 33.0 pg    MCHC 32.6 31.5 - 36.5 g/dL    RDW 13.0 10.0 - 15.0 %    Platelet Count 410 150 - 450 10e9/L   AST   Result Value Ref Range    AST 10 0 - 45 U/L   Albumin level   Result Value Ref Range    Albumin 3.5 3.4 - 5.0 g/dL   ALT   Result Value Ref Range    ALT 18 0 - 50 U/L       Recent Labs   Lab Test  01/19/18   1330  10/06/17   1120  07/21/17   1130   02/27/13   0934  01/02/13   0940  11/07/12   0957   WBC  8.9  8.0  9.6   < >  4.6  4.5  5.7   RBC  4.04  4.26  3.99   < >  4.78  4.43  4.71   HGB  12.8  13.4  12.9   < >  14.2  13.1  13.7   HCT  38.0  39.1  37.4   < >  42.5  39.4  41.8   MCV  94  92  94   < >  89  89  89   RDW  13.3  13.0  12.9   < >  12.9  13.0  12.8   PLT  486*  409  407   < >  409  383  448   ALBUMIN  3.1*  3.2*  3.3*   < >  4.4  4.0  4.1   CRP   --    --    --    --   15.0*  15.8*  10.3*   BUN  9  7  10   < >   --    --    --     < > = values in this interval not displayed.      Recent Labs   Lab Test  01/03/17   1614  12/16/15   0746  12/09/14   1604   TSH  0.61  1.62  0.50     Hemoglobin   Date Value Ref Range Status   05/31/2019 12.6 11.7 - 15.7 g/dL Final   03/22/2019 13.9 11.7 - 15.7 g/dL Final   01/11/2019 12.8 11.7 - 15.7 g/dL Final     Urea Nitrogen   Date Value Ref Range Status   04/24/2019 7 7 - 30 mg/dL Final   03/30/2018 8 7 - 30 mg/dL Final   01/19/2018 9 7 - 30 mg/dL Final     Sed Rate   Date Value Ref Range Status   02/27/2013 28 (H) 0 - 20 mm/h Final   01/02/2013 32 (H) 0 - 20 mm/h Final   11/07/2012 22 (H) 0 - 20 mm/h Final     C-Reactive Protein   Date Value Ref Range Status   11/04/2009 1.2 (A) 0 - 0.8 mg/dL      CRP Inflammation   Date Value Ref Range Status   02/27/2013 15.0 (H) 0.0 - 8.0 mg/L Final   01/02/2013 15.8 (H) 0.0 - 8.0 mg/L Final   11/07/2012 10.3 (H)  0.0 - 8.0 mg/L Final     AST   Date Value Ref Range Status   05/31/2019 10 0 - 45 U/L Final   03/22/2019 14 0 - 45 U/L Final   01/11/2019 7 0 - 45 U/L Final     Albumin   Date Value Ref Range Status   05/31/2019 3.5 3.4 - 5.0 g/dL Final   03/22/2019 3.4 3.4 - 5.0 g/dL Final   03/30/2018 3.2 (L) 3.4 - 5.0 g/dL Final     Alkaline Phosphatase   Date Value Ref Range Status   03/30/2018 27 (L) 40 - 150 U/L Final   01/19/2018 28 (L) 40 - 150 U/L Final   10/06/2017 32 (L) 40 - 150 U/L Final     ALT   Date Value Ref Range Status   05/31/2019 18 0 - 50 U/L Final   03/22/2019 16 0 - 50 U/L Final   01/11/2019 11 0 - 50 U/L Final     Rheumatoid Factor   Date Value Ref Range Status   12/13/2010 7 0 - 14 IU/mL Final     Recent Labs   Lab Test 05/31/19  1359 04/24/19  1512 03/22/19  1316 02/15/19  1206 01/11/19  1326  08/20/18  0912  03/30/18  0820 01/29/18  0837 01/19/18  1330 10/06/17  1120   WBC 6.3  --  6.8  --  6.2   < >  --    < > 5.5  --  8.9 8.0   HGB 12.6  --  13.9  --  12.8   < >  --    < > 13.6  --  12.8 13.4   HCT 38.6  --  42.5  --  38.9   < >  --    < > 40.6  --  38.0 39.1   MCV 91  --  91  --  92   < >  --    < > 100  --  94 92     --  500*  --  423   < >  --    < > 362  --  486* 409   BUN  --  7  --   --   --   --   --   --  8  --  9 7   TSH  --   --   --  0.54  --   --  0.50  --   --  0.38*  --   --    AST 10  --  14  --  7   < >  --    < > 16  --  13 38   ALT 18  --  16  --  11   < >  --    < > 18  --  12 16   ALKPHOS  --   --   --   --   --   --   --   --  27*  --  28* 32*    < > = values in this interval not displayed.     Component      Latest Ref Rng & Units 12/13/2010   Rheumatoid Factor      0 - 14 IU/mL 7       Reviewed Rheumatology lab flowsheet    Sterling Gonzalez MD  [facility]  Department of Rheumatology & Autoimmune Disorders  [facility]: [phone]   Pager - [phone]

## 2019-07-05 ENCOUNTER — INFUSION THERAPY VISIT (OUTPATIENT)
Dept: INFUSION THERAPY | Facility: CLINIC | Age: 49
End: 2019-07-05
Payer: COMMERCIAL

## 2019-07-05 VITALS
SYSTOLIC BLOOD PRESSURE: 137 MMHG | TEMPERATURE: 97.8 F | DIASTOLIC BLOOD PRESSURE: 83 MMHG | HEART RATE: 85 BPM | OXYGEN SATURATION: 96 % | RESPIRATION RATE: 16 BRPM | BODY MASS INDEX: 23.49 KG/M2 | WEIGHT: 132.6 LBS

## 2019-07-05 DIAGNOSIS — Z79.899 HIGH RISK MEDICATIONS (NOT ANTICOAGULANTS) LONG-TERM USE: ICD-10-CM

## 2019-07-05 DIAGNOSIS — M08.3 CHRONIC POLYARTICULAR JUVENILE RHEUMATOID ARTHRITIS (H): Primary | ICD-10-CM

## 2019-07-05 PROCEDURE — 99207 ZZC NO CHARGE LOS: CPT

## 2019-07-05 PROCEDURE — 96375 TX/PRO/DX INJ NEW DRUG ADDON: CPT | Performed by: INTERNAL MEDICINE

## 2019-07-05 PROCEDURE — 96413 CHEMO IV INFUSION 1 HR: CPT | Performed by: INTERNAL MEDICINE

## 2019-07-05 RX ORDER — METHYLPREDNISOLONE SODIUM SUCCINATE 125 MG/2ML
80 INJECTION, POWDER, LYOPHILIZED, FOR SOLUTION INTRAMUSCULAR; INTRAVENOUS ONCE
Status: CANCELLED | OUTPATIENT
Start: 2019-07-05

## 2019-07-05 RX ORDER — METHYLPREDNISOLONE SODIUM SUCCINATE 125 MG/2ML
80 INJECTION, POWDER, LYOPHILIZED, FOR SOLUTION INTRAMUSCULAR; INTRAVENOUS ONCE
Status: COMPLETED | OUTPATIENT
Start: 2019-07-05 | End: 2019-07-05

## 2019-07-05 RX ORDER — ACETAMINOPHEN 325 MG/1
650 TABLET ORAL ONCE
Status: CANCELLED
Start: 2019-07-05

## 2019-07-05 RX ORDER — DIPHENHYDRAMINE HCL 25 MG
25 CAPSULE ORAL ONCE
Status: CANCELLED
Start: 2019-07-05

## 2019-07-05 RX ADMIN — Medication 250 ML: at 14:21

## 2019-07-05 RX ADMIN — METHYLPREDNISOLONE SODIUM SUCCINATE 81.25 MG: 125 INJECTION INTRAMUSCULAR; INTRAVENOUS at 14:26

## 2019-07-05 ASSESSMENT — PAIN SCALES - GENERAL: PAINLEVEL: MILD PAIN (2)

## 2019-07-05 NOTE — PROGRESS NOTES
Infusion Nursing Note:  Noni Mccormick presents today for Remicade rapid.    Patient seen by provider today: No   present during visit today: Not Applicable.    Note: patient has upcoming eye surgery that is scheduled for July 30th. Next remicade appt is August 9th.  Asked her to contact her provider regarding the timing of remicade infusion. Patient verbalized understanding and will reach out to her provider/.     Intravenous Access:  Peripheral IV placed.    Treatment Conditions:  Biological Infusion Checklist:  ~~~ NOTE: If the patient answers yes to any of the questions below, hold the infusion and contact ordering provider or on-call provider.    1. Have you recently had an elevated temperature, fever, chills, productive cough, coughing for 3 weeks or longer or hemoptysis, abnormal vital signs, night sweats,  chest pain or have you noticed a decrease in your appetite, unexplained weight loss or fatigue? No  2. Do you have any open wounds or new incisions? No  3. Do you have any recent or upcoming hospitalizations, surgeries or dental procedures? No  4. Do you currently have or recently have had any signs of illness or infection or are you on any antibiotics? No  5. Have you had any new, sudden or worsening abdominal pain? No  6. Have you or anyone in your household received a live vaccination in the past 4 weeks? Please note:  No live vaccines while on biologic/chemotherapy until 6 months after the last treatment.  Patient can receive the flu vaccine (shot only) and the pneumovax.  It is optimal for the patient to get these vaccines mid cycle, but they can be given at any time as long as it is not on the day of the infusion. No  7. Have you recently been diagnosed with any new nervous system diseases (ie. Multiple sclerosis, Guillain [place], seizures, neurological changes) or cancer diagnosis? No  8. Are you on any form of radiation or chemotherapy? No  9. Are you pregnant or breast feeding or  do you have plans of pregnancy in the future? No  10. Have you been having any signs of worsening depression or suicidal ideations?  (benlysta only) No  11. Have there been any other new onset medical symptoms? No        Post Infusion Assessment:  Patient tolerated infusion without incident.  Site patent and intact, free from redness, edema or discomfort.  No evidence of extravasations.  Access discontinued per protocol.       Discharge Plan:   Discharge instructions reviewed with: Patient.  Patient and/or family verbalized understanding of discharge instructions and all questions answered.  Patient discharged in stable condition accompanied by:   Departure Mode: Ambulatory.    Unique Min RN

## 2019-07-09 ENCOUNTER — TELEPHONE (OUTPATIENT)
Dept: RHEUMATOLOGY | Facility: CLINIC | Age: 49
End: 2019-07-09

## 2019-07-09 NOTE — TELEPHONE ENCOUNTER
Spoke with Dr. Gonzalez in regards to patients questions. Informed patient that as long as her eye was healing okay and the surgeon preforming the procedure was ok with it she could get her Remicade as scheduled. She stated that the eye doctor was ok with it so would keep the appointment. Let her know to call us with any additional questions.     RAIE BinghamN, RN   Rheumatology Care Coordinator   [facility]

## 2019-07-09 NOTE — TELEPHONE ENCOUNTER
[facility] Call Center    Phone Message    May a detailed message be left on voicemail: no    Reason for Call: Medication Question or concern regarding medication   Prescription Clarification  Name of Medication: REMICADE on 8.9.19 but patient is having an eyelid procedure on 7.31.19  Prescribing Provider: Carlos   Pharmacy:    Pt is wondering if she can continue with the remicade treatment on the 9th of August.  Please advise.         Action Taken: Message routed to:  Adult Clinics: Rheumatology p [phone]

## 2019-07-12 ENCOUNTER — ANCILLARY PROCEDURE (OUTPATIENT)
Dept: MAMMOGRAPHY | Facility: CLINIC | Age: 49
End: 2019-07-12
Attending: NURSE PRACTITIONER
Payer: COMMERCIAL

## 2019-07-12 DIAGNOSIS — Z12.39 BREAST CANCER SCREENING: ICD-10-CM

## 2019-07-12 PROCEDURE — 77067 SCR MAMMO BI INCL CAD: CPT | Mod: TC

## 2019-07-16 NOTE — PROGRESS NOTES
[facility]  [address] [facility] [address] [phone]  Dept: [phone]    PRE-OP EVALUATION:  Today's date: 2019    Noni Mccormick (: 1970) presents for pre-operative evaluation assessment as requested by Dr. Burch.  She requires evaluation and anesthesia risk assessment prior to undergoing surgery/procedure for treatment of eyelid  .    Fax number for surgical facility: [phone] [facility]  Primary Physician: Sophia Baugh  Type of Anesthesia Anticipated: to be determined    Patient has a Health Care Directive or Living Will:  NO    Preop Questions 2019   Who is doing your surgery? Dr Burch   What are you having done? eye lid lift   Date of Surgery/Procedure: 19   Facility or Hospital where procedure/surgery will be performed: [facility]   1.  Do you have a history of Heart attack, stroke, stent, coronary bypass surgery, or other heart surgery? No   2.  Do you ever have any pain or discomfort in your chest? No   3.  Do you have a history of  Heart Failure? No   4.   Are you troubled by shortness of breath when:  walking on a level surface, or up a slight hill, or at night? No   5.  Do you currently have a cold, bronchitis or other respiratory infection? No   6.  Do you have a cough, shortness of breath, or wheezing? YES - due to asthma and allergies   7.  Do you sometimes get pains in the calves of your legs when you walk? No   8. Do you or anyone in your family have previous history of blood clots? No   9.  Do you or does anyone in your family have a serious bleeding problem such as prolonged bleeding following surgeries or cuts? No   10. Have you ever had problems with anemia or been told to take iron pills? No   11. Have you had any abnormal blood loss such as black, tarry or bloody stools, or abnormal vaginal bleeding? No   12. Have you ever had a blood transfusion? YES - with hip surgery 18 years ago   13.  Have you or any of your relatives ever had problems with anesthesia? No   14. Do you have sleep apnea, excessive snoring or daytime drowsiness? No   15. Do you have any prosthetic heart valves? No   16. Do you have prosthetic joints? YES - right and left hip replacements   17. Is there any chance that you may be pregnant? No         HPI:     HPI related to upcoming procedure: surgery for right eyelid drooping      ASTHMA - Patient has a longstanding history of moderate-severe Asthma . Patient has been doing well overall noting NO SYMPTOMS and continues on medication regimen consisting of qvar and albuterol without adverse reactions or side effects.     HYPOTHYROIDISM - Patient has a longstanding history of chronic Hypothyroidism. Patient has been doing well, noting no tremor, insomnia, hair loss or changes in skin texture. Continues to take medications as directed, without adverse reactions or side effects. Last TSH   Lab Results   Component Value Date    TSH 0.54 02/15/2019   .      RA: on methotrexate and remicade. Managed per rheumatology, no change in medication for surgery    MEDICAL HISTORY:     Patient Active Problem List    Diagnosis Date Noted     Chronic polyarticular juvenile rheumatoid arthritis (H) 07/25/2016     Priority: Medium     Intermittent asthma 09/10/2013     Priority: Medium     Osteopenia 02/14/2013     Priority: Medium     Injury of left hip 04/12/2012     Priority: Medium     High risk medications (not anticoagulants) long-term use 07/19/2011     Priority: Medium     Nephrolithiasis 06/16/2011     Priority: Medium     CARDIOVASCULAR SCREENING; LDL GOAL LESS THAN 160 10/31/2010     Priority: Medium     Hypothyroidism 10/15/2010     Priority: Medium      Past Medical History:   Diagnosis Date     Contraception      Grave's disease      Hypothyroid      Inflammatory arthritis      Intermittent asthma 9/10/2013     Nephrolithiasis 6/16/2011     Osteopenia 2/14/2013     Rheumatoid  arthritis(714.0)      Past Surgical History:   Procedure Laterality Date     C PELVIS/HIP JOINT SURGERY UNLISTED       JOINT REPLACEMENT, HIP RT/LT      (L)     JOINT REPLACEMENT, HIP RT/LT  3/2013    (R)     SURGICAL HISTORY OF -       Lithotripsy     Current Outpatient Medications   Medication Sig Dispense Refill     albuterol (PROAIR HFA/PROVENTIL HFA/VENTOLIN HFA) 108 (90 Base) MCG/ACT inhaler Inhale 2 puffs into the lungs every 6 hours as needed for shortness of breath / dyspnea 1 Inhaler 4     beclomethasone HFA (QVAR REDIHALER) 40 MCG/ACT inhaler Inhale 1 puff into the lungs 2 times daily 3 Inhaler 3     Cholecalciferol (VITAMIN D) 1000 UNIT capsule Take 1 capsule by mouth daily.       famotidine (PEPCID) 20 MG tablet Take 1 tablet (20 mg) by mouth 2 times daily 180 tablet 1     folic acid (FOLVITE) 1 MG tablet Take 1 tablet (1 mg) by mouth daily 90 tablet 11     inFLIXimab (REMICADE) 100 MG injection Inject 300 mg into the vein as needed 30 mL 0     levothyroxine (SYNTHROID/LEVOTHROID) 112 MCG tablet Take 1 tablet (112 mcg) by mouth daily 90 tablet 3     losartan (COZAAR) 25 MG tablet Take 1 tablet (25 mg) by mouth daily 90 tablet 3     methotrexate 2.5 MG tablet Take 6 tablets (15 mg) by mouth every 7 days 72 tablet 0     norethin-eth estrad triphasic (ARANELLE) 0.5/1/0.5-35 MG-MCG tablet Take 1 tablet by mouth daily 84 tablet 3     OTC products: None, except as noted above    Allergies   Allergen Reactions     Amoxicillin Nausea and Cramps      Latex Allergy: NO    Social History     Tobacco Use     Smoking status: Never Smoker     Smokeless tobacco: Never Used   Substance Use Topics     Alcohol use: No     History   Drug Use No       REVIEW OF SYSTEMS:   Constitutional, neuro, ENT, endocrine, pulmonary, cardiac, gastrointestinal, genitourinary, musculoskeletal, integument and psychiatric systems are negative, except as otherwise noted.    EXAM:   /83   Pulse 71   Temp 98.5  F (36.9  C) (Oral)    "Ht 1.6 m (5' 3\")   Wt 59.9 kg (132 lb)   LMP 07/08/2019   BMI 23.38 kg/m      GENERAL APPEARANCE: healthy, alert and no distress     EYES: EOMI, PERRL     HENT: ear canals and TM's normal and nose and mouth without ulcers or lesions     NECK: no adenopathy, no asymmetry, masses, or scars and thyroid normal to palpation     RESP: lungs clear to auscultation - no rales, rhonchi or wheezes     CV: regular rates and rhythm, normal S1 S2, no S3 or S4 and no murmur, click or rub     ABDOMEN:  soft, nontender, no HSM or masses and bowel sounds normal     MS: extremities normal- no gross deformities noted, no evidence of inflammation in joints, FROM in all extremities.     PSYCH: mentation appears normal. and affect normal/bright    DIAGNOSTICS:       Lab Results   Component Value Date    WBC 6.3 05/31/2019     Lab Results   Component Value Date    RBC 4.24 05/31/2019     Lab Results   Component Value Date    HGB 12.6 05/31/2019     Lab Results   Component Value Date    HCT 38.6 05/31/2019     No components found for: MCT  Lab Results   Component Value Date    MCV 91 05/31/2019     Lab Results   Component Value Date    MCH 29.7 05/31/2019     Lab Results   Component Value Date    MCHC 32.6 05/31/2019     Lab Results   Component Value Date    RDW 13.0 05/31/2019     Lab Results   Component Value Date     05/31/2019     Last Comprehensive Metabolic Panel:  Sodium   Date Value Ref Range Status   04/24/2019 139 133 - 144 mmol/L Final     Potassium   Date Value Ref Range Status   04/24/2019 3.3 (L) 3.4 - 5.3 mmol/L Final     Chloride   Date Value Ref Range Status   04/24/2019 107 94 - 109 mmol/L Final     Carbon Dioxide   Date Value Ref Range Status   04/24/2019 26 20 - 32 mmol/L Final     Anion Gap   Date Value Ref Range Status   04/24/2019 6 3 - 14 mmol/L Final     Glucose   Date Value Ref Range Status   04/24/2019 78 70 - 99 mg/dL Final     Comment:     Non Fasting     Urea Nitrogen   Date Value Ref Range Status "   04/24/2019 7 7 - 30 mg/dL Final     Creatinine   Date Value Ref Range Status   05/31/2019 0.66 0.52 - 1.04 mg/dL Final     GFR Estimate   Date Value Ref Range Status   05/31/2019 >90 >60 mL/min/[1.73_m2] Final     Comment:     Non  GFR Calc  Starting 12/18/2018, serum creatinine based estimated GFR (eGFR) will be   calculated using the Chronic Kidney Disease Epidemiology Collaboration   (CKD-EPI) equation.       Calcium   Date Value Ref Range Status   04/24/2019 8.8 8.5 - 10.1 mg/dL Final         IMPRESSION:   Reason for surgery/procedure: right eyelid repair for droopiness    The proposed surgical procedure is considered LOW risk.    REVISED CARDIAC RISK INDEX  The patient has the following serious cardiovascular risks for perioperative complications such as (MI, PE, VFib and 3  AV Block):  No serious cardiac risks  INTERPRETATION: 0 risks: Class I (very low risk - 0.4% complication rate)    The patient has the following additional risks for perioperative complications:  No identified additional risks      ICD-10-CM    1. Preop general physical exam Z01.818    2. Droopy eyelid, right H02.401        RECOMMENDATIONS:         --Patient is to take all scheduled medications on the day of surgery     APPROVAL GIVEN to proceed with proposed procedure, without further diagnostic evaluation       Signed Electronically by: Sophia Horne MD    Copy of this evaluation report is provided to requesting physician.    [facility] Preop Guidelines    Revised Cardiac Risk Index

## 2019-07-22 ENCOUNTER — OFFICE VISIT (OUTPATIENT)
Dept: FAMILY MEDICINE | Facility: CLINIC | Age: 49
End: 2019-07-22
Payer: COMMERCIAL

## 2019-07-22 VITALS
BODY MASS INDEX: 23.39 KG/M2 | HEART RATE: 71 BPM | SYSTOLIC BLOOD PRESSURE: 130 MMHG | WEIGHT: 132 LBS | TEMPERATURE: 98.5 F | HEIGHT: 63 IN | DIASTOLIC BLOOD PRESSURE: 83 MMHG

## 2019-07-22 DIAGNOSIS — Z01.818 PREOP GENERAL PHYSICAL EXAM: Primary | ICD-10-CM

## 2019-07-22 DIAGNOSIS — H02.401 DROOPY EYELID, RIGHT: ICD-10-CM

## 2019-07-22 PROCEDURE — 99214 OFFICE O/P EST MOD 30 MIN: CPT | Performed by: FAMILY MEDICINE

## 2019-07-22 ASSESSMENT — MIFFLIN-ST. JEOR: SCORE: 1197.88

## 2019-07-23 NOTE — PROGRESS NOTES
I faxed the Pre Op and recent 4/24/19 labs to [facility]/Dr. Burch @[phone].  Shoshana Teixeira,

## 2019-07-24 DIAGNOSIS — J45.20 INTERMITTENT ASTHMA, UNCOMPLICATED: ICD-10-CM

## 2019-07-24 DIAGNOSIS — R05.3 CHRONIC COUGH: ICD-10-CM

## 2019-07-24 DIAGNOSIS — R09.82 POST-NASAL DRIP: ICD-10-CM

## 2019-07-24 RX ORDER — FAMOTIDINE 20 MG/1
20 TABLET, FILM COATED ORAL 2 TIMES DAILY
Qty: 180 TABLET | Refills: 1 | Status: SHIPPED | OUTPATIENT
Start: 2019-07-24 | End: 2019-11-07

## 2019-07-24 NOTE — TELEPHONE ENCOUNTER
Medication meets refill protocol for RN approved refill.     Praveena José RN   Pulmonary/CORE Care Coordinator  [facility]

## 2019-08-09 ENCOUNTER — INFUSION THERAPY VISIT (OUTPATIENT)
Dept: INFUSION THERAPY | Facility: CLINIC | Age: 49
End: 2019-08-09
Payer: COMMERCIAL

## 2019-08-09 VITALS
BODY MASS INDEX: 23.52 KG/M2 | RESPIRATION RATE: 18 BRPM | HEART RATE: 68 BPM | WEIGHT: 132.8 LBS | OXYGEN SATURATION: 97 % | TEMPERATURE: 99.1 F | SYSTOLIC BLOOD PRESSURE: 127 MMHG | DIASTOLIC BLOOD PRESSURE: 82 MMHG

## 2019-08-09 DIAGNOSIS — Z79.899 HIGH RISK MEDICATIONS (NOT ANTICOAGULANTS) LONG-TERM USE: ICD-10-CM

## 2019-08-09 DIAGNOSIS — M08.3 CHRONIC POLYARTICULAR JUVENILE RHEUMATOID ARTHRITIS (H): Primary | ICD-10-CM

## 2019-08-09 DIAGNOSIS — M06.09 RHEUMATOID ARTHRITIS OF MULTIPLE SITES WITHOUT RHEUMATOID FACTOR (H): ICD-10-CM

## 2019-08-09 LAB
ALBUMIN SERPL-MCNC: 3.5 G/DL (ref 3.4–5)
ALT SERPL W P-5'-P-CCNC: 14 U/L (ref 0–50)
AST SERPL W P-5'-P-CCNC: 13 U/L (ref 0–45)
CREAT SERPL-MCNC: 0.64 MG/DL (ref 0.52–1.04)
ERYTHROCYTE [DISTWIDTH] IN BLOOD BY AUTOMATED COUNT: 12.9 % (ref 10–15)
GFR SERPL CREATININE-BSD FRML MDRD: >90 ML/MIN/{1.73_M2}
HCT VFR BLD AUTO: 40.4 % (ref 35–47)
HGB BLD-MCNC: 13.4 G/DL (ref 11.7–15.7)
MCH RBC QN AUTO: 30 PG (ref 26.5–33)
MCHC RBC AUTO-ENTMCNC: 33.2 G/DL (ref 31.5–36.5)
MCV RBC AUTO: 91 FL (ref 78–100)
PLATELET # BLD AUTO: 454 10E9/L (ref 150–450)
RBC # BLD AUTO: 4.46 10E12/L (ref 3.8–5.2)
WBC # BLD AUTO: 6 10E9/L (ref 4–11)

## 2019-08-09 PROCEDURE — 82040 ASSAY OF SERUM ALBUMIN: CPT | Performed by: STUDENT IN AN ORGANIZED HEALTH CARE EDUCATION/TRAINING PROGRAM

## 2019-08-09 PROCEDURE — 84460 ALANINE AMINO (ALT) (SGPT): CPT | Performed by: STUDENT IN AN ORGANIZED HEALTH CARE EDUCATION/TRAINING PROGRAM

## 2019-08-09 PROCEDURE — 82565 ASSAY OF CREATININE: CPT | Performed by: STUDENT IN AN ORGANIZED HEALTH CARE EDUCATION/TRAINING PROGRAM

## 2019-08-09 PROCEDURE — 96413 CHEMO IV INFUSION 1 HR: CPT | Performed by: INTERNAL MEDICINE

## 2019-08-09 PROCEDURE — 85027 COMPLETE CBC AUTOMATED: CPT | Performed by: STUDENT IN AN ORGANIZED HEALTH CARE EDUCATION/TRAINING PROGRAM

## 2019-08-09 PROCEDURE — 36415 COLL VENOUS BLD VENIPUNCTURE: CPT | Performed by: STUDENT IN AN ORGANIZED HEALTH CARE EDUCATION/TRAINING PROGRAM

## 2019-08-09 PROCEDURE — 84450 TRANSFERASE (AST) (SGOT): CPT | Performed by: STUDENT IN AN ORGANIZED HEALTH CARE EDUCATION/TRAINING PROGRAM

## 2019-08-09 PROCEDURE — 99207 ZZC NO CHARGE NURSE ONLY: CPT

## 2019-08-09 RX ORDER — DIPHENHYDRAMINE HCL 25 MG
25 CAPSULE ORAL ONCE
Status: CANCELLED
Start: 2019-08-09

## 2019-08-09 RX ORDER — ACETAMINOPHEN 325 MG/1
650 TABLET ORAL ONCE
Status: CANCELLED
Start: 2019-08-09

## 2019-08-09 RX ORDER — METHYLPREDNISOLONE SODIUM SUCCINATE 125 MG/2ML
80 INJECTION, POWDER, LYOPHILIZED, FOR SOLUTION INTRAMUSCULAR; INTRAVENOUS ONCE
Status: CANCELLED | OUTPATIENT
Start: 2019-08-09

## 2019-08-09 RX ADMIN — Medication 250 ML: at 14:11

## 2019-08-09 ASSESSMENT — PAIN SCALES - GENERAL: PAINLEVEL: NO PAIN (0)

## 2019-08-09 NOTE — PROGRESS NOTES
Infusion Nursing Note:  Noni Mccormick presents today for rapid remicade.    Patient seen by provider today: No   present during visit today: Not Applicable.    Note: Pt healing well after eye surgery. States that she spoke to rheum, ok to have remicade.    Intravenous Access:  Peripheral IV placed.    Treatment Conditions:  Biological Infusion Checklist:  ~~~ NOTE: If the patient answers yes to any of the questions below, hold the infusion and contact ordering provider or on-call provider.    1. Have you recently had an elevated temperature, fever, chills, productive cough, coughing for 3 weeks or longer or hemoptysis, abnormal vital signs, night sweats,  chest pain or have you noticed a decrease in your appetite, unexplained weight loss or fatigue? No  2. Do you have any open wounds or new incisions? No  3. Do you have any recent or upcoming hospitalizations, surgeries or dental procedures? No  4. Do you currently have or recently have had any signs of illness or infection or are you on any antibiotics? No  5. Have you had any new, sudden or worsening abdominal pain? No  6. Have you or anyone in your household received a live vaccination in the past 4 weeks? Please note:  No live vaccines while on biologic/chemotherapy until 6 months after the last treatment.  Patient can receive the flu vaccine (shot only) and the pneumovax.  It is optimal for the patient to get these vaccines mid cycle, but they can be given at any time as long as it is not on the day of the infusion. No  7. Have you recently been diagnosed with any new nervous system diseases (ie. Multiple sclerosis, Guillain [place], seizures, neurological changes) or cancer diagnosis? No  8. Are you on any form of radiation or chemotherapy? No  9. Are you pregnant or breast feeding or do you have plans of pregnancy in the future? No  10. Have you been having any signs of worsening depression or suicidal ideations?  (benlysta only) No  11. Have  there been any other new onset medical symptoms? No        Post Infusion Assessment:  Patient tolerated infusion without incident.  Site patent and intact, free from redness, edema or discomfort.  No evidence of extravasations.  Access discontinued per protocol.  Biologic Infusion Post Education: Call the triage nurse at your clinic or seek medical attention if you have chills and/or temperature greater than or equal to 100.5, uncontrolled nausea/vomiting, diarrhea, constipation, dizziness, shortness of breath, chest pain, heart palpitations, weakness or any other new or concerning symptoms, questions or concerns.  You cannot have any live virus vaccines prior to or during treatment or up to 6 months post infusion.  If you have an upcoming surgery, medical procedure or dental procedure during treatment, this should be discussed with your ordering physician and your surgeon/dentist.  If you are having any concerning symptom, if you are unsure if you should get your next infusion or wish to speak to a provider before your next infusion, please call your care coordinator or triage nurse at your clinic to notify them so we can adequately serve you.       Discharge Plan:   Patient and/or family verbalized understanding of discharge instructions and all questions answered.    Le Rees RN

## 2019-08-29 DIAGNOSIS — M06.09 RHEUMATOID ARTHRITIS OF MULTIPLE SITES WITHOUT RHEUMATOID FACTOR (H): ICD-10-CM

## 2019-08-29 NOTE — TELEPHONE ENCOUNTER
Medication/Dose: methotrexate 2.5 MG tablet  Sig - Route: Take 6 tablets (15 mg) by mouth every 7 days - Oral  Last Written Prescription Date: 6/7/2019  Last Fill Quantity: 72, # refills: 0  Last Office Visit:  6/7/2019  Next Enc:  Visit date not found      Standing lab orders every 8-12 weeks    Set up for 90 day supply, 0 refills    Routed to Dr. Gonzalez    for review and approval of medication request.    According to [facility] Rheumatology Refill protocol:           WBC   Date Value Ref Range Status   08/09/2019 6.0 4.0 - 11.0 10e9/L Final     RBC Count   Date Value Ref Range Status   08/09/2019 4.46 3.8 - 5.2 10e12/L Final     Hemoglobin   Date Value Ref Range Status   08/09/2019 13.4 11.7 - 15.7 g/dL Final     Hematocrit   Date Value Ref Range Status   08/09/2019 40.4 35.0 - 47.0 % Final     MCV   Date Value Ref Range Status   08/09/2019 91 78 - 100 fl Final     MCH   Date Value Ref Range Status   08/09/2019 30.0 26.5 - 33.0 pg Final     MCHC   Date Value Ref Range Status   08/09/2019 33.2 31.5 - 36.5 g/dL Final     RDW   Date Value Ref Range Status   08/09/2019 12.9 10.0 - 15.0 % Final     Platelet Count   Date Value Ref Range Status   08/09/2019 454 (H) 150 - 450 10e9/L Final     AST   Date Value Ref Range Status   08/09/2019 13 0 - 45 U/L Final     ALT   Date Value Ref Range Status   08/09/2019 14 0 - 50 U/L Final     Creatinine   Date Value Ref Range Status   08/09/2019 0.64 0.52 - 1.04 mg/dL Final     Albumin   Date Value Ref Range Status   08/09/2019 3.5 3.4 - 5.0 g/dL Final     No results found for: CKTOTAL  C-Reactive Protein   Date Value Ref Range Status   11/04/2009 1.2 (A) 0 - 0.8 mg/dL      CRP Inflammation   Date Value Ref Range Status   02/27/2013 15.0 (H) 0.0 - 8.0 mg/L Final   01/02/2013 15.8 (H) 0.0 - 8.0 mg/L Final   11/07/2012 10.3 (H) 0.0 - 8.0 mg/L Final     Sed Rate   Date Value Ref Range Status   02/27/2013 28 (H) 0 - 20 mm/h Final   01/02/2013 32 (H) 0 - 20 mm/h Final   11/07/2012 22 (H) 0  - 20 mm/h Final     Color Urine (no units)   Date Value   02/08/2018 Yellow     Appearance Urine (no units)   Date Value   02/08/2018 Clear     Glucose Urine (mg/dL)   Date Value   02/08/2018 Negative     Bilirubin Urine (no units)   Date Value   02/08/2018 Negative     Ketones Urine (mg/dL)   Date Value   02/08/2018 Negative     Specific Gravity Urine (no units)   Date Value   02/08/2018 <=1.005     pH Urine (pH)   Date Value   02/08/2018 5.5     Protein Albumin Urine (mg/dL)   Date Value   02/08/2018 Negative     Urobilinogen Urine (EU/dL)   Date Value   02/08/2018 0.2     Nitrite Urine (no units)   Date Value   02/08/2018 Negative     Leukocyte Esterase Urine (no units)   Date Value   02/08/2018 Negative       ARIE BinghamN, RN   Rheumatology Care Coordinator   [facility]

## 2019-09-13 ENCOUNTER — INFUSION THERAPY VISIT (OUTPATIENT)
Dept: INFUSION THERAPY | Facility: CLINIC | Age: 49
End: 2019-09-13
Payer: COMMERCIAL

## 2019-09-13 VITALS
BODY MASS INDEX: 23.9 KG/M2 | RESPIRATION RATE: 16 BRPM | SYSTOLIC BLOOD PRESSURE: 119 MMHG | OXYGEN SATURATION: 98 % | HEART RATE: 69 BPM | DIASTOLIC BLOOD PRESSURE: 71 MMHG | WEIGHT: 134.9 LBS | TEMPERATURE: 98 F

## 2019-09-13 DIAGNOSIS — Z79.899 HIGH RISK MEDICATIONS (NOT ANTICOAGULANTS) LONG-TERM USE: ICD-10-CM

## 2019-09-13 DIAGNOSIS — M08.3 CHRONIC POLYARTICULAR JUVENILE RHEUMATOID ARTHRITIS (H): Primary | ICD-10-CM

## 2019-09-13 PROCEDURE — 99207 ZZC NO CHARGE LOS: CPT

## 2019-09-13 PROCEDURE — 96413 CHEMO IV INFUSION 1 HR: CPT | Performed by: INTERNAL MEDICINE

## 2019-09-13 RX ORDER — METHYLPREDNISOLONE SODIUM SUCCINATE 125 MG/2ML
80 INJECTION, POWDER, LYOPHILIZED, FOR SOLUTION INTRAMUSCULAR; INTRAVENOUS ONCE
Status: CANCELLED | OUTPATIENT
Start: 2019-09-13

## 2019-09-13 RX ORDER — DIPHENHYDRAMINE HCL 25 MG
25 CAPSULE ORAL ONCE
Status: CANCELLED
Start: 2019-09-13

## 2019-09-13 RX ORDER — ACETAMINOPHEN 325 MG/1
650 TABLET ORAL ONCE
Status: CANCELLED
Start: 2019-09-13

## 2019-09-13 RX ADMIN — Medication 250 ML: at 13:31

## 2019-09-13 ASSESSMENT — PAIN SCALES - GENERAL: PAINLEVEL: NO PAIN (0)

## 2019-09-13 NOTE — PROGRESS NOTES
Infusion Nursing Note:  Noni Mccormick presents today for Remicade.    Patient seen by provider today: No   present during visit today: Not Applicable.    Note: N/A.    Intravenous Access:  Peripheral IV placed.    Treatment Conditions:  Not Applicable.      Post Infusion Assessment:  Patient tolerated infusion without incident.  Site patent and intact, free from redness, edema or discomfort.  No evidence of extravasations.  Access discontinued per protocol.       Discharge Plan:   Patient will return 10/18 for next appointment.   Patient discharged in stable condition accompanied by: self.  Departure Mode: Ambulatory.    Krista Thomas, RN, BSN

## 2019-09-30 NOTE — NURSING NOTE
"Noni Mccormick's goals for this visit include:   She requests these members of her care team be copied on today's visit information:     PCP: Sophia Baugh    Referring Provider:  No referring provider defined for this encounter.    /75  Pulse 67  Ht 1.6 m (5' 3\")  Wt 59.8 kg (131 lb 12.8 oz)  SpO2 100%  BMI 23.35 kg/m2   " no med changes

## 2019-10-05 ENCOUNTER — HEALTH MAINTENANCE LETTER (OUTPATIENT)
Age: 49
End: 2019-10-05

## 2019-10-18 ENCOUNTER — INFUSION THERAPY VISIT (OUTPATIENT)
Dept: INFUSION THERAPY | Facility: CLINIC | Age: 49
End: 2019-10-18
Payer: COMMERCIAL

## 2019-10-18 VITALS
DIASTOLIC BLOOD PRESSURE: 74 MMHG | RESPIRATION RATE: 16 BRPM | OXYGEN SATURATION: 94 % | HEART RATE: 79 BPM | TEMPERATURE: 98 F | BODY MASS INDEX: 23.68 KG/M2 | WEIGHT: 133.7 LBS | SYSTOLIC BLOOD PRESSURE: 119 MMHG

## 2019-10-18 DIAGNOSIS — Z79.899 HIGH RISK MEDICATIONS (NOT ANTICOAGULANTS) LONG-TERM USE: ICD-10-CM

## 2019-10-18 DIAGNOSIS — M06.09 RHEUMATOID ARTHRITIS OF MULTIPLE SITES WITHOUT RHEUMATOID FACTOR (H): ICD-10-CM

## 2019-10-18 DIAGNOSIS — M08.3 CHRONIC POLYARTICULAR JUVENILE RHEUMATOID ARTHRITIS (H): Primary | ICD-10-CM

## 2019-10-18 LAB
ALBUMIN SERPL-MCNC: 3.5 G/DL (ref 3.4–5)
ALT SERPL W P-5'-P-CCNC: 17 U/L (ref 0–50)
AST SERPL W P-5'-P-CCNC: 19 U/L (ref 0–45)
CREAT SERPL-MCNC: 0.6 MG/DL (ref 0.52–1.04)
ERYTHROCYTE [DISTWIDTH] IN BLOOD BY AUTOMATED COUNT: 13.1 % (ref 10–15)
GFR SERPL CREATININE-BSD FRML MDRD: >90 ML/MIN/{1.73_M2}
HCT VFR BLD AUTO: 41.9 % (ref 35–47)
HGB BLD-MCNC: 13.8 G/DL (ref 11.7–15.7)
MCH RBC QN AUTO: 29.9 PG (ref 26.5–33)
MCHC RBC AUTO-ENTMCNC: 32.9 G/DL (ref 31.5–36.5)
MCV RBC AUTO: 91 FL (ref 78–100)
PLATELET # BLD AUTO: 214 10E9/L (ref 150–450)
RBC # BLD AUTO: 4.61 10E12/L (ref 3.8–5.2)
WBC # BLD AUTO: 7.8 10E9/L (ref 4–11)

## 2019-10-18 PROCEDURE — 99207 ZZC NO CHARGE LOS: CPT

## 2019-10-18 PROCEDURE — 36415 COLL VENOUS BLD VENIPUNCTURE: CPT | Performed by: STUDENT IN AN ORGANIZED HEALTH CARE EDUCATION/TRAINING PROGRAM

## 2019-10-18 PROCEDURE — 84460 ALANINE AMINO (ALT) (SGPT): CPT | Performed by: STUDENT IN AN ORGANIZED HEALTH CARE EDUCATION/TRAINING PROGRAM

## 2019-10-18 PROCEDURE — 84450 TRANSFERASE (AST) (SGOT): CPT | Performed by: STUDENT IN AN ORGANIZED HEALTH CARE EDUCATION/TRAINING PROGRAM

## 2019-10-18 PROCEDURE — 82040 ASSAY OF SERUM ALBUMIN: CPT | Performed by: STUDENT IN AN ORGANIZED HEALTH CARE EDUCATION/TRAINING PROGRAM

## 2019-10-18 PROCEDURE — 82565 ASSAY OF CREATININE: CPT | Performed by: STUDENT IN AN ORGANIZED HEALTH CARE EDUCATION/TRAINING PROGRAM

## 2019-10-18 PROCEDURE — 96413 CHEMO IV INFUSION 1 HR: CPT | Performed by: PHYSICIAN ASSISTANT

## 2019-10-18 PROCEDURE — 85027 COMPLETE CBC AUTOMATED: CPT | Performed by: STUDENT IN AN ORGANIZED HEALTH CARE EDUCATION/TRAINING PROGRAM

## 2019-10-18 RX ORDER — ACETAMINOPHEN 325 MG/1
650 TABLET ORAL ONCE
Status: CANCELLED
Start: 2019-10-18

## 2019-10-18 RX ORDER — METHYLPREDNISOLONE SODIUM SUCCINATE 125 MG/2ML
80 INJECTION, POWDER, LYOPHILIZED, FOR SOLUTION INTRAMUSCULAR; INTRAVENOUS ONCE
Status: CANCELLED | OUTPATIENT
Start: 2019-10-18

## 2019-10-18 RX ORDER — DIPHENHYDRAMINE HCL 25 MG
25 CAPSULE ORAL ONCE
Status: CANCELLED
Start: 2019-10-18

## 2019-10-18 RX ADMIN — Medication 250 ML: at 14:15

## 2019-10-18 ASSESSMENT — PAIN SCALES - GENERAL: PAINLEVEL: NO PAIN (0)

## 2019-10-18 NOTE — PROGRESS NOTES
Infusion Nursing Note:  Noni Mccormick presents today for Rapid Remicade.    Patient seen by provider today: No   present during visit today: Not Applicable.    Note: N/A.    Intravenous Access:  Peripheral IV placed.    Treatment Conditions:  Biological Infusion Checklist:  ~~~ NOTE: If the patient answers yes to any of the questions below, hold the infusion and contact ordering provider or on-call provider.    1. Have you recently had an elevated temperature, fever, chills, productive cough, coughing for 3 weeks or longer or hemoptysis, abnormal vital signs, night sweats,  chest pain or have you noticed a decrease in your appetite, unexplained weight loss or fatigue? No  2. Do you have any open wounds or new incisions? No  3. Do you have any recent or upcoming hospitalizations, surgeries or dental procedures? No  4. Do you currently have or recently have had any signs of illness or infection or are you on any antibiotics? No  5. Have you had any new, sudden or worsening abdominal pain? No  6. Have you or anyone in your household received a live vaccination in the past 4 weeks? Please note:  No live vaccines while on biologic/chemotherapy until 6 months after the last treatment.  Patient can receive the flu vaccine (shot only) and the pneumovax.  It is optimal for the patient to get these vaccines mid cycle, but they can be given at any time as long as it is not on the day of the infusion. No  7. Have you recently been diagnosed with any new nervous system diseases (ie. Multiple sclerosis, Guillain [place], seizures, neurological changes) or cancer diagnosis? No  8. Are you on any form of radiation or chemotherapy? No  9. Are you pregnant or breast feeding or do you have plans of pregnancy in the future? No  10. Have you been having any signs of worsening depression or suicidal ideations?  (benlysta only) No  11. Have there been any other new onset medical symptoms? No    Post Infusion  Assessment:  Patient tolerated infusion without incident.  Site patent and intact, free from redness, edema or discomfort.  No evidence of extravasations.  Access discontinued per protocol.       Discharge Plan:   Patient will return 11/22 for next appointment.   Patient discharged in stable condition accompanied by: self.  Departure Mode: Ambulatory.    Krista Thomas RN

## 2019-11-07 DIAGNOSIS — J45.20 INTERMITTENT ASTHMA, UNCOMPLICATED: ICD-10-CM

## 2019-11-07 DIAGNOSIS — R05.3 CHRONIC COUGH: ICD-10-CM

## 2019-11-07 DIAGNOSIS — R09.82 POST-NASAL DRIP: ICD-10-CM

## 2019-11-07 RX ORDER — FAMOTIDINE 20 MG/1
20 TABLET, FILM COATED ORAL 2 TIMES DAILY
Qty: 180 TABLET | Refills: 1 | Status: SHIPPED | OUTPATIENT
Start: 2019-11-07 | End: 2020-04-09

## 2019-11-08 DIAGNOSIS — M06.09 RHEUMATOID ARTHRITIS OF MULTIPLE SITES WITHOUT RHEUMATOID FACTOR (H): ICD-10-CM

## 2019-11-08 NOTE — TELEPHONE ENCOUNTER
Medication/Dose:   methotrexate 2.5 MG tablet 15 mg, EVERY 7 DAYS 0 ordered         Summary: TAKE 6 TABLETS (15 MG) BY MOUTH EVERY 7 DAYS        Last Written Prescription Date: 8/29/2019  Last Fill Quantity: 72 tabs, # refills: 0  Last Office Visit:  6/7/2019  Next Enc:  Visit date not found    Next 5 appointments (look out 90 days)    Dec 12, 2019 11:30 AM CST  [facility] Visit with [facility]za [facility] [id number] [place] [id number] [phone]          Standing lab orders every 8-12 weeks     Last labs completed on 10/18/2019    WBC   Date Value Ref Range Status   10/18/2019 7.8 4.0 - 11.0 10e9/L Final     RBC Count   Date Value Ref Range Status   10/18/2019 4.61 3.8 - 5.2 10e12/L Final     Hemoglobin   Date Value Ref Range Status   10/18/2019 13.8 11.7 - 15.7 g/dL Final     Hematocrit   Date Value Ref Range Status   10/18/2019 41.9 35.0 - 47.0 % Final     MCV   Date Value Ref Range Status   10/18/2019 91 78 - 100 fl Final     MCH   Date Value Ref Range Status   10/18/2019 29.9 26.5 - 33.0 pg Final     MCHC   Date Value Ref Range Status   10/18/2019 32.9 31.5 - 36.5 g/dL Final     RDW   Date Value Ref Range Status   10/18/2019 13.1 10.0 - 15.0 % Final     Platelet Count   Date Value Ref Range Status   10/18/2019 214 150 - 450 10e9/L Final     AST   Date Value Ref Range Status   10/18/2019 19 0 - 45 U/L Final     ALT   Date Value Ref Range Status   10/18/2019 17 0 - 50 U/L Final     Creatinine   Date Value Ref Range Status   10/18/2019 0.60 0.52 - 1.04 mg/dL Final     Albumin   Date Value Ref Range Status   10/18/2019 3.5 3.4 - 5.0 g/dL Final     No results found for: CKTOTAL  C-Reactive Protein   Date Value Ref Range Status   11/04/2009 1.2 (A) 0 - 0.8 mg/dL      CRP Inflammation   Date Value Ref Range Status   02/27/2013 15.0 (H) 0.0 - 8.0 mg/L Final   01/02/2013 15.8 (H) 0.0 - 8.0 mg/L Final   11/07/2012 10.3 (H) 0.0 - 8.0 mg/L Final      Sed Rate   Date Value Ref Range Status   02/27/2013 28 (H) 0 - 20 mm/h Final   01/02/2013 32 (H) 0 - 20 mm/h Final   11/07/2012 22 (H) 0 - 20 mm/h Final     Color Urine (no units)   Date Value   02/08/2018 Yellow     Appearance Urine (no units)   Date Value   02/08/2018 Clear     Glucose Urine (mg/dL)   Date Value   02/08/2018 Negative     Bilirubin Urine (no units)   Date Value   02/08/2018 Negative     Ketones Urine (mg/dL)   Date Value   02/08/2018 Negative     Specific Gravity Urine (no units)   Date Value   02/08/2018 <=1.005     pH Urine (pH)   Date Value   02/08/2018 5.5     Protein Albumin Urine (mg/dL)   Date Value   02/08/2018 Negative     Urobilinogen Urine (EU/dL)   Date Value   02/08/2018 0.2     Nitrite Urine (no units)   Date Value   02/08/2018 Negative     Leukocyte Esterase Urine (no units)   Date Value   02/08/2018 Negative       Cindy Hoover CMA.  [facility]   Rheumatology  Phone: [phone]  Fax: [phone]

## 2019-11-22 ENCOUNTER — INFUSION THERAPY VISIT (OUTPATIENT)
Dept: INFUSION THERAPY | Facility: CLINIC | Age: 49
End: 2019-11-22
Payer: COMMERCIAL

## 2019-11-22 VITALS
OXYGEN SATURATION: 96 % | RESPIRATION RATE: 18 BRPM | BODY MASS INDEX: 22.87 KG/M2 | SYSTOLIC BLOOD PRESSURE: 114 MMHG | TEMPERATURE: 97.1 F | WEIGHT: 129.1 LBS | HEART RATE: 75 BPM | DIASTOLIC BLOOD PRESSURE: 77 MMHG

## 2019-11-22 DIAGNOSIS — M08.3 CHRONIC POLYARTICULAR JUVENILE RHEUMATOID ARTHRITIS (H): Primary | ICD-10-CM

## 2019-11-22 DIAGNOSIS — Z79.899 HIGH RISK MEDICATIONS (NOT ANTICOAGULANTS) LONG-TERM USE: ICD-10-CM

## 2019-11-22 PROCEDURE — 96413 CHEMO IV INFUSION 1 HR: CPT | Performed by: NURSE PRACTITIONER

## 2019-11-22 PROCEDURE — 99207 ZZC NO CHARGE LOS: CPT

## 2019-11-22 RX ORDER — DIPHENHYDRAMINE HCL 25 MG
25 CAPSULE ORAL ONCE
Status: CANCELLED
Start: 2019-11-22

## 2019-11-22 RX ORDER — METHYLPREDNISOLONE SODIUM SUCCINATE 125 MG/2ML
80 INJECTION, POWDER, LYOPHILIZED, FOR SOLUTION INTRAMUSCULAR; INTRAVENOUS ONCE
Status: CANCELLED | OUTPATIENT
Start: 2019-11-22

## 2019-11-22 RX ORDER — ACETAMINOPHEN 325 MG/1
650 TABLET ORAL ONCE
Status: CANCELLED
Start: 2019-11-22

## 2019-11-22 RX ADMIN — Medication 250 ML: at 12:13

## 2019-11-22 ASSESSMENT — PAIN SCALES - GENERAL: PAINLEVEL: NO PAIN (1)

## 2019-11-22 NOTE — PROGRESS NOTES
Infusion Nursing Note:  Noni Mccormick presents today for Remicade infusion.    Patient seen by provider today: No   present during visit today: Not Applicable.    Note:  Patient takes her own Tylenol at home, prior to coming in.    Intravenous Access:  Peripheral IV placed.    Treatment Conditions:  Biological Infusion Checklist:  ~~~ NOTE: If the patient answers yes to any of the questions below, hold the infusion and contact ordering provider or on-call provider.    1. Have you recently had an elevated temperature, fever, chills, productive cough, coughing for 3 weeks or longer or hemoptysis, abnormal vital signs, night sweats,  chest pain or have you noticed a decrease in your appetite, unexplained weight loss or fatigue? No  2. Do you have any open wounds or new incisions? No  3. Do you have any recent or upcoming hospitalizations, surgeries or dental procedures? No  4. Do you currently have or recently have had any signs of illness or infection or are you on any antibiotics? No  5. Have you had any new, sudden or worsening abdominal pain? No  6. Have you or anyone in your household received a live vaccination in the past 4 weeks? Please note:  No live vaccines while on biologic/chemotherapy until 6 months after the last treatment.  Patient can receive the flu vaccine (shot only) and the pneumovax.  It is optimal for the patient to get these vaccines mid cycle, but they can be given at any time as long as it is not on the day of the infusion. No  7. Have you recently been diagnosed with any new nervous system diseases (ie. Multiple sclerosis, Guillain [place], seizures, neurological changes) or cancer diagnosis? No  8. Are you on any form of radiation or chemotherapy? No  9. Are you pregnant or breast feeding or do you have plans of pregnancy in the future? No  10. Have there been any other new onset medical symptoms? No        Post Infusion Assessment:  Patient tolerated infusion without  incident.  Blood return noted pre and post infusion.  Site patent and intact, free from redness, edema or discomfort.  No evidence of extravasations.  Access discontinued per protocol.  Biologic Infusion Post Education: Call the triage nurse at your clinic or seek medical attention if you have chills and/or temperature greater than or equal to 100.5, uncontrolled nausea/vomiting, diarrhea, constipation, dizziness, shortness of breath, chest pain, heart palpitations, weakness or any other new or concerning symptoms, questions or concerns.  You cannot have any live virus vaccines prior to or during treatment or up to 6 months post infusion.  If you have an upcoming surgery, medical procedure or dental procedure during treatment, this should be discussed with your ordering physician and your surgeon/dentist.  If you are having any concerning symptom, if you are unsure if you should get your next infusion or wish to speak to a provider before your next infusion, please call your care coordinator or triage nurse at your clinic to notify them so we can adequately serve you.       Discharge Plan:   Patient will return 12/27/19 for next appointment.   Patient discharged in stable condition accompanied by: self.  Departure Mode: Ambulatory.    Maria Del Carmen Estrada RN

## 2019-11-24 DIAGNOSIS — M06.09 RHEUMATOID ARTHRITIS OF MULTIPLE SITES WITHOUT RHEUMATOID FACTOR (H): ICD-10-CM

## 2019-11-25 NOTE — TELEPHONE ENCOUNTER
Medication/Dose:     methotrexate 2.5 MG tablet 72 tablet 0 11/8/2019  No   Sig - Route: Take 6 tablets (15 mg) by mouth every 7 days - Oral     Last Written Prescription Date: 11/08/2019  Last Fill Quantity: 72, # refills: 0  Last Office Visit:  6/7/2019  Next Enc: 06/04/2020    Next 5 appointments (look out 90 days)    Dec 12, 2019 11:30 AM CST  Return Visit with Le Garza MD  [facility] [id number] [place] [id number] [phone]          Standing lab orders every 8-12 weeks.    WBC   Date Value Ref Range Status   10/18/2019 7.8 4.0 - 11.0 10e9/L Final     RBC Count   Date Value Ref Range Status   10/18/2019 4.61 3.8 - 5.2 10e12/L Final     Hemoglobin   Date Value Ref Range Status   10/18/2019 13.8 11.7 - 15.7 g/dL Final     Hematocrit   Date Value Ref Range Status   10/18/2019 41.9 35.0 - 47.0 % Final     MCV   Date Value Ref Range Status   10/18/2019 91 78 - 100 fl Final     MCH   Date Value Ref Range Status   10/18/2019 29.9 26.5 - 33.0 pg Final     MCHC   Date Value Ref Range Status   10/18/2019 32.9 31.5 - 36.5 g/dL Final     RDW   Date Value Ref Range Status   10/18/2019 13.1 10.0 - 15.0 % Final     Platelet Count   Date Value Ref Range Status   10/18/2019 214 150 - 450 10e9/L Final     AST   Date Value Ref Range Status   10/18/2019 19 0 - 45 U/L Final     ALT   Date Value Ref Range Status   10/18/2019 17 0 - 50 U/L Final     Creatinine   Date Value Ref Range Status   10/18/2019 0.60 0.52 - 1.04 mg/dL Final     Albumin   Date Value Ref Range Status   10/18/2019 3.5 3.4 - 5.0 g/dL Final     No results found for: CKTOTAL  C-Reactive Protein   Date Value Ref Range Status   11/04/2009 1.2 (A) 0 - 0.8 mg/dL      CRP Inflammation   Date Value Ref Range Status   02/27/2013 15.0 (H) 0.0 - 8.0 mg/L Final   01/02/2013 15.8 (H) 0.0 - 8.0 mg/L Final   11/07/2012 10.3 (H) 0.0 - 8.0 mg/L Final     Sed Rate   Date Value Ref Range Status    02/27/2013 28 (H) 0 - 20 mm/h Final   01/02/2013 32 (H) 0 - 20 mm/h Final   11/07/2012 22 (H) 0 - 20 mm/h Final     Color Urine (no units)   Date Value   02/08/2018 Yellow     Appearance Urine (no units)   Date Value   02/08/2018 Clear     Glucose Urine (mg/dL)   Date Value   02/08/2018 Negative     Bilirubin Urine (no units)   Date Value   02/08/2018 Negative     Ketones Urine (mg/dL)   Date Value   02/08/2018 Negative     Specific Gravity Urine (no units)   Date Value   02/08/2018 <=1.005     pH Urine (pH)   Date Value   02/08/2018 5.5     Protein Albumin Urine (mg/dL)   Date Value   02/08/2018 Negative     Urobilinogen Urine (EU/dL)   Date Value   02/08/2018 0.2     Nitrite Urine (no units)   Date Value   02/08/2018 Negative     Leukocyte Esterase Urine (no units)   Date Value   02/08/2018 Negative

## 2019-12-27 ENCOUNTER — INFUSION THERAPY VISIT (OUTPATIENT)
Dept: INFUSION THERAPY | Facility: CLINIC | Age: 49
End: 2019-12-27
Payer: COMMERCIAL

## 2019-12-27 VITALS
TEMPERATURE: 98.5 F | HEART RATE: 82 BPM | BODY MASS INDEX: 22.55 KG/M2 | WEIGHT: 127.3 LBS | RESPIRATION RATE: 16 BRPM | SYSTOLIC BLOOD PRESSURE: 128 MMHG | OXYGEN SATURATION: 95 % | DIASTOLIC BLOOD PRESSURE: 82 MMHG

## 2019-12-27 DIAGNOSIS — M08.3 CHRONIC POLYARTICULAR JUVENILE RHEUMATOID ARTHRITIS (H): Primary | ICD-10-CM

## 2019-12-27 DIAGNOSIS — Z79.899 HIGH RISK MEDICATIONS (NOT ANTICOAGULANTS) LONG-TERM USE: ICD-10-CM

## 2019-12-27 DIAGNOSIS — M06.09 RHEUMATOID ARTHRITIS OF MULTIPLE SITES WITHOUT RHEUMATOID FACTOR (H): ICD-10-CM

## 2019-12-27 LAB
ALBUMIN SERPL-MCNC: 3.4 G/DL (ref 3.4–5)
ALT SERPL W P-5'-P-CCNC: 17 U/L (ref 0–50)
AST SERPL W P-5'-P-CCNC: 15 U/L (ref 0–45)
CREAT SERPL-MCNC: 0.66 MG/DL (ref 0.52–1.04)
ERYTHROCYTE [DISTWIDTH] IN BLOOD BY AUTOMATED COUNT: 12.9 % (ref 10–15)
GFR SERPL CREATININE-BSD FRML MDRD: >90 ML/MIN/{1.73_M2}
HCT VFR BLD AUTO: 42.3 % (ref 35–47)
HGB BLD-MCNC: 13.7 G/DL (ref 11.7–15.7)
MCH RBC QN AUTO: 29.7 PG (ref 26.5–33)
MCHC RBC AUTO-ENTMCNC: 32.4 G/DL (ref 31.5–36.5)
MCV RBC AUTO: 92 FL (ref 78–100)
PLATELET # BLD AUTO: 483 10E9/L (ref 150–450)
RBC # BLD AUTO: 4.62 10E12/L (ref 3.8–5.2)
WBC # BLD AUTO: 6.1 10E9/L (ref 4–11)

## 2019-12-27 PROCEDURE — 84460 ALANINE AMINO (ALT) (SGPT): CPT | Performed by: STUDENT IN AN ORGANIZED HEALTH CARE EDUCATION/TRAINING PROGRAM

## 2019-12-27 PROCEDURE — 85027 COMPLETE CBC AUTOMATED: CPT | Performed by: STUDENT IN AN ORGANIZED HEALTH CARE EDUCATION/TRAINING PROGRAM

## 2019-12-27 PROCEDURE — 82040 ASSAY OF SERUM ALBUMIN: CPT | Performed by: STUDENT IN AN ORGANIZED HEALTH CARE EDUCATION/TRAINING PROGRAM

## 2019-12-27 PROCEDURE — 84450 TRANSFERASE (AST) (SGOT): CPT | Performed by: STUDENT IN AN ORGANIZED HEALTH CARE EDUCATION/TRAINING PROGRAM

## 2019-12-27 PROCEDURE — 36415 COLL VENOUS BLD VENIPUNCTURE: CPT | Performed by: STUDENT IN AN ORGANIZED HEALTH CARE EDUCATION/TRAINING PROGRAM

## 2019-12-27 PROCEDURE — 99207 ZZC NO CHARGE NURSE ONLY: CPT

## 2019-12-27 PROCEDURE — 82565 ASSAY OF CREATININE: CPT | Performed by: STUDENT IN AN ORGANIZED HEALTH CARE EDUCATION/TRAINING PROGRAM

## 2019-12-27 PROCEDURE — 96413 CHEMO IV INFUSION 1 HR: CPT | Performed by: INTERNAL MEDICINE

## 2019-12-27 RX ORDER — ACETAMINOPHEN 325 MG/1
650 TABLET ORAL ONCE
Status: CANCELLED
Start: 2019-12-27

## 2019-12-27 RX ORDER — DIPHENHYDRAMINE HCL 25 MG
25 CAPSULE ORAL ONCE
Status: CANCELLED
Start: 2019-12-27

## 2019-12-27 RX ORDER — ACETAMINOPHEN 325 MG/1
650 TABLET ORAL ONCE
Status: DISCONTINUED | OUTPATIENT
Start: 2019-12-27 | End: 2019-12-27 | Stop reason: HOSPADM

## 2019-12-27 RX ORDER — METHYLPREDNISOLONE SODIUM SUCCINATE 125 MG/2ML
80 INJECTION, POWDER, LYOPHILIZED, FOR SOLUTION INTRAMUSCULAR; INTRAVENOUS ONCE
Status: CANCELLED | OUTPATIENT
Start: 2019-12-27

## 2019-12-27 RX ADMIN — Medication 250 ML: at 14:49

## 2019-12-27 ASSESSMENT — PAIN SCALES - GENERAL: PAINLEVEL: MILD PAIN (2)

## 2019-12-27 NOTE — PROGRESS NOTES
Infusion Nursing Note:  Noni Mccormick presents today for rapid remicade.    Patient seen by provider today: No   present during visit today: Not Applicable.    Intravenous Access:  Peripheral IV placed.    Treatment Conditions:  Biological Infusion Checklist:  ~~~ NOTE: If the patient answers yes to any of the questions below, hold the infusion and contact ordering provider or on-call provider.    1. Have you recently had an elevated temperature, fever, chills, productive cough, coughing for 3 weeks or longer or hemoptysis, abnormal vital signs, night sweats,  chest pain or have you noticed a decrease in your appetite, unexplained weight loss or fatigue? No  2. Do you have any open wounds or new incisions? No  3. Do you have any recent or upcoming hospitalizations, surgeries or dental procedures? No  4. Do you currently have or recently have had any signs of illness or infection or are you on any antibiotics? No  5. Have you had any new, sudden or worsening abdominal pain? No  6. Have you or anyone in your household received a live vaccination in the past 4 weeks? Please note:  No live vaccines while on biologic/chemotherapy until 6 months after the last treatment.  Patient can receive the flu vaccine (shot only) and the pneumovax.  It is optimal for the patient to get these vaccines mid cycle, but they can be given at any time as long as it is not on the day of the infusion. No  7. Have you recently been diagnosed with any new nervous system diseases (ie. Multiple sclerosis, Guillain [place], seizures, neurological changes) or cancer diagnosis? No  8. Are you on any form of radiation or chemotherapy? No  9. Are you pregnant or breast feeding or do you have plans of pregnancy in the future? No  10. Have you been having any signs of worsening depression or suicidal ideations?  (benlysta only) No  11. Have there been any other new onset medical symptoms? No        Post Infusion Assessment:  Patient  tolerated infusion without incident.  Blood return noted pre and post infusion.  Site patent and intact, free from redness, edema or discomfort.  No evidence of extravasations.  Access discontinued per protocol.       Discharge Plan:   Copy of AVS reviewed with patient and/or family.  Patient will return 1/31/20 for next appointment.  Patient discharged in stable condition accompanied by: self.  Departure Mode: Ambulatory.    Jessica Ronquillo RN

## 2020-01-31 ENCOUNTER — INFUSION THERAPY VISIT (OUTPATIENT)
Dept: INFUSION THERAPY | Facility: CLINIC | Age: 50
End: 2020-01-31
Payer: COMMERCIAL

## 2020-01-31 VITALS
BODY MASS INDEX: 22.69 KG/M2 | DIASTOLIC BLOOD PRESSURE: 78 MMHG | HEART RATE: 84 BPM | RESPIRATION RATE: 16 BRPM | TEMPERATURE: 98.1 F | SYSTOLIC BLOOD PRESSURE: 124 MMHG | OXYGEN SATURATION: 97 % | WEIGHT: 128.1 LBS

## 2020-01-31 DIAGNOSIS — M08.3 CHRONIC POLYARTICULAR JUVENILE RHEUMATOID ARTHRITIS (H): Primary | ICD-10-CM

## 2020-01-31 DIAGNOSIS — Z79.899 HIGH RISK MEDICATIONS (NOT ANTICOAGULANTS) LONG-TERM USE: ICD-10-CM

## 2020-01-31 PROCEDURE — 96413 CHEMO IV INFUSION 1 HR: CPT | Performed by: INTERNAL MEDICINE

## 2020-01-31 PROCEDURE — 99207 ZZC NO CHARGE NURSE ONLY: CPT

## 2020-01-31 RX ORDER — METHYLPREDNISOLONE SODIUM SUCCINATE 125 MG/2ML
80 INJECTION, POWDER, LYOPHILIZED, FOR SOLUTION INTRAMUSCULAR; INTRAVENOUS ONCE
Status: CANCELLED | OUTPATIENT
Start: 2020-01-31

## 2020-01-31 RX ORDER — DIPHENHYDRAMINE HCL 25 MG
25 CAPSULE ORAL ONCE
Status: CANCELLED
Start: 2020-01-31

## 2020-01-31 RX ORDER — ACETAMINOPHEN 325 MG/1
650 TABLET ORAL ONCE
Status: CANCELLED
Start: 2020-01-31

## 2020-01-31 RX ADMIN — Medication 250 ML: at 12:56

## 2020-01-31 ASSESSMENT — PAIN SCALES - GENERAL: PAINLEVEL: NO PAIN (1)

## 2020-01-31 NOTE — PROGRESS NOTES
Infusion Nursing Note:  Noni Mccormick presents today for Rapid Remicade.    Patient seen by provider today: No   present during visit today: Not Applicable.    Note: Reports she took tylenol at home    Intravenous Access:  Peripheral IV placed.    Treatment Conditions:  Biological Infusion Checklist:  ~~~ NOTE: If the patient answers yes to any of the questions below, hold the infusion and contact ordering provider or on-call provider.    1. Have you recently had an elevated temperature, fever, chills, productive cough, coughing for 3 weeks or longer or hemoptysis, abnormal vital signs, night sweats,  chest pain or have you noticed a decrease in your appetite, unexplained weight loss or fatigue? No  2. Do you have any open wounds or new incisions? No  3. Do you have any recent or upcoming hospitalizations, surgeries or dental procedures? No  4. Do you currently have or recently have had any signs of illness or infection or are you on any antibiotics? No  5. Have you had any new, sudden or worsening abdominal pain? No  6. Have you or anyone in your household received a live vaccination in the past 4 weeks? Please note:  No live vaccines while on biologic/chemotherapy until 6 months after the last treatment.  Patient can receive the flu vaccine (shot only) and the pneumovax.  It is optimal for the patient to get these vaccines mid cycle, but they can be given at any time as long as it is not on the day of the infusion. No  7. Have you recently been diagnosed with any new nervous system diseases (ie. Multiple sclerosis, Guillain [place], seizures, neurological changes) or cancer diagnosis? No  8. Are you on any form of radiation or chemotherapy? No  9. Are you pregnant or breast feeding or do you have plans of pregnancy in the future? No  10. Have you been having any signs of worsening depression or suicidal ideations?  (benlysta only) No  11. Have there been any other new onset medical symptoms?  No        Post Infusion Assessment:  Patient tolerated infusion without incident.  Blood return noted pre and post infusion.  Site patent and intact, free from redness, edema or discomfort.  No evidence of extravasations.  Access discontinued per protocol.  Biologic Infusion Post Education: Call the triage nurse at your clinic or seek medical attention if you have chills and/or temperature greater than or equal to 100.5, uncontrolled nausea/vomiting, diarrhea, constipation, dizziness, shortness of breath, chest pain, heart palpitations, weakness or any other new or concerning symptoms, questions or concerns.  You cannot have any live virus vaccines prior to or during treatment or up to 6 months post infusion.  If you have an upcoming surgery, medical procedure or dental procedure during treatment, this should be discussed with your ordering physician and your surgeon/dentist.  If you are having any concerning symptom, if you are unsure if you should get your next infusion or wish to speak to a provider before your next infusion, please call your care coordinator or triage nurse at your clinic to notify them so we can adequately serve you.       Discharge Plan:   Patient will return 3/6/2020 for next appointment.   Patient discharged in stable condition accompanied by: self.  Departure Mode: Ambulatory.    Mercedes Bacon RN

## 2020-02-12 ENCOUNTER — OFFICE VISIT (OUTPATIENT)
Dept: URGENT CARE | Facility: URGENT CARE | Age: 50
End: 2020-02-12
Payer: COMMERCIAL

## 2020-02-12 VITALS
SYSTOLIC BLOOD PRESSURE: 157 MMHG | DIASTOLIC BLOOD PRESSURE: 85 MMHG | HEART RATE: 88 BPM | OXYGEN SATURATION: 97 % | TEMPERATURE: 100.6 F

## 2020-02-12 DIAGNOSIS — J11.1 INFLUENZA-LIKE ILLNESS: Primary | ICD-10-CM

## 2020-02-12 DIAGNOSIS — R03.0 ELEVATED BP WITHOUT DIAGNOSIS OF HYPERTENSION: ICD-10-CM

## 2020-02-12 PROCEDURE — 99214 OFFICE O/P EST MOD 30 MIN: CPT | Performed by: INTERNAL MEDICINE

## 2020-02-12 RX ORDER — OSELTAMIVIR PHOSPHATE 75 MG/1
75 CAPSULE ORAL 2 TIMES DAILY
Qty: 14 CAPSULE | Refills: 0 | Status: SHIPPED | OUTPATIENT
Start: 2020-02-12 | End: 2020-02-24

## 2020-02-12 NOTE — PROGRESS NOTES
SUBJECTIVE:  Noni Mccormick is an 49 year old female who presents for not feeling well.  Cough started last night.  Body aches started this afternoon.   Had temp of 99.8 after work.  Now is 100.6 here.  Has had chills and felt hot at times.  Some headache all through head.  Cough is not productive.  Mild nasal congestion.  No sore throat.  No skin rashes.  Mild ear pain intermittently.  No n/v/d.  Decreased appetite.  Works in a care center so around sick people.  No recent travel.  No meds taken for sxs.  Is on remicade for RA.     PMH:   has a past medical history of Contraception, Grave's disease, Hypothyroid, Inflammatory arthritis, Intermittent asthma (9/10/2013), Nephrolithiasis (6/16/2011), Osteopenia (2/14/2013), and Rheumatoid arthritis(714.0).  Patient Active Problem List   Diagnosis     Hypothyroidism     CARDIOVASCULAR SCREENING; LDL GOAL LESS THAN 160     Nephrolithiasis     High risk medications (not anticoagulants) long-term use     Injury of left hip     Osteopenia     Intermittent asthma     Chronic polyarticular juvenile rheumatoid arthritis (H)     Social History     Socioeconomic History     Marital status:      Spouse name: None     Number of children: None     Years of education: None     Highest education level: None   Occupational History     None   Social Needs     Financial resource strain: None     Food insecurity:     Worry: None     Inability: None     Transportation needs:     Medical: None     Non-medical: None   Tobacco Use     Smoking status: Never Smoker     Smokeless tobacco: Never Used   Substance and Sexual Activity     Alcohol use: No     Drug use: No     Sexual activity: Yes     Partners: Male     Birth control/protection: Pill   Lifestyle     Physical activity:     Days per week: None     Minutes per session: None     Stress: None   Relationships     Social connections:     Talks on phone: None     Gets together: None     Attends Church service: None     Active  member of club or organization: None     Attends meetings of clubs or organizations: None     Relationship status: None     Intimate partner violence:     Fear of current or ex partner: None     Emotionally abused: None     Physically abused: None     Forced sexual activity: None   Other Topics Concern     Parent/sibling w/ CABG, MI or angioplasty before 65F 55M? Yes      Service No     Blood Transfusions Yes     Caffeine Concern No     Occupational Exposure No     Hobby Hazards No     Sleep Concern No     Stress Concern No     Weight Concern No     Special Diet No     Back Care No     Exercise No     Bike Helmet No     Seat Belt Yes     Self-Exams No   Social History Narrative     None     Family History   Problem Relation Age of Onset     Breast Cancer Mother      C.A.D. Father        ALLERGIES:  Amoxicillin    Current Outpatient Medications   Medication     albuterol (PROAIR HFA/PROVENTIL HFA/VENTOLIN HFA) 108 (90 Base) MCG/ACT inhaler     beclomethasone HFA (QVAR REDIHALER) 40 MCG/ACT inhaler     Cholecalciferol (VITAMIN D) 1000 UNIT capsule     famotidine (PEPCID) 20 MG tablet     folic acid (FOLVITE) 1 MG tablet     inFLIXimab (REMICADE) 100 MG injection     levothyroxine (SYNTHROID/LEVOTHROID) 112 MCG tablet     losartan (COZAAR) 25 MG tablet     methotrexate 2.5 MG tablet     norethin-eth estrad triphasic (ARANELLE) 0.5/1/0.5-35 MG-MCG tablet     Current Facility-Administered Medications   Medication     barium sulfate (EZ PAQUE) oral suspension 96% 50 mL     barium sulfate (VARIBAR) 40 % pudding/paste 50 mL     barium sulfate 40% (VARIBAR NECTAR) oral suspension 50 mL     barium sulfate 40% (VARIBAR THIN HONEY) oral suspension 50 mL         ROS:  ROS is done and is negative for general/constitutional, eye, ENT, Respiratory, cardiovascular, GI, , Skin, musculoskeletal except as noted elsewhere.  All other review of systems negative except as noted elsewhere.      OBJECTIVE:  BP (!) 157/85   Pulse  88   Temp 100.6  F (38.1  C) (Tympanic)   SpO2 97%   GENERAL APPEARANCE: Alert, in no acute distress, appears tired.  EYES: normal  EARS: External ears normal. Canals clear. TM's normal.  NOSE:mild clear discharge  OROPHARYNX:normal  NECK:No adenopathy,masses or thyromegaly  RESP: normal and clear to auscultation  CV:regular rate and rhythm and no murmurs, clicks, or gallops  ABDOMEN: Abdomen soft, non-tender. BS normal. No masses, organomegaly  SKIN: no ulcers, lesions or rash  MUSCULOSKELETAL:Musculoskeletal normal      RESULTS  No results found for any visits on 02/12/20..  No results found for this or any previous visit (from the past 48 hour(s)).    ASSESSMENT/PLAN:    ASSESSMENT / PLAN:  (R69) Influenza-like illness  (primary encounter diagnosis)  Comment: sxs currently c/w influenza and as sxs within 24 hours of onset will start tamiflu after discussing options with pt.  Given high false neg rate of rapid flu test, opted not to do test, which pt agreed with  Plan: oseltamivir (TAMIFLU) 75 MG capsule        Reviewed medication instructions and side effects. Follow up if experiences side effects.. I reviewed supportive care, otc meds to use if needed, expected course, and signs of concern.  Follow up as needed or if she does not improve within 5 day(s) or if worsens in any way.  Reviewed red flag symptoms and is to go to the ER if experiences any of these.    (R03.0) Elevated BP without diagnosis of hypertension  Comment: may be due to illness  Plan: Recheck BP in 1-2 weeks with a nurse visit, [facility] pharmacy visit, or a visit to primary care doctor.      See [facility] for orders, medications, letters, patient instructions    Anna Guevara M.D.

## 2020-02-12 NOTE — LETTER
February 12, 2020      Noni Mccormick  [address] [address] [facility] [id number]        To Whom It May Concern:    Noni Mccormick was seen in our clinic for illness.  She will miss work on 2/13/2020 and may need to miss an additional three days due to illness.  When her symptoms have improved, she may return to work without restrictions.      Sincerely,        Anna Guevara MD

## 2020-02-13 NOTE — PATIENT INSTRUCTIONS
Noni to follow up with Primary Care provider regarding elevated blood pressure.. Recheck BP in 1-2 weeks with a nurse visit, [facility] pharmacy visit, or a visit to primary care doctor.

## 2020-02-23 DIAGNOSIS — R09.81 NASAL CONGESTION: Primary | ICD-10-CM

## 2020-02-24 ENCOUNTER — OFFICE VISIT (OUTPATIENT)
Dept: OBGYN | Facility: CLINIC | Age: 50
End: 2020-02-24
Payer: COMMERCIAL

## 2020-02-24 VITALS
SYSTOLIC BLOOD PRESSURE: 126 MMHG | HEART RATE: 72 BPM | HEIGHT: 63 IN | WEIGHT: 126 LBS | DIASTOLIC BLOOD PRESSURE: 80 MMHG | OXYGEN SATURATION: 96 % | TEMPERATURE: 98.5 F | BODY MASS INDEX: 22.32 KG/M2

## 2020-02-24 DIAGNOSIS — Z01.419 ENCOUNTER FOR GYNECOLOGICAL EXAMINATION WITHOUT ABNORMAL FINDING: Primary | ICD-10-CM

## 2020-02-24 DIAGNOSIS — R61 EXCESSIVE SWEATING: ICD-10-CM

## 2020-02-24 DIAGNOSIS — E03.8 OTHER SPECIFIED HYPOTHYROIDISM: ICD-10-CM

## 2020-02-24 DIAGNOSIS — Z13.6 CARDIOVASCULAR SCREENING; LDL GOAL LESS THAN 130: ICD-10-CM

## 2020-02-24 DIAGNOSIS — Z30.41 ENCOUNTER FOR SURVEILLANCE OF CONTRACEPTIVE PILLS: ICD-10-CM

## 2020-02-24 PROCEDURE — 36415 COLL VENOUS BLD VENIPUNCTURE: CPT | Performed by: NURSE PRACTITIONER

## 2020-02-24 PROCEDURE — 84443 ASSAY THYROID STIM HORMONE: CPT | Performed by: NURSE PRACTITIONER

## 2020-02-24 PROCEDURE — 99396 PREV VISIT EST AGE 40-64: CPT | Performed by: NURSE PRACTITIONER

## 2020-02-24 RX ORDER — FLUTICASONE PROPIONATE 50 MCG
SPRAY, SUSPENSION (ML) NASAL
Qty: 48 ML | Refills: 0 | Status: SHIPPED | OUTPATIENT
Start: 2020-02-24 | End: 2020-04-10

## 2020-02-24 RX ORDER — LEVOTHYROXINE SODIUM 112 UG/1
112 TABLET ORAL DAILY
Qty: 90 TABLET | Refills: 3 | Status: SHIPPED | OUTPATIENT
Start: 2020-02-24 | End: 2021-01-28

## 2020-02-24 ASSESSMENT — PAIN SCALES - GENERAL: PAINLEVEL: NO PAIN (0)

## 2020-02-24 ASSESSMENT — MIFFLIN-ST. JEOR: SCORE: 1165.66

## 2020-02-24 NOTE — PROGRESS NOTES
SUBJECTIVE:   CC: Noni Mccormick is an 49 year old woman who presents for preventive health visit.     Healthy Habits:     Getting at least 3 servings of Calcium per day:  Yes    Bi-annual eye exam:  Yes    Dental care twice a year:  Yes    Sleep apnea or symptoms of sleep apnea:  None    Diet:  Regular (no restrictions)    Frequency of exercise:  2-3 days/week    Duration of exercise:  30-45 minutes    Taking medications regularly:  Yes    PHQ-2 Total Score: 0    Additional concerns today:  No    Has been having issues with more sweating in the armpits, to the point she does not wear white shirts. Friend has had luck with Drysol and patient would like to try.    Today's PHQ-2 Score:   PHQ-2 ( 1999 Pfizer) 2/24/2020   Q1: Little interest or pleasure in doing things 0   Q2: Feeling down, depressed or hopeless 0   PHQ-2 Score 0   Q1: Little interest or pleasure in doing things Not at all   Q2: Feeling down, depressed or hopeless Not at all   PHQ-2 Score 0       Abuse: Current or Past(Physical, Sexual or Emotional)- No  Do you feel safe in your environment? Yes        Social History     Tobacco Use     Smoking status: Never Smoker     Smokeless tobacco: Never Used   Substance Use Topics     Alcohol use: No           Alcohol Use 2/24/2020   Prescreen: >3 drinks/day or >7 drinks/week? No   Prescreen: >3 drinks/day or >7 drinks/week? -   No flowsheet data found.    Reviewed orders with patient.  Reviewed health maintenance and updated orders accordingly - Yes  Patient Active Problem List   Diagnosis     Hypothyroidism     CARDIOVASCULAR SCREENING; LDL GOAL LESS THAN 160     Nephrolithiasis     High risk medications (not anticoagulants) long-term use     Injury of left hip     Osteopenia     Intermittent asthma     Chronic polyarticular juvenile rheumatoid arthritis (H)     Past Surgical History:   Procedure Laterality Date     C PELVIS/HIP JOINT SURGERY UNLISTED       JOINT REPLACEMENT, HIP RT/LT      (L)      JOINT REPLACEMENT, HIP RT/LT  3/2013    (R)     SURGICAL HISTORY OF -       Lithotripsy       Social History     Tobacco Use     Smoking status: Never Smoker     Smokeless tobacco: Never Used   Substance Use Topics     Alcohol use: No     Family History   Problem Relation Age of Onset     Breast Cancer Mother      C.A.D. Father            Mammogram Screening: Patient under age 50, mutual decision reflected in health maintenance.      Pertinent mammograms are reviewed under the imaging tab.  History of abnormal Pap smear: endometrial cells were present on last pap - plan to repeat in 3 years.  PAP / HPV Latest Ref Rng & Units 1/29/2018 12/9/2014 11/2/2011   PAP - OTHER-NIL, See Result NIL NIL   HPV 16 DNA NEG:Negative Negative - -   HPV 18 DNA NEG:Negative Negative - -   OTHER HR HPV NEG:Negative Negative - -     Reviewed and updated as needed this visit by clinical staff  Tobacco  Allergies  Meds  Problems  Med Hx  Surg Hx  Fam Hx  Soc Hx          Reviewed and updated as needed this visit by Provider  Problems        Past Medical History:   Diagnosis Date     Contraception      Grave's disease      Hypothyroid      Inflammatory arthritis      Intermittent asthma 9/10/2013     Nephrolithiasis 6/16/2011     Osteopenia 2/14/2013     Rheumatoid arthritis(714.0)       Past Surgical History:   Procedure Laterality Date     C PELVIS/HIP JOINT SURGERY UNLISTED       JOINT REPLACEMENT, HIP RT/LT      (L)     JOINT REPLACEMENT, HIP RT/LT  3/2013    (R)     SURGICAL HISTORY OF -       Lithotripsy       Review of Systems  CONSTITUTIONAL: NEGATIVE for fever, chills, change in weight  INTEGUMENTARU/SKIN: NEGATIVE for worrisome rashes, moles or lesions  EYES: NEGATIVE for vision changes or irritation  ENT: NEGATIVE for ear, mouth and throat problems  RESP: NEGATIVE for significant cough or SOB  BREAST: NEGATIVE for masses, tenderness or discharge  CV: NEGATIVE for chest pain, palpitations or peripheral edema  GI: NEGATIVE  "for nausea, abdominal pain, heartburn, or change in bowel habits  : NEGATIVE for unusual urinary or vaginal symptoms. Periods are regular.  MUSCULOSKELETAL: NEGATIVE for significant arthralgias or myalgia  NEURO: NEGATIVE for weakness, dizziness or paresthesias  PSYCHIATRIC: NEGATIVE for changes in mood or affect     OBJECTIVE:   /80 (BP Location: Right arm, Patient Position: Sitting, Cuff Size: Adult Regular)   Pulse 72   Temp 98.5  F (36.9  C) (Oral)   Ht 1.6 m (5' 3\")   Wt 57.2 kg (126 lb)   LMP 02/03/2020 (Exact Date)   SpO2 96%   BMI 22.32 kg/m    Physical Exam  GENERAL: healthy, alert and no distress  EYES: Eyes grossly normal to inspection, PERRL and conjunctivae and sclerae normal  HENT: ear canals and TM's normal, nose and mouth without ulcers or lesions  NECK: no adenopathy, no asymmetry, masses, or scars and thyroid normal to palpation  RESP: lungs clear to auscultation - no rales, rhonchi or wheezes  BREAST: normal without masses, tenderness or nipple discharge and no palpable axillary masses or adenopathy  CV: regular rate and rhythm, normal S1 S2, no S3 or S4, no murmur, click or rub, no peripheral edema and peripheral pulses strong  ABDOMEN: soft, nontender, no hepatosplenomegaly, no masses and bowel sounds normal   (female): normal female external genitalia, normal urethral meatus, vaginal mucosa pink, moist, well rugated, and normal cervix/adnexa/uterus without masses or discharge  MS: no gross musculoskeletal defects noted, no edema  SKIN: no suspicious lesions or rashes  NEURO: Normal strength and tone, mentation intact and speech normal  PSYCH: mentation appears normal, affect normal/bright    ASSESSMENT/PLAN:   1. Encounter for gynecological examination without abnormal finding  Pap smear up to date. Discussed colon cancer screening, prefers to wait until visit next year to have ordered.     2. Other specified hypothyroidism  Refill current dose of medication and check TSH " "today. If dose adjustment needed, will send in new prescription and notify patient.  - TSH with free T4 reflex  - levothyroxine (SYNTHROID/LEVOTHROID) 112 MCG tablet; Take 1 tablet (112 mcg) by mouth daily  Dispense: 90 tablet; Refill: 3    3. Encounter for surveillance of contraceptive pills  - norethin-eth estrad triphasic (ARANELLE) 0.5/1/0.5-35 MG-MCG tablet; Take 1 tablet by mouth daily  Dispense: 84 tablet; Refill: 3    4. CARDIOVASCULAR SCREENING; LDL GOAL LESS THAN 130  - Lipid panel reflex to direct LDL Fasting; Future    5. Excessive sweating  Discussed use of medication.   - aluminum chloride (DRYSOL) 20 % external solution; Apply topically At Bedtime  Dispense: 60 mL; Refill: 3    COUNSELING:  Reviewed preventive health counseling, as reflected in patient instructions       Regular exercise       Healthy diet/nutrition       Osteoporosis Prevention/Bone Health       Colon cancer screening       (Brenda)menopause management    Estimated body mass index is 22.32 kg/m  as calculated from the following:    Height as of this encounter: 1.6 m (5' 3\").    Weight as of this encounter: 57.2 kg (126 lb).         reports that she has never smoked. She has never used smokeless tobacco.      Counseling Resources:  ATP IV Guidelines  Pooled Cohorts Equation Calculator  Breast Cancer Risk Calculator  FRAX Risk Assessment  ICSI Preventive Guidelines  Dietary Guidelines for Americans, 2010  USDA's MyPlate  ASA Prophylaxis  Lung CA Screening    [facility]EENAN [facility]  "

## 2020-02-24 NOTE — TELEPHONE ENCOUNTER
"No appointment pending at this time.  Routing to provider to advise.    Becky SARGENTN, RN    Requested Prescriptions   Pending Prescriptions Disp Refills     fluticasone (FLONASE) 50 MCG/ACT nasal spray [Pharmacy Med Name: FLUTICASONE PROP 50 MCG SPRAY] 48 mL 0     Sig: SPRAY 2 SPRAYS INTO BOTH NOSTRILS DAILY       Inhaled Steroids Protocol Failed - 2/23/2020  9:07 AM        Failed - Asthma control assessment score within normal limits in last 6 months     Please review ACT score.           Failed - Medication is active on med list        Passed - Patient is age 12 or older        Passed - Recent (6 mo) or future (30 days) visit within the authorizing provider's specialty     Patient had office visit in the last 6 months or has a visit in the next 30 days with authorizing provider or within the authorizing provider's specialty.  See \"Patient Info\" tab in inbasket, or \"Choose Columns\" in Meds & Orders section of the refill encounter.              "

## 2020-02-25 LAB — TSH SERPL DL<=0.005 MIU/L-ACNC: 0.52 MU/L (ref 0.4–4)

## 2020-03-06 ENCOUNTER — INFUSION THERAPY VISIT (OUTPATIENT)
Dept: INFUSION THERAPY | Facility: CLINIC | Age: 50
End: 2020-03-06
Payer: COMMERCIAL

## 2020-03-06 VITALS
WEIGHT: 128.9 LBS | RESPIRATION RATE: 16 BRPM | SYSTOLIC BLOOD PRESSURE: 131 MMHG | BODY MASS INDEX: 22.83 KG/M2 | DIASTOLIC BLOOD PRESSURE: 82 MMHG | HEART RATE: 79 BPM | TEMPERATURE: 98.6 F | OXYGEN SATURATION: 98 %

## 2020-03-06 DIAGNOSIS — M06.09 RHEUMATOID ARTHRITIS OF MULTIPLE SITES WITHOUT RHEUMATOID FACTOR (H): ICD-10-CM

## 2020-03-06 DIAGNOSIS — Z13.6 CARDIOVASCULAR SCREENING; LDL GOAL LESS THAN 130: ICD-10-CM

## 2020-03-06 DIAGNOSIS — M08.3 CHRONIC POLYARTICULAR JUVENILE RHEUMATOID ARTHRITIS (H): Primary | ICD-10-CM

## 2020-03-06 DIAGNOSIS — Z79.899 HIGH RISK MEDICATIONS (NOT ANTICOAGULANTS) LONG-TERM USE: ICD-10-CM

## 2020-03-06 LAB
ALBUMIN SERPL-MCNC: 3.2 G/DL (ref 3.4–5)
ALT SERPL W P-5'-P-CCNC: 13 U/L (ref 0–50)
AST SERPL W P-5'-P-CCNC: 17 U/L (ref 0–45)
CHOLEST SERPL-MCNC: 199 MG/DL
CREAT SERPL-MCNC: 0.71 MG/DL (ref 0.52–1.04)
ERYTHROCYTE [DISTWIDTH] IN BLOOD BY AUTOMATED COUNT: 13.2 % (ref 10–15)
GFR SERPL CREATININE-BSD FRML MDRD: >90 ML/MIN/{1.73_M2}
HCT VFR BLD AUTO: 38.6 % (ref 35–47)
HDLC SERPL-MCNC: 77 MG/DL
HGB BLD-MCNC: 12.7 G/DL (ref 11.7–15.7)
LDLC SERPL CALC-MCNC: 105 MG/DL
MCH RBC QN AUTO: 29.9 PG (ref 26.5–33)
MCHC RBC AUTO-ENTMCNC: 32.9 G/DL (ref 31.5–36.5)
MCV RBC AUTO: 91 FL (ref 78–100)
NONHDLC SERPL-MCNC: 122 MG/DL
PLATELET # BLD AUTO: 449 10E9/L (ref 150–450)
RBC # BLD AUTO: 4.25 10E12/L (ref 3.8–5.2)
TRIGL SERPL-MCNC: 86 MG/DL
WBC # BLD AUTO: 5.8 10E9/L (ref 4–11)

## 2020-03-06 PROCEDURE — 82565 ASSAY OF CREATININE: CPT | Performed by: STUDENT IN AN ORGANIZED HEALTH CARE EDUCATION/TRAINING PROGRAM

## 2020-03-06 PROCEDURE — 84460 ALANINE AMINO (ALT) (SGPT): CPT | Performed by: STUDENT IN AN ORGANIZED HEALTH CARE EDUCATION/TRAINING PROGRAM

## 2020-03-06 PROCEDURE — 96413 CHEMO IV INFUSION 1 HR: CPT | Performed by: INTERNAL MEDICINE

## 2020-03-06 PROCEDURE — 99207 ZZC NO CHARGE LOS: CPT

## 2020-03-06 PROCEDURE — 82040 ASSAY OF SERUM ALBUMIN: CPT | Performed by: STUDENT IN AN ORGANIZED HEALTH CARE EDUCATION/TRAINING PROGRAM

## 2020-03-06 PROCEDURE — 36415 COLL VENOUS BLD VENIPUNCTURE: CPT | Performed by: NURSE PRACTITIONER

## 2020-03-06 PROCEDURE — 85027 COMPLETE CBC AUTOMATED: CPT | Performed by: STUDENT IN AN ORGANIZED HEALTH CARE EDUCATION/TRAINING PROGRAM

## 2020-03-06 PROCEDURE — 80061 LIPID PANEL: CPT | Performed by: NURSE PRACTITIONER

## 2020-03-06 PROCEDURE — 96375 TX/PRO/DX INJ NEW DRUG ADDON: CPT | Performed by: INTERNAL MEDICINE

## 2020-03-06 PROCEDURE — 84450 TRANSFERASE (AST) (SGOT): CPT | Performed by: STUDENT IN AN ORGANIZED HEALTH CARE EDUCATION/TRAINING PROGRAM

## 2020-03-06 RX ORDER — DIPHENHYDRAMINE HCL 25 MG
25 CAPSULE ORAL ONCE
Status: CANCELLED
Start: 2020-03-06

## 2020-03-06 RX ORDER — METHYLPREDNISOLONE SODIUM SUCCINATE 125 MG/2ML
80 INJECTION, POWDER, LYOPHILIZED, FOR SOLUTION INTRAMUSCULAR; INTRAVENOUS ONCE
Status: CANCELLED | OUTPATIENT
Start: 2020-03-06

## 2020-03-06 RX ORDER — METHYLPREDNISOLONE SODIUM SUCCINATE 40 MG/ML
80 INJECTION, POWDER, LYOPHILIZED, FOR SOLUTION INTRAMUSCULAR; INTRAVENOUS ONCE
Status: COMPLETED | OUTPATIENT
Start: 2020-03-06 | End: 2020-03-06

## 2020-03-06 RX ORDER — DIPHENHYDRAMINE HCL 25 MG
25 CAPSULE ORAL ONCE
Status: COMPLETED | OUTPATIENT
Start: 2020-03-06 | End: 2020-03-06

## 2020-03-06 RX ORDER — ACETAMINOPHEN 325 MG/1
650 TABLET ORAL ONCE
Status: CANCELLED
Start: 2020-03-06

## 2020-03-06 RX ADMIN — Medication 25 MG: at 14:02

## 2020-03-06 RX ADMIN — METHYLPREDNISOLONE SODIUM SUCCINATE 80 MG: 40 INJECTION INTRAMUSCULAR; INTRAVENOUS at 14:08

## 2020-03-06 RX ADMIN — Medication 250 ML: at 14:02

## 2020-03-06 ASSESSMENT — PAIN SCALES - GENERAL: PAINLEVEL: NO PAIN (0)

## 2020-03-06 NOTE — PROGRESS NOTES
Infusion Nursing Note:  Noni Mccormick presents today for Remicade.    Patient seen by provider today: No   present during visit today: Not Applicable.    Note: N/A.    Intravenous Access:  Peripheral IV placed.    Treatment Conditions:  Biological Infusion Checklist:  ~~~ NOTE: If the patient answers yes to any of the questions below, hold the infusion and contact ordering provider or on-call provider.    1. Have you recently had an elevated temperature, fever, chills, productive cough, coughing for 3 weeks or longer or hemoptysis, abnormal vital signs, night sweats,  chest pain or have you noticed a decrease in your appetite, unexplained weight loss or fatigue? No  2. Do you have any open wounds or new incisions? No  3. Do you have any recent or upcoming hospitalizations, surgeries or dental procedures? No  4. Do you currently have or recently have had any signs of illness or infection or are you on any antibiotics? No  5. Have you had any new, sudden or worsening abdominal pain? No  6. Have you or anyone in your household received a live vaccination in the past 4 weeks? Please note:  No live vaccines while on biologic/chemotherapy until 6 months after the last treatment.  Patient can receive the flu vaccine (shot only) and the pneumovax.  It is optimal for the patient to get these vaccines mid cycle, but they can be given at any time as long as it is not on the day of the infusion. No  7. Have you recently been diagnosed with any new nervous system diseases (ie. Multiple sclerosis, Guillain [place], seizures, neurological changes) or cancer diagnosis? No  8. Are you on any form of radiation or chemotherapy? No  9. Are you pregnant or breast feeding or do you have plans of pregnancy in the future? No  10. Have you been having any signs of worsening depression or suicidal ideations?  (benlysta only) No  11. Have there been any other new onset medical symptoms? No    Post Infusion Assessment:  Patient  tolerated infusion without incident.  Site patent and intact, free from redness, edema or discomfort.  No evidence of extravasations.  Access discontinued per protocol.       Discharge Plan:   Patient will return 4/10 for next appointment.   Patient discharged in stable condition accompanied by: self.  Departure Mode: Ambulatory.    Krista Thomas RN

## 2020-03-11 ENCOUNTER — OFFICE VISIT (OUTPATIENT)
Dept: DERMATOLOGY | Facility: CLINIC | Age: 50
End: 2020-03-11
Payer: COMMERCIAL

## 2020-03-11 DIAGNOSIS — Z12.83 SKIN CANCER SCREENING: Primary | ICD-10-CM

## 2020-03-11 DIAGNOSIS — D18.01 CHERRY ANGIOMA: ICD-10-CM

## 2020-03-11 DIAGNOSIS — D84.9 IMMUNOSUPPRESSED STATUS (H): ICD-10-CM

## 2020-03-11 DIAGNOSIS — D48.5 NEOPLASM OF UNCERTAIN BEHAVIOR OF SKIN: ICD-10-CM

## 2020-03-11 PROCEDURE — 99214 OFFICE O/P EST MOD 30 MIN: CPT | Mod: 25 | Performed by: PHYSICIAN ASSISTANT

## 2020-03-11 PROCEDURE — 11102 TANGNTL BX SKIN SINGLE LES: CPT | Performed by: PHYSICIAN ASSISTANT

## 2020-03-11 PROCEDURE — 11103 TANGNTL BX SKIN EA SEP/ADDL: CPT | Performed by: PHYSICIAN ASSISTANT

## 2020-03-11 PROCEDURE — 88305 TISSUE EXAM BY PATHOLOGIST: CPT | Mod: TC | Performed by: PHYSICIAN ASSISTANT

## 2020-03-11 RX ORDER — LIDOCAINE HYDROCHLORIDE AND EPINEPHRINE 10; 10 MG/ML; UG/ML
3 INJECTION, SOLUTION INFILTRATION; PERINEURAL ONCE
Status: DISCONTINUED | OUTPATIENT
Start: 2020-03-11 | End: 2020-04-10

## 2020-03-11 ASSESSMENT — PAIN SCALES - GENERAL: PAINLEVEL: NO PAIN (0)

## 2020-03-11 NOTE — PATIENT INSTRUCTIONS
Sun protective clothing and Resources     L[place] (www.[email or web address].to.com)  Athleta (www.[email or web address])  Dolosys Life ([email or web address].Coworks)  Carve Designs (Spectra Analysis Instruments) - affordable  Skinz (uvskinz.com)    Long sleeve - Lizbet Cool DRI UPF 50 or [place] PFG UPF 50  Hoodie - [place] PFG UPF 50  Swimshirt/Rash Guard - [place] UPF 50 (on Amazon)  Neck - Outdoor Research Ubertubes (www.outdoorresJobSpice.com)  The ABCDEs of Melanoma    Skin cancer can develop anywhere on the skin. Ask someone for help when checking your skin, especially in hard to see places. If you notice a mole different from others, or that changes, enlarges, itches, or bleeds (even if it is small), you should see a dermatologist.             Recommended Sunscreens (SPF30+):  -Elta MD sunscreen  -SkinCeuticals UV Fusion sunscreen

## 2020-03-11 NOTE — LETTER
3/11/2020         RE: Noni Mccormick  [address] [facility] [address]        Dear Colleague,    Thank you for referring your patient, Noni Mccormick, to the [facility]. Please see a copy of my visit note below.    [facility] Dermatology Note      Dermatology Problem List:  1. Immunosuppressed status -x10-15 years on Methotrexate due to Rheumatoid arthritis   2. NUB on the mid lower back s/p biopsy 3/11/2020 DDx: Irritated cherry angioma vs Other  3. NUB on the right lateral breast s/p biopsy 3/11/2020 DDx: Irritated dermal nevus vs SK vs Other  4. NUB on the left anterior thigh s/p biopsy 3/11/2020 DDx: NMSC vs Other    Encounter Date: Mar 11, 2020    CC:  Chief Complaint   Patient presents with     Skin Check     Area of concern under right armpit, low back, and left thigh. No personal or family hx of SC         History of Present Illness:  Ms. Noni Mccormick is a 49 year old female who is new to the clinic and presents for a skin check. At today's visit, the patient notes she has had multiple moles removed in the past, but states these moles were benign. The patient notes moles of concern on her thigh, lower back, bra-line and mid back. She notes the mole on her mid lower back and on the bra-line are irritating as they catch on clothes and become itchy. The lesion on her back can bleed from time to time. The patient notes tanning bed use in the past 20-30 years ago. The patient notes she has been on methotrexate for 10-15 years for RA. The patient denies a personal and family history of skin cancer. The patient states she is not diligent with sunscreen use. The patient denies additional lesions or areas of concern. The patient denies painful, itching, tingling or bleeding lesions unless otherwise noted.    Past Medical History:   Patient Active Problem List   Diagnosis     Hypothyroidism     CARDIOVASCULAR SCREENING; LDL GOAL LESS THAN  160     Nephrolithiasis     High risk medications (not anticoagulants) long-term use     Injury of left hip     Osteopenia     Intermittent asthma     Chronic polyarticular juvenile rheumatoid arthritis (H)     Past Medical History:   Diagnosis Date     Contraception      Grave's disease      Hypothyroid      Inflammatory arthritis      Intermittent asthma 9/10/2013     Nephrolithiasis 6/16/2011     Osteopenia 2/14/2013     Rheumatoid arthritis(714.0)      Past Surgical History:   Procedure Laterality Date     C PELVIS/HIP JOINT SURGERY UNLISTED       JOINT REPLACEMENT, HIP RT/LT      (L)     JOINT REPLACEMENT, HIP RT/LT  3/2013    (R)     SURGICAL HISTORY OF -       Lithotripsy       Social History:   reports that she has never smoked. She has never used smokeless tobacco. She reports that she does not drink alcohol or use drugs.    Family History:  Family History   Problem Relation Age of Onset     Breast Cancer Mother      C.A.D. Father        Medications:  Current Outpatient Medications   Medication Sig Dispense Refill     albuterol (PROAIR HFA/PROVENTIL HFA/VENTOLIN HFA) 108 (90 Base) MCG/ACT inhaler Inhale 2 puffs into the lungs every 6 hours as needed for shortness of breath / dyspnea 1 Inhaler 4     beclomethasone HFA (QVAR REDIHALER) 40 MCG/ACT inhaler Inhale 1 puff into the lungs 2 times daily 3 Inhaler 3     Cholecalciferol (VITAMIN D) 1000 UNIT capsule Take 1 capsule by mouth daily.       famotidine (PEPCID) 20 MG tablet Take 1 tablet (20 mg) by mouth 2 times daily 180 tablet 1     fluticasone (FLONASE) 50 MCG/ACT nasal spray SPRAY 2 SPRAYS INTO BOTH NOSTRILS DAILY 48 mL 0     folic acid (FOLVITE) 1 MG tablet Take 1 tablet (1 mg) by mouth daily 90 tablet 11     inFLIXimab (REMICADE) 100 MG injection Inject 300 mg into the vein as needed 30 mL 0     levothyroxine (SYNTHROID/LEVOTHROID) 112 MCG tablet Take 1 tablet (112 mcg) by mouth daily 90 tablet 3     losartan (COZAAR) 25 MG tablet Take 1 tablet (25  mg) by mouth daily 90 tablet 3     methotrexate 2.5 MG tablet Take 6 tablets (15 mg) by mouth every 7 days 72 tablet 0     norethin-eth estrad triphasic (ARANELLE) 0.5/1/0.5-35 MG-MCG tablet Take 1 tablet by mouth daily 84 tablet 3     aluminum chloride (DRYSOL) 20 % external solution Apply topically At Bedtime (Patient not taking: Reported on 3/11/2020) 60 mL 3       Allergies   Allergen Reactions     Amoxicillin Nausea and Cramps       Review of Systems:  -Heme/Lymph: no concerning bumps, no bleeding or bruising problems   -Constitutional: The patient denies fatigue, fevers, chills, unintended weight loss, and night sweats.  -HEENT: Patient denies nonhealing oral sores.  -Skin: As above in HPI. No additional skin concerns.    Physical exam:  Vitals: There were no vitals taken for this visit.  GEN: This is a well developed, well-nourished female in no acute distress, in a pleasant mood.    SKIN: Full skin, which includes the head/face, both arms, chest, back, abdomen,both legs, genitalia and/or groin buttocks, digits and/or nails, was examined.  -There are dome shaped bright red papules on the lower back, upper back.   -Mid lower back: 3-4 mm bright red papule with surrounding erythema under patient's pant line  -Right lateral breast: 5 mm raised, brown and pink papule which rubs on her bra  -Left anterior thigh: 9 mm pink, shiny papule with an irregular border  -No other lesions of concern on areas examined.       Impression/Plan:  1. Skin cancer screening/History of Immunosuppression, has been on methotrexate x10-15 years due to RA  - ABCDs of melanoma were discussed and self skin checks were advised  -  Sun precaution was advised including the use of sun screens of SPF 30 or higher, sun protective clothing, and avoidance of tanning beds.  - Provided sunscreen, melanoma and sun protective clothing handouts  - Patient to return for yearly skin checks given hx of methotrexate use  -discussion about risk of skin  cancer while on methotrexate or other immunosuppressants  - Recommended EltaMD or SkinCeuticals sunscreen for the face, otherwise at least SPF 30+ daily    2. Pozo Angiomas  - Educated on the etiology   - Reassured benign  - No further intervention required    3. Neoplasm of uncertain behavior on the mid-lower back. The differential diagnosis includes irritated cherry angioma vs Other.   - Shave biopsy:  After discussion of benefits and risks including but not limited to bleeding/bruising, pain/swelling, infection, scar, incomplete removal, nerve damage/numbness, recurrence, and non-diagnostic biopsy, written consent, verbal consent and photographs were obtained. Time-out was performed. The area was cleaned with isopropyl alcohol. 0.5ml of 1% lidocaine with 1:100,000 epinephrine was injected to obtain adequate anesthesia. A shave biopsy was performed. Hemostasis was achieved with aluminium chloride. Vaseline and a sterile dressing were applied. The patient tolerated the procedure and no complications were noted. The patient was provided with verbal and written post care instructions.  - Photograph was obtained for clinical monitoring and inclusion in medical record.    4. Neoplasm of uncertain behavior on the right lateral breast. The differential diagnosis includes Irritated dermal nevus vs SK vs Other.   - Shave biopsy:  After discussion of benefits and risks including but not limited to bleeding/bruising, pain/swelling, infection, scar, incomplete removal, nerve damage/numbness, recurrence, and non-diagnostic biopsy, written consent, verbal consent and photographs were obtained. Time-out was performed. The area was cleaned with isopropyl alcohol. 0.5ml of 1% lidocaine with 1:100,000 epinephrine was injected to obtain adequate anesthesia. A shave biopsy was performed. Hemostasis was achieved with aluminium chloride. Vaseline and a sterile dressing were applied. The patient tolerated the procedure and no  complications were noted. The patient was provided with verbal and written post care instructions.  - Photograph was obtained for clinical monitoring and inclusion in medical record.    5. Neoplasm of uncertain behavior on the left anterior thigh. The differential diagnosis includes NMSC vs Other.   - Shave biopsy:  After discussion of benefits and risks including but not limited to bleeding/bruising, pain/swelling, infection, scar, incomplete removal, nerve damage/numbness, recurrence, and non-diagnostic biopsy, written consent, verbal consent and photographs were obtained. Time-out was performed. The area was cleaned with isopropyl alcohol. 0.5ml of 1% lidocaine with 1:100,000 epinephrine was injected to obtain adequate anesthesia. A shave biopsy was performed. Hemostasis was achieved with aluminium chloride. Vaseline and a sterile dressing were applied. The patient tolerated the procedure and no complications were noted. The patient was provided with verbal and written post care instructions.  - Photograph was obtained for clinical monitoring and inclusion in medical record.    CC Dr. Stoll on close of this encounter.  Follow-up prn for new or changing lesions.       Staff Involved:  Staff/Scribe    Scribe Disclosure:  I, Drake Cummings, am serving as a scribe to document services personally performed by Jannet Segovia PA-C, based on data collection and the provider's statements to me.     Provider Disclosure:   The documentation recorded by the scribe accurately reflects the services I personally performed and the decisions made by me.    All risks, benefits and alternatives were discussed with patient.  Patient is in agreement and understands the assessment and plan.  All questions were answered.    Jannet Segovia PA-C, [facility]  [facility] [place]: Phone: [phone], Fax: [phone]  [facility]: Phone: [phone],  Fax:  [phone]                      Again, thank you for allowing me to participate in the care of your patient.        Sincerely,        Jannet Segovia PA-C

## 2020-03-11 NOTE — NURSING NOTE
Noni Mccormick's goals for this visit include:   Chief Complaint   Patient presents with     Skin Check     Area of concern under right armpit, low back, and left thigh. No personal or family hx of SC     She requests these members of her care team be copied on today's visit information:     PCP: Sophia Baugh    Referring Provider:  No referring provider defined for this encounter.    There were no vitals taken for this visit.    Do you need any medication refills at today's visit? Angela Harris LPN

## 2020-03-11 NOTE — PROGRESS NOTES
[facility] Dermatology Note      Dermatology Problem List:  1. Immunosuppressed status -x10-15 years on Methotrexate due to Rheumatoid arthritis   2. NUB on the mid lower back s/p biopsy 3/11/2020 DDx: Irritated cherry angioma vs Other  3. NUB on the right lateral breast s/p biopsy 3/11/2020 DDx: Irritated dermal nevus vs SK vs Other  4. NUB on the left anterior thigh s/p biopsy 3/11/2020 DDx: NMSC vs Other    Encounter Date: Mar 11, 2020    CC:  Chief Complaint   Patient presents with     Skin Check     Area of concern under right armpit, low back, and left thigh. No personal or family hx of SC         History of Present Illness:  Ms. Noni Mccormick is a 49 year old female who is new to the clinic and presents for a skin check. At today's visit, the patient notes she has had multiple moles removed in the past, but states these moles were benign. The patient notes moles of concern on her thigh, lower back, bra-line and mid back. She notes the mole on her mid lower back and on the bra-line are irritating as they catch on clothes and become itchy. The lesion on her back can bleed from time to time. The patient notes tanning bed use in the past 20-30 years ago. The patient notes she has been on methotrexate for 10-15 years for RA. The patient denies a personal and family history of skin cancer. The patient states she is not diligent with sunscreen use. The patient denies additional lesions or areas of concern. The patient denies painful, itching, tingling or bleeding lesions unless otherwise noted.    Past Medical History:   Patient Active Problem List   Diagnosis     Hypothyroidism     CARDIOVASCULAR SCREENING; LDL GOAL LESS THAN 160     Nephrolithiasis     High risk medications (not anticoagulants) long-term use     Injury of left hip     Osteopenia     Intermittent asthma     Chronic polyarticular juvenile rheumatoid arthritis (H)     Past Medical History:   Diagnosis Date     Contraception       Grave's disease      Hypothyroid      Inflammatory arthritis      Intermittent asthma 9/10/2013     Nephrolithiasis 6/16/2011     Osteopenia 2/14/2013     Rheumatoid arthritis(714.0)      Past Surgical History:   Procedure Laterality Date     C PELVIS/HIP JOINT SURGERY UNLISTED       JOINT REPLACEMENT, HIP RT/LT      (L)     JOINT REPLACEMENT, HIP RT/LT  3/2013    (R)     SURGICAL HISTORY OF -       Lithotripsy       Social History:   reports that she has never smoked. She has never used smokeless tobacco. She reports that she does not drink alcohol or use drugs.    Family History:  Family History   Problem Relation Age of Onset     Breast Cancer Mother      C.A.D. Father        Medications:  Current Outpatient Medications   Medication Sig Dispense Refill     albuterol (PROAIR HFA/PROVENTIL HFA/VENTOLIN HFA) 108 (90 Base) MCG/ACT inhaler Inhale 2 puffs into the lungs every 6 hours as needed for shortness of breath / dyspnea 1 Inhaler 4     beclomethasone HFA (QVAR REDIHALER) 40 MCG/ACT inhaler Inhale 1 puff into the lungs 2 times daily 3 Inhaler 3     Cholecalciferol (VITAMIN D) 1000 UNIT capsule Take 1 capsule by mouth daily.       famotidine (PEPCID) 20 MG tablet Take 1 tablet (20 mg) by mouth 2 times daily 180 tablet 1     fluticasone (FLONASE) 50 MCG/ACT nasal spray SPRAY 2 SPRAYS INTO BOTH NOSTRILS DAILY 48 mL 0     folic acid (FOLVITE) 1 MG tablet Take 1 tablet (1 mg) by mouth daily 90 tablet 11     inFLIXimab (REMICADE) 100 MG injection Inject 300 mg into the vein as needed 30 mL 0     levothyroxine (SYNTHROID/LEVOTHROID) 112 MCG tablet Take 1 tablet (112 mcg) by mouth daily 90 tablet 3     losartan (COZAAR) 25 MG tablet Take 1 tablet (25 mg) by mouth daily 90 tablet 3     methotrexate 2.5 MG tablet Take 6 tablets (15 mg) by mouth every 7 days 72 tablet 0     norethin-eth estrad triphasic (ARANELLE) 0.5/1/0.5-35 MG-MCG tablet Take 1 tablet by mouth daily 84 tablet 3     aluminum chloride (DRYSOL) 20 %  external solution Apply topically At Bedtime (Patient not taking: Reported on 3/11/2020) 60 mL 3       Allergies   Allergen Reactions     Amoxicillin Nausea and Cramps       Review of Systems:  -Heme/Lymph: no concerning bumps, no bleeding or bruising problems   -Constitutional: The patient denies fatigue, fevers, chills, unintended weight loss, and night sweats.  -HEENT: Patient denies nonhealing oral sores.  -Skin: As above in HPI. No additional skin concerns.    Physical exam:  Vitals: There were no vitals taken for this visit.  GEN: This is a well developed, well-nourished female in no acute distress, in a pleasant mood.    SKIN: Full skin, which includes the head/face, both arms, chest, back, abdomen,both legs, genitalia and/or groin buttocks, digits and/or nails, was examined.  -There are dome shaped bright red papules on the lower back, upper back.   -Mid lower back: 3-4 mm bright red papule with surrounding erythema under patient's pant line  -Right lateral breast: 5 mm raised, brown and pink papule which rubs on her bra  -Left anterior thigh: 9 mm pink, shiny papule with an irregular border  -No other lesions of concern on areas examined.       Impression/Plan:  1. Skin cancer screening/History of Immunosuppression, has been on methotrexate x10-15 years due to RA  - ABCDs of melanoma were discussed and self skin checks were advised  -  Sun precaution was advised including the use of sun screens of SPF 30 or higher, sun protective clothing, and avoidance of tanning beds.  - Provided sunscreen, melanoma and sun protective clothing handouts  - Patient to return for yearly skin checks given hx of methotrexate use  -discussion about risk of skin cancer while on methotrexate or other immunosuppressants  - Recommended EltaMD or SkinCeuticals sunscreen for the face, otherwise at least SPF 30+ daily    2. Pozo Angiomas  - Educated on the etiology   - Reassured benign  - No further intervention required    3.  Neoplasm of uncertain behavior on the mid-lower back. The differential diagnosis includes irritated cherry angioma vs Other.   - Shave biopsy:  After discussion of benefits and risks including but not limited to bleeding/bruising, pain/swelling, infection, scar, incomplete removal, nerve damage/numbness, recurrence, and non-diagnostic biopsy, written consent, verbal consent and photographs were obtained. Time-out was performed. The area was cleaned with isopropyl alcohol. 0.5ml of 1% lidocaine with 1:100,000 epinephrine was injected to obtain adequate anesthesia. A shave biopsy was performed. Hemostasis was achieved with aluminium chloride. Vaseline and a sterile dressing were applied. The patient tolerated the procedure and no complications were noted. The patient was provided with verbal and written post care instructions.  - Photograph was obtained for clinical monitoring and inclusion in medical record.    4. Neoplasm of uncertain behavior on the right lateral breast. The differential diagnosis includes Irritated dermal nevus vs SK vs Other.   - Shave biopsy:  After discussion of benefits and risks including but not limited to bleeding/bruising, pain/swelling, infection, scar, incomplete removal, nerve damage/numbness, recurrence, and non-diagnostic biopsy, written consent, verbal consent and photographs were obtained. Time-out was performed. The area was cleaned with isopropyl alcohol. 0.5ml of 1% lidocaine with 1:100,000 epinephrine was injected to obtain adequate anesthesia. A shave biopsy was performed. Hemostasis was achieved with aluminium chloride. Vaseline and a sterile dressing were applied. The patient tolerated the procedure and no complications were noted. The patient was provided with verbal and written post care instructions.  - Photograph was obtained for clinical monitoring and inclusion in medical record.    5. Neoplasm of uncertain behavior on the left anterior thigh. The differential diagnosis  includes NMSC vs Other.   - Shave biopsy:  After discussion of benefits and risks including but not limited to bleeding/bruising, pain/swelling, infection, scar, incomplete removal, nerve damage/numbness, recurrence, and non-diagnostic biopsy, written consent, verbal consent and photographs were obtained. Time-out was performed. The area was cleaned with isopropyl alcohol. 0.5ml of 1% lidocaine with 1:100,000 epinephrine was injected to obtain adequate anesthesia. A shave biopsy was performed. Hemostasis was achieved with aluminium chloride. Vaseline and a sterile dressing were applied. The patient tolerated the procedure and no complications were noted. The patient was provided with verbal and written post care instructions.  - Photograph was obtained for clinical monitoring and inclusion in medical record.    CC Dr. Stoll on close of this encounter.  Follow-up prn for new or changing lesions.       Staff Involved:  Staff/Scribe    Scribe Disclosure:  I, Drake Cummings, am serving as a scribe to document services personally performed by Jannet Segovia PA-C, based on data collection and the provider's statements to me.     Provider Disclosure:   The documentation recorded by the scribe accurately reflects the services I personally performed and the decisions made by me.    All risks, benefits and alternatives were discussed with patient.  Patient is in agreement and understands the assessment and plan.  All questions were answered.    Jannet Segovia PA-C, [facility]  [facility] [place]: Phone: [phone], Fax: [phone]  [facility]: Phone: [phone],  Fax: [phone]

## 2020-03-14 LAB — COPATH REPORT: NORMAL

## 2020-03-16 ENCOUNTER — TELEPHONE (OUTPATIENT)
Dept: DERMATOLOGY | Facility: CLINIC | Age: 50
End: 2020-03-16

## 2020-03-16 DIAGNOSIS — Z79.2 PROPHYLACTIC ANTIBIOTIC: Primary | ICD-10-CM

## 2020-03-16 NOTE — TELEPHONE ENCOUNTER
Excision Intake                                                    There were no vitals taken for this visit.    Do you take the following medications:  Coumadin, Eliquis, Pradaxa, Xarelto:   NO  -If on Coumadin, INR should be checked within 7 days of surgery.  Range is 3.5 or less or within therapeutic range.  Antibiotic Prophylaxis:  Yes, hip replacement    and 7 years ago   Do you have an iodine allergy:  NO    Past Medical History                                                    Do you currently or have you previously had any of the following conditions:       HIV/AIDS:  NO    Hepatitis:  NO    Suppressed Immune System: yes remicade on the 10th     Autoimmune disorder (eg RA, Lupus):  YES, RA     Fever blisters/herpes, cold sores:  NO    Kidney disease:  NO  Creatinine   Date Value Ref Range Status   03/06/2020 0.71 0.52 - 1.04 mg/dL Final   ]    Pacemaker or Defibrillator:  NO.      History of artificial or heart valve replacement:  NO.      Endocarditis: NO    Organ transplant: NO.      Joint replacement within past 2 years: NO    Previous prosthetic joint infection: NO    Diabetes: NO    Pregnant:: NO    Tobacco use:  NO.    History   Smoking Status     Never Smoker   Smokeless Tobacco     Never Used     Reviewed by:  Toribio Herring MA

## 2020-03-17 RX ORDER — CEPHALEXIN 500 MG/1
CAPSULE ORAL
Qty: 4 CAPSULE | Refills: 0 | Status: SHIPPED | OUTPATIENT
Start: 2020-03-17 | End: 2020-05-21

## 2020-03-17 NOTE — TELEPHONE ENCOUNTER
"Per Dr. Stoll, \"Excision is also appropriate. I agree with antibiotics before excision.   We can schedule about 2 weeks after her Remicade dose. I recommend treatment within 8 weeks.\"    I spoke with Noni and notified her of this information.  She verbalized understanding and agreed with the plan.  Excision scheduled for 4/27/20.    Keflex 2 grams prescribed, per protocol.    Magaly Espinal RN    "

## 2020-03-30 ENCOUNTER — TELEPHONE (OUTPATIENT)
Dept: RHEUMATOLOGY | Facility: CLINIC | Age: 50
End: 2020-03-30

## 2020-03-30 NOTE — TELEPHONE ENCOUNTER
[facility] Call Center    Phone Message    May a detailed message be left on voicemail: yes     Reason for Call: Patient called wanting to know if Dr. Gonzalez received her paperwork that she faxed last week since she hasn't heard anything. Please advise. Thank you.    Action Taken: Message routed to:  Adult Clinics: Rheumatology p [phone]    Travel Screening: Not Applicable

## 2020-03-30 NOTE — TELEPHONE ENCOUNTER
Spoke with patient she is looking for her FMLA paper work for when she has infusions. Confirmed we did receive it and it has been signed. Will have it faxed to provided number.     ARIE BinghamN, RN   Rheumatology Care Coordinator   [facility]

## 2020-03-30 NOTE — TELEPHONE ENCOUNTER
Signed FMLA forms were faxed to the number provided by pt [phone]. I have mailed a signed copy to the patient and scanned a copy into [employer].    VM left for patient informing her of this information. Encouraged a call back to the clinic with any further questions/concerns.    Sally Atkins LPN    Rheumatology / Pulmonology  [facility]

## 2020-03-30 NOTE — TELEPHONE ENCOUNTER
Fax number provided by pt is [phone]    ARIE BinghamN, RN   Rheumatology Care Coordinator   [facility]

## 2020-04-01 ENCOUNTER — TELEPHONE (OUTPATIENT)
Dept: PULMONOLOGY | Facility: CLINIC | Age: 50
End: 2020-04-01

## 2020-04-01 NOTE — TELEPHONE ENCOUNTER
Writer attempted to reach the patient to change the appointment with Dr. Garza on 4/9/2020 from in clinic visit to telephone visit per Dr. Garza; delaney.    [facility]  Adult Med Spec/Surg Spec   [phone]

## 2020-04-09 ENCOUNTER — VIRTUAL VISIT (OUTPATIENT)
Dept: PULMONOLOGY | Facility: CLINIC | Age: 50
End: 2020-04-09
Payer: COMMERCIAL

## 2020-04-09 DIAGNOSIS — J45.20 INTERMITTENT ASTHMA, UNCOMPLICATED: ICD-10-CM

## 2020-04-09 DIAGNOSIS — R05.3 CHRONIC COUGH: Primary | ICD-10-CM

## 2020-04-09 DIAGNOSIS — K21.9 GASTROESOPHAGEAL REFLUX DISEASE, ESOPHAGITIS PRESENCE NOT SPECIFIED: ICD-10-CM

## 2020-04-09 PROCEDURE — 99213 OFFICE O/P EST LOW 20 MIN: CPT | Mod: TEL | Performed by: INTERNAL MEDICINE

## 2020-04-09 RX ORDER — FAMOTIDINE 20 MG/1
20 TABLET, FILM COATED ORAL 2 TIMES DAILY
Qty: 180 TABLET | Refills: 3 | Status: SHIPPED | OUTPATIENT
Start: 2020-04-09 | End: 2021-04-19

## 2020-04-09 RX ORDER — ALBUTEROL SULFATE 90 UG/1
2 POWDER, METERED RESPIRATORY (INHALATION) EVERY 6 HOURS PRN
Qty: 1 INHALER | Refills: 3 | Status: SHIPPED | OUTPATIENT
Start: 2020-04-09 | End: 2021-02-01

## 2020-04-09 NOTE — PROGRESS NOTES
"Noni Mccormick is a 49 year old female who is being evaluated via a billable telephone visit.      The patient has been notified of following:     \"This telephone visit will be conducted via a call between you and your physician/provider. We have found that certain health care needs can be provided without the need for a physical exam.  This service lets us provide the care you need with a short phone conversation.  If a prescription is necessary we can send it directly to your pharmacy.  If lab work is needed we can place an order for that and you can then stop by our lab to have the test done at a later time.    Telephone visits are billed at different rates depending on your insurance coverage. During this emergency period, for some insurers they may be billed the same as an in-person visit.  Please reach out to your insurance provider with any questions.    If during the course of the call the physician/provider feels a telephone visit is not appropriate, you will not be charged for this service.\"    Patient has given verbal consent for Telephone visit?  Yes    How would you like to obtain your AVS? Mail a copy    Noni Mccormick complains of    Chief Complaint   Patient presents with     RECHECK     Chronic cough f/u - seems better       I have reviewed and updated the patient's Past Medical History, Social History, Family History and Medication List.    ALLERGIES    Sally Atkins LPN    Rheumatology / Pulmonology  [facility]    Additional provider notes:    Ms. Mccormick is a 49 yof with a history of juvenile RA on remicade and methotrexate who presents to pulmonary clinic today for follow up of chronic cough. To briefly review, her cough started about 5 years ago. Previous CXRs have been negative, a VFSS was negative and PFTs have been normal (bronchodilator +7%).  She was last seen in clinic in June.  At that point she was somewhat improved.  Pepcid and Qvar were " continued but Flonase was stopped due to lack of benefit.  We obtained CT chest which was negative and I ultimately recommended referral to ENT for further cough evaluation.     -cough overall seems better.  Feels like has decreased quite a bit.  -Stopped Qvar as well as did not feel this was helping.  -Still taking pepcid, finds this helps the most  -identified triggers of cough including ketchup, red sauces, and wine -- avoiding these seems to help  -No new or worsening shortness of breath, no hemoptysis  -No problems with recurrent pneumonia  -Never ended up seeing ENT    Assessment and Plan:    1) Chronic cough.  Negative evaluation including normal VFSS, chest imaging including CXR and CT, and PFTs.  Flonase and QVar tried and stopped due to lack of efficacy.  Based on treatment response to pepci and recently identified cough triggers, GERD or LPR seems to be the most likely etiology of cough. She should continue on pepcid BID for this.  It is also reasonable to continue on albuterol as needed, as she finds this helpful to use prior to exercise.  She is satisfied with the level of her cough currently and does not wish to pursue additional work up at this time.  If in the future, she experiences resurgence of cough, referral to GI for more definitive evaluation could be considered, as could referral to ENT.      Return to clinic as needed.     Phone call duration: 11 minutes    Le Garza MD

## 2020-04-10 ENCOUNTER — INFUSION THERAPY VISIT (OUTPATIENT)
Dept: INFUSION THERAPY | Facility: CLINIC | Age: 50
End: 2020-04-10
Payer: COMMERCIAL

## 2020-04-10 VITALS
WEIGHT: 130.5 LBS | SYSTOLIC BLOOD PRESSURE: 120 MMHG | TEMPERATURE: 97.2 F | OXYGEN SATURATION: 97 % | BODY MASS INDEX: 23.12 KG/M2 | HEART RATE: 78 BPM | DIASTOLIC BLOOD PRESSURE: 72 MMHG | RESPIRATION RATE: 16 BRPM

## 2020-04-10 DIAGNOSIS — M08.3 CHRONIC POLYARTICULAR JUVENILE RHEUMATOID ARTHRITIS (H): Primary | ICD-10-CM

## 2020-04-10 DIAGNOSIS — Z79.899 HIGH RISK MEDICATIONS (NOT ANTICOAGULANTS) LONG-TERM USE: ICD-10-CM

## 2020-04-10 PROCEDURE — 96413 CHEMO IV INFUSION 1 HR: CPT | Performed by: PHYSICIAN ASSISTANT

## 2020-04-10 PROCEDURE — 96375 TX/PRO/DX INJ NEW DRUG ADDON: CPT | Performed by: PHYSICIAN ASSISTANT

## 2020-04-10 PROCEDURE — 99207 ZZC NO CHARGE LOS: CPT

## 2020-04-10 RX ORDER — DIPHENHYDRAMINE HCL 25 MG
25 CAPSULE ORAL ONCE
Status: COMPLETED | OUTPATIENT
Start: 2020-04-10 | End: 2020-04-10

## 2020-04-10 RX ORDER — DIPHENHYDRAMINE HCL 25 MG
25 CAPSULE ORAL ONCE
Status: CANCELLED
Start: 2020-04-10

## 2020-04-10 RX ORDER — METHYLPREDNISOLONE SODIUM SUCCINATE 125 MG/2ML
80 INJECTION, POWDER, LYOPHILIZED, FOR SOLUTION INTRAMUSCULAR; INTRAVENOUS ONCE
Status: CANCELLED | OUTPATIENT
Start: 2020-04-10

## 2020-04-10 RX ORDER — METHYLPREDNISOLONE SODIUM SUCCINATE 40 MG/ML
80 INJECTION, POWDER, LYOPHILIZED, FOR SOLUTION INTRAMUSCULAR; INTRAVENOUS ONCE
Status: COMPLETED | OUTPATIENT
Start: 2020-04-10 | End: 2020-04-10

## 2020-04-10 RX ORDER — ACETAMINOPHEN 325 MG/1
650 TABLET ORAL ONCE
Status: CANCELLED
Start: 2020-04-10

## 2020-04-10 RX ADMIN — Medication 250 ML: at 14:50

## 2020-04-10 RX ADMIN — METHYLPREDNISOLONE SODIUM SUCCINATE 80 MG: 40 INJECTION INTRAMUSCULAR; INTRAVENOUS at 14:51

## 2020-04-10 RX ADMIN — Medication 25 MG: at 14:56

## 2020-04-10 NOTE — PROGRESS NOTES
Infusion Nursing Note:  Noni Payton Mccormick presents today for Remicade infusion.    Patient seen by provider today: No   present during visit today: Not Applicable.    Note: With Remicade infusion in right forearm, patient had pain approximately 20 ml into Remicade.  Unable to get blood return.  Area not red or  Swollen but had a small firm area.  DC'd right arm IV and restarted in the left arm.    Intravenous Access:  Peripheral IV placed.    Treatment Conditions:  Biological Infusion Checklist:  ~~~ NOTE: If the patient answers yes to any of the questions below, hold the infusion and contact ordering provider or on-call provider.    1. Have you recently had an elevated temperature, fever, chills, productive cough, coughing for 3 weeks or longer or hemoptysis, abnormal vital signs, night sweats,  chest pain or have you noticed a decrease in your appetite, unexplained weight loss or fatigue? No  2. Do you have any open wounds or new incisions? No  3. Do you have any recent or upcoming hospitalizations, surgeries or dental procedures? No  4. Do you currently have or recently have had any signs of illness or infection or are you on any antibiotics? No  5. Have you had any new, sudden or worsening abdominal pain? No  6. Have you or anyone in your household received a live vaccination in the past 4 weeks? Please note:  No live vaccines while on biologic/chemotherapy until 6 months after the last treatment.  Patient can receive the flu vaccine (shot only) and the pneumovax.  It is optimal for the patient to get these vaccines mid cycle, but they can be given at any time as long as it is not on the day of the infusion. No  7. Have you recently been diagnosed with any new nervous system diseases (ie. Multiple sclerosis, Guillain [place], seizures, neurological changes) or cancer diagnosis? No  8. Are you on any form of radiation or chemotherapy? No  9. Are you pregnant or breast feeding or do you have plans of  pregnancy in the future? No  10. Have there been any other new onset medical symptoms? No      Post Infusion Assessment:  Patient tolerated infusion without incident.  Blood return noted pre and post infusion.  Site patent and intact, free from redness, edema or discomfort.  No evidence of extravasations.  Access discontinued per protocol.       Discharge Plan:   Patient will return 5/15/2020 for next appointment.   Patient discharged in stable condition accompanied by: self.  Departure Mode: Ambulatory.    Maria Del Carmen Estrada RN

## 2020-04-27 ENCOUNTER — OFFICE VISIT (OUTPATIENT)
Dept: DERMATOLOGY | Facility: CLINIC | Age: 50
End: 2020-04-27
Payer: COMMERCIAL

## 2020-04-27 VITALS — HEART RATE: 80 BPM | DIASTOLIC BLOOD PRESSURE: 79 MMHG | SYSTOLIC BLOOD PRESSURE: 127 MMHG

## 2020-04-27 DIAGNOSIS — C44.719 BASAL CELL CARCINOMA OF LEFT THIGH: Primary | ICD-10-CM

## 2020-04-27 PROCEDURE — 12032 INTMD RPR S/A/T/EXT 2.6-7.5: CPT | Performed by: DERMATOLOGY

## 2020-04-27 PROCEDURE — 88305 TISSUE EXAM BY PATHOLOGIST: CPT | Mod: TC | Performed by: DERMATOLOGY

## 2020-04-27 PROCEDURE — 11603 EXC TR-EXT MAL+MARG 2.1-3 CM: CPT | Performed by: DERMATOLOGY

## 2020-04-27 RX ORDER — MUPIROCIN 20 MG/G
OINTMENT TOPICAL ONCE
Status: COMPLETED | OUTPATIENT
Start: 2020-04-27 | End: 2020-04-27

## 2020-04-27 RX ADMIN — MUPIROCIN: 20 OINTMENT TOPICAL at 10:29

## 2020-04-27 ASSESSMENT — PAIN SCALES - GENERAL: PAINLEVEL: NO PAIN (0)

## 2020-04-27 NOTE — NURSING NOTE
The following medication was given:     MEDICATION:  Lidocaine with epinephrine 1% [id number]  ROUTE: SQ  SITE: see procedure note  DOSE: 12 ml   LOT #: -[id number]   : Hospira  EXPIRATION DATE: 11/1/2020  NDC#: 3513-1560-03   Was there drug waste? NA   Multi-dose vial: Yes    Toribio Herring MA  April 27, 2020    Mupirocin, Vaseline and pressure dressing applied to Mohs site on Left anterior thigh.  Wound care instructions reviewed with patient and AVS provided.  Patient verbalized understanding.  Patient will follow up for wound check via telephone visit in 2 weeks.  No further questions or concerns at this time.    Toribio Herring CMA

## 2020-04-27 NOTE — PATIENT INSTRUCTIONS
Excision Wound Care Instructions with Steri strips      I will experience scar, altered skin color, bleeding, swelling, pain, crusting and redness. I may experience altered sensation. Risks are excessive bleeding, infection, muscle weakness, thick (hypertrophic or keloidal) scar, and recurrence. A second procedure may be recommended to obtain the best cosmetic or functional result.    Possible complications of any surgical procedure are bleeding, infection, scarring, alteration in skin color and sensation, muscle weakness in the area, wound dehiscence or seperation, or recurrence of the lesion or disease. On occasion, after healing, a secondary procedure or revision may be recommended in order to obtain the best cosmetic or functional result.       After your surgery, a pressure bandage will be placed over the area that has sutures. This will help prevent bleeding. Please, follow these instructions as they will help you to prevent complications as your wound heals.        For the First 48 hours After Surgery:    1. Leave the pressure bandage on and keep it dry. If it should come loose, you may retape it, but do not take it off.    2. Relax and take it easy. Do not do any vigorous exercise, heavy lifting, or bending forward. This could cause the wound to bleed.    3. Post-operative pain is usually mild. You may take plain or extra strength Tylenol every 4 hours as needed (do not take more than 4,000mg in one day) if you have no liver problems and your doctor says it is okay. Do not take any medicine that contains aspirin, ibuprofen or motrin unless you have been recommended these by a doctor.  Avoid alcohol and vitamin E as these may increase your tendency to bleed.    4. You may put an ice pack around the bandaged area for 20 minutes every 2-3 hours. This may help reduce swelling, bruising, and pain. Make sure the ice pack is waterproof so that the pressure bandage does not get wet.     5. You may see a small amount  of drainage or blood on your pressure bandage. This is normal. However, if drainage or bleeding continues or saturates the bandage, you will need to apply firm pressure over the bandage with a washcloth for 15 minutes. If bleeding continues after applying pressure for 15 minutes then go to the nearest emergency room.        48 Hours After Surgery:    Carefully remove the outer pressure bandage but leave the steri strips in place for these will continue to give the wound edges extra support as they are healing. You may trim the edges of the steri strip that have curled up but do not remove steri strips until they fall off. You may also now shower but do not saturate the wound or steri strips for this will cause your steri strips to fall off. You should cover the steri strips and wound with a wash cloth while showering to protect the area from water. It is ok if the steri strips become a bit damp but do not saturate.     Daily Wound Care:    1. Once the steri strips fall off wash wound with a mild soap and water.  Use caution when washing the wound, be gentle and do not let the forceful shower stream hit the wound directly. DO NOT WASH WITH HYDROGEN PEROXIDE FOR THIS MIGHT CAUSE THE SUTURES TO DISSOLVE FASTER THAN WHAT WE WANT.     2. PAT DRY.    3. Once steri strips fall off apply Vaseline (from a new container or tube) over the suture line with a Q-tip until it is completely healed. It is very important to keep the wound continuously moist, as wounds heal best in a moist environment.    4. Keep the site covered until site is healed, you can cover it with a Telfa (non-stick) dressing and tape or a band-aid. While your steri strips are on you can use them as your bandage.    5. Once steri strips fall off if you are unable to keep wound covered, you must apply Vaseline every 2 - 3 hours (while awake) to ensure it is being kept moist for optimal healing until completely healed. A dressing overnight is recommended to  keep the area moist.        Call Us If:    1. You have pain that is not controlled with Tylenol.    2. You have signs or symptoms of an infection, such as: fever over 100 degrees F, redness, warmth, or foul-smelling or yellow/creamy drainage from the wound.        Who should I call with questions?  [facility]: [phone]   [facility]: [phone]  For urgent needs outside of business hours call the [facility] at [phone] and ask for the dermatology resident on call  Excision Wound Care Instructions  I will experience scar, altered skin color, bleeding, swelling, pain, crusting and redness. I may experience altered sensation. Risks are excessive bleeding, infection, muscle weakness, thick (hypertrophic or keloidal) scar, and recurrence,. A second procedure may be recommended to obtain the best cosmetic or functional result.  Possible complications of any surgical procedure are bleeding, infection, scarring, alteration in skin color and sensation, muscle weakness in the area, wound dehiscence or seperation, or recurrence of the lesion or disease. On occasion, after healing, a secondary procedure or revision may be recommended in order to obtain the best cosmetic or functional result.   After your surgery, a pressure bandage will be placed over the area that has sutures. This will help prevent bleeding. Please follow these instructions until you come back to clinic for suture removal in 2 weeks, as they will help you to prevent complications as your wound heals.  For the First 48 hours After Surgery:  1. Leave the pressure bandage on and keep it dry. If it should come loose, you may retape it, but do not take it off.  2. Relax and take it easy. Do not do any vigorous exercise, heavy lifting, or bending forward. This could cause the wound to bleed.  3. Post-operative pain is usually mild. You may take plain or extra strength Tylenol every 4  hours as needed (do not take more than 4,000mg in one day). Do not take any medicine that contains aspirin, ibuprofen or motrin unless you have been recommended these by a doctor.  Avoid alcohol and vitamin E as these may increase your tendency to bleed.  4. You may put an ice pack around the bandaged area for 20 minutes every 2-3 hours. This may help reduce swelling, bruising, and pain. Make sure the ice pack is waterproof so that the pressure bandage does not get wet.   5. You may see a small amount of drainage or blood on your pressure bandage. This is normal. However, if drainage or bleeding continues or saturates the bandage, you will need to apply firm pressure over the bandage with a washcloth for 15 minutes. If bleeding continues after applying pressure for 15 minutes then go to the nearest emergency room.  48 Hours After Surgery  Carefully remove the bandage and start daily wound care and dressing changes. You may also now shower and get the wound wet. Wash wound with a mild soap and water.  Use caution when washing the wound. Be gentle and do not let the forceful shower stream hit the wound directly.  PAT dry.  Daily Wound Care:  1. Wash wound with a mild soap and water.  Use caution when washing the wound, be gentle and do not let the forceful shower stream hit the wound directly.  2. PAT DRY.  3. Apply Vaseline (from a new container or tube) over the suture line with a Q-tip. It is very important to keep the wound continuously moist, as wounds heal best in a moist environment.  4.  Keep the site covered until sutures are removed, you can cover it with a Telfa (non-stick) dressing and tape or a band-aid.    5. If you are unable to keep wound covered, you must apply Vaseline every 2 - 3 hours (while awake) to ensure it is being kept moist for optimal healing. A dressing overnight is recommended to keep the area moist.   Call Us If:  1. You have pain that is not controlled with Tylenol.  2. You have signs  or symptoms of an infection, such as: fever over 100 degrees F, redness, warmth, or foul-smelling or yellow/creamy drainage from the wound.  Who should I call with questions?    [facility]: [phone]     [facility]: [phone]    For urgent needs outside of business hours call the [facility] at [phone] and ask for the dermatology resident on call

## 2020-04-27 NOTE — NURSING NOTE
Noni Mccormick's goals for this visit include:   Chief Complaint   Patient presents with     Procedure     excision- bcc left anterior thigh      She requests these members of her care team be copied on today's visit information: yes     PCP: Sophia Baugh    Referring Provider:  No referring provider defined for this encounter.    /79   Pulse 80     Do you need any medication refills at today's visit? No

## 2020-04-27 NOTE — LETTER
4/27/2020         RE: Noni Mccormick  [id number] [address] [facility] [address]        Dear Colleague,    Thank you for referring your patient, Noni Mccormick, to the [facility]. Please see a copy of my visit note below.    DERMATOLOGY EXCISION PROCEDURE NOTE    Dermatology Problem List:  1. Immunosuppressed status -x10-15 years on Methotrexate due to Rheumatoid arthritis   2. BCC left anterior thigh s/p excision 4/27/20    NAME OF PROCEDURE: Excision intermediate layered linear closure  Staff surgeon: Victor Manuel Stoll DO  Resident: None  Scrub Nurse: Diamond Herring CMA    PRE-OPERATIVE DIAGNOSIS:  Basal Cell Carcinoma  POST-OPERATIVE DIAGNOSIS: same   FINAL EXCISION SIZE(DEFECT SIZE): 2.2x1.9 cm, with 4 mm margin   FINAL REPAIR LENGTH: 5.5 cm     INDICATIONS: This patient presented with a 1.4x1.1cm Basal Cell Carcinoma of the left anterior thigh. Excision was indicated. We discussed the principles of treatment and most likely complications including scarring, bleeding, infection, incomplete excision, wound dehiscence, pain, nerve damage, and recurrence. Informed consent was obtained and the patient underwent the procedure as follows:    PROCEDURE: The patient was taken to the operative suite. Time-out was performed.  The treatment area was anesthetized with 1% lidocaine and epinephrine (1:100,000). The area was prepped with Chlorhexidine and rinsed with sterile saline and draped with sterile towels. The lesion was delineated and excised down to subcutaneous fat in a fusiform manner. Hemostasis was obtained by electrocoagulation.     REPAIR: An intermediate layered linear closure was selected as the procedure which would maximally preserve both function and cosmesis.    After the excision of the tumor, the area was carefully undermined. Hemostasis was obtained with electrocoagulation.  Closure was oriented so that the wound was in the patient's natural skin tension lines. The  subcutaneous and dermal layers were then closed with 4-0 Monocryl buried vertical mattress sutures. The epidermis was then carefully approximated along the length of the wound using 4-0 Prolene simple running sutures.     The final wound length was 5.5 cm. A total of 12 ml of anesthesia was administered for all surgical sites. Estimated blood loss was less than 10 ml for all surgical sites. A sterile pressure dressing was applied and wound care instructions, with a written handout, were given. The patient was discharged from the [facility] alert and ambulatory.    Follow-up as needed for wound evaluation.       Anatomic Pathology Results: pending      Staff Involved:  I personally performed the procedures today.  I personally performed the documentation today.    Victor Manuel Stoll DO    Department of Dermatology  [facility]: Phone: [phone], Fax:[phone]  [facility]: Phone: [phone], Fax: [phone]    Again, thank you for allowing me to participate in the care of your patient.        Sincerely,        Victor Manuel tSoll MD

## 2020-04-27 NOTE — PROGRESS NOTES
DERMATOLOGY EXCISION PROCEDURE NOTE    Dermatology Problem List:  1. Immunosuppressed status -x10-15 years on Methotrexate due to Rheumatoid arthritis   2. BCC left anterior thigh s/p excision 4/27/20    NAME OF PROCEDURE: Excision intermediate layered linear closure  Staff surgeon: Victor Manuel Stoll DO  Resident: None  Scrub Nurse: Diamond Herring CMA    PRE-OPERATIVE DIAGNOSIS:  Basal Cell Carcinoma  POST-OPERATIVE DIAGNOSIS: same   FINAL EXCISION SIZE(DEFECT SIZE): 2.2x1.9 cm, with 4 mm margin   FINAL REPAIR LENGTH: 5.5 cm     INDICATIONS: This patient presented with a 1.4x1.1cm Basal Cell Carcinoma of the left anterior thigh. Excision was indicated. We discussed the principles of treatment and most likely complications including scarring, bleeding, infection, incomplete excision, wound dehiscence, pain, nerve damage, and recurrence. Informed consent was obtained and the patient underwent the procedure as follows:    PROCEDURE: The patient was taken to the operative suite. Time-out was performed.  The treatment area was anesthetized with 1% lidocaine and epinephrine (1:100,000). The area was prepped with Chlorhexidine and rinsed with sterile saline and draped with sterile towels. The lesion was delineated and excised down to subcutaneous fat in a fusiform manner. Hemostasis was obtained by electrocoagulation.     REPAIR: An intermediate layered linear closure was selected as the procedure which would maximally preserve both function and cosmesis.    After the excision of the tumor, the area was carefully undermined. Hemostasis was obtained with electrocoagulation.  Closure was oriented so that the wound was in the patient's natural skin tension lines. The subcutaneous and dermal layers were then closed with 4-0 Monocryl buried vertical mattress sutures. The epidermis was then carefully approximated along the length of the wound using 4-0 Prolene simple running sutures.     The final wound length was 5.5 cm. A total of 12  ml of anesthesia was administered for all surgical sites. Estimated blood loss was less than 10 ml for all surgical sites. A sterile pressure dressing was applied and wound care instructions, with a written handout, were given. The patient was discharged from the [facility] alert and ambulatory.    Follow-up as needed for wound evaluation.       Anatomic Pathology Results: pending      Staff Involved:  I personally performed the procedures today.  I personally performed the documentation today.    Victor Manuel Stoll DO    Department of Dermatology  [facility]: Phone: [phone], Fax:[phone]  [facility]: Phone: [phone], Fax: [phone]

## 2020-04-29 DIAGNOSIS — M06.09 RHEUMATOID ARTHRITIS OF MULTIPLE SITES WITHOUT RHEUMATOID FACTOR (H): ICD-10-CM

## 2020-04-29 LAB — COPATH REPORT: NORMAL

## 2020-04-30 NOTE — TELEPHONE ENCOUNTER
METHOTREXATE 2.5 MG TABLET       Last Written Prescription Date:  11-8-19  Last Fill Quantity: 72,   # refills: 0  Last Office Visit: 6-7-19  Future Office visit:  6-4-20    CBC RESULTS:   Recent Labs   Lab Test 03/06/20  1041   WBC 5.8   RBC 4.25   HGB 12.7   HCT 38.6   MCV 91   MCH 29.9   MCHC 32.9   RDW 13.2          Creatinine   Date Value Ref Range Status   03/06/2020 0.71 0.52 - 1.04 mg/dL Final   ]    Liver Function Studies -   Recent Labs   Lab Test 03/06/20  1041  03/30/18  0820   PROTTOTAL  --   --  7.6   ALBUMIN 3.2*   < > 3.2*   BILITOTAL  --   --  0.5   ALKPHOS  --   --  27*   AST 17   < > 16   ALT 13   < > 18    < > = values in this interval not displayed.       Routing refill request to provider for review/approval because:  Per protocol

## 2020-05-07 ENCOUNTER — MYC MEDICAL ADVICE (OUTPATIENT)
Dept: DERMATOLOGY | Facility: CLINIC | Age: 50
End: 2020-05-07

## 2020-05-11 ENCOUNTER — VIRTUAL VISIT (OUTPATIENT)
Dept: DERMATOLOGY | Facility: CLINIC | Age: 50
End: 2020-05-11
Payer: COMMERCIAL

## 2020-05-11 ENCOUNTER — TELEPHONE (OUTPATIENT)
Dept: RHEUMATOLOGY | Facility: CLINIC | Age: 50
End: 2020-05-11

## 2020-05-11 DIAGNOSIS — C44.719 BASAL CELL CARCINOMA OF LEFT THIGH: Primary | ICD-10-CM

## 2020-05-11 PROCEDURE — 99024 POSTOP FOLLOW-UP VISIT: CPT | Mod: TEL | Performed by: DERMATOLOGY

## 2020-05-11 ASSESSMENT — PAIN SCALES - GENERAL: PAINLEVEL: NO PAIN (0)

## 2020-05-11 NOTE — NURSING NOTE
"Teledermatology Nurse Call for RETURN patients seen within the last 3 years:    The patient was contacted by phone and we reviewed, \"Due to the coronavirus pandemic, we are calling to review your visit and offer you a teledermatology visit where you send in photos via [place]. These photos will be seen by an MD or MAYURI. This will be billed to you and your insurance.\"  The patient was also told that \"a teledermatology visit is not as thorough as an in-person visit and that the quality of the photograph sent may not be of the same quality as that taken by the dermatology clinic, but the patient would like to proceed with an teledermatology because of National Emergency Regarding Coronavirus disease (COVID 19) Outbreak.\"  \"If a prescription is necessary we can send it directly to your pharmacy.  If lab work is needed we can place an order for that and you can then stop by our lab to have the test done at a later time.\"    The patient understood that they may receive a call from the clinic to review additional history, may still be instructed to come to clinic even after photo review and be billed for both visits with an MD. They were told that a photo assessment does not replace an in person skin exam. The patient understood that teledermatology is not for urgent issues and would require up to 3 business days for review. The patient denied skin pain, fever, mucosal symptoms (lesions, blisters, sores in the mouth, nose, eyes, or genitals)  IF PATIENT ENDORSES ANY OF THESE STOP AND PAGE  ON CALL ATTENDING. IF OTHER POSSIBLY URGENT SYMPTOMS THEN PAGE PHYSICIAN YOU ARE SCHEDULING WITH OR ON CALL IF NO ANSWER.       The patient chose to:                                                                                                                                                                                                                    Consent to a teledermatology visit with [employer] photos. The patient understood " they must upload a mychart photo for this visit to be completed. They indicated that the photo will be taken at their home address (if other address please document here). Patient told nursing these are already uploaded .  The patient was instructed to take photos of all all areas of concern and all areas of any rash from near and far away.                                                                                                                                                                                                                                                                                                                                                                                                                                      Patient concerns for this return visit:   Chief Complaint   Patient presents with     Follow Up     left thigh sp excision 4/27 patient states sutures removed healing well          Nursing tasks completed  -Pharmacy preference was updated.  -The nurse has dropped in the AVS information *(For adults the phrase is umdermhteleavs and for pediatrics it is their own) for the physician to route in the AVS.                                                                                                                                                                                                                         -The patient was told to contact the clinic if they have not received correspondence within 72 hours.       Nena Garrido LPN

## 2020-05-11 NOTE — TELEPHONE ENCOUNTER
[facility] Call Center    Phone Message    May a detailed message be left on voicemail: yes     Reason for Call: Patient called having questions regarding her health and being on remicade during COVID. 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 Please advise. Thank you.    Action Taken: Message routed to:  Adult Clinics: Rheumatology p [id number]    Travel Screening: Not Applicable

## 2020-05-11 NOTE — LETTER
5/14/2020    To whom it may concern,     Noni is a patient of mine in the Rheumatology Clinic. Due to her condition as well as being on immunosuppressant medication, she is at a higher risk of getting seriously ill from an infectious disease process. Because of the current COVID-19 pandemic, we ask that you allow this individual the option of reducing her direct exposure to infection by limiting her face-to-face contact with others, working remotely, if possible. She should not work in a facility where COVID-19 is present.     Due to Noni's long history of Rheumatoid Arthritis and complications from it, it is not advised to change her medication regimen. She should also not be required to preform heavy lifting as this could aggravate her disease and cause flares which would result in needing more immunosuppressive medication. Thank you in advance for these accommodations.     Sterling Gonzalez MD

## 2020-05-11 NOTE — TELEPHONE ENCOUNTER
Spoke to patient. 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 She is concerned with being on Remicade, and having asthma if she should do this. She is also unsure if physically she will be able to keep up with the demands of working as a nursing assistant. She would like recommendations on medication and if she should be working in a facility with covid given her medication/diagnosis. I let her know I would send a message to Dr. Gonzalez and get back to her.     Joann Burch, ARIEN, RN   Rheumatology Care Coordinator   [facility]

## 2020-05-11 NOTE — PROGRESS NOTES
[facility]  Store and Forward and Telephone      Impression and Recommendations (Patient Counseled on the Following):  1. BCC, left anterior thigh  - Treated with WLE on 4/27/20  - Wound appears to be healing appropriately. Patient reports having removed the sutures already.   - Ok to treat as normal skin.   - continue sun voidance and protection.   - Follow up with skin cancer screening in 6 months.       Staff only:    Victor Manuel Stoll DO    Department of Dermatology  [facility]: Phone: [phone], Fax:[phone]  [facility]: Phone: [phone], Fax: [phone]        _____________________________________________________________________________    Dermatology Problem List:  1. Immunosuppressed status -x10-15 years on Methotrexate due to Rheumatoid arthritis   2. BCC left anterior thigh s/p excision 4/27/20    Encounter Date: May 11, 2020    CC:   Chief Complaint   Patient presents with     Follow Up     left thigh sp excision 4/27 patient states sutures removed healing well        History of Present Illness:  I have reviewed the teledermatology information and the nursing intake corresponding to this issue. Noni Mccormick is a 49 year old female who presents via teledermatology for wound check 2 weeks following excision surgery on left anterior thigh. Wound is healing well. Some pain with pantleg rubbing the suture line, but tolerable. No significant redness, no bleeding or weeping.       ROS: Patient is generally feeling well today     Physical Examination:  General: Well-appearing, appropriately-developed individual.  Skin: Focused examination within the teledermatology photograph(s) including left anterior thigh was performed.   - Surgical wound wis clean, dry, intact.    No adjacent erythema, edema or ulceration.     Labs:  N/A    Past Medical History:    Patient Active Problem List   Diagnosis     Hypothyroidism     CARDIOVASCULAR SCREENING; LDL GOAL LESS THAN 160     Nephrolithiasis     High risk medications (not anticoagulants) long-term use     Injury of left hip     Osteopenia     Intermittent asthma     Chronic polyarticular juvenile rheumatoid arthritis (H)     Past Medical History:   Diagnosis Date     Contraception      Grave's disease      Hypothyroid      Inflammatory arthritis      Intermittent asthma 9/10/2013     Nephrolithiasis 6/16/2011     Osteopenia 2/14/2013     Rheumatoid arthritis(714.0)      Past Surgical History:   Procedure Laterality Date     C PELVIS/HIP JOINT SURGERY UNLISTED       JOINT REPLACEMENT, HIP RT/LT      (L)     JOINT REPLACEMENT, HIP RT/LT  3/2013    (R)     SURGICAL HISTORY OF -       Lithotripsy       Social History:  Patient reports that she has never smoked. She has never used smokeless tobacco. She reports that she does not drink alcohol or use drugs.    Family History:  Family History   Problem Relation Age of Onset     Breast Cancer Mother      C.A.D. Father        Medications:  Current Outpatient Medications   Medication     albuterol (PROAIR RESPICLICK) 108 (90 Base) MCG/ACT inhaler     aluminum chloride (DRYSOL) 20 % external solution     cephALEXin (KEFLEX) 500 MG capsule     Cholecalciferol (VITAMIN D) 1000 UNIT capsule     famotidine (PEPCID) 20 MG tablet     folic acid (FOLVITE) 1 MG tablet     inFLIXimab (REMICADE) 100 MG injection     levothyroxine (SYNTHROID/LEVOTHROID) 112 MCG tablet     losartan (COZAAR) 25 MG tablet     methotrexate 2.5 MG tablet     norethin-eth estrad triphasic (ARANELLE) 0.5/1/0.5-35 MG-MCG tablet     No current facility-administered medications for this visit.           Allergies   Allergen Reactions     Amoxicillin Nausea and Cramps         _____________________________________________________________________________    Teledermatology information:  - Location of patient: Home  -  Patient presented as: return  - Location of teledermatologist:  ([facility] )  - Reason teledermatology is appropriate:  of National Emergency Regarding Coronavirus disease (COVID 19) Outbreak  - Image quality and interpretability: acceptable  - Physician has received verbal consent for a Video/Photos Visit from the patient? Yes  - In-person dermatology visit recommendation: no  - Date of images: 5/10/2020  - Service start time: 1158  - Service end time: 1201  - Date of report: 5/11/2020

## 2020-05-11 NOTE — PATIENT INSTRUCTIONS
[facility] Teledermatology Visit    Thank you for allowing us to participate in your care. Your findings, instructions and follow-up plan are as follows:    It is ok to treat your surgical scar as normal skin, meaning normal skin care products. It's ok to go without a dressing if you like.        When should I call my doctor?    If you are worsening or not improving, please, contact us or seek urgent care as noted below.     Who should I call with questions (adults)?    [facility] (adult and pediatric): [phone]     [facility] (adult): [phone]    For urgent needs outside of business hours call the [facility] at [phone] and ask for the dermatology resident on call    If this is a medical emergency and you are unable to reach an ER, Call [phone]      Who should I call with questions (pediatric)?  [facility]- Pediatric Dermatology  Dr. Rach Cintron, Dr. Jamin Dewitt, Dr. Jolene Mattson, Karime Becker, MIGUEL Aldana, Dr. Mary Walker & Dr. Familia Charles  Non Urgent  Nurse Triage Line; [phone]- Martha and Linda MONZON Care Coordinators   Omayra (/Complex ) [phone]    If you need a prescription refill, please contact your pharmacy. Refills are approved or denied by our Physicians during normal business hours, Monday through Fridays  Per office policy, refills will not be granted if you have not been seen within the past year (or sooner depending on your child's condition)    Scheduling Information:  Pediatric Appointment Scheduling and Call Center [phone]  Radiology Scheduling- [phone]  Sedation Unit Scheduling- [phone]  [place] Scheduling- General [phone]; Pediatric Dermatology [phone]  Main  Services: [phone]  Maori: [phone]  Polish: [phone]  Hmong/Swedish/Persian: [phone]  Preadmission  Nursing Department Fax Number: [phone] (Fax all pre-operative paperwork to this number)    For urgent matters arising during evenings, weekends, or holidays that cannot wait for normal business hours please call [phone] and ask for the Dermatology Resident On-Call to be paged.

## 2020-05-12 NOTE — TELEPHONE ENCOUNTER
Discussed with Dr. 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 Patient has JRA dx from the time of 3 yo. She would like a letter sent recommending this to employers. Secondary action could be FMLA.    Discussed this with patient. She was agreeable to plan.       ARIE BinghamN, RN   Rheumatology Care Coordinator   [facility]

## 2020-05-14 ENCOUNTER — TELEPHONE (OUTPATIENT)
Dept: RHEUMATOLOGY | Facility: CLINIC | Age: 50
End: 2020-05-14

## 2020-05-14 DIAGNOSIS — M06.09 RHEUMATOID ARTHRITIS OF MULTIPLE SITES WITHOUT RHEUMATOID FACTOR (H): Primary | ICD-10-CM

## 2020-05-14 NOTE — TELEPHONE ENCOUNTER
Spoke with patient. She provided fax number to HR. Sent letter. She will send [facility] paperwork incase they need that done as well.     ARIE BinghamN, RN   Rheumatology Care Coordinator   [facility]

## 2020-05-15 ENCOUNTER — INFUSION THERAPY VISIT (OUTPATIENT)
Dept: INFUSION THERAPY | Facility: CLINIC | Age: 50
End: 2020-05-15
Payer: COMMERCIAL

## 2020-05-15 VITALS
TEMPERATURE: 98.1 F | SYSTOLIC BLOOD PRESSURE: 118 MMHG | BODY MASS INDEX: 23.2 KG/M2 | RESPIRATION RATE: 16 BRPM | OXYGEN SATURATION: 100 % | DIASTOLIC BLOOD PRESSURE: 73 MMHG | HEART RATE: 79 BPM | WEIGHT: 130.9 LBS | HEIGHT: 63 IN

## 2020-05-15 DIAGNOSIS — M06.09 RHEUMATOID ARTHRITIS OF MULTIPLE SITES WITHOUT RHEUMATOID FACTOR (H): ICD-10-CM

## 2020-05-15 DIAGNOSIS — Z79.899 HIGH RISK MEDICATIONS (NOT ANTICOAGULANTS) LONG-TERM USE: ICD-10-CM

## 2020-05-15 DIAGNOSIS — M08.3 CHRONIC POLYARTICULAR JUVENILE RHEUMATOID ARTHRITIS (H): Primary | ICD-10-CM

## 2020-05-15 LAB
ALBUMIN SERPL-MCNC: 3.5 G/DL (ref 3.4–5)
ALT SERPL W P-5'-P-CCNC: 14 U/L (ref 0–50)
AST SERPL W P-5'-P-CCNC: 15 U/L (ref 0–45)
CREAT SERPL-MCNC: 0.66 MG/DL (ref 0.52–1.04)
CRP SERPL-MCNC: 6.7 MG/L (ref 0–8)
ERYTHROCYTE [DISTWIDTH] IN BLOOD BY AUTOMATED COUNT: 12.8 % (ref 10–15)
ERYTHROCYTE [SEDIMENTATION RATE] IN BLOOD BY WESTERGREN METHOD: 53 MM/H (ref 0–20)
GFR SERPL CREATININE-BSD FRML MDRD: >90 ML/MIN/{1.73_M2}
HCT VFR BLD AUTO: 42.1 % (ref 35–47)
HGB BLD-MCNC: 13.6 G/DL (ref 11.7–15.7)
MCH RBC QN AUTO: 29.8 PG (ref 26.5–33)
MCHC RBC AUTO-ENTMCNC: 32.3 G/DL (ref 31.5–36.5)
MCV RBC AUTO: 92 FL (ref 78–100)
PLATELET # BLD AUTO: 482 10E9/L (ref 150–450)
RBC # BLD AUTO: 4.56 10E12/L (ref 3.8–5.2)
WBC # BLD AUTO: 6.4 10E9/L (ref 4–11)

## 2020-05-15 PROCEDURE — 36415 COLL VENOUS BLD VENIPUNCTURE: CPT | Performed by: STUDENT IN AN ORGANIZED HEALTH CARE EDUCATION/TRAINING PROGRAM

## 2020-05-15 PROCEDURE — 82565 ASSAY OF CREATININE: CPT | Performed by: STUDENT IN AN ORGANIZED HEALTH CARE EDUCATION/TRAINING PROGRAM

## 2020-05-15 PROCEDURE — 82040 ASSAY OF SERUM ALBUMIN: CPT | Performed by: STUDENT IN AN ORGANIZED HEALTH CARE EDUCATION/TRAINING PROGRAM

## 2020-05-15 PROCEDURE — 99207 ZZC NO CHARGE LOS: CPT

## 2020-05-15 PROCEDURE — 96413 CHEMO IV INFUSION 1 HR: CPT | Performed by: PHYSICIAN ASSISTANT

## 2020-05-15 PROCEDURE — 84450 TRANSFERASE (AST) (SGOT): CPT | Performed by: STUDENT IN AN ORGANIZED HEALTH CARE EDUCATION/TRAINING PROGRAM

## 2020-05-15 PROCEDURE — 85652 RBC SED RATE AUTOMATED: CPT | Performed by: STUDENT IN AN ORGANIZED HEALTH CARE EDUCATION/TRAINING PROGRAM

## 2020-05-15 PROCEDURE — 85027 COMPLETE CBC AUTOMATED: CPT | Performed by: STUDENT IN AN ORGANIZED HEALTH CARE EDUCATION/TRAINING PROGRAM

## 2020-05-15 PROCEDURE — 96375 TX/PRO/DX INJ NEW DRUG ADDON: CPT | Performed by: PHYSICIAN ASSISTANT

## 2020-05-15 PROCEDURE — 86140 C-REACTIVE PROTEIN: CPT | Performed by: STUDENT IN AN ORGANIZED HEALTH CARE EDUCATION/TRAINING PROGRAM

## 2020-05-15 PROCEDURE — 84460 ALANINE AMINO (ALT) (SGPT): CPT | Performed by: STUDENT IN AN ORGANIZED HEALTH CARE EDUCATION/TRAINING PROGRAM

## 2020-05-15 RX ORDER — METHYLPREDNISOLONE SODIUM SUCCINATE 125 MG/2ML
80 INJECTION, POWDER, LYOPHILIZED, FOR SOLUTION INTRAMUSCULAR; INTRAVENOUS ONCE
Status: CANCELLED | OUTPATIENT
Start: 2020-05-15

## 2020-05-15 RX ORDER — DIPHENHYDRAMINE HCL 25 MG
25 CAPSULE ORAL ONCE
Status: CANCELLED
Start: 2020-05-15

## 2020-05-15 RX ORDER — METHYLPREDNISOLONE SODIUM SUCCINATE 40 MG/ML
80 INJECTION, POWDER, LYOPHILIZED, FOR SOLUTION INTRAMUSCULAR; INTRAVENOUS ONCE
Status: COMPLETED | OUTPATIENT
Start: 2020-05-15 | End: 2020-05-15

## 2020-05-15 RX ORDER — ACETAMINOPHEN 325 MG/1
650 TABLET ORAL ONCE
Status: CANCELLED
Start: 2020-05-15

## 2020-05-15 RX ORDER — DIPHENHYDRAMINE HCL 25 MG
25 CAPSULE ORAL ONCE
Status: COMPLETED | OUTPATIENT
Start: 2020-05-15 | End: 2020-05-15

## 2020-05-15 RX ADMIN — Medication 25 MG: at 13:42

## 2020-05-15 RX ADMIN — METHYLPREDNISOLONE SODIUM SUCCINATE 80 MG: 40 INJECTION INTRAMUSCULAR; INTRAVENOUS at 13:53

## 2020-05-15 RX ADMIN — Medication 250 ML: at 13:52

## 2020-05-15 ASSESSMENT — PAIN SCALES - GENERAL: PAINLEVEL: NO PAIN (0)

## 2020-05-15 ASSESSMENT — MIFFLIN-ST. JEOR: SCORE: 1187.89

## 2020-05-15 NOTE — PATIENT INSTRUCTIONS
Pt to return on 06/19/20 for Rapid Remicade. Copies of medication list and upcoming appointments given prior to discharge.

## 2020-05-15 NOTE — PROGRESS NOTES
Infusion Nursing Note:  Noni Mccormick presents today for Rapid Remicade.    Patient seen by provider today: No   present during visit today: Not Applicable.    Note: Patient has no new complaints today.    Intravenous Access:  Peripheral IV placed.    Treatment Conditions:  Biological Infusion Checklist:  ~~~ NOTE: If the patient answers yes to any of the questions below, hold the infusion and contact ordering provider or on-call provider.    1. Have you recently had an elevated temperature, fever, chills, productive cough, coughing for 3 weeks or longer or hemoptysis, abnormal vital signs, night sweats,  chest pain or have you noticed a decrease in your appetite, unexplained weight loss or fatigue? No  2. Do you have any open wounds or new incisions? No  3. Do you have any recent or upcoming hospitalizations, surgeries or dental procedures? No  4. Do you currently have or recently have had any signs of illness or infection or are you on any antibiotics? No  5. Have you had any new, sudden or worsening abdominal pain? No  6. Have you or anyone in your household received a live vaccination in the past 4 weeks? Please note:  No live vaccines while on biologic/chemotherapy until 6 months after the last treatment.  Patient can receive the flu vaccine (shot only) and the pneumovax.  It is optimal for the patient to get these vaccines mid cycle, but they can be given at any time as long as it is not on the day of the infusion. No  7. Have you recently been diagnosed with any new nervous system diseases (ie. Multiple sclerosis, Guillain [place], seizures, neurological changes) or cancer diagnosis? No  8. Are you on any form of radiation or chemotherapy? No  9. Are you pregnant or breast feeding or do you have plans of pregnancy in the future? No  10. Have you been having any signs of worsening depression or suicidal ideations?  (benlysta only) No  11. Have there been any other new onset medical symptoms?  No        Post Infusion Assessment:  Patient tolerated infusion without incident.  Patient observed for 10 minutes post infusion per protocol.  Blood return noted pre and post infusion.  Site patent and intact, free from redness, edema or discomfort.  No evidence of extravasations.  Access discontinued per protocol.  Biologic Infusion Post Education: Call the triage nurse at your clinic or seek medical attention if you have chills and/or temperature greater than or equal to 100.5, uncontrolled nausea/vomiting, diarrhea, constipation, dizziness, shortness of breath, chest pain, heart palpitations, weakness or any other new or concerning symptoms, questions or concerns.  You cannot have any live virus vaccines prior to or during treatment or up to 6 months post infusion.  If you have an upcoming surgery, medical procedure or dental procedure during treatment, this should be discussed with your ordering physician and your surgeon/dentist.  If you are having any concerning symptom, if you are unsure if you should get your next infusion or wish to speak to a provider before your next infusion, please call your care coordinator or triage nurse at your clinic to notify them so we can adequately serve you.       Discharge Plan:   Discharge instructions reviewed with: Patient.  Patient and/or family verbalized understanding of discharge instructions and all questions answered.  Patient discharged in stable condition accompanied by: self.  Departure Mode: Ambulatory.    Kathie Andres RN

## 2020-05-17 ENCOUNTER — TELEPHONE (OUTPATIENT)
Dept: FAMILY MEDICINE | Facility: CLINIC | Age: 50
End: 2020-05-17

## 2020-05-17 DIAGNOSIS — I10 HYPERTENSION GOAL BP (BLOOD PRESSURE) < 140/90: ICD-10-CM

## 2020-05-20 RX ORDER — LOSARTAN POTASSIUM 25 MG/1
TABLET ORAL
Qty: 90 TABLET | Refills: 1 | OUTPATIENT
Start: 2020-05-20

## 2020-05-20 NOTE — TELEPHONE ENCOUNTER
RX is-Losartan    Called and spoke to patient, video appointment made for tomorrow with Dr Horne.Daxa Yeung MA/TC

## 2020-05-20 NOTE — TELEPHONE ENCOUNTER
Routing refill request to provider for review/approval because:  Labs not current:    Sodium   Date Value Ref Range Status   04/24/2019 139 133 - 144 mmol/L Final     Please advise on refill.    Health Maintenance Due   Topic Date Due     ASTHMA CONTROL TEST  10/24/2019     ASTHMA ACTION PLAN  04/24/2020     Krys Ely RN

## 2020-05-21 ENCOUNTER — VIRTUAL VISIT (OUTPATIENT)
Dept: FAMILY MEDICINE | Facility: CLINIC | Age: 50
End: 2020-05-21
Payer: COMMERCIAL

## 2020-05-21 DIAGNOSIS — I10 HYPERTENSION GOAL BP (BLOOD PRESSURE) < 140/90: ICD-10-CM

## 2020-05-21 PROCEDURE — 99213 OFFICE O/P EST LOW 20 MIN: CPT | Mod: GT | Performed by: FAMILY MEDICINE

## 2020-05-21 RX ORDER — LOSARTAN POTASSIUM 25 MG/1
25 TABLET ORAL DAILY
Qty: 90 TABLET | Refills: 3 | Status: SHIPPED | OUTPATIENT
Start: 2020-05-21 | End: 2021-04-19

## 2020-05-21 RX ORDER — LOSARTAN POTASSIUM 25 MG/1
25 TABLET ORAL DAILY
Qty: 90 TABLET | Refills: 3 | OUTPATIENT
Start: 2020-05-21

## 2020-05-21 NOTE — PROGRESS NOTES
"Noni Mccormick is a 49 year old female who is being evaluated via a billable video visit.      The patient has been notified of following:     \"This video visit will be conducted via a call between you and your physician/provider. We have found that certain health care needs can be provided without the need for an in-person physical exam.  This service lets us provide the care you need with a video conversation.  If a prescription is necessary we can send it directly to your pharmacy.  If lab work is needed we can place an order for that and you can then stop by our lab to have the test done at a later time.    Video visits are billed at different rates depending on your insurance coverage.  Please reach out to your insurance provider with any questions.    If during the course of the call the physician/provider feels a video visit is not appropriate, you will not be charged for this service.\"    Patient has given verbal consent for Video visit? Yes    How would you like to obtain your AVS? Sanjay    Patient would like the video invitation sent by: Send to e-mail at: [email or web address]    Will anyone else be joining your video visit? No      Subjective     Noni Mccormick is a 49 year old female who presents today via video visit for the following health issues:    HPI  Hypertension Follow-up      Do you check your blood pressure regularly outside of the clinic?   no    Are you following a low salt diet? No    Are your blood pressures ever more than 140 on the top number (systolic) OR more   than 90 on the bottom number (diastolic), for example 140/90? Yes in clinic      How many servings of fruits and vegetables do you eat daily?  2-3    On average, how many sweetened beverages do you drink each day (Examples: soda, juice, sweet tea, etc.  Do NOT count diet or artificially sweetened beverages)?   0    How many days per week do you exercise enough to make your heart beat faster? 3 or less    How many " "minutes a day do you exercise enough to make your heart beat faster? 30 - 60    How many days per week do you miss taking your medication? 0         Video Start Time: 11:48 AM    Pt with HTN, well controlled on medication. She is on losartan 25 mg daily  She is due for labwork, will get it drawn at next bloodwork with rheum  BP from last month was normal. BP  From home today was 112/66  No concerns per pt.         Reviewed and updated as needed this visit by Provider         Review of Systems   Constitutional, HEENT, cardiovascular, pulmonary, gi and gu systems are negative, except as otherwise noted.      Objective    There were no vitals taken for this visit.  Estimated body mass index is 23.19 kg/m  as calculated from the following:    Height as of 5/15/20: 1.6 m (5' 3\").    Weight as of 5/15/20: 59.4 kg (130 lb 14.4 oz).  Physical Exam     GENERAL: Healthy, alert and no distress  EYES: Eyes grossly normal to inspection.  No discharge or erythema, or obvious scleral/conjunctival abnormalities.  RESP: No audible wheeze, cough, or visible cyanosis.  No visible retractions or increased work of breathing.    SKIN: Visible skin clear. No significant rash, abnormal pigmentation or lesions.  NEURO: Cranial nerves grossly intact.  Mentation and speech appropriate for age.  PSYCH: Mentation appears normal, affect normal/bright, judgement and insight intact, normal speech and appearance well-groomed.      Diagnostic Test Results:  Labs reviewed in Epic        Assessment & Plan     1. Hypertension goal BP (blood pressure) < 140/90  At goal on meds, follow-up in one year since she gets regular follow-up with rheum in between  - losartan (COZAAR) 25 MG tablet; Take 1 tablet (25 mg) by mouth daily  Dispense: 90 tablet; Refill: 3  - Basic metabolic panel; Future           No follow-ups on file.    Sophia Horne MD  [facility]      Video-Visit Details    Type of service:  Video Visit    Video End Time:11:57 " AM    Originating Location (pt. Location): Home    Distant Location (provider location):  Home    Platform used for Video Visit: [facility]    No follow-ups on file.       Sophia Horne MD

## 2020-05-22 ASSESSMENT — ASTHMA QUESTIONNAIRES: ACT_TOTALSCORE: 25

## 2020-05-29 ENCOUNTER — TELEPHONE (OUTPATIENT)
Dept: RHEUMATOLOGY | Facility: CLINIC | Age: 50
End: 2020-05-29

## 2020-05-29 NOTE — LETTER
6/2/2020    To whom it may concern,     Noni Mccormick is able to return starting 6/1/2020 without previously listed limitations. She should continue to practice social distancing, proper and frequent hand hygiene, and wear a mask. Please contact our office with any questions.       Sterling Gonzalez MD   Rheumatology

## 2020-05-29 NOTE — TELEPHONE ENCOUNTER
[facility] Call Center    Phone Message    May a detailed message be left on voicemail: yes     Reason for Call: Patient called stating that her employer wants her to return to work on Monday 6/1/2020. Patient needs to get a letter stating that she can return to work since she is on FMLA. Please advise. Thank you.    Action Taken: Message routed to:  Adult Clinics: Rheumatology p [phone]    Travel Screening: Not Applicable

## 2020-05-29 NOTE — TELEPHONE ENCOUNTER
Patient called back. She just talked to her employer and needs a form filled out.  Leave goes through 6.3.20.    Pt will fax form [phone] today.

## 2020-05-29 NOTE — TELEPHONE ENCOUNTER
Dr Gonzalez, how can we get these FMLA forms to you to review and sign?     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       Sadaf Husain, NICOLÁSCC  Neurology

## 2020-06-01 NOTE — TELEPHONE ENCOUNTER
Patient states that she needs a new letter for her work, not stating that she is limited in her work. Patient would like call back to discuss restrictions.

## 2020-06-02 ENCOUNTER — MYC MEDICAL ADVICE (OUTPATIENT)
Dept: RHEUMATOLOGY | Facility: CLINIC | Age: 50
End: 2020-06-02

## 2020-06-02 NOTE — TELEPHONE ENCOUNTER
Faxed letter from Dr Gonzalez to [facility] with confirmation.     NICOLÁS Chacko

## 2020-06-03 NOTE — TELEPHONE ENCOUNTER
Patient called stating that her work has yet to receive the return to work letter. Patient wants to know if it can be refaxed to her work at [phone]. Please advise. Thank you.

## 2020-06-04 ENCOUNTER — VIRTUAL VISIT (OUTPATIENT)
Dept: RHEUMATOLOGY | Facility: CLINIC | Age: 50
End: 2020-06-04
Payer: COMMERCIAL

## 2020-06-04 DIAGNOSIS — M06.09 RHEUMATOID ARTHRITIS OF MULTIPLE SITES WITHOUT RHEUMATOID FACTOR (H): ICD-10-CM

## 2020-06-04 DIAGNOSIS — M06.9 RHEUMATOID ARTHRITIS, INVOLVING UNSPECIFIED SITE, UNSPECIFIED RHEUMATOID FACTOR PRESENCE: ICD-10-CM

## 2020-06-04 PROCEDURE — 99214 OFFICE O/P EST MOD 30 MIN: CPT | Mod: GT | Performed by: STUDENT IN AN ORGANIZED HEALTH CARE EDUCATION/TRAINING PROGRAM

## 2020-06-04 RX ORDER — FOLIC ACID 1 MG/1
1 TABLET ORAL DAILY
Qty: 90 TABLET | Refills: 11 | Status: SHIPPED | OUTPATIENT
Start: 2020-06-04 | End: 2021-01-20

## 2020-06-04 NOTE — TELEPHONE ENCOUNTER
Refaxed letter.     ARIE BinghamN, RN   Rheumatology Care Coordinator   [facility]

## 2020-06-04 NOTE — PROGRESS NOTES
"Noni Mccormick is a 49 year old female who is being evaluated via a billable video visit.      The patient has been notified of following:     \"This video visit will be conducted via a call between you and your physician/provider. We have found that certain health care needs can be provided without the need for an in-person physical exam.  This service lets us provide the care you need with a video conversation.  If a prescription is necessary we can send it directly to your pharmacy.  If lab work is needed we can place an order for that and you can then stop by our lab to have the test done at a later time.    Video visits are billed at different rates depending on your insurance coverage.  Please reach out to your insurance provider with any questions.    If during the course of the call the physician/provider feels a video visit is not appropriate, you will not be charged for this service.\"    Patient has given verbal consent for Video visit? Yes    How would you like to obtain your AVS? Sanjay    Patient would like the video invitation sent by: Text to cell phone: [phone]    Will anyone else be joining your video visit? Angela Olmedo [facility]    Subjective : Noni is a 49-year-old female with past medical history of juvenile rheumatoid arthritis, hypothyroidism evaluated via a billable virtual visit for management of JRA.  She was diagnosed with JRA at 3 years of age.  She has been maintained on a stable dose of Remicade 300 mg every 5 weeks and methotrexate 15 mg once a week for more than 10 years.  She denies any side effects with the medication.  Her last Remicade infusion was on 5/15/2020.  Next infusion is due on 6/19/2020.  She has not discontinued any of the infusion due to COVID-19 pandemic.  She denies any fever, chills, upper respiratory symptoms.  No sick contacts.  Her last monitoring labs did show elevated sed rate of 53 with normal CRP of 6.7.  She denies any major flareup during " that time.  Will continue to follow her sed rate.    Review of systems are negative except for those mentioned above    Reviewed her past medical, surgical, family history    Physical exam: Bilateral fifth finger flexion contracture, swan-neck deformities over third fourth fingers.  No active synovitis noted over MCP, PIP, DIP joints.  Bilateral wrists, elbows.  Denies any tenderness over the joints.  Hammertoes present both feet.    Pertinent labs:   Component      Latest Ref Rng & Units 5/15/2020   WBC      4.0 - 11.0 10e9/L 6.4   RBC Count      3.8 - 5.2 10e12/L 4.56   Hemoglobin      11.7 - 15.7 g/dL 13.6   Hematocrit      35.0 - 47.0 % 42.1   MCV      78 - 100 fl 92   MCH      26.5 - 33.0 pg 29.8   MCHC      31.5 - 36.5 g/dL 32.3   RDW      10.0 - 15.0 % 12.8   Platelet Count      150 - 450 10e9/L 482 (H)   Creatinine      0.52 - 1.04 mg/dL 0.66   GFR Estimate      >60 mL/min/1.73:m2 >90   GFR Estimate If Black      >60 mL/min/1.73:m2 >90   Sed Rate      0 - 20 mm/h 53 (H)   CRP Inflammation      0.0 - 8.0 mg/L 6.7   Albumin      3.4 - 5.0 g/dL 3.5   ALT      0 - 50 U/L 14   AST      0 - 45 U/L 15       Assessment    Juvenile rheumatoid arthritis  High risk medication use-methotrexate and Remicade    Juvenile rheumatoid arthritis: Symptoms are well controlled on regimen of methotrexate 15 mg once a week and Remicade infusion 300 mg every 5 weeks. I will continue the same regimen.  She denies any side effects with the infusion.  Even during the COVID-19 pandemic we decided not to discontinue Remicade since it has helped significantly with her JRA.  Discontinuing Remicade can put her at high risk of having a flareup.     Plan     Continue methotrexate 15 mg once a week  --q 3 mo AST/ALT, Albumin, CBC with plts  --Limit EtOH intake to 2 drinks weekly; use folate 1 mg daily.  --Tylenol 500 mg tid prn nausea/HA associated with dosing.      Continue Remicade infusions 300 mg every 5 weeks.     Follow-up in 6  months    Video-Visit Details    Type of service:  Video Visit    Video Start Time: 4:40 PM  Video End Time: 5:00 PM    Originating Location (pt. Location): Home    Distant Location (provider location):  [facility]     Platform used for Video Visit: Regine Gonzalez MD

## 2020-06-04 NOTE — PATIENT INSTRUCTIONS
Continue methotrexate 15 mg once a week    Continue Remicade infusions 300 mg every 5 weeks.     Follow-up in 6 months

## 2020-06-18 ENCOUNTER — TELEPHONE (OUTPATIENT)
Dept: RHEUMATOLOGY | Facility: CLINIC | Age: 50
End: 2020-06-18

## 2020-06-18 NOTE — TELEPHONE ENCOUNTER
Infusion order pended.     ARIE BinghamN, RN   Rheumatology Care Coordinator   [facility]       ----- Message from Sterling Gonzalez MD sent at 2020  3:46 PM CDT -----  Regarding: FW: Remicade Order   Can you please put in new remicade orders.    Sterling   ----- Message -----  From: Reanna Fonseca RP  Sent: 2020   7:18 AM CDT  To: Peace Farfan RN, Sterling Gonzalze MD  Subject: Remicade Order                            Good morning Dr. Gonzalez.    Noni is coming tomorrow for Remicade. Her order  this month. Could you resign the order or put in a new one?    Thanks,   Reanna Fonseca, PharmD, BCPS, BCOP   Oncology Pharmacy Manager  [facility]   of Pharmacy  [facility]   [email or web address]

## 2020-06-19 ENCOUNTER — INFUSION THERAPY VISIT (OUTPATIENT)
Dept: INFUSION THERAPY | Facility: CLINIC | Age: 50
End: 2020-06-19
Payer: COMMERCIAL

## 2020-06-19 VITALS
SYSTOLIC BLOOD PRESSURE: 129 MMHG | RESPIRATION RATE: 16 BRPM | OXYGEN SATURATION: 98 % | TEMPERATURE: 98.2 F | HEART RATE: 70 BPM | DIASTOLIC BLOOD PRESSURE: 79 MMHG

## 2020-06-19 DIAGNOSIS — Z79.899 HIGH RISK MEDICATIONS (NOT ANTICOAGULANTS) LONG-TERM USE: ICD-10-CM

## 2020-06-19 DIAGNOSIS — M08.3 CHRONIC POLYARTICULAR JUVENILE RHEUMATOID ARTHRITIS (H): Primary | ICD-10-CM

## 2020-06-19 PROCEDURE — 96375 TX/PRO/DX INJ NEW DRUG ADDON: CPT | Performed by: INTERNAL MEDICINE

## 2020-06-19 PROCEDURE — 99207 ZZC NO CHARGE LOS: CPT

## 2020-06-19 PROCEDURE — 96413 CHEMO IV INFUSION 1 HR: CPT | Performed by: INTERNAL MEDICINE

## 2020-06-19 RX ORDER — ACETAMINOPHEN 325 MG/1
650 TABLET ORAL ONCE
Status: CANCELLED
Start: 2020-06-19

## 2020-06-19 RX ORDER — DIPHENHYDRAMINE HCL 25 MG
25 CAPSULE ORAL ONCE
Status: CANCELLED
Start: 2020-06-19

## 2020-06-19 RX ORDER — DIPHENHYDRAMINE HCL 25 MG
25 CAPSULE ORAL ONCE
Status: COMPLETED | OUTPATIENT
Start: 2020-06-19 | End: 2020-06-19

## 2020-06-19 RX ORDER — METHYLPREDNISOLONE SODIUM SUCCINATE 125 MG/2ML
125 INJECTION, POWDER, LYOPHILIZED, FOR SOLUTION INTRAMUSCULAR; INTRAVENOUS ONCE
Status: CANCELLED
Start: 2020-06-19

## 2020-06-19 RX ORDER — METHYLPREDNISOLONE SODIUM SUCCINATE 125 MG/2ML
125 INJECTION, POWDER, LYOPHILIZED, FOR SOLUTION INTRAMUSCULAR; INTRAVENOUS ONCE
Status: COMPLETED | OUTPATIENT
Start: 2020-06-19 | End: 2020-06-19

## 2020-06-19 RX ADMIN — Medication 25 MG: at 14:28

## 2020-06-19 RX ADMIN — METHYLPREDNISOLONE SODIUM SUCCINATE 125 MG: 125 INJECTION INTRAMUSCULAR; INTRAVENOUS at 14:39

## 2020-06-19 RX ADMIN — Medication 250 ML: at 14:38

## 2020-06-19 ASSESSMENT — PAIN SCALES - GENERAL: PAINLEVEL: MILD PAIN (2)

## 2020-06-19 NOTE — PROGRESS NOTES
Infusion Nursing Note:  Noni Mccormick presents today for Rapid Remicade.    Patient seen by provider today: No   present during visit today: Not Applicable.    Note: N/A.    Intravenous Access:  Peripheral IV placed.    Treatment Conditions:  Biological Infusion Checklist:  ~~~ NOTE: If the patient answers yes to any of the questions below, hold the infusion and contact ordering provider or on-call provider.    1. Have you recently had an elevated temperature, fever, chills, productive cough, coughing for 3 weeks or longer or hemoptysis, abnormal vital signs, night sweats,  chest pain or have you noticed a decrease in your appetite, unexplained weight loss or fatigue? No  2. Do you have any open wounds or new incisions? No  3. Do you have any recent or upcoming hospitalizations, surgeries or dental procedures? No  4. Do you currently have or recently have had any signs of illness or infection or are you on any antibiotics? No  5. Have you had any new, sudden or worsening abdominal pain? No  6. Have you or anyone in your household received a live vaccination in the past 4 weeks? Please note:  No live vaccines while on biologic/chemotherapy until 6 months after the last treatment.  Patient can receive the flu vaccine (shot only) and the pneumovax.  It is optimal for the patient to get these vaccines mid cycle, but they can be given at any time as long as it is not on the day of the infusion. No  7. Have you recently been diagnosed with any new nervous system diseases (ie. Multiple sclerosis, Guillain [place], seizures, neurological changes) or cancer diagnosis? No  8. Are you on any form of radiation or chemotherapy? No  9. Are you pregnant or breast feeding or do you have plans of pregnancy in the future? No  10. Have you been having any signs of worsening depression or suicidal ideations?  (benlysta only) No  11. Have there been any other new onset medical symptoms? No        Post Infusion  Assessment:  Patient tolerated infusion without incident.  Site patent and intact, free from redness, edema or discomfort.  No evidence of extravasations.  Access discontinued per protocol.  Biologic Infusion Post Education: Call the triage nurse at your clinic or seek medical attention if you have chills and/or temperature greater than or equal to 100.5, uncontrolled nausea/vomiting, diarrhea, constipation, dizziness, shortness of breath, chest pain, heart palpitations, weakness or any other new or concerning symptoms, questions or concerns.  You cannot have any live virus vaccines prior to or during treatment or up to 6 months post infusion.  If you have an upcoming surgery, medical procedure or dental procedure during treatment, this should be discussed with your ordering physician and your surgeon/dentist.  If you are having any concerning symptom, if you are unsure if you should get your next infusion or wish to speak to a provider before your next infusion, please call your care coordinator or triage nurse at your clinic to notify them so we can adequately serve you.       Discharge Plan:   Discharge instructions reviewed with: Patient.  Patient and/or family verbalized understanding of discharge instructions and all questions answered.  Patient discharged in stable condition accompanied by: self.  Departure Mode: Ambulatory.    Unique Min RN

## 2020-07-24 ENCOUNTER — INFUSION THERAPY VISIT (OUTPATIENT)
Dept: INFUSION THERAPY | Facility: CLINIC | Age: 50
End: 2020-07-24
Payer: COMMERCIAL

## 2020-07-24 VITALS
RESPIRATION RATE: 16 BRPM | TEMPERATURE: 98.3 F | OXYGEN SATURATION: 97 % | HEART RATE: 75 BPM | SYSTOLIC BLOOD PRESSURE: 134 MMHG | WEIGHT: 133.8 LBS | DIASTOLIC BLOOD PRESSURE: 80 MMHG | BODY MASS INDEX: 23.7 KG/M2

## 2020-07-24 DIAGNOSIS — I10 HYPERTENSION GOAL BP (BLOOD PRESSURE) < 140/90: ICD-10-CM

## 2020-07-24 DIAGNOSIS — M08.3 CHRONIC POLYARTICULAR JUVENILE RHEUMATOID ARTHRITIS (H): Primary | ICD-10-CM

## 2020-07-24 DIAGNOSIS — Z79.899 HIGH RISK MEDICATIONS (NOT ANTICOAGULANTS) LONG-TERM USE: ICD-10-CM

## 2020-07-24 DIAGNOSIS — M06.09 RHEUMATOID ARTHRITIS OF MULTIPLE SITES WITHOUT RHEUMATOID FACTOR (H): ICD-10-CM

## 2020-07-24 LAB
ALBUMIN SERPL-MCNC: 3.4 G/DL (ref 3.4–5)
ALT SERPL W P-5'-P-CCNC: 20 U/L (ref 0–50)
ANION GAP SERPL CALCULATED.3IONS-SCNC: 7 MMOL/L (ref 3–14)
AST SERPL W P-5'-P-CCNC: 16 U/L (ref 0–45)
BUN SERPL-MCNC: 8 MG/DL (ref 7–30)
CALCIUM SERPL-MCNC: 8.4 MG/DL (ref 8.5–10.1)
CHLORIDE SERPL-SCNC: 111 MMOL/L (ref 94–109)
CO2 SERPL-SCNC: 22 MMOL/L (ref 20–32)
CREAT SERPL-MCNC: 0.61 MG/DL (ref 0.52–1.04)
CRP SERPL-MCNC: 8.4 MG/L (ref 0–8)
ERYTHROCYTE [DISTWIDTH] IN BLOOD BY AUTOMATED COUNT: 13.2 % (ref 10–15)
ERYTHROCYTE [SEDIMENTATION RATE] IN BLOOD BY WESTERGREN METHOD: 64 MM/H (ref 0–20)
GFR SERPL CREATININE-BSD FRML MDRD: >90 ML/MIN/{1.73_M2}
GLUCOSE SERPL-MCNC: 83 MG/DL (ref 70–99)
HCT VFR BLD AUTO: 41.7 % (ref 35–47)
HGB BLD-MCNC: 13.5 G/DL (ref 11.7–15.7)
MCH RBC QN AUTO: 29.8 PG (ref 26.5–33)
MCHC RBC AUTO-ENTMCNC: 32.4 G/DL (ref 31.5–36.5)
MCV RBC AUTO: 92 FL (ref 78–100)
PLATELET # BLD AUTO: 395 10E9/L (ref 150–450)
POTASSIUM SERPL-SCNC: 3.9 MMOL/L (ref 3.4–5.3)
RBC # BLD AUTO: 4.53 10E12/L (ref 3.8–5.2)
SODIUM SERPL-SCNC: 140 MMOL/L (ref 133–144)
WBC # BLD AUTO: 4.3 10E9/L (ref 4–11)

## 2020-07-24 PROCEDURE — 86140 C-REACTIVE PROTEIN: CPT | Performed by: FAMILY MEDICINE

## 2020-07-24 PROCEDURE — 96413 CHEMO IV INFUSION 1 HR: CPT | Performed by: INTERNAL MEDICINE

## 2020-07-24 PROCEDURE — 85652 RBC SED RATE AUTOMATED: CPT | Performed by: FAMILY MEDICINE

## 2020-07-24 PROCEDURE — 84460 ALANINE AMINO (ALT) (SGPT): CPT | Performed by: FAMILY MEDICINE

## 2020-07-24 PROCEDURE — 82040 ASSAY OF SERUM ALBUMIN: CPT | Performed by: FAMILY MEDICINE

## 2020-07-24 PROCEDURE — 85027 COMPLETE CBC AUTOMATED: CPT | Performed by: FAMILY MEDICINE

## 2020-07-24 PROCEDURE — 36415 COLL VENOUS BLD VENIPUNCTURE: CPT | Performed by: FAMILY MEDICINE

## 2020-07-24 PROCEDURE — 84450 TRANSFERASE (AST) (SGOT): CPT | Performed by: FAMILY MEDICINE

## 2020-07-24 PROCEDURE — 80048 BASIC METABOLIC PNL TOTAL CA: CPT | Performed by: FAMILY MEDICINE

## 2020-07-24 PROCEDURE — 96375 TX/PRO/DX INJ NEW DRUG ADDON: CPT | Performed by: INTERNAL MEDICINE

## 2020-07-24 PROCEDURE — 99207 ZZC NO CHARGE NURSE ONLY: CPT

## 2020-07-24 RX ORDER — ACETAMINOPHEN 325 MG/1
650 TABLET ORAL ONCE
Status: CANCELLED
Start: 2020-07-24

## 2020-07-24 RX ORDER — DIPHENHYDRAMINE HCL 25 MG
25 CAPSULE ORAL ONCE
Status: COMPLETED | OUTPATIENT
Start: 2020-07-24 | End: 2020-07-24

## 2020-07-24 RX ORDER — DIPHENHYDRAMINE HCL 25 MG
25 CAPSULE ORAL ONCE
Status: CANCELLED
Start: 2020-07-24

## 2020-07-24 RX ORDER — METHYLPREDNISOLONE SODIUM SUCCINATE 125 MG/2ML
125 INJECTION, POWDER, LYOPHILIZED, FOR SOLUTION INTRAMUSCULAR; INTRAVENOUS ONCE
Status: CANCELLED
Start: 2020-07-24

## 2020-07-24 RX ORDER — METHYLPREDNISOLONE SODIUM SUCCINATE 125 MG/2ML
125 INJECTION, POWDER, LYOPHILIZED, FOR SOLUTION INTRAMUSCULAR; INTRAVENOUS ONCE
Status: COMPLETED | OUTPATIENT
Start: 2020-07-24 | End: 2020-07-24

## 2020-07-24 RX ADMIN — Medication 250 ML: at 12:02

## 2020-07-24 RX ADMIN — METHYLPREDNISOLONE SODIUM SUCCINATE 125 MG: 125 INJECTION INTRAMUSCULAR; INTRAVENOUS at 12:02

## 2020-07-24 RX ADMIN — Medication 25 MG: at 11:56

## 2020-07-24 ASSESSMENT — PAIN SCALES - GENERAL: PAINLEVEL: NO PAIN (0)

## 2020-07-24 NOTE — PROGRESS NOTES
Infusion Nursing Note:  Noni Mccormick presents today for Rapid Remicade.    Patient seen by provider today: No   present during visit today: Not Applicable.    Note: N/A.    Intravenous Access:  Peripheral IV placed.    Treatment Conditions:  Biological Infusion Checklist:  ~~~ NOTE: If the patient answers yes to any of the questions below, hold the infusion and contact ordering provider or on-call provider.    1. Have you recently had an elevated temperature, fever, chills, productive cough, coughing for 3 weeks or longer or hemoptysis, abnormal vital signs, night sweats,  chest pain or have you noticed a decrease in your appetite, unexplained weight loss or fatigue? No  2. Do you have any open wounds or new incisions? No  3. Do you have any recent or upcoming hospitalizations, surgeries or dental procedures? No  4. Do you currently have or recently have had any signs of illness or infection or are you on any antibiotics? No  5. Have you had any new, sudden or worsening abdominal pain? No  6. Have you or anyone in your household received a live vaccination in the past 4 weeks? Please note:  No live vaccines while on biologic/chemotherapy until 6 months after the last treatment.  Patient can receive the flu vaccine (shot only) and the pneumovax.  It is optimal for the patient to get these vaccines mid cycle, but they can be given at any time as long as it is not on the day of the infusion. No  7. Have you recently been diagnosed with any new nervous system diseases (ie. Multiple sclerosis, Guillain [place], seizures, neurological changes) or cancer diagnosis? No  8. Are you on any form of radiation or chemotherapy? No  9. Are you pregnant or breast feeding or do you have plans of pregnancy in the future? No  10. Have you been having any signs of worsening depression or suicidal ideations?  (benlysta only) No  11. Have there been any other new onset medical symptoms? No      Post Infusion  Assessment:  Patient tolerated infusion without incident.  Blood return noted pre and post infusion.  Site patent and intact, free from redness, edema or discomfort.  No evidence of extravasations.  Access discontinued per protocol.       Discharge Plan:   Patient will return 8/28/2020 for next appointment.   Patient discharged in stable condition accompanied by: self.  Departure Mode: Ambulatory.    Soledad Mendez RN-BSN, PHN, OCN  [facility]

## 2020-08-28 ENCOUNTER — INFUSION THERAPY VISIT (OUTPATIENT)
Dept: INFUSION THERAPY | Facility: CLINIC | Age: 50
End: 2020-08-28
Payer: COMMERCIAL

## 2020-08-28 VITALS
HEART RATE: 72 BPM | WEIGHT: 133.3 LBS | BODY MASS INDEX: 23.61 KG/M2 | TEMPERATURE: 97.6 F | DIASTOLIC BLOOD PRESSURE: 78 MMHG | SYSTOLIC BLOOD PRESSURE: 133 MMHG | RESPIRATION RATE: 14 BRPM | OXYGEN SATURATION: 98 %

## 2020-08-28 DIAGNOSIS — M08.3 CHRONIC POLYARTICULAR JUVENILE RHEUMATOID ARTHRITIS (H): Primary | ICD-10-CM

## 2020-08-28 DIAGNOSIS — Z79.899 HIGH RISK MEDICATIONS (NOT ANTICOAGULANTS) LONG-TERM USE: ICD-10-CM

## 2020-08-28 PROCEDURE — 96413 CHEMO IV INFUSION 1 HR: CPT | Performed by: INTERNAL MEDICINE

## 2020-08-28 PROCEDURE — 96375 TX/PRO/DX INJ NEW DRUG ADDON: CPT | Performed by: INTERNAL MEDICINE

## 2020-08-28 PROCEDURE — 99207 ZZC NO CHARGE LOS: CPT

## 2020-08-28 RX ORDER — ACETAMINOPHEN 325 MG/1
650 TABLET ORAL ONCE
Status: CANCELLED
Start: 2020-08-28

## 2020-08-28 RX ORDER — DIPHENHYDRAMINE HCL 25 MG
25 CAPSULE ORAL ONCE
Status: COMPLETED | OUTPATIENT
Start: 2020-08-28 | End: 2020-08-28

## 2020-08-28 RX ORDER — METHYLPREDNISOLONE SODIUM SUCCINATE 125 MG/2ML
125 INJECTION, POWDER, LYOPHILIZED, FOR SOLUTION INTRAMUSCULAR; INTRAVENOUS ONCE
Status: CANCELLED
Start: 2020-08-28

## 2020-08-28 RX ORDER — METHYLPREDNISOLONE SODIUM SUCCINATE 125 MG/2ML
125 INJECTION, POWDER, LYOPHILIZED, FOR SOLUTION INTRAMUSCULAR; INTRAVENOUS ONCE
Status: COMPLETED | OUTPATIENT
Start: 2020-08-28 | End: 2020-08-28

## 2020-08-28 RX ORDER — DIPHENHYDRAMINE HCL 25 MG
25 CAPSULE ORAL ONCE
Status: CANCELLED
Start: 2020-08-28

## 2020-08-28 RX ADMIN — METHYLPREDNISOLONE SODIUM SUCCINATE 125 MG: 125 INJECTION INTRAMUSCULAR; INTRAVENOUS at 13:08

## 2020-08-28 RX ADMIN — Medication 25 MG: at 12:50

## 2020-08-28 RX ADMIN — Medication 250 ML: at 13:12

## 2020-08-28 ASSESSMENT — PAIN SCALES - GENERAL: PAINLEVEL: NO PAIN (0)

## 2020-08-28 NOTE — PROGRESS NOTES
Infusion Nursing Note:  Noni Mccormick presents today for Rapid Remicade.    Patient seen by provider today: No   present during visit today: Not Applicable.    Note: N/A.    Intravenous Access:  Peripheral IV placed.    Treatment Conditions:  Biological Infusion Checklist:  ~~~ NOTE: If the patient answers yes to any of the questions below, hold the infusion and contact ordering provider or on-call provider.    1. Have you recently had an elevated temperature, fever, chills, productive cough, coughing for 3 weeks or longer or hemoptysis, abnormal vital signs, night sweats,  chest pain or have you noticed a decrease in your appetite, unexplained weight loss or fatigue? No  2. Do you have any open wounds or new incisions? No  3. Do you have any recent or upcoming hospitalizations, surgeries or dental procedures? No  4. Do you currently have or recently have had any signs of illness or infection or are you on any antibiotics? No  5. Have you had any new, sudden or worsening abdominal pain? No  6. Have you or anyone in your household received a live vaccination in the past 4 weeks? Please note:  No live vaccines while on biologic/chemotherapy until 6 months after the last treatment.  Patient can receive the flu vaccine (shot only) and the pneumovax.  It is optimal for the patient to get these vaccines mid cycle, but they can be given at any time as long as it is not on the day of the infusion. No  7. Have you recently been diagnosed with any new nervous system diseases (ie. Multiple sclerosis, Guillain [place], seizures, neurological changes) or cancer diagnosis? No  8. Are you on any form of radiation or chemotherapy? No  9. Are you pregnant or breast feeding or do you have plans of pregnancy in the future? No  10. Have there been any other new onset medical symptoms? No    Post Infusion Assessment:  Patient tolerated infusion without incident.  Blood return noted pre and post infusion.  Site patent and  intact, free from redness, edema or discomfort.  No evidence of extravasations.  Access discontinued per protocol.       Discharge Plan:   Patient will schedule next infusion before leaving today.  Patient discharged in stable condition accompanied by: self.  Departure Mode: Ambulatory.    Maria Del Carmen Estrada RN

## 2020-09-09 ENCOUNTER — MYC MEDICAL ADVICE (OUTPATIENT)
Dept: RHEUMATOLOGY | Facility: CLINIC | Age: 50
End: 2020-09-09

## 2020-10-02 ENCOUNTER — INFUSION THERAPY VISIT (OUTPATIENT)
Dept: INFUSION THERAPY | Facility: CLINIC | Age: 50
End: 2020-10-02
Payer: COMMERCIAL

## 2020-10-02 VITALS
HEART RATE: 62 BPM | DIASTOLIC BLOOD PRESSURE: 81 MMHG | TEMPERATURE: 98.1 F | RESPIRATION RATE: 16 BRPM | OXYGEN SATURATION: 97 % | BODY MASS INDEX: 23.91 KG/M2 | SYSTOLIC BLOOD PRESSURE: 143 MMHG | WEIGHT: 135 LBS

## 2020-10-02 DIAGNOSIS — M08.3 CHRONIC POLYARTICULAR JUVENILE RHEUMATOID ARTHRITIS (H): Primary | ICD-10-CM

## 2020-10-02 DIAGNOSIS — Z79.899 HIGH RISK MEDICATIONS (NOT ANTICOAGULANTS) LONG-TERM USE: ICD-10-CM

## 2020-10-02 DIAGNOSIS — M06.09 RHEUMATOID ARTHRITIS OF MULTIPLE SITES WITHOUT RHEUMATOID FACTOR (H): ICD-10-CM

## 2020-10-02 LAB
ALBUMIN SERPL-MCNC: 3.7 G/DL (ref 3.4–5)
ALT SERPL W P-5'-P-CCNC: 18 U/L (ref 0–50)
AST SERPL W P-5'-P-CCNC: 17 U/L (ref 0–45)
CREAT SERPL-MCNC: 0.69 MG/DL (ref 0.52–1.04)
CRP SERPL-MCNC: 8 MG/L (ref 0–8)
ERYTHROCYTE [DISTWIDTH] IN BLOOD BY AUTOMATED COUNT: 12.9 % (ref 10–15)
ERYTHROCYTE [SEDIMENTATION RATE] IN BLOOD BY WESTERGREN METHOD: 47 MM/H (ref 0–30)
GFR SERPL CREATININE-BSD FRML MDRD: >90 ML/MIN/{1.73_M2}
HCT VFR BLD AUTO: 43.7 % (ref 35–47)
HGB BLD-MCNC: 14.3 G/DL (ref 11.7–15.7)
MCH RBC QN AUTO: 29.7 PG (ref 26.5–33)
MCHC RBC AUTO-ENTMCNC: 32.7 G/DL (ref 31.5–36.5)
MCV RBC AUTO: 91 FL (ref 78–100)
PLATELET # BLD AUTO: 462 10E9/L (ref 150–450)
RBC # BLD AUTO: 4.81 10E12/L (ref 3.8–5.2)
WBC # BLD AUTO: 5.5 10E9/L (ref 4–11)

## 2020-10-02 PROCEDURE — 99207 PR NO CHARGE LOS: CPT

## 2020-10-02 PROCEDURE — 85652 RBC SED RATE AUTOMATED: CPT | Performed by: STUDENT IN AN ORGANIZED HEALTH CARE EDUCATION/TRAINING PROGRAM

## 2020-10-02 PROCEDURE — 85027 COMPLETE CBC AUTOMATED: CPT | Performed by: STUDENT IN AN ORGANIZED HEALTH CARE EDUCATION/TRAINING PROGRAM

## 2020-10-02 PROCEDURE — 84450 TRANSFERASE (AST) (SGOT): CPT | Performed by: STUDENT IN AN ORGANIZED HEALTH CARE EDUCATION/TRAINING PROGRAM

## 2020-10-02 PROCEDURE — 96413 CHEMO IV INFUSION 1 HR: CPT | Performed by: INTERNAL MEDICINE

## 2020-10-02 PROCEDURE — 36415 COLL VENOUS BLD VENIPUNCTURE: CPT | Performed by: STUDENT IN AN ORGANIZED HEALTH CARE EDUCATION/TRAINING PROGRAM

## 2020-10-02 PROCEDURE — 82565 ASSAY OF CREATININE: CPT | Performed by: STUDENT IN AN ORGANIZED HEALTH CARE EDUCATION/TRAINING PROGRAM

## 2020-10-02 PROCEDURE — 96375 TX/PRO/DX INJ NEW DRUG ADDON: CPT | Performed by: INTERNAL MEDICINE

## 2020-10-02 PROCEDURE — 84460 ALANINE AMINO (ALT) (SGPT): CPT | Performed by: STUDENT IN AN ORGANIZED HEALTH CARE EDUCATION/TRAINING PROGRAM

## 2020-10-02 PROCEDURE — 82040 ASSAY OF SERUM ALBUMIN: CPT | Performed by: STUDENT IN AN ORGANIZED HEALTH CARE EDUCATION/TRAINING PROGRAM

## 2020-10-02 PROCEDURE — 86140 C-REACTIVE PROTEIN: CPT | Performed by: STUDENT IN AN ORGANIZED HEALTH CARE EDUCATION/TRAINING PROGRAM

## 2020-10-02 RX ORDER — ACETAMINOPHEN 325 MG/1
650 TABLET ORAL ONCE
Status: CANCELLED
Start: 2020-10-02 | End: 2020-10-02

## 2020-10-02 RX ORDER — METHYLPREDNISOLONE SODIUM SUCCINATE 125 MG/2ML
125 INJECTION, POWDER, LYOPHILIZED, FOR SOLUTION INTRAMUSCULAR; INTRAVENOUS ONCE
Status: COMPLETED | OUTPATIENT
Start: 2020-10-02 | End: 2020-10-02

## 2020-10-02 RX ORDER — DIPHENHYDRAMINE HCL 25 MG
25 CAPSULE ORAL ONCE
Status: CANCELLED
Start: 2020-10-02 | End: 2020-10-02

## 2020-10-02 RX ORDER — METHYLPREDNISOLONE SODIUM SUCCINATE 125 MG/2ML
125 INJECTION, POWDER, LYOPHILIZED, FOR SOLUTION INTRAMUSCULAR; INTRAVENOUS ONCE
Status: CANCELLED
Start: 2020-10-02 | End: 2020-10-02

## 2020-10-02 RX ORDER — DIPHENHYDRAMINE HCL 25 MG
25 CAPSULE ORAL ONCE
Status: COMPLETED | OUTPATIENT
Start: 2020-10-02 | End: 2020-10-02

## 2020-10-02 RX ADMIN — Medication 250 ML: at 12:28

## 2020-10-02 RX ADMIN — Medication 25 MG: at 12:28

## 2020-10-02 RX ADMIN — METHYLPREDNISOLONE SODIUM SUCCINATE 125 MG: 125 INJECTION INTRAMUSCULAR; INTRAVENOUS at 12:28

## 2020-10-02 ASSESSMENT — PAIN SCALES - GENERAL: PAINLEVEL: MILD PAIN (2)

## 2020-10-02 NOTE — LETTER
October 12, 2020      Noni Mccormick  [address] [address] [facility] [address]        Dear ,    We are writing to inform you of your test results.    Normal monitoring labs - normal liver, kidney tests and cell counts. Platelet count is high and sed rate is high which are markers of inflammation. Let us know if you have any flares.     Resulted Orders   Erythrocyte sedimentation rate auto   Result Value Ref Range    Sed Rate 47 (H) 0 - 30 mm/h   CRP inflammation   Result Value Ref Range    CRP Inflammation 8.0 0.0 - 8.0 mg/L   Creatinine   Result Value Ref Range    Creatinine 0.69 0.52 - 1.04 mg/dL    GFR Estimate >90 >60 mL/min/[1.73_m2]      Comment:      Non  GFR Calc  Starting 12/18/2018, serum creatinine based estimated GFR (eGFR) will be   calculated using the Chronic Kidney Disease Epidemiology Collaboration   (CKD-EPI) equation.      GFR Estimate If Black >90 >60 mL/min/[1.73_m2]      Comment:       GFR Calc  Starting 12/18/2018, serum creatinine based estimated GFR (eGFR) will be   calculated using the Chronic Kidney Disease Epidemiology Collaboration   (CKD-EPI) equation.     Albumin level   Result Value Ref Range    Albumin 3.7 3.4 - 5.0 g/dL   ALT   Result Value Ref Range    ALT 18 0 - 50 U/L   AST   Result Value Ref Range    AST 17 0 - 45 U/L   CBC with platelets   Result Value Ref Range    WBC 5.5 4.0 - 11.0 10e9/L    RBC Count 4.81 3.8 - 5.2 10e12/L    Hemoglobin 14.3 11.7 - 15.7 g/dL    Hematocrit 43.7 35.0 - 47.0 %    MCV 91 78 - 100 fl    MCH 29.7 26.5 - 33.0 pg    MCHC 32.7 31.5 - 36.5 g/dL    RDW 12.9 10.0 - 15.0 %    Platelet Count 462 (H) 150 - 450 10e9/L       If you have any questions or concerns, please call the clinic at the number listed above.       Sincerely,        Sterling Gonzalez MD

## 2020-10-02 NOTE — PROGRESS NOTES
Infusion Nursing Note:  Noni Mccormick presents today for Rapid Remicade.    Patient seen by provider today: No   present during visit today: Not Applicable.    Note: Patient informed infusion  prior to her arrival that her employer alerted her to a Covid exposure at her workplace in the last three days.  Patient is asymptomatic and wears a surgical mask and eye protection at work.  Per [facility], patient is able to have infusion today.  Instructed patient to present for testing if she becomes symptomatic, and to contact infusion center if she is found to be positive for Covid.  Patient verbalized understanding.     Intravenous Access:  Peripheral IV placed.    Treatment Conditions:  Biological Infusion Checklist:  ~~~ NOTE: If the patient answers yes to any of the questions below, hold the infusion and contact ordering provider or on-call provider.    1. Have you recently had an elevated temperature, fever, chills, productive cough, coughing for 3 weeks or longer or hemoptysis, abnormal vital signs, night sweats,  chest pain or have you noticed a decrease in your appetite, unexplained weight loss or fatigue? No  2. Do you have any open wounds or new incisions? No  3. Do you have any recent or upcoming hospitalizations, surgeries or dental procedures? No  4. Do you currently have or recently have had any signs of illness or infection or are you on any antibiotics? No  5. Have you had any new, sudden or worsening abdominal pain? No  6. Have you or anyone in your household received a live vaccination in the past 4 weeks? Please note:  No live vaccines while on biologic/chemotherapy until 6 months after the last treatment.  Patient can receive the flu vaccine (shot only) and the pneumovax.  It is optimal for the patient to get these vaccines mid cycle, but they can be given at any time as long as it is not on the day of the infusion. No  7. Have you recently been diagnosed with any new  nervous system diseases (ie. Multiple sclerosis, Guillain [place], seizures, neurological changes) or cancer diagnosis? No  8. Are you on any form of radiation or chemotherapy? No  9. Are you pregnant or breast feeding or do you have plans of pregnancy in the future? No  10. Have you been having any signs of worsening depression or suicidal ideations?  (benlysta only) No  11. Have there been any other new onset medical symptoms? No      Post Infusion Assessment:  Patient tolerated infusion without incident.  Blood return noted pre and post infusion.  Site patent and intact, free from redness, edema or discomfort.  No evidence of extravasations.  Access discontinued per protocol.       Discharge Plan:   Patient will return 11/6/2020 for next appointment.   Patient discharged in stable condition accompanied by: self.  Departure Mode: Ambulatory.    Soledad Mendez RN-BSN, PHN, OCN  [facility]

## 2020-10-12 NOTE — RESULT ENCOUNTER NOTE
Normal monitoring labs - normal liver, kidney tests and cell counts. Platelet count is high and sed rate is high which are markers of inflammation. Let us know if you have any flares.     Sterling Gonzalez MD  10/11/2020

## 2020-11-04 ENCOUNTER — OFFICE VISIT (OUTPATIENT)
Dept: DERMATOLOGY | Facility: CLINIC | Age: 50
End: 2020-11-04
Payer: COMMERCIAL

## 2020-11-04 DIAGNOSIS — L57.0 AK (ACTINIC KERATOSIS): Primary | ICD-10-CM

## 2020-11-04 PROCEDURE — 17000 DESTRUCT PREMALG LESION: CPT | Performed by: PHYSICIAN ASSISTANT

## 2020-11-04 PROCEDURE — 99214 OFFICE O/P EST MOD 30 MIN: CPT | Mod: 25 | Performed by: PHYSICIAN ASSISTANT

## 2020-11-04 ASSESSMENT — PAIN SCALES - GENERAL: PAINLEVEL: NO PAIN (0)

## 2020-11-04 NOTE — LETTER
11/4/2020         RE: Noni Mccormick  [address] [facility] [address]        Dear Colleague,    Thank you for referring your patient, Noni Mccormick, to the [facility]. Please see a copy of my visit note below.    [facility] Note      Dermatology Problem List:  1. BCC, left anterior thigh  - Treated with WLE on 4/27/20  - Wound appears to be healing appropriately. Patient reports having removed the sutures already.   - Ok to treat as normal skin.   - continue sun voidance and protection.   - Follow up with skin cancer screening in 6 months.   2. AK - L upper arm x1 - s/p cryo  3. Hx of DN - R buttocks - s/p excision ~2005 (outside dermatologist)    CC:   Chief Complaint   Patient presents with     Skin Check     6 month skin exam, hx of NMSC - no new concerns     Encounter Date: Nov 4, 2020    History of Present Illness:  Ms. Noni Mccormick is a 50 year old female who returns today for a FBSE. Immunosuppressed. On Remicade infusions every 5 weeks for RA. Also on methotrexate. Has been on some kind of immunosuppressive medication for around 20 years. No new spots of concern today. Most recently had BCC excised on the L anterior thigh. Has hx of DN in the past on the R buttock which she thinks was excised around 15 years ago. She has had biopsies of moles in the past. No fhx skin cancer or fhx of melanoma. She is otherwise well and has no other concerns.      Past Medical History:   Patient Active Problem List   Diagnosis     Hypothyroidism     CARDIOVASCULAR SCREENING; LDL GOAL LESS THAN 160     Nephrolithiasis     High risk medications (not anticoagulants) long-term use     Injury of left hip     Intermittent asthma     Chronic polyarticular juvenile rheumatoid arthritis (H)     Past Medical History:   Diagnosis Date     Contraception      Grave's disease      Hypothyroid      Inflammatory arthritis      Intermittent asthma  9/10/2013     Nephrolithiasis 6/16/2011     Osteopenia 2/14/2013     Rheumatoid arthritis(714.0)      Past Surgical History:   Procedure Laterality Date     C PELVIS/HIP JOINT SURGERY UNLISTED       JOINT REPLACEMENT, HIP RT/LT      (L)     JOINT REPLACEMENT, HIP RT/LT  3/2013    (R)     SURGICAL HISTORY OF -       Lithotripsy     Social History:  reports that she has never smoked. She has never used smokeless tobacco. She reports that she does not drink alcohol or use drugs.    Family History:  There is no family history of skin cancer.    Medications:  Current Outpatient Medications   Medication Sig Dispense Refill     albuterol (PROAIR RESPICLICK) 108 (90 Base) MCG/ACT inhaler Inhale 2 puffs into the lungs every 6 hours as needed for shortness of breath / dyspnea 1 Inhaler 3     aluminum chloride (DRYSOL) 20 % external solution Apply topically At Bedtime (Patient not taking: Reported on 8/28/2020) 60 mL 3     Cholecalciferol (VITAMIN D) 1000 UNIT capsule Take 1 capsule by mouth daily.       famotidine (PEPCID) 20 MG tablet Take 1 tablet (20 mg) by mouth 2 times daily 180 tablet 3     folic acid (FOLVITE) 1 MG tablet Take 1 tablet (1 mg) by mouth daily 90 tablet 11     inFLIXimab (REMICADE) 100 MG injection Inject 300 mg into the vein as needed 30 mL 0     levothyroxine (SYNTHROID/LEVOTHROID) 112 MCG tablet Take 1 tablet (112 mcg) by mouth daily 90 tablet 3     losartan (COZAAR) 25 MG tablet Take 1 tablet (25 mg) by mouth daily 90 tablet 3     methotrexate 2.5 MG tablet Take 6 tablets (15 mg) by mouth every 7 days 72 tablet 0     norethin-eth estrad triphasic (ARANELLE) 0.5/1/0.5-35 MG-MCG tablet Take 1 tablet by mouth daily 84 tablet 3     Allergies   Allergen Reactions     Amoxicillin Nausea and Cramps     Review of Systems:  -As per HPI  -Constitutional: The patient denies fatigue, fevers, chills, unintended weight loss, and night sweats.  -HEENT: Patient denies nonhealing oral sores.  -Skin: As above in HPI.  No additional skin concerns.    Physical exam:  Vitals: There were no vitals taken for this visit.  GEN: This is a well developed, well-nourished female in no acute distress, in a pleasant mood.    SKIN: Full skin, which includes the head/face, both arms, chest, back, abdomen,both legs, genitalia and/or groin buttocks, digits and/or nails, was examined.  -There is an erythematous macule with overyling adherent scale on the x1 on the L upper arm  -Larry's skin type II, greater than 100 nevi.  -There is no erythema, telangectasias, nodularity, or pigmentation on the L thigh.  -No other lesions of concern on areas examined.     Impression/Plan:  1. Actinic keratosis  -Cryotherapy procedure note: After verbal consent and discussion of risks and benefits including but no limited to dyspigmentation/scar, blister, and pain, 1 was(were) treated with 1-2mm freeze border for 2 cycles with liquid nitrogen. Post cryotherapy instructions were provided.  -Continue with skin checks every 6mo    2. History of nonmelanoma skin cancer, no clincial evidence of recurrence/ Skin cancer screening  -Continue with skin exams every 6mo  -ABCD's of melanoma were reviewed with patient and handout provided.   -Sunscreen: Apply 20 minutes prior to going outdoors and reapply every two hours, when wet or sweating. We recommend using an SPF 30 or higher, and to use one that is water resistant.       CC Dr. Walker on close of this encounter.  Follow-up in 6 months, earlier for new or changing lesions.       Staff Involved:  Staff Only  All risks, benefits and alternatives were discussed with patient.  Patient is in agreement and understands the assessment and plan.  All questions were answered.    Jannet Segovia PA-C, MPAS  [facility]: Phone: [phone], Fax: [phone]  [facility]: Phone: [phone],  Fax: [phone]                Again,  thank you for allowing me to participate in the care of your patient.        Sincerely,        Jannet Segovia PA-C

## 2020-11-04 NOTE — PROGRESS NOTES
[facility] Dermatology Note      Dermatology Problem List:  1. BCC, left anterior thigh  - Treated with WLE on 4/27/20  - Wound appears to be healing appropriately. Patient reports having removed the sutures already.   - Ok to treat as normal skin.   - continue sun voidance and protection.   - Follow up with skin cancer screening in 6 months.   2. AK - L upper arm x1 - s/p cryo  3. Hx of DN - R buttocks - s/p excision ~2005 (outside dermatologist)    CC:   Chief Complaint   Patient presents with     Skin Check     6 month skin exam, hx of NMSC - no new concerns     Encounter Date: Nov 4, 2020    History of Present Illness:  Ms. Noni Mccormick is a 50 year old female who returns today for a FBSE. Immunosuppressed. On Remicade infusions every 5 weeks for RA. Also on methotrexate. Has been on some kind of immunosuppressive medication for around 20 years. No new spots of concern today. Most recently had BCC excised on the L anterior thigh. Has hx of DN in the past on the R buttock which she thinks was excised around 15 years ago. She has had biopsies of moles in the past. No fhx skin cancer or fhx of melanoma. She is otherwise well and has no other concerns.      Past Medical History:   Patient Active Problem List   Diagnosis     Hypothyroidism     CARDIOVASCULAR SCREENING; LDL GOAL LESS THAN 160     Nephrolithiasis     High risk medications (not anticoagulants) long-term use     Injury of left hip     Intermittent asthma     Chronic polyarticular juvenile rheumatoid arthritis (H)     Past Medical History:   Diagnosis Date     Contraception      Grave's disease      Hypothyroid      Inflammatory arthritis      Intermittent asthma 9/10/2013     Nephrolithiasis 6/16/2011     Osteopenia 2/14/2013     Rheumatoid arthritis(714.0)      Past Surgical History:   Procedure Laterality Date     C PELVIS/HIP JOINT SURGERY UNLISTED       JOINT REPLACEMENT, HIP RT/LT      (L)     JOINT REPLACEMENT, HIP RT/LT   3/2013    (R)     SURGICAL HISTORY OF -       Lithotripsy     Social History:  reports that she has never smoked. She has never used smokeless tobacco. She reports that she does not drink alcohol or use drugs.    Family History:  There is no family history of skin cancer.    Medications:  Current Outpatient Medications   Medication Sig Dispense Refill     albuterol (PROAIR RESPICLICK) 108 (90 Base) MCG/ACT inhaler Inhale 2 puffs into the lungs every 6 hours as needed for shortness of breath / dyspnea 1 Inhaler 3     aluminum chloride (DRYSOL) 20 % external solution Apply topically At Bedtime (Patient not taking: Reported on 8/28/2020) 60 mL 3     Cholecalciferol (VITAMIN D) 1000 UNIT capsule Take 1 capsule by mouth daily.       famotidine (PEPCID) 20 MG tablet Take 1 tablet (20 mg) by mouth 2 times daily 180 tablet 3     folic acid (FOLVITE) 1 MG tablet Take 1 tablet (1 mg) by mouth daily 90 tablet 11     inFLIXimab (REMICADE) 100 MG injection Inject 300 mg into the vein as needed 30 mL 0     levothyroxine (SYNTHROID/LEVOTHROID) 112 MCG tablet Take 1 tablet (112 mcg) by mouth daily 90 tablet 3     losartan (COZAAR) 25 MG tablet Take 1 tablet (25 mg) by mouth daily 90 tablet 3     methotrexate 2.5 MG tablet Take 6 tablets (15 mg) by mouth every 7 days 72 tablet 0     norethin-eth estrad triphasic (ARANELLE) 0.5/1/0.5-35 MG-MCG tablet Take 1 tablet by mouth daily 84 tablet 3     Allergies   Allergen Reactions     Amoxicillin Nausea and Cramps     Review of Systems:  -As per HPI  -Constitutional: The patient denies fatigue, fevers, chills, unintended weight loss, and night sweats.  -HEENT: Patient denies nonhealing oral sores.  -Skin: As above in HPI. No additional skin concerns.    Physical exam:  Vitals: There were no vitals taken for this visit.  GEN: This is a well developed, well-nourished female in no acute distress, in a pleasant mood.    SKIN: Full skin, which includes the head/face, both arms, chest, back,  abdomen,both legs, genitalia and/or groin buttocks, digits and/or nails, was examined.  -There is an erythematous macule with overyling adherent scale on the x1 on the L upper arm  -Larry's skin type II, greater than 100 nevi.  -There is no erythema, telangectasias, nodularity, or pigmentation on the L thigh.  -No other lesions of concern on areas examined.     Impression/Plan:  1. Actinic keratosis  -Cryotherapy procedure note: After verbal consent and discussion of risks and benefits including but no limited to dyspigmentation/scar, blister, and pain, 1 was(were) treated with 1-2mm freeze border for 2 cycles with liquid nitrogen. Post cryotherapy instructions were provided.  -Continue with skin checks every 6mo    2. History of nonmelanoma skin cancer, no clincial evidence of recurrence/ Skin cancer screening  -Continue with skin exams every 6mo  -ABCD's of melanoma were reviewed with patient and handout provided.   -Sunscreen: Apply 20 minutes prior to going outdoors and reapply every two hours, when wet or sweating. We recommend using an SPF 30 or higher, and to use one that is water resistant.       CC Dr. Walker on close of this encounter.  Follow-up in 6 months, earlier for new or changing lesions.       Staff Involved:  Staff Only  All risks, benefits and alternatives were discussed with patient.  Patient is in agreement and understands the assessment and plan.  All questions were answered.    Jannet Segovia PA-C, MPAS  [facility] [place]: Phone: [phone], Fax: [phone]  [facility]: Phone: [phone],  Fax: [phone]

## 2020-11-04 NOTE — NURSING NOTE
Noni Mccormick's goals for this visit include:   Chief Complaint   Patient presents with     Skin Check     6 month skin exam, hx of NMSC - no new concerns     She requests these members of her care team be copied on today's visit information: yes    PCP: Sophia Lagos    Referring Provider:  No referring provider defined for this encounter.    There were no vitals taken for this visit.    Do you need any medication refills at today's visit? No    Latonia Justice LPN

## 2020-11-06 ENCOUNTER — INFUSION THERAPY VISIT (OUTPATIENT)
Dept: INFUSION THERAPY | Facility: CLINIC | Age: 50
End: 2020-11-06
Payer: COMMERCIAL

## 2020-11-06 VITALS
OXYGEN SATURATION: 99 % | WEIGHT: 132.7 LBS | HEART RATE: 63 BPM | TEMPERATURE: 98.6 F | SYSTOLIC BLOOD PRESSURE: 129 MMHG | DIASTOLIC BLOOD PRESSURE: 78 MMHG | RESPIRATION RATE: 16 BRPM | BODY MASS INDEX: 23.51 KG/M2

## 2020-11-06 DIAGNOSIS — Z79.899 HIGH RISK MEDICATIONS (NOT ANTICOAGULANTS) LONG-TERM USE: ICD-10-CM

## 2020-11-06 DIAGNOSIS — M08.3 CHRONIC POLYARTICULAR JUVENILE RHEUMATOID ARTHRITIS (H): Primary | ICD-10-CM

## 2020-11-06 PROCEDURE — 99207 PR NO CHARGE NURSE ONLY: CPT

## 2020-11-06 PROCEDURE — 96413 CHEMO IV INFUSION 1 HR: CPT | Performed by: INTERNAL MEDICINE

## 2020-11-06 RX ORDER — DIPHENHYDRAMINE HCL 25 MG
25 CAPSULE ORAL ONCE
Status: CANCELLED
Start: 2020-11-06 | End: 2020-11-06

## 2020-11-06 RX ORDER — ACETAMINOPHEN 325 MG/1
650 TABLET ORAL ONCE
Status: CANCELLED
Start: 2020-11-06 | End: 2020-11-06

## 2020-11-06 RX ORDER — DIPHENHYDRAMINE HCL 25 MG
25 CAPSULE ORAL ONCE
Status: COMPLETED | OUTPATIENT
Start: 2020-11-06 | End: 2020-11-06

## 2020-11-06 RX ORDER — ACETAMINOPHEN 325 MG/1
650 TABLET ORAL ONCE
Status: COMPLETED | OUTPATIENT
Start: 2020-11-06 | End: 2020-11-06

## 2020-11-06 RX ORDER — METHYLPREDNISOLONE SODIUM SUCCINATE 125 MG/2ML
125 INJECTION, POWDER, LYOPHILIZED, FOR SOLUTION INTRAMUSCULAR; INTRAVENOUS ONCE
Status: CANCELLED
Start: 2020-11-06 | End: 2020-11-06

## 2020-11-06 RX ADMIN — Medication 250 ML: at 13:15

## 2020-11-06 RX ADMIN — Medication 50 MG: at 13:15

## 2020-11-06 RX ADMIN — ACETAMINOPHEN 650 MG: 325 TABLET ORAL at 13:16

## 2020-11-06 ASSESSMENT — PAIN SCALES - GENERAL: PAINLEVEL: MODERATE PAIN (4)

## 2020-11-06 NOTE — PROGRESS NOTES
Infusion Nursing Note:  Noni Mccormick presents today for Rapid Remicade.    Patient seen by provider today: No   present during visit today: Not Applicable.    Note: Patient usually takes pre meds at home, forgot today. Without premeds will sometimes feel flu like the next day.    Intravenous Access:  Peripheral IV placed.    Treatment Conditions:  Biological Infusion Checklist:  ~~~ NOTE: If the patient answers yes to any of the questions below, hold the infusion and contact ordering provider or on-call provider.    1. Have you recently had an elevated temperature, fever, chills, productive cough, coughing for 3 weeks or longer or hemoptysis, abnormal vital signs, night sweats,  chest pain or have you noticed a decrease in your appetite, unexplained weight loss or fatigue? No  2. Do you have any open wounds or new incisions? No  3. Do you have any recent or upcoming hospitalizations, surgeries or dental procedures? No  4. Do you currently have or recently have had any signs of illness or infection or are you on any antibiotics? No  5. Have you had any new, sudden or worsening abdominal pain? No  6. Have you or anyone in your household received a live vaccination in the past 4 weeks? Please note:  No live vaccines while on biologic/chemotherapy until 6 months after the last treatment.  Patient can receive the flu vaccine (shot only) and the pneumovax.  It is optimal for the patient to get these vaccines mid cycle, but they can be given at any time as long as it is not on the day of the infusion. No  7. Have you recently been diagnosed with any new nervous system diseases (ie. Multiple sclerosis, Guillain [place], seizures, neurological changes) or cancer diagnosis? No  8. Are you on any form of radiation or chemotherapy? No  9. Are you pregnant or breast feeding or do you have plans of pregnancy in the future? No  10. Have you been having any signs of worsening depression or suicidal ideations?   (benlysta only) No  11. Have there been any other new onset medical symptoms? No    Handed off report to infusion RN Krista Thomas at 1400.    Post Infusion Assessment:  Patient tolerated infusion without incident.  Blood return noted pre and post infusion.  Site patent and intact, free from redness, edema or discomfort.  Access discontinued per protocol.       Discharge Plan:   Patient will return 12/11/20 for next appointment.   Patient discharged in stable condition accompanied by: self.  Departure Mode: Ambulatory.

## 2020-11-13 ENCOUNTER — ANCILLARY PROCEDURE (OUTPATIENT)
Dept: MAMMOGRAPHY | Facility: CLINIC | Age: 50
End: 2020-11-13
Attending: FAMILY MEDICINE
Payer: COMMERCIAL

## 2020-11-13 DIAGNOSIS — Z12.31 SCREENING MAMMOGRAM FOR HIGH-RISK PATIENT: ICD-10-CM

## 2020-11-13 DIAGNOSIS — M06.09 RHEUMATOID ARTHRITIS OF MULTIPLE SITES WITHOUT RHEUMATOID FACTOR (H): ICD-10-CM

## 2020-11-13 PROCEDURE — 77067 SCR MAMMO BI INCL CAD: CPT | Performed by: RADIOLOGY

## 2020-11-17 NOTE — TELEPHONE ENCOUNTER
METHOTREXATE 2.5 MG TABLET        Last Written Prescription Date:  6/4/20  Last Fill Quantity: 72,   # refills: 0  Last Office Visit: 6/4/20  Future Office visit:  none    CBC RESULTS:   Recent Labs   Lab Test 10/02/20  1136   WBC 5.5   RBC 4.81   HGB 14.3   HCT 43.7   MCV 91   MCH 29.7   MCHC 32.7   RDW 12.9   *       Creatinine   Date Value Ref Range Status   10/02/2020 0.69 0.52 - 1.04 mg/dL Final   ]    Liver Function Studies -   Recent Labs   Lab Test 10/02/20  1136 03/30/18  0820 03/30/18  0820   PROTTOTAL  --   --  7.6   ALBUMIN 3.7   < > 3.2*   BILITOTAL  --   --  0.5   ALKPHOS  --   --  27*   AST 17   < > 16   ALT 18   < > 18    < > = values in this interval not displayed.       Routing refill request to provider for review/approval because:  Per protocol  Thank-you, Lashell SHARMA RN Medication Refill Team

## 2020-11-17 NOTE — PATIENT INSTRUCTIONS
Cryotherapy    What is it?    Use of a very cold liquid, such as liquid nitrogen, to freeze and destroy abnormal skin cells that need to be removed    What should I expect?    Tenderness and redness    A small blister that might grow and fill with dark purple blood. There may be crusting.    More than one treatment may be needed if the lesions do not go away.    How do I care for the treated area?    Gently wash the area with your hands when bathing.    Use a thin layer of Vaseline to help with healing. You may use a Band-Aid.     The area should heal within 7-10 days and may leave behind a pink or lighter color.     Do not use an antibiotic or Neosporin ointment.     You may take acetaminophen (Tylenol) for pain.     Call your Doctor if you have:    Severe pain    Signs of infection (warmth, redness, cloudy yellow drainage, and or a bad smell)    Questions or concerns    Who should I call with questions?       [facility]: [phone]       [facility]: [phone]       For urgent needs outside of business hours call the [facility] at [phone]        and ask for the dermatology resident on call

## 2020-12-11 ENCOUNTER — INFUSION THERAPY VISIT (OUTPATIENT)
Dept: INFUSION THERAPY | Facility: CLINIC | Age: 50
End: 2020-12-11
Payer: COMMERCIAL

## 2020-12-11 VITALS
WEIGHT: 130.8 LBS | TEMPERATURE: 98.6 F | SYSTOLIC BLOOD PRESSURE: 115 MMHG | RESPIRATION RATE: 16 BRPM | HEART RATE: 64 BPM | OXYGEN SATURATION: 97 % | BODY MASS INDEX: 23.17 KG/M2 | DIASTOLIC BLOOD PRESSURE: 75 MMHG

## 2020-12-11 DIAGNOSIS — Z79.899 HIGH RISK MEDICATIONS (NOT ANTICOAGULANTS) LONG-TERM USE: ICD-10-CM

## 2020-12-11 DIAGNOSIS — M08.3 CHRONIC POLYARTICULAR JUVENILE RHEUMATOID ARTHRITIS (H): Primary | ICD-10-CM

## 2020-12-11 DIAGNOSIS — M06.09 RHEUMATOID ARTHRITIS OF MULTIPLE SITES WITHOUT RHEUMATOID FACTOR (H): ICD-10-CM

## 2020-12-11 LAB
ALBUMIN SERPL-MCNC: 3.6 G/DL (ref 3.4–5)
ALT SERPL W P-5'-P-CCNC: 16 U/L (ref 0–50)
AST SERPL W P-5'-P-CCNC: 12 U/L (ref 0–45)
CREAT SERPL-MCNC: 0.61 MG/DL (ref 0.52–1.04)
CRP SERPL-MCNC: 13.5 MG/L (ref 0–8)
ERYTHROCYTE [DISTWIDTH] IN BLOOD BY AUTOMATED COUNT: 12.6 % (ref 10–15)
ERYTHROCYTE [SEDIMENTATION RATE] IN BLOOD BY WESTERGREN METHOD: 67 MM/H (ref 0–30)
GFR SERPL CREATININE-BSD FRML MDRD: >90 ML/MIN/{1.73_M2}
HCT VFR BLD AUTO: 40.1 % (ref 35–47)
HGB BLD-MCNC: 12.9 G/DL (ref 11.7–15.7)
MCH RBC QN AUTO: 29.7 PG (ref 26.5–33)
MCHC RBC AUTO-ENTMCNC: 32.2 G/DL (ref 31.5–36.5)
MCV RBC AUTO: 92 FL (ref 78–100)
PLATELET # BLD AUTO: 426 10E9/L (ref 150–450)
RBC # BLD AUTO: 4.34 10E12/L (ref 3.8–5.2)
WBC # BLD AUTO: 8.1 10E9/L (ref 4–11)

## 2020-12-11 PROCEDURE — 85027 COMPLETE CBC AUTOMATED: CPT | Performed by: STUDENT IN AN ORGANIZED HEALTH CARE EDUCATION/TRAINING PROGRAM

## 2020-12-11 PROCEDURE — 99207 PR NO CHARGE LOS: CPT

## 2020-12-11 PROCEDURE — 82565 ASSAY OF CREATININE: CPT | Performed by: STUDENT IN AN ORGANIZED HEALTH CARE EDUCATION/TRAINING PROGRAM

## 2020-12-11 PROCEDURE — 85652 RBC SED RATE AUTOMATED: CPT | Performed by: STUDENT IN AN ORGANIZED HEALTH CARE EDUCATION/TRAINING PROGRAM

## 2020-12-11 PROCEDURE — 84460 ALANINE AMINO (ALT) (SGPT): CPT | Performed by: STUDENT IN AN ORGANIZED HEALTH CARE EDUCATION/TRAINING PROGRAM

## 2020-12-11 PROCEDURE — 36415 COLL VENOUS BLD VENIPUNCTURE: CPT | Performed by: STUDENT IN AN ORGANIZED HEALTH CARE EDUCATION/TRAINING PROGRAM

## 2020-12-11 PROCEDURE — 82040 ASSAY OF SERUM ALBUMIN: CPT | Performed by: STUDENT IN AN ORGANIZED HEALTH CARE EDUCATION/TRAINING PROGRAM

## 2020-12-11 PROCEDURE — 96413 CHEMO IV INFUSION 1 HR: CPT | Performed by: NURSE PRACTITIONER

## 2020-12-11 PROCEDURE — 96375 TX/PRO/DX INJ NEW DRUG ADDON: CPT | Performed by: NURSE PRACTITIONER

## 2020-12-11 PROCEDURE — 84450 TRANSFERASE (AST) (SGOT): CPT | Performed by: STUDENT IN AN ORGANIZED HEALTH CARE EDUCATION/TRAINING PROGRAM

## 2020-12-11 PROCEDURE — 86140 C-REACTIVE PROTEIN: CPT | Performed by: STUDENT IN AN ORGANIZED HEALTH CARE EDUCATION/TRAINING PROGRAM

## 2020-12-11 RX ORDER — METHYLPREDNISOLONE SODIUM SUCCINATE 125 MG/2ML
125 INJECTION, POWDER, LYOPHILIZED, FOR SOLUTION INTRAMUSCULAR; INTRAVENOUS ONCE
Status: CANCELLED
Start: 2020-12-11 | End: 2020-12-11

## 2020-12-11 RX ORDER — DIPHENHYDRAMINE HCL 25 MG
25 CAPSULE ORAL ONCE
Status: CANCELLED
Start: 2020-12-11 | End: 2020-12-11

## 2020-12-11 RX ORDER — DIPHENHYDRAMINE HCL 25 MG
25 CAPSULE ORAL ONCE
Status: COMPLETED | OUTPATIENT
Start: 2020-12-11 | End: 2020-12-11

## 2020-12-11 RX ORDER — ACETAMINOPHEN 325 MG/1
650 TABLET ORAL ONCE
Status: CANCELLED
Start: 2020-12-11 | End: 2020-12-11

## 2020-12-11 RX ORDER — METHYLPREDNISOLONE SODIUM SUCCINATE 125 MG/2ML
125 INJECTION, POWDER, LYOPHILIZED, FOR SOLUTION INTRAMUSCULAR; INTRAVENOUS ONCE
Status: COMPLETED | OUTPATIENT
Start: 2020-12-11 | End: 2020-12-11

## 2020-12-11 RX ADMIN — Medication 25 MG: at 11:34

## 2020-12-11 RX ADMIN — METHYLPREDNISOLONE SODIUM SUCCINATE 125 MG: 125 INJECTION INTRAMUSCULAR; INTRAVENOUS at 11:45

## 2020-12-11 RX ADMIN — Medication 250 ML: at 11:45

## 2020-12-11 ASSESSMENT — PAIN SCALES - GENERAL: PAINLEVEL: MILD PAIN (3)

## 2020-12-11 NOTE — LETTER
December 14, 2020      Noni Mccormick  [address] [facility] [address]        Dear ,    We are writing to inform you of your test results.    Normal monitoring labs - normal liver, kidney tests and cell counts except for elevated inflammatory markers. Let us know if there is any worsening symptoms.     Resulted Orders   Erythrocyte sedimentation rate auto   Result Value Ref Range    Sed Rate 67 (H) 0 - 30 mm/h   CBC with platelets   Result Value Ref Range    WBC 8.1 4.0 - 11.0 10e9/L    RBC Count 4.34 3.8 - 5.2 10e12/L    Hemoglobin 12.9 11.7 - 15.7 g/dL    Hematocrit 40.1 35.0 - 47.0 %    MCV 92 78 - 100 fl    MCH 29.7 26.5 - 33.0 pg    MCHC 32.2 31.5 - 36.5 g/dL    RDW 12.6 10.0 - 15.0 %    Platelet Count 426 150 - 450 10e9/L   AST   Result Value Ref Range    AST 12 0 - 45 U/L   ALT   Result Value Ref Range    ALT 16 0 - 50 U/L   Albumin level   Result Value Ref Range    Albumin 3.6 3.4 - 5.0 g/dL   Creatinine   Result Value Ref Range    Creatinine 0.61 0.52 - 1.04 mg/dL    GFR Estimate >90 >60 mL/min/[1.73_m2]      Comment:      Non  GFR Calc  Starting 12/18/2018, serum creatinine based estimated GFR (eGFR) will be   calculated using the Chronic Kidney Disease Epidemiology Collaboration   (CKD-EPI) equation.      GFR Estimate If Black >90 >60 mL/min/[1.73_m2]      Comment:       GFR Calc  Starting 12/18/2018, serum creatinine based estimated GFR (eGFR) will be   calculated using the Chronic Kidney Disease Epidemiology Collaboration   (CKD-EPI) equation.     CRP inflammation   Result Value Ref Range    CRP Inflammation 13.5 (H) 0.0 - 8.0 mg/L       If you have any questions or concerns, please call the clinic at the number listed above.       Sincerely,      Sterling Gonzalez MD

## 2020-12-11 NOTE — PROGRESS NOTES
Infusion Nursing Note:  Noni Mccormick presents today for rapid remicade.    Patient seen by provider today: No   present during visit today: Not Applicable.    Intravenous Access:  Peripheral IV placed.    Treatment Conditions:  Biological Infusion Checklist:  ~~~ NOTE: If the patient answers yes to any of the questions below, hold the infusion and contact ordering provider or on-call provider.    1. Have you recently had an elevated temperature, fever, chills, productive cough, coughing for 3 weeks or longer or hemoptysis, abnormal vital signs, night sweats,  chest pain or have you noticed a decrease in your appetite, unexplained weight loss or fatigue? No  2. Do you have any open wounds or new incisions? No  3. Do you have any recent or upcoming hospitalizations, surgeries or dental procedures? No  4. Do you currently have or recently have had any signs of illness or infection or are you on any antibiotics? No  5. Have you had any new, sudden or worsening abdominal pain? No  6. Have you or anyone in your household received a live vaccination in the past 4 weeks? Please note:  No live vaccines while on biologic/chemotherapy until 6 months after the last treatment.  Patient can receive the flu vaccine (shot only) and the pneumovax.  It is optimal for the patient to get these vaccines mid cycle, but they can be given at any time as long as it is not on the day of the infusion. No  7. Have you recently been diagnosed with any new nervous system diseases (ie. Multiple sclerosis, Guillain [place], seizures, neurological changes) or cancer diagnosis? No  8. Are you on any form of radiation or chemotherapy? No  9. Are you pregnant or breast feeding or do you have plans of pregnancy in the future? No  10. Have you been having any signs of worsening depression or suicidal ideations?  (benlysta only) No  11. Have there been any other new onset medical symptoms? No        Post Infusion Assessment:  Patient  tolerated infusion without incident.  Blood return noted pre and post infusion.  Site patent and intact, free from redness, edema or discomfort.  No evidence of extravasations.  Access discontinued per protocol.       Discharge Plan:   Copy of AVS reviewed with patient and/or family.  Patient will return 1/15/21 for next appointment.  Patient discharged in stable condition accompanied by: self.  Departure Mode: Ambulatory.    Jessica Ronquillo RN

## 2020-12-12 NOTE — RESULT ENCOUNTER NOTE
Normal monitoring labs - normal liver, kidney tests and cell counts except for elevated inflammatory markers. Let us know if there is any worsening symptoms.     Sterling Gonzalez MD

## 2020-12-30 ENCOUNTER — TELEPHONE (OUTPATIENT)
Dept: RHEUMATOLOGY | Facility: CLINIC | Age: 50
End: 2020-12-30

## 2020-12-30 NOTE — TELEPHONE ENCOUNTER
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

## 2020-12-31 NOTE — TELEPHONE ENCOUNTER
Spoke with Noni. 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 As of right now she is leaning towards not getting the vaccine. I did tell her that we anticipate that our patients should be able to get the vaccine and there's not reason from out standpoint that she shouldn't get it. I did tell her I would route to Dr. Gonzalez to confirm. With the long weekend I told her we would probably get back to her sometime next week. She was agreeable and had no further questions at this time.     Batsheva Herman, BSN, RN  Medical Specialty Care Coordinator  [facility]

## 2021-01-06 NOTE — TELEPHONE ENCOUNTER
We are recommending our patients to get COVID-19 vaccine to prevent infection.  It is not a live vaccine hence it is not contraindicated in patients on immunosuppressive medications.  Those patients who have history of severe allergic reaction to other vaccines are advised not to take it.

## 2021-01-06 NOTE — TELEPHONE ENCOUNTER
Called patient and reviewed Dr. Gonzalez's recommendations:    We are recommending our patients to get COVID-19 vaccine to prevent infection.  It is not a live vaccine hence it is not contraindicated in patients on immunosuppressive medications.  Those patients who have history of severe allergic reaction to other vaccines are advised not to take it.    Patient verbalized understanding and had no further questions at this time.     Batsheva Herman, BSN, RN  Medical Specialty Care Coordinator  [facility]

## 2021-01-15 ENCOUNTER — INFUSION THERAPY VISIT (OUTPATIENT)
Dept: INFUSION THERAPY | Facility: CLINIC | Age: 51
End: 2021-01-15
Payer: COMMERCIAL

## 2021-01-15 VITALS
DIASTOLIC BLOOD PRESSURE: 77 MMHG | TEMPERATURE: 98.5 F | HEART RATE: 84 BPM | BODY MASS INDEX: 23.38 KG/M2 | WEIGHT: 132 LBS | RESPIRATION RATE: 16 BRPM | OXYGEN SATURATION: 98 % | SYSTOLIC BLOOD PRESSURE: 120 MMHG

## 2021-01-15 DIAGNOSIS — Z79.899 HIGH RISK MEDICATIONS (NOT ANTICOAGULANTS) LONG-TERM USE: ICD-10-CM

## 2021-01-15 DIAGNOSIS — M08.3 CHRONIC POLYARTICULAR JUVENILE RHEUMATOID ARTHRITIS (H): Primary | ICD-10-CM

## 2021-01-15 PROCEDURE — 96413 CHEMO IV INFUSION 1 HR: CPT | Performed by: INTERNAL MEDICINE

## 2021-01-15 PROCEDURE — 99207 PR NO CHARGE LOS: CPT

## 2021-01-15 PROCEDURE — 96375 TX/PRO/DX INJ NEW DRUG ADDON: CPT | Performed by: INTERNAL MEDICINE

## 2021-01-15 RX ORDER — METHYLPREDNISOLONE SODIUM SUCCINATE 125 MG/2ML
125 INJECTION, POWDER, LYOPHILIZED, FOR SOLUTION INTRAMUSCULAR; INTRAVENOUS ONCE
Status: COMPLETED | OUTPATIENT
Start: 2021-01-15 | End: 2021-01-15

## 2021-01-15 RX ORDER — METHYLPREDNISOLONE SODIUM SUCCINATE 125 MG/2ML
125 INJECTION, POWDER, LYOPHILIZED, FOR SOLUTION INTRAMUSCULAR; INTRAVENOUS ONCE
Status: CANCELLED
Start: 2021-01-15 | End: 2021-01-15

## 2021-01-15 RX ORDER — DIPHENHYDRAMINE HCL 25 MG
25 CAPSULE ORAL ONCE
Status: CANCELLED
Start: 2021-01-15 | End: 2021-01-15

## 2021-01-15 RX ORDER — DIPHENHYDRAMINE HCL 25 MG
25 CAPSULE ORAL ONCE
Status: COMPLETED | OUTPATIENT
Start: 2021-01-15 | End: 2021-01-15

## 2021-01-15 RX ORDER — ACETAMINOPHEN 325 MG/1
650 TABLET ORAL ONCE
Status: CANCELLED
Start: 2021-01-15 | End: 2021-01-15

## 2021-01-15 RX ADMIN — Medication 250 ML: at 13:30

## 2021-01-15 RX ADMIN — Medication 25 MG: at 13:37

## 2021-01-15 RX ADMIN — METHYLPREDNISOLONE SODIUM SUCCINATE 125 MG: 125 INJECTION INTRAMUSCULAR; INTRAVENOUS at 13:38

## 2021-01-15 ASSESSMENT — PAIN SCALES - GENERAL: PAINLEVEL: MILD PAIN (3)

## 2021-01-15 NOTE — PROGRESS NOTES
Infusion Nursing Note:  Noni Payton Mccormick presents today for: Remicade.    Patient seen by provider today: No   present during visit today: Not Applicable.    Note: denies any side effects from previous infusions.    Intravenous Access:  Peripheral IV placed.    Treatment Conditions:  Biological Infusion Checklist:  ~~~ NOTE: If the patient answers yes to any of the questions below, hold the infusion and contact ordering provider or on-call provider.    1. Have you recently had an elevated temperature, fever, chills, productive cough, coughing for 3 weeks or longer or hemoptysis, abnormal vital signs, night sweats,  chest pain or have you noticed a decrease in your appetite, unexplained weight loss or fatigue? No  2. Do you have any open wounds or new incisions? No  3. Do you have any recent or upcoming hospitalizations, surgeries or dental procedures? No  4. Do you currently have or recently have had any signs of illness or infection or are you on any antibiotics? No  5. Have you had any new, sudden or worsening abdominal pain? No  6. Have you or anyone in your household received a live vaccination in the past 4 weeks? Please note:  No live vaccines while on biologic/chemotherapy until 6 months after the last treatment.  Patient can receive the flu vaccine (shot only) and the pneumovax.  It is optimal for the patient to get these vaccines mid cycle, but they can be given at any time as long as it is not on the day of the infusion. No  7. Have you recently been diagnosed with any new nervous system diseases (ie. Multiple sclerosis, Guillain [place], seizures, neurological changes) or cancer diagnosis? No  8. Are you on any form of radiation or chemotherapy? No  9. Are you pregnant or breast feeding or do you have plans of pregnancy in the future? No  10. Have you been having any signs of worsening depression or suicidal ideations?  (benlysta only) No  11. Have there been any other new onset medical  symptoms? No    Post Infusion Assessment:  Biologic Infusion Post Education: Call the triage nurse at your clinic or seek medical attention if you have chills and/or temperature greater than or equal to 100.5, uncontrolled nausea/vomiting, diarrhea, constipation, dizziness, shortness of breath, chest pain, heart palpitations, weakness or any other new or concerning symptoms, questions or concerns.  You cannot have any live virus vaccines prior to or during treatment or up to 6 months post infusion.  If you have an upcoming surgery, medical procedure or dental procedure during treatment, this should be discussed with your ordering physician and your surgeon/dentist.  If you are having any concerning symptom, if you are unsure if you should get your next infusion or wish to speak to a provider before your next infusion, please call your care coordinator or triage nurse at your clinic to notify them so we can adequately serve you.       Discharge Plan:   RTC as scheduled in 8 wks for lab and infusion .    ZAINAB KRISHNAN RN

## 2021-01-20 ENCOUNTER — VIRTUAL VISIT (OUTPATIENT)
Dept: RHEUMATOLOGY | Facility: CLINIC | Age: 51
End: 2021-01-20
Payer: COMMERCIAL

## 2021-01-20 DIAGNOSIS — M06.09 RHEUMATOID ARTHRITIS OF MULTIPLE SITES WITHOUT RHEUMATOID FACTOR (H): ICD-10-CM

## 2021-01-20 PROCEDURE — 99214 OFFICE O/P EST MOD 30 MIN: CPT | Mod: 95 | Performed by: STUDENT IN AN ORGANIZED HEALTH CARE EDUCATION/TRAINING PROGRAM

## 2021-01-20 RX ORDER — FOLIC ACID 1 MG/1
2 TABLET ORAL DAILY
Qty: 180 TABLET | Refills: 11 | Status: SHIPPED | OUTPATIENT
Start: 2021-01-20 | End: 2022-02-17

## 2021-01-20 NOTE — PATIENT INSTRUCTIONS
Increase methotrexate to 7 tablets and then to 8 tablets once a week    Continue Remicade infusions every 5 weeks    Methotrexate monitoring labs in 1 month    Follow-up in 3 months

## 2021-01-20 NOTE — PROGRESS NOTES
Noni is a 50 year old who is being evaluated via a billable video visit.      How would you like to obtain your AVS? MyChart  If the video visit is dropped, the invitation should be resent by: Text to cell phone: [phone]  Will anyone else be joining your video visit? Angela Olmedo [facility]    Video Start Time: 4:34 PM    Subjective : Noni is a 50-year-old female with past medical history of juvenile rheumatoid arthritis, hypothyroidism evaluated via a billable virtual visit for management of JRA. She was diagnosed with JRA at 3 years of age.  She has been maintained on a stable dose of Remicade 300 mg every 5 weeks and methotrexate 15 mg once a week for more than 10 years.  She denies any side effects with the medication. Her last remicade infusion was last Friday.     Her ESR, CRP has been persistently high for the last 10 years.  Her last sed rate was higher than her previous values.  However she denies any significant worsening.  She does report having joint pain flares 3-4 times a week.  Mostly has swelling in her hands.       Review of systems negative except for those mentioned above    Reviewed past medical, surgical, family history    Pertinent labs:   Component      Latest Ref Rng & Units 12/11/2020   WBC      4.0 - 11.0 10e9/L 8.1   RBC Count      3.8 - 5.2 10e12/L 4.34   Hemoglobin      11.7 - 15.7 g/dL 12.9   Hematocrit      35.0 - 47.0 % 40.1   MCV      78 - 100 fl 92   MCH      26.5 - 33.0 pg 29.7   MCHC      31.5 - 36.5 g/dL 32.2   RDW      10.0 - 15.0 % 12.6   Platelet Count      150 - 450 10e9/L 426   Creatinine      0.52 - 1.04 mg/dL 0.61   GFR Estimate      >60 mL/min/1.73:m2 >90   GFR Estimate If Black      >60 mL/min/1.73:m2 >90   Sed Rate      0 - 30 mm/h 67 (H)   AST      0 - 45 U/L 12   ALT      0 - 50 U/L 16   Albumin      3.4 - 5.0 g/dL 3.6   CRP Inflammation      0.0 - 8.0 mg/L 13.5 (H)     Physical exam: Bilateral fifth finger flexion contracture, swan-neck deformities over  third fourth fingers. No active synovitis noted over MCP, PIP, DIP joints, Bilateral wrists, elbows.  Denies any tenderness over the joints.  Hammertoes present both feet.     Assessment :     Juvenile rheumatoid arthritis  High risk medication use-methotrexate and Remicade    Juvenile rheumatoid arthritis: She has been maintained on Remicade infusions 300 mg every 5 weeks and methotrexate 15 mg once a week.  Her last inflammatory markers were mildly elevated.  She always have elevated inflammatory markers for almost 10 years.  She reports having minor flares 2-3 times a week.  I will increase methotrexate dose to 7 tablets and then to 8 tablets once a week.  I will continue Remicade infusions.     Plan    Increase methotrexate to 20 mg once a week  --q 3 mo AST/ALT, Albumin, CBC with plts  --Limit EtOH intake to 2 drinks weekly; use folate 1 mg daily.  --Tylenol 500 mg tid prn nausea/HA associated with dosing.    Continue Remicade infusions 300 mg every 5 weeks    Folic acid can be increased to 2 mg daily    Follow-up in 3 months       Video-Visit Details    Type of service:  Video Visit    Video End Time:4:45 PM     Originating Location (pt. Location): Home    Distant Location (provider location):  [facility]     Platform used for Video Visit: Regine

## 2021-01-28 DIAGNOSIS — E03.8 OTHER SPECIFIED HYPOTHYROIDISM: ICD-10-CM

## 2021-01-28 RX ORDER — LEVOTHYROXINE SODIUM 112 UG/1
TABLET ORAL
Qty: 90 TABLET | Refills: 0 | Status: SHIPPED | OUTPATIENT
Start: 2021-01-28 | End: 2021-02-24

## 2021-01-28 NOTE — TELEPHONE ENCOUNTER
"Thyroid Protocol Passed    Rerun Protocol (1/28/2021 3:14 PM)     Patient is 12 years or older       Recent (12 mo) or future (30 days) visit within the authorizing provider's specialty    Patient has had an office visit with the authorizing provider or a provider within the authorizing providers department within the previous 12 mos or has a future within next 30 days. See \"Patient Info\" tab in inbasket, or \"Choose Columns\" in Meds & Orders section of the refill encounter.             Medication is active on med list       Normal TSH on file in past 12 months        Recent Labs   Lab Test 02/24/20  1617   TSH 0.52            No active pregnancy on record    If patient is pregnant or has had a positive pregnancy test, please check TSH.        No positive pregnancy test in past 12 months    If patient is pregnant or has had a positive pregnancy test, please check TSH.        Last seen 2/24/2020 for annual exam.    Last prescribed 2/24/2020 for 90 tablets with 3 refills    Prescription approved per [facility].    RN sending aga refill and reminder for pt to schedule annual exam.    Abi Joy RN on 1/28/2021 at 3:19 PM    "

## 2021-02-01 DIAGNOSIS — J45.20 INTERMITTENT ASTHMA, UNCOMPLICATED: ICD-10-CM

## 2021-02-01 RX ORDER — ALBUTEROL SULFATE 90 UG/1
2 POWDER, METERED RESPIRATORY (INHALATION) EVERY 6 HOURS PRN
Qty: 1 EACH | Refills: 11 | Status: SHIPPED | OUTPATIENT
Start: 2021-02-01 | End: 2022-06-01

## 2021-02-19 ENCOUNTER — INFUSION THERAPY VISIT (OUTPATIENT)
Dept: INFUSION THERAPY | Facility: CLINIC | Age: 51
End: 2021-02-19
Payer: COMMERCIAL

## 2021-02-19 VITALS
BODY MASS INDEX: 23.03 KG/M2 | RESPIRATION RATE: 16 BRPM | HEART RATE: 79 BPM | TEMPERATURE: 98.3 F | WEIGHT: 130 LBS | SYSTOLIC BLOOD PRESSURE: 116 MMHG | OXYGEN SATURATION: 98 % | DIASTOLIC BLOOD PRESSURE: 66 MMHG

## 2021-02-19 DIAGNOSIS — M08.3 CHRONIC POLYARTICULAR JUVENILE RHEUMATOID ARTHRITIS (H): Primary | ICD-10-CM

## 2021-02-19 DIAGNOSIS — Z79.899 HIGH RISK MEDICATIONS (NOT ANTICOAGULANTS) LONG-TERM USE: ICD-10-CM

## 2021-02-19 DIAGNOSIS — M06.09 RHEUMATOID ARTHRITIS OF MULTIPLE SITES WITHOUT RHEUMATOID FACTOR (H): ICD-10-CM

## 2021-02-19 LAB
ALBUMIN SERPL-MCNC: 3.3 G/DL (ref 3.4–5)
ALT SERPL W P-5'-P-CCNC: 17 U/L (ref 0–50)
AST SERPL W P-5'-P-CCNC: 13 U/L (ref 0–45)
CREAT SERPL-MCNC: 0.65 MG/DL (ref 0.52–1.04)
CRP SERPL-MCNC: 9.8 MG/L (ref 0–8)
ERYTHROCYTE [DISTWIDTH] IN BLOOD BY AUTOMATED COUNT: 13 % (ref 10–15)
ERYTHROCYTE [SEDIMENTATION RATE] IN BLOOD BY WESTERGREN METHOD: 57 MM/H (ref 0–30)
GFR SERPL CREATININE-BSD FRML MDRD: >90 ML/MIN/{1.73_M2}
HCT VFR BLD AUTO: 39.8 % (ref 35–47)
HGB BLD-MCNC: 13.1 G/DL (ref 11.7–15.7)
MCH RBC QN AUTO: 29.6 PG (ref 26.5–33)
MCHC RBC AUTO-ENTMCNC: 32.9 G/DL (ref 31.5–36.5)
MCV RBC AUTO: 90 FL (ref 78–100)
PLATELET # BLD AUTO: 452 10E9/L (ref 150–450)
RBC # BLD AUTO: 4.43 10E12/L (ref 3.8–5.2)
WBC # BLD AUTO: 6.8 10E9/L (ref 4–11)

## 2021-02-19 PROCEDURE — 82040 ASSAY OF SERUM ALBUMIN: CPT | Performed by: STUDENT IN AN ORGANIZED HEALTH CARE EDUCATION/TRAINING PROGRAM

## 2021-02-19 PROCEDURE — 84460 ALANINE AMINO (ALT) (SGPT): CPT | Performed by: STUDENT IN AN ORGANIZED HEALTH CARE EDUCATION/TRAINING PROGRAM

## 2021-02-19 PROCEDURE — 85652 RBC SED RATE AUTOMATED: CPT | Performed by: STUDENT IN AN ORGANIZED HEALTH CARE EDUCATION/TRAINING PROGRAM

## 2021-02-19 PROCEDURE — 82565 ASSAY OF CREATININE: CPT | Performed by: STUDENT IN AN ORGANIZED HEALTH CARE EDUCATION/TRAINING PROGRAM

## 2021-02-19 PROCEDURE — 85027 COMPLETE CBC AUTOMATED: CPT | Performed by: STUDENT IN AN ORGANIZED HEALTH CARE EDUCATION/TRAINING PROGRAM

## 2021-02-19 PROCEDURE — 84450 TRANSFERASE (AST) (SGOT): CPT | Performed by: STUDENT IN AN ORGANIZED HEALTH CARE EDUCATION/TRAINING PROGRAM

## 2021-02-19 PROCEDURE — 99207 PR NO CHARGE LOS: CPT

## 2021-02-19 PROCEDURE — 96413 CHEMO IV INFUSION 1 HR: CPT | Performed by: INTERNAL MEDICINE

## 2021-02-19 PROCEDURE — 36415 COLL VENOUS BLD VENIPUNCTURE: CPT | Performed by: STUDENT IN AN ORGANIZED HEALTH CARE EDUCATION/TRAINING PROGRAM

## 2021-02-19 PROCEDURE — 86140 C-REACTIVE PROTEIN: CPT | Performed by: STUDENT IN AN ORGANIZED HEALTH CARE EDUCATION/TRAINING PROGRAM

## 2021-02-19 PROCEDURE — 96375 TX/PRO/DX INJ NEW DRUG ADDON: CPT | Performed by: INTERNAL MEDICINE

## 2021-02-19 RX ORDER — DIPHENHYDRAMINE HCL 25 MG
25 CAPSULE ORAL ONCE
Status: COMPLETED | OUTPATIENT
Start: 2021-02-19 | End: 2021-02-19

## 2021-02-19 RX ORDER — DIPHENHYDRAMINE HCL 25 MG
25 CAPSULE ORAL ONCE
Status: CANCELLED
Start: 2021-02-19 | End: 2021-02-19

## 2021-02-19 RX ORDER — METHYLPREDNISOLONE SODIUM SUCCINATE 125 MG/2ML
125 INJECTION, POWDER, LYOPHILIZED, FOR SOLUTION INTRAMUSCULAR; INTRAVENOUS ONCE
Status: CANCELLED
Start: 2021-02-19 | End: 2021-02-19

## 2021-02-19 RX ORDER — METHYLPREDNISOLONE SODIUM SUCCINATE 125 MG/2ML
125 INJECTION, POWDER, LYOPHILIZED, FOR SOLUTION INTRAMUSCULAR; INTRAVENOUS ONCE
Status: COMPLETED | OUTPATIENT
Start: 2021-02-19 | End: 2021-02-19

## 2021-02-19 RX ORDER — ACETAMINOPHEN 325 MG/1
650 TABLET ORAL ONCE
Status: CANCELLED
Start: 2021-02-19 | End: 2021-02-19

## 2021-02-19 RX ADMIN — Medication 25 MG: at 11:39

## 2021-02-19 RX ADMIN — METHYLPREDNISOLONE SODIUM SUCCINATE 125 MG: 125 INJECTION INTRAMUSCULAR; INTRAVENOUS at 11:44

## 2021-02-19 RX ADMIN — Medication 250 ML: at 11:43

## 2021-02-19 ASSESSMENT — PAIN SCALES - GENERAL: PAINLEVEL: NO PAIN (0)

## 2021-02-19 NOTE — PROGRESS NOTES
Infusion Nursing Note:  Noni Mccormick presents today for Remicade.    Patient seen by provider today: No   present during visit today: Not Applicable.    Note: N/A.    Intravenous Access:  Peripheral IV placed.    Treatment Conditions:  Biological Infusion Checklist:  ~~~ NOTE: If the patient answers yes to any of the questions below, hold the infusion and contact ordering provider or on-call provider.    1. Have you recently had an elevated temperature, fever, chills, productive cough, coughing for 3 weeks or longer or hemoptysis, abnormal vital signs, night sweats,  chest pain or have you noticed a decrease in your appetite, unexplained weight loss or fatigue? No  2. Do you have any open wounds or new incisions? No  3. Do you have any recent or upcoming hospitalizations, surgeries or dental procedures? No  4. Do you currently have or recently have had any signs of illness or infection or are you on any antibiotics? No  5. Have you had any new, sudden or worsening abdominal pain? No  6. Have you or anyone in your household received a live vaccination in the past 4 weeks? Please note:  No live vaccines while on biologic/chemotherapy until 6 months after the last treatment.  Patient can receive the flu vaccine (shot only) and the pneumovax.  It is optimal for the patient to get these vaccines mid cycle, but they can be given at any time as long as it is not on the day of the infusion. No  7. Have you recently been diagnosed with any new nervous system diseases (ie. Multiple sclerosis, Guillain [place], seizures, neurological changes) or cancer diagnosis? No  8. Are you on any form of radiation or chemotherapy? No  9. Are you pregnant or breast feeding or do you have plans of pregnancy in the future? No  10. Have you been having any signs of worsening depression or suicidal ideations?  (benlysta only) No  11. Have there been any other new onset medical symptoms? No    Post Infusion Assessment:  Patient  tolerated infusion without incident.  Site patent and intact, free from redness, edema or discomfort.  No evidence of extravasations.  Access discontinued per protocol.       Discharge Plan:   Patient will return 3/26 for next appointment.   Patient discharged in stable condition accompanied by: self.  Departure Mode: Ambulatory.    Krista Thomas RN

## 2021-02-23 NOTE — RESULT ENCOUNTER NOTE
Normal monitoring labs - normal liver, kidney tests and cell counts.  Inflammatory markers are still high, slightly better than before.    Sterling Gonzalez MD  2/23/2021  9:21 AM

## 2021-02-24 ENCOUNTER — TELEPHONE (OUTPATIENT)
Dept: RHEUMATOLOGY | Facility: CLINIC | Age: 51
End: 2021-02-24

## 2021-02-24 NOTE — TELEPHONE ENCOUNTER
Called and LM for patient to inform her that forms were received, signed and faxed. A copy of the form will be scanned into patient's chart.      JOSÉ MIGUEL Negrete [facility] Rheumatology

## 2021-02-24 NOTE — TELEPHONE ENCOUNTER
Health Call Center    Phone Message    May a detailed message be left on voicemail: yes     Reason for Call: Form or Letter   Type or form/letter needing completion: FMLA  Provider: Dr Gonzalez  Date form needed: Friday 2/26  Once completed: Fax form to: [phone]     Patient is calling wondering if we have received her FMLA paperwork that needs completing. She sent this in on Monday 2/22. Please call patient to discuss.       Action Taken: Message routed to:  Adult Clinics: Rheumatology p [phone]    Travel Screening: Not Applicable

## 2021-02-26 ENCOUNTER — OFFICE VISIT (OUTPATIENT)
Dept: OBGYN | Facility: CLINIC | Age: 51
End: 2021-02-26
Payer: COMMERCIAL

## 2021-02-26 VITALS
HEART RATE: 66 BPM | DIASTOLIC BLOOD PRESSURE: 82 MMHG | TEMPERATURE: 98.3 F | BODY MASS INDEX: 22.2 KG/M2 | WEIGHT: 130 LBS | OXYGEN SATURATION: 99 % | HEIGHT: 64 IN | SYSTOLIC BLOOD PRESSURE: 145 MMHG

## 2021-02-26 DIAGNOSIS — Z11.59 NEED FOR HEPATITIS C SCREENING TEST: ICD-10-CM

## 2021-02-26 DIAGNOSIS — Z12.11 SCREEN FOR COLON CANCER: ICD-10-CM

## 2021-02-26 DIAGNOSIS — E03.8 OTHER SPECIFIED HYPOTHYROIDISM: ICD-10-CM

## 2021-02-26 DIAGNOSIS — Z13.6 CARDIOVASCULAR SCREENING; LDL GOAL LESS THAN 130: ICD-10-CM

## 2021-02-26 DIAGNOSIS — Z13.1 SCREENING FOR DIABETES MELLITUS: ICD-10-CM

## 2021-02-26 DIAGNOSIS — Z01.419 ENCOUNTER FOR GYNECOLOGICAL EXAMINATION WITHOUT ABNORMAL FINDING: Primary | ICD-10-CM

## 2021-02-26 DIAGNOSIS — Z30.41 ENCOUNTER FOR SURVEILLANCE OF CONTRACEPTIVE PILLS: ICD-10-CM

## 2021-02-26 LAB
CHOLEST SERPL-MCNC: 190 MG/DL
GLUCOSE SERPL-MCNC: 72 MG/DL (ref 70–99)
HDLC SERPL-MCNC: 82 MG/DL
LDLC SERPL CALC-MCNC: 95 MG/DL
NONHDLC SERPL-MCNC: 108 MG/DL
TRIGL SERPL-MCNC: 67 MG/DL
TSH SERPL DL<=0.005 MIU/L-ACNC: 0.69 MU/L (ref 0.4–4)

## 2021-02-26 PROCEDURE — G0145 SCR C/V CYTO,THINLAYER,RESCR: HCPCS | Performed by: NURSE PRACTITIONER

## 2021-02-26 PROCEDURE — G0124 SCREEN C/V THIN LAYER BY MD: HCPCS | Performed by: PATHOLOGY

## 2021-02-26 PROCEDURE — 99396 PREV VISIT EST AGE 40-64: CPT | Performed by: NURSE PRACTITIONER

## 2021-02-26 PROCEDURE — 84443 ASSAY THYROID STIM HORMONE: CPT | Performed by: NURSE PRACTITIONER

## 2021-02-26 PROCEDURE — 80061 LIPID PANEL: CPT | Performed by: NURSE PRACTITIONER

## 2021-02-26 PROCEDURE — 82947 ASSAY GLUCOSE BLOOD QUANT: CPT | Performed by: NURSE PRACTITIONER

## 2021-02-26 PROCEDURE — 87624 HPV HI-RISK TYP POOLED RSLT: CPT | Performed by: NURSE PRACTITIONER

## 2021-02-26 PROCEDURE — 86803 HEPATITIS C AB TEST: CPT | Performed by: NURSE PRACTITIONER

## 2021-02-26 PROCEDURE — 36415 COLL VENOUS BLD VENIPUNCTURE: CPT | Performed by: NURSE PRACTITIONER

## 2021-02-26 RX ORDER — LEVOTHYROXINE SODIUM 112 UG/1
112 TABLET ORAL DAILY
Qty: 90 TABLET | Refills: 3 | Status: SHIPPED | OUTPATIENT
Start: 2021-02-26 | End: 2022-03-07

## 2021-02-26 RX ORDER — NORETHINDRONE AND ETHINYL ESTRADIOL 7-9-5
1 KIT ORAL DAILY
Qty: 84 TABLET | Refills: 3 | Status: SHIPPED | OUTPATIENT
Start: 2021-02-26 | End: 2022-01-26

## 2021-02-26 ASSESSMENT — MIFFLIN-ST. JEOR: SCORE: 1186.74

## 2021-02-26 ASSESSMENT — PAIN SCALES - GENERAL: PAINLEVEL: NO PAIN (0)

## 2021-02-26 NOTE — PROGRESS NOTES
SUBJECTIVE:   CC: Noni Mccormick is an 50 year old woman who presents for preventive health visit.     {Healthy Habits:     Getting at least 3 servings of Calcium per day:  Yes    Bi-annual eye exam:  Yes    Dental care twice a year:  Yes    Sleep apnea or symptoms of sleep apnea:  None    Diet:  Regular (no restrictions)    Frequency of exercise:  2-3 days/week    Duration of exercise:  15-30 minutes    Taking medications regularly:  Yes    Medication side effects:  None    PHQ-2 Total Score: 0    Additional concerns today:  No        Today's PHQ-2 Score:   PHQ-2 ( 1999 Pfizer) 2/26/2021   Q1: Little interest or pleasure in doing things 0   Q2: Feeling down, depressed or hopeless 0   PHQ-2 Score 0   Q1: Little interest or pleasure in doing things Not at all   Q2: Feeling down, depressed or hopeless Not at all   PHQ-2 Score 0       Abuse: Current or Past (Physical, Sexual or Emotional) - No  Do you feel safe in your environment? Yes        Social History     Tobacco Use     Smoking status: Never Smoker     Smokeless tobacco: Never Used   Substance Use Topics     Alcohol use: No         Alcohol Use 2/26/2021   Prescreen: >3 drinks/day or >7 drinks/week? No   Prescreen: >3 drinks/day or >7 drinks/week? -   No flowsheet data found.    Any new diagnosis of family breast, ovarian, or bowel cancer? No     Reviewed orders with patient.  Reviewed health maintenance and updated orders accordingly - Yes  Patient Active Problem List   Diagnosis     Hypothyroidism     CARDIOVASCULAR SCREENING; LDL GOAL LESS THAN 160     Nephrolithiasis     High risk medications (not anticoagulants) long-term use     Injury of left hip     Intermittent asthma     Chronic polyarticular juvenile rheumatoid arthritis (H)     Past Surgical History:   Procedure Laterality Date     C PELVIS/HIP JOINT SURGERY UNLISTED       JOINT REPLACEMENT, HIP RT/LT      (L)     JOINT REPLACEMENT, HIP RT/LT  3/2013    (R)     SURGICAL HISTORY OF -        Lithotripsy       Social History     Tobacco Use     Smoking status: Never Smoker     Smokeless tobacco: Never Used   Substance Use Topics     Alcohol use: No     Family History   Problem Relation Age of Onset     Breast Cancer Mother      C.A.D. Father            Breast CA Risk Screening:  Mammogram Screening: Recommended annual mammography  Pertinent mammograms are reviewed under the imaging tab.    PAP / HPV Latest Ref Rng & Units 1/29/2018 12/9/2014 11/2/2011   PAP - OTHER-NIL, See Result NIL NIL   HPV 16 DNA NEG:Negative Negative - -   HPV 18 DNA NEG:Negative Negative - -   OTHER HR HPV NEG:Negative Negative - -     Reviewed and updated as needed this visit by clinical staff  Tobacco  Allergies  Meds   Med Hx  Surg Hx  Fam Hx  Soc Hx        Reviewed and updated as needed this visit by Provider                Past Medical History:   Diagnosis Date     Contraception      Grave's disease      Hypothyroid      Inflammatory arthritis      Intermittent asthma 9/10/2013     Nephrolithiasis 6/16/2011     Osteopenia 2/14/2013     Rheumatoid arthritis(714.0)       Past Surgical History:   Procedure Laterality Date     C PELVIS/HIP JOINT SURGERY UNLISTED       JOINT REPLACEMENT, HIP RT/LT      (L)     JOINT REPLACEMENT, HIP RT/LT  3/2013    (R)     SURGICAL HISTORY OF -       Lithotripsy       Review of Systems  CONSTITUTIONAL: NEGATIVE for fever, chills, change in weight  INTEGUMENTARU/SKIN: NEGATIVE for worrisome rashes, moles or lesions  EYES: NEGATIVE for vision changes or irritation  ENT: NEGATIVE for ear, mouth and throat problems  RESP: NEGATIVE for significant cough or SOB  BREAST: NEGATIVE for masses, tenderness or discharge  CV: NEGATIVE for chest pain, palpitations or peripheral edema  GI: NEGATIVE for nausea, abdominal pain, heartburn, or change in bowel habits  : NEGATIVE for unusual urinary or vaginal symptoms. Periods are regular.  MUSCULOSKELETAL: NEGATIVE for significant arthralgias or  "myalgia  NEURO: NEGATIVE for weakness, dizziness or paresthesias  PSYCHIATRIC: NEGATIVE for changes in mood or affect     OBJECTIVE:   BP (!) 145/82 (BP Location: Right arm, Patient Position: Sitting, Cuff Size: Adult Regular)   Pulse 66   Temp 98.3  F (36.8  C) (Tympanic)   Ht 1.613 m (5' 3.5\")   Wt 59 kg (130 lb)   LMP 02/16/2021 (Exact Date)   SpO2 99%   BMI 22.67 kg/m    Physical Exam  GENERAL: healthy, alert and no distress  EYES: Eyes grossly normal to inspection, PERRL and conjunctivae and sclerae normal  HENT: ear canals and TM's normal, nose and mouth without ulcers or lesions  NECK: no adenopathy, no asymmetry, masses, or scars and thyroid normal to palpation  RESP: lungs clear to auscultation - no rales, rhonchi or wheezes  BREAST: normal without masses, tenderness or nipple discharge and no palpable axillary masses or adenopathy  CV: regular rate and rhythm, normal S1 S2, no S3 or S4, no murmur, click or rub, no peripheral edema and peripheral pulses strong  ABDOMEN: soft, nontender, no hepatosplenomegaly, no masses and bowel sounds normal   (female): normal female external genitalia, normal urethral meatus, vaginal mucosa pink, moist, well rugated, and normal cervix/adnexa/uterus without masses or discharge  MS: no gross musculoskeletal defects noted, no edema  SKIN: no suspicious lesions or rashes  NEURO: Normal strength and tone, mentation intact and speech normal  PSYCH: mentation appears normal, affect normal/bright    ASSESSMENT/PLAN:   1. Encounter for gynecological examination without abnormal finding  - Pap imaged thin layer screen with HPV - recommended age 30 - 65 years (select HPV order below)  - HPV High Risk Types DNA Cervical    2. Other specified hypothyroidism  Refill current dose of medication and check TSH today. If dose adjustment needed, will send in new prescription and notify patient.  - TSH WITH FREE T4 REFLEX  - levothyroxine (SYNTHROID/LEVOTHROID) 112 MCG tablet; Take 1 " "tablet (112 mcg) by mouth daily  Dispense: 90 tablet; Refill: 3    3. Encounter for surveillance of contraceptive pills  - norethin-eth estrad triphasic (ARANELLE) 0.5/1/0.5-35 MG-MCG tablet; Take 1 tablet by mouth daily  Dispense: 84 tablet; Refill: 3    4. Screen for colon cancer  - GASTROENTEROLOGY ADULT REF PROCEDURE ONLY; Future    5. Need for hepatitis C screening test  - Hepatitis C Screen Reflex to HCV RNA Quant and Genotype    6. Screening for diabetes mellitus  - Glucose    7. CARDIOVASCULAR SCREENING; LDL GOAL LESS THAN 130  - Lipid panel reflex to direct LDL Fasting    COUNSELING:  Reviewed preventive health counseling, as reflected in patient instructions  Special attention given to:        Regular exercise       Healthy diet/nutrition       Contraception       Colon cancer screening       Consider Hep C screening for all patients one time for ages 18-79 years       (Brenda)menopause management    Estimated body mass index is 22.67 kg/m  as calculated from the following:    Height as of this encounter: 1.613 m (5' 3.5\").    Weight as of this encounter: 59 kg (130 lb).        She reports that she has never smoked. She has never used smokeless tobacco.      Counseling Resources:  ATP IV Guidelines  Pooled Cohorts Equation Calculator  Breast Cancer Risk Calculator  BRCA-Related Cancer Risk Assessment: FHS-7 Tool  FRAX Risk Assessment  ICSI Preventive Guidelines  Dietary Guidelines for Americans, 2010  USDA's MyPlate  ASA Prophylaxis  Lung CA Screening    KEENAN Bah CNP  [facility]  "

## 2021-02-27 LAB — HCV AB SERPL QL IA: NONREACTIVE

## 2021-03-03 LAB
COPATH REPORT: NORMAL
PAP: NORMAL

## 2021-03-09 PROBLEM — Z12.4 CERVICAL CANCER SCREENING: Status: ACTIVE | Noted: 2021-03-09

## 2021-03-26 ENCOUNTER — INFUSION THERAPY VISIT (OUTPATIENT)
Dept: INFUSION THERAPY | Facility: CLINIC | Age: 51
End: 2021-03-26
Payer: COMMERCIAL

## 2021-03-26 VITALS
TEMPERATURE: 98.2 F | BODY MASS INDEX: 22.67 KG/M2 | WEIGHT: 130 LBS | SYSTOLIC BLOOD PRESSURE: 116 MMHG | OXYGEN SATURATION: 97 % | HEART RATE: 84 BPM | DIASTOLIC BLOOD PRESSURE: 80 MMHG | RESPIRATION RATE: 16 BRPM

## 2021-03-26 DIAGNOSIS — M08.3 CHRONIC POLYARTICULAR JUVENILE RHEUMATOID ARTHRITIS (H): Primary | ICD-10-CM

## 2021-03-26 DIAGNOSIS — Z79.899 HIGH RISK MEDICATIONS (NOT ANTICOAGULANTS) LONG-TERM USE: ICD-10-CM

## 2021-03-26 PROCEDURE — 96375 TX/PRO/DX INJ NEW DRUG ADDON: CPT | Performed by: INTERNAL MEDICINE

## 2021-03-26 PROCEDURE — 96413 CHEMO IV INFUSION 1 HR: CPT | Performed by: INTERNAL MEDICINE

## 2021-03-26 PROCEDURE — 99207 PR NO CHARGE LOS: CPT

## 2021-03-26 RX ORDER — METHYLPREDNISOLONE SODIUM SUCCINATE 125 MG/2ML
125 INJECTION, POWDER, LYOPHILIZED, FOR SOLUTION INTRAMUSCULAR; INTRAVENOUS ONCE
Status: COMPLETED | OUTPATIENT
Start: 2021-03-26 | End: 2021-03-26

## 2021-03-26 RX ORDER — METHYLPREDNISOLONE SODIUM SUCCINATE 125 MG/2ML
125 INJECTION, POWDER, LYOPHILIZED, FOR SOLUTION INTRAMUSCULAR; INTRAVENOUS ONCE
Status: CANCELLED
Start: 2021-03-26 | End: 2021-03-26

## 2021-03-26 RX ORDER — DIPHENHYDRAMINE HCL 25 MG
25 CAPSULE ORAL ONCE
Status: CANCELLED
Start: 2021-03-26 | End: 2021-03-26

## 2021-03-26 RX ORDER — ACETAMINOPHEN 325 MG/1
650 TABLET ORAL ONCE
Status: CANCELLED
Start: 2021-03-26 | End: 2021-03-26

## 2021-03-26 RX ORDER — DIPHENHYDRAMINE HCL 25 MG
25 CAPSULE ORAL ONCE
Status: COMPLETED | OUTPATIENT
Start: 2021-03-26 | End: 2021-03-26

## 2021-03-26 RX ADMIN — METHYLPREDNISOLONE SODIUM SUCCINATE 125 MG: 125 INJECTION INTRAMUSCULAR; INTRAVENOUS at 12:02

## 2021-03-26 RX ADMIN — Medication 25 MG: at 12:01

## 2021-03-26 RX ADMIN — Medication 250 ML: at 11:55

## 2021-03-26 ASSESSMENT — PAIN SCALES - GENERAL: PAINLEVEL: NO PAIN (0)

## 2021-03-26 NOTE — PROGRESS NOTES
Infusion Nursing Note:  Noni Payton Mccormick presents today for:  Remicade.    Patient seen by provider today: No   present during visit today: Not Applicable.    Note: stop time Infliximab:  3/26 @ 1310. 0.9% NS stop time 3/26 @ 1314    Intravenous Access:  Peripheral IV placed.    Treatment Conditions:  Biological Infusion Checklist:  ~~~ NOTE: If the patient answers yes to any of the questions below, hold the infusion and contact ordering provider or on-call provider.    1. Have you recently had an elevated temperature, fever, chills, productive cough, coughing for 3 weeks or longer or hemoptysis, abnormal vital signs, night sweats,  chest pain or have you noticed a decrease in your appetite, unexplained weight loss or fatigue? No  2. Do you have any open wounds or new incisions? No  3. Do you have any recent or upcoming hospitalizations, surgeries or dental procedures? No  4. Do you currently have or recently have had any signs of illness or infection or are you on any antibiotics? No  5. Have you had any new, sudden or worsening abdominal pain? No  6. Have you or anyone in your household received a live vaccination in the past 4 weeks? Please note:  No live vaccines while on biologic/chemotherapy until 6 months after the last treatment.  Patient can receive the flu vaccine (shot only) and the pneumovax.  It is optimal for the patient to get these vaccines mid cycle, but they can be given at any time as long as it is not on the day of the infusion. No  7. Have you recently been diagnosed with any new nervous system diseases (ie. Multiple sclerosis, Guillain [place], seizures, neurological changes) or cancer diagnosis? No  8. Are you on any form of radiation or chemotherapy? No  9. Are you pregnant or breast feeding or do you have plans of pregnancy in the future? No  10. Have you been having any signs of worsening depression or suicidal ideations?  (benlysta only) No  11. Have there been any other new  onset medical symptoms? No    Post Infusion Assessment:  Patient tolerated infusion without incident.       Discharge Plan:   RTC 4/30 as scheduled.    ZAINAB KRISHNAN RN

## 2021-04-19 DIAGNOSIS — R05.3 CHRONIC COUGH: ICD-10-CM

## 2021-04-19 DIAGNOSIS — J45.20 INTERMITTENT ASTHMA, UNCOMPLICATED: ICD-10-CM

## 2021-04-19 RX ORDER — FAMOTIDINE 20 MG/1
20 TABLET, FILM COATED ORAL 2 TIMES DAILY
Qty: 180 TABLET | Refills: 3 | Status: SHIPPED | OUTPATIENT
Start: 2021-04-19 | End: 2022-06-01

## 2021-04-21 ENCOUNTER — VIRTUAL VISIT (OUTPATIENT)
Dept: RHEUMATOLOGY | Facility: CLINIC | Age: 51
End: 2021-04-21
Payer: COMMERCIAL

## 2021-04-21 DIAGNOSIS — M06.09 RHEUMATOID ARTHRITIS OF MULTIPLE SITES WITHOUT RHEUMATOID FACTOR (H): Primary | ICD-10-CM

## 2021-04-21 DIAGNOSIS — M06.09 RHEUMATOID ARTHRITIS OF MULTIPLE SITES WITHOUT RHEUMATOID FACTOR (H): ICD-10-CM

## 2021-04-21 PROCEDURE — 99213 OFFICE O/P EST LOW 20 MIN: CPT | Mod: 95 | Performed by: STUDENT IN AN ORGANIZED HEALTH CARE EDUCATION/TRAINING PROGRAM

## 2021-04-21 RX ORDER — PNEUMOCOCCAL VACCINE POLYVALENT 25; 25; 25; 25; 25; 25; 25; 25; 25; 25; 25; 25; 25; 25; 25; 25; 25; 25; 25; 25; 25; 25; 25 UG/.5ML; UG/.5ML; UG/.5ML; UG/.5ML; UG/.5ML; UG/.5ML; UG/.5ML; UG/.5ML; UG/.5ML; UG/.5ML; UG/.5ML; UG/.5ML; UG/.5ML; UG/.5ML; UG/.5ML; UG/.5ML; UG/.5ML; UG/.5ML; UG/.5ML; UG/.5ML; UG/.5ML; UG/.5ML; UG/.5ML
0.5 INJECTION, SOLUTION INTRAMUSCULAR; SUBCUTANEOUS ONCE
Qty: 0.5 ML | Refills: 0 | Status: SHIPPED | OUTPATIENT
Start: 2021-06-21 | End: 2021-06-21

## 2021-04-21 RX ORDER — ZOSTER VACCINE RECOMBINANT, ADJUVANTED 50 MCG/0.5
1 KIT INTRAMUSCULAR ONCE
Qty: 0.5 ML | Refills: 0 | Status: SHIPPED | OUTPATIENT
Start: 2021-04-21 | End: 2021-04-21

## 2021-04-21 RX ORDER — ZOSTER VACCINE RECOMBINANT, ADJUVANTED 50 MCG/0.5
1 KIT INTRAMUSCULAR ONCE
Qty: 0.5 ML | Refills: 0 | Status: SHIPPED | OUTPATIENT
Start: 2021-06-21 | End: 2021-06-21

## 2021-04-21 NOTE — PATIENT INSTRUCTIONS
Continue methotrexate 8 tablets once a week    Continue Remicade infusions every 5 weeks    Methotrexate monitoring labs every 8 to 12 weeks    I will send pneumococcal and Shingrix vaccine prescription to local pharmacy    Follow-up in 6 months.

## 2021-04-21 NOTE — PROGRESS NOTES
Noni is a 50 year old who is being evaluated via a billable video visit.      How would you like to obtain your AVS? MyChart  If the video visit is dropped, the invitation should be resent by: Text to cell phone: [phone]  Will anyone else be joining your video visit? No      Video Start Time: 4:03 PM    Subjective : Noni is a 50-year-old female with past medical history of juvenile rheumatoid arthritis, hypothyroidism evaluated via a billable virtual visit for management of JRA. She was diagnosed with JRA at 3 years of age.  She has been maintained on a stable dose of Remicade 300 mg every 5 weeks and methotrexate 15 mg once a week for more than 10 years.  She denies any side effects with the medication. Her last remicade infusion was last Friday.     Her ESR, CRP has been persistently high for the last 10 years.  Her last sed rate was higher than her previous values.  However she denies any significant worsening.  She does report having joint pain flares 3-4 times a week.  Mostly has swelling in her hands.     April 21, 2021 - First dose of 20 mg  Methotrexate caused some nausea but it is better now.  Overall she has noticed improvement after increasing methotrexate to 20 mg.  Last methotrexate monitoring labs done in 2/2021 showed slight improvement in sed rate and CRP.  She gets Remicade infusions every 5 weeks.  Denies any side effects with infusion.     Review of systems negative except for those mentioned above    Reviewed past medical, surgical, family history    Pertinent labs:   Component      Latest Ref Rng & Units 2/19/2021   WBC      4.0 - 11.0 10e9/L 6.8   RBC Count      3.8 - 5.2 10e12/L 4.43   Hemoglobin      11.7 - 15.7 g/dL 13.1   Hematocrit      35.0 - 47.0 % 39.8   MCV      78 - 100 fl 90   MCH      26.5 - 33.0 pg 29.6   MCHC      31.5 - 36.5 g/dL 32.9   RDW      10.0 - 15.0 % 13.0   Platelet Count      150 - 450 10e9/L 452 (H)   Creatinine      0.52 - 1.04 mg/dL 0.65   GFR Estimate       >60 mL/min/1.73:m2 >90   GFR Estimate If Black      >60 mL/min/1.73:m2 >90   Sed Rate      0 - 30 mm/h 57 (H)   CRP Inflammation      0.0 - 8.0 mg/L 9.8 (H)   Albumin      3.4 - 5.0 g/dL 3.3 (L)   ALT      0 - 50 U/L 17   AST      0 - 45 U/L 13     Physical exam: Bilateral fifth finger flexion contracture, swan-neck deformities over third fourth fingers. No active synovitis noted over MCP, PIP, DIP joints, Bilateral wrists, elbows.  Denies any tenderness over the joints.  Hammertoes present both feet.     Assessment :     Juvenile rheumatoid arthritis  High risk medication use-methotrexate and Remicade    Juvenile rheumatoid arthritis: She has been maintained on Remicade infusions 300 mg every 5 weeks and methotrexate 15 mg once a week.  Methotrexate dose was increased to 20 mg in 1/2021 due to joint pain flares and elevated inflammatory markers.  She has noticed improvement on 20 mg methotrexate.     High risk medication use: Methotrexate labs every 10 weeks.  Last labs done in 2/2021 showed normal CBC, liver, kidney function test.  Sed rate-57, CRP - 9.8    Vaccinations: Vaccinations reviewed with Ms. Mccormick. Risks and benefits of vaccinations were discussed.  - Influenza: encouraged yearly vaccination  - [id number]: prescription sent   - [id number]: to receive 8 weeks after [id number] is administered  - Zostavax: prescription sent  -COVID ( Moderna ) - 1/28/21, 2/25/21      Plan    Continue methotrexate 8 tablets once a week    Continue Remicade infusions every 5 weeks    Methotrexate monitoring labs every 8 to 12 weeks    I will send pneumococcal and Shingrix vaccine prescription to local pharmacy    Follow-up in 6 months.       Video-Visit Details    Type of service:  Video Visit    Video End Time:4:15 pm     Originating Location (pt. Location): Home    Distant Location (provider location):  [facility]     Platform used for Video Visit: Regine Gonzalez MD  [place]  [facility]  Department of Rheumatology & Autoimmune Disorders  [facility] [place]: [phone]  Pager - [phone]

## 2021-04-22 NOTE — TELEPHONE ENCOUNTER
METHOTREXATE 2.5 MG TABLET      Last Written Prescription Date:  1-20-21  Last Fill Quantity: 96,   # refills: 0  Last Office Visit: 4-21-21  Future Office visit:  none    CBC RESULTS:   Recent Labs   Lab Test 02/19/21  1100   WBC 6.8   RBC 4.43   HGB 13.1   HCT 39.8   MCV 90   MCH 29.6   MCHC 32.9   RDW 13.0   *       Creatinine   Date Value Ref Range Status   02/19/2021 0.65 0.52 - 1.04 mg/dL Final   ]    Liver Function Studies -   Recent Labs   Lab Test 02/19/21  1100 03/30/18  0820 03/30/18  0820   PROTTOTAL  --   --  7.6   ALBUMIN 3.3*   < > 3.2*   BILITOTAL  --   --  0.5   ALKPHOS  --   --  27*   AST 13   < > 16   ALT 17   < > 18    < > = values in this interval not displayed.       Routing refill request to provider for review/approval because:  Per protocol

## 2021-04-23 RX ORDER — METHOTREXATE 2.5 MG/1
20 TABLET ORAL
Qty: 96 TABLET | Refills: 0 | Status: SHIPPED | OUTPATIENT
Start: 2021-04-23 | End: 2021-07-21

## 2021-04-30 ENCOUNTER — INFUSION THERAPY VISIT (OUTPATIENT)
Dept: INFUSION THERAPY | Facility: CLINIC | Age: 51
End: 2021-04-30
Payer: COMMERCIAL

## 2021-04-30 VITALS
WEIGHT: 133.6 LBS | DIASTOLIC BLOOD PRESSURE: 80 MMHG | TEMPERATURE: 99 F | BODY MASS INDEX: 23.29 KG/M2 | SYSTOLIC BLOOD PRESSURE: 129 MMHG | OXYGEN SATURATION: 97 % | HEART RATE: 68 BPM

## 2021-04-30 DIAGNOSIS — M06.09 RHEUMATOID ARTHRITIS OF MULTIPLE SITES WITHOUT RHEUMATOID FACTOR (H): ICD-10-CM

## 2021-04-30 DIAGNOSIS — Z79.899 HIGH RISK MEDICATIONS (NOT ANTICOAGULANTS) LONG-TERM USE: ICD-10-CM

## 2021-04-30 DIAGNOSIS — M08.3 CHRONIC POLYARTICULAR JUVENILE RHEUMATOID ARTHRITIS (H): Primary | ICD-10-CM

## 2021-04-30 LAB
ALBUMIN SERPL-MCNC: 3.5 G/DL (ref 3.4–5)
ALT SERPL W P-5'-P-CCNC: 16 U/L (ref 0–50)
AST SERPL W P-5'-P-CCNC: 12 U/L (ref 0–45)
CREAT SERPL-MCNC: 0.69 MG/DL (ref 0.52–1.04)
CRP SERPL-MCNC: 9.6 MG/L (ref 0–8)
ERYTHROCYTE [DISTWIDTH] IN BLOOD BY AUTOMATED COUNT: 12.9 % (ref 10–15)
ERYTHROCYTE [SEDIMENTATION RATE] IN BLOOD BY WESTERGREN METHOD: 58 MM/H (ref 0–30)
GFR SERPL CREATININE-BSD FRML MDRD: >90 ML/MIN/{1.73_M2}
HCT VFR BLD AUTO: 40.1 % (ref 35–47)
HGB BLD-MCNC: 13.3 G/DL (ref 11.7–15.7)
MCH RBC QN AUTO: 30.4 PG (ref 26.5–33)
MCHC RBC AUTO-ENTMCNC: 33.2 G/DL (ref 31.5–36.5)
MCV RBC AUTO: 92 FL (ref 78–100)
PLATELET # BLD AUTO: 435 10E9/L (ref 150–450)
RBC # BLD AUTO: 4.37 10E12/L (ref 3.8–5.2)
WBC # BLD AUTO: 7.3 10E9/L (ref 4–11)

## 2021-04-30 PROCEDURE — 84450 TRANSFERASE (AST) (SGOT): CPT | Performed by: STUDENT IN AN ORGANIZED HEALTH CARE EDUCATION/TRAINING PROGRAM

## 2021-04-30 PROCEDURE — 86140 C-REACTIVE PROTEIN: CPT | Performed by: STUDENT IN AN ORGANIZED HEALTH CARE EDUCATION/TRAINING PROGRAM

## 2021-04-30 PROCEDURE — 82565 ASSAY OF CREATININE: CPT | Performed by: STUDENT IN AN ORGANIZED HEALTH CARE EDUCATION/TRAINING PROGRAM

## 2021-04-30 PROCEDURE — 84460 ALANINE AMINO (ALT) (SGPT): CPT | Performed by: STUDENT IN AN ORGANIZED HEALTH CARE EDUCATION/TRAINING PROGRAM

## 2021-04-30 PROCEDURE — 96375 TX/PRO/DX INJ NEW DRUG ADDON: CPT | Performed by: INTERNAL MEDICINE

## 2021-04-30 PROCEDURE — 85652 RBC SED RATE AUTOMATED: CPT | Performed by: STUDENT IN AN ORGANIZED HEALTH CARE EDUCATION/TRAINING PROGRAM

## 2021-04-30 PROCEDURE — 99207 PR NO CHARGE LOS: CPT

## 2021-04-30 PROCEDURE — 82040 ASSAY OF SERUM ALBUMIN: CPT | Performed by: STUDENT IN AN ORGANIZED HEALTH CARE EDUCATION/TRAINING PROGRAM

## 2021-04-30 PROCEDURE — 96413 CHEMO IV INFUSION 1 HR: CPT | Performed by: INTERNAL MEDICINE

## 2021-04-30 PROCEDURE — 36415 COLL VENOUS BLD VENIPUNCTURE: CPT | Performed by: STUDENT IN AN ORGANIZED HEALTH CARE EDUCATION/TRAINING PROGRAM

## 2021-04-30 PROCEDURE — 85027 COMPLETE CBC AUTOMATED: CPT | Performed by: STUDENT IN AN ORGANIZED HEALTH CARE EDUCATION/TRAINING PROGRAM

## 2021-04-30 RX ORDER — DIPHENHYDRAMINE HCL 25 MG
25 CAPSULE ORAL ONCE
Status: CANCELLED
Start: 2021-04-30 | End: 2021-04-30

## 2021-04-30 RX ORDER — METHYLPREDNISOLONE SODIUM SUCCINATE 125 MG/2ML
125 INJECTION, POWDER, LYOPHILIZED, FOR SOLUTION INTRAMUSCULAR; INTRAVENOUS ONCE
Status: COMPLETED | OUTPATIENT
Start: 2021-04-30 | End: 2021-04-30

## 2021-04-30 RX ORDER — METHYLPREDNISOLONE SODIUM SUCCINATE 125 MG/2ML
125 INJECTION, POWDER, LYOPHILIZED, FOR SOLUTION INTRAMUSCULAR; INTRAVENOUS ONCE
Status: CANCELLED
Start: 2021-04-30 | End: 2021-04-30

## 2021-04-30 RX ORDER — DIPHENHYDRAMINE HCL 25 MG
25 CAPSULE ORAL ONCE
Status: COMPLETED | OUTPATIENT
Start: 2021-04-30 | End: 2021-04-30

## 2021-04-30 RX ORDER — ACETAMINOPHEN 325 MG/1
650 TABLET ORAL ONCE
Status: CANCELLED
Start: 2021-04-30 | End: 2021-04-30

## 2021-04-30 RX ADMIN — METHYLPREDNISOLONE SODIUM SUCCINATE 125 MG: 125 INJECTION INTRAMUSCULAR; INTRAVENOUS at 12:05

## 2021-04-30 RX ADMIN — Medication 25 MG: at 12:04

## 2021-04-30 RX ADMIN — Medication 250 ML: at 11:56

## 2021-04-30 ASSESSMENT — PAIN SCALES - GENERAL: PAINLEVEL: MODERATE PAIN (4)

## 2021-04-30 NOTE — PROGRESS NOTES
Infusion Nursing Note:  Noni Mccormick presents today for Rapid Remicade.    Patient seen by provider today: No   present during visit today: Not Applicable.    Note: Pt c/o hands and wrist pain, increased as she gets closer to needing her infusion.  See flow sheet for assessment.  Pt takes Tylenol at home for her premed, also takes a benadryl at home.      Intravenous Access:  Peripheral IV placed.    Treatment Conditions:  Biological Infusion Checklist:  ~~~ NOTE: If the patient answers yes to any of the questions below, hold the infusion and contact ordering provider or on-call provider.    1. Have you recently had an elevated temperature, fever, chills, productive cough, coughing for 3 weeks or longer or hemoptysis, abnormal vital signs, night sweats,  chest pain or have you noticed a decrease in your appetite, unexplained weight loss or fatigue? No  2. Do you have any open wounds or new incisions? No  3. Do you have any recent or upcoming hospitalizations, surgeries or dental procedures? No  4. Do you currently have or recently have had any signs of illness or infection or are you on any antibiotics? No  5. Have you had any new, sudden or worsening abdominal pain? No  6. Have you or anyone in your household received a live vaccination in the past 4 weeks? Please note:  No live vaccines while on biologic/chemotherapy until 6 months after the last treatment.  Patient can receive the flu vaccine (shot only) and the pneumovax.  It is optimal for the patient to get these vaccines mid cycle, but they can be given at any time as long as it is not on the day of the infusion. No  7. Have you recently been diagnosed with any new nervous system diseases (ie. Multiple sclerosis, Guillain [place], seizures, neurological changes) or cancer diagnosis? No  8. Are you on any form of radiation or chemotherapy? No  9. Are you pregnant or breast feeding or do you have plans of pregnancy in the future? No  10. Have  you been having any signs of worsening depression or suicidal ideations?  (benlysta only) No  11. Have there been any other new onset medical symptoms? No        Post Infusion Assessment:  Patient tolerated infusion without incident.  Blood return noted pre and post infusion.  Site patent and intact, free from redness, edema or discomfort.  No evidence of extravasations.  Access discontinued per protocol.       Discharge Plan:   Patient discharged in stable condition accompanied by: self.  Departure Mode: Ambulatory.  Pt will RTC 6/4/21 for Rapid Remicade.    Nick Peacock RN

## 2021-05-06 NOTE — RESULT ENCOUNTER NOTE
Normal monitoring labs - normal liver, kidney tests and cell counts.     Sterling Gonzalez MD  5/6/2021  6:05 PM

## 2021-05-13 DIAGNOSIS — Z11.59 ENCOUNTER FOR SCREENING FOR OTHER VIRAL DISEASES: ICD-10-CM

## 2021-05-19 ENCOUNTER — OFFICE VISIT (OUTPATIENT)
Dept: DERMATOLOGY | Facility: CLINIC | Age: 51
End: 2021-05-19
Payer: COMMERCIAL

## 2021-05-19 DIAGNOSIS — Z87.898 HISTORY OF ATYPICAL NEVUS: ICD-10-CM

## 2021-05-19 DIAGNOSIS — Z85.828 HISTORY OF BASAL CELL CARCINOMA: Primary | ICD-10-CM

## 2021-05-19 DIAGNOSIS — D18.01 CHERRY ANGIOMA: ICD-10-CM

## 2021-05-19 DIAGNOSIS — L82.1 SEBORRHEIC KERATOSES: ICD-10-CM

## 2021-05-19 DIAGNOSIS — L81.4 SOLAR LENTIGO: ICD-10-CM

## 2021-05-19 DIAGNOSIS — D22.9 MULTIPLE BENIGN NEVI: ICD-10-CM

## 2021-05-19 PROCEDURE — 99213 OFFICE O/P EST LOW 20 MIN: CPT | Performed by: PHYSICIAN ASSISTANT

## 2021-05-19 ASSESSMENT — PAIN SCALES - GENERAL: PAINLEVEL: NO PAIN (0)

## 2021-05-19 NOTE — PROGRESS NOTES
[facility] Dermatology Note  Encounter Date: May 19, 2021  Office Visit     Dermatology Problem List:  1. BCC, left anterior thigh  - Treated with WLE on 4/27/20  - Wound appears to be healing appropriately. Patient reports having removed the sutures already.   - OK to treat as normal skin.   - Continue sun voidance and protection.   - skin cancer screening q 6 months until 4/27/22.   2. AK - L upper arm x1 - s/p cryo  3. Hx of DN - R buttocks - s/p excision ~2005 (outside dermatologist)    ____________________________________________    Assessment & Plan:    # History of nonmelanoma skin cancer, no clincial evidence of recurrence. Well healed  - Sun protection: Counseled SPF30+ sunscreen, UPF clothing, sun avoidance, tanning bed avoidance.   -Continue with skin exams every 6mo until 2022    # History of DN: No evidence of recurrence. While these are benign, they are a marker for increased melanoma risk.  - Recommended yearly skin checks.    # Cherry angioma(s).    - No further intervention needed.    # Multiple clinically benign nevi.    - No further intervention needed.   - ABCD's of melanoma were reviewed with patient and handout provided.   - Sunscreen: Apply 20 minutes prior to going outdoors and reapply every two hours, when wet or sweating. We recommend using an SPF 30 or higher, and to use one that is water resistant.       # Seborrheic keratosis, non irritated.   - No further intervention needed.     # Solar lentigines.   - No further intervention needed.   -discussed importance of continued sun protection  -could consider laser treatment in future    Procedures Performed:   None.      Follow-up: 6 month(s) in-person, or earlier for new or changing lesions    Staff and Scribe:     Scribe Disclosure:   I, Devin Shultz, am serving as a scribe to document services personally performed by Jannet Segovia PA-C, based on data collection and the provider's statements to me.  Provider  "Disclosure:   The documentation recorded by the scribe accurately reflects the services I personally performed and the decisions made by me.    All risks, benefits and alternatives were discussed with patient.  Patient is in agreement and understands the assessment and plan.  All questions were answered.    Jannet Segovia PA-C, MPAS  [facility] [place]: Phone: [phone], Fax: [phone]  [facility]: Phone: [phone],  Fax: [phone]  ____________________________________________    CC: Skin Check (no new spots of concern. Hx of AK, DN and BCC)    HPI:  Ms. Noni Mccormick is a(n) 50 year old female who presents today as a return patient for a skin check.    Last seen 11/04/2020 for a skin check. At that time, 1 AK was treated with cryo. Hx BCC on the thigh.     Today, she has no areas of concern. She is asking if there are any treatment options for the \"age spots\" on her forearms/hands.    Patient is otherwise feeling well, without additional concerns.    Labs:  NA    Physical Exam:  Vitals: There were no vitals taken for this visit.  SKIN: Total skin excluding the undergarment areas was performed. The exam included the head/face, neck, both arms, chest, back, abdomen, both legs, digits and/or nails.   - Larry Type II  - There is a well healed surgical scar without erythema, nodularity or telangiectasias on the left anterior thigh (site of BCC).   - There are dome shaped bright red papules on the trunk and extremities.   - Multiple regular brown pigmented macules and papules are identified on the trunk and extremities.   - Scattered brown macules on sun exposed areas.  - There are waxy stuck on tan to brown papules on the trunk and extremities.  - No other lesions of concern on areas examined.     Medications:  Current Outpatient Medications   Medication     albuterol (PROAIR RESPICLICK) 108 (90 Base) MCG/ACT " inhaler     aluminum chloride (DRYSOL) 20 % external solution     Cholecalciferol (VITAMIN D) 1000 UNIT capsule     famotidine (PEPCID) 20 MG tablet     folic acid (FOLVITE) 1 MG tablet     inFLIXimab (REMICADE) 100 MG injection     levothyroxine (SYNTHROID/LEVOTHROID) 112 MCG tablet     losartan (COZAAR) 25 MG tablet     methotrexate sodium 2.5 MG TABS     norethin-eth estrad triphasic (ARANELLE) 0.5/1/0.5-35 MG-MCG tablet     [START ON 6/21/2021] pneumococcal vaccine (PNEUMOVAX 23) 25 MCG/0.5ML injection     [START ON 6/21/2021] zoster vaccine recombinant adjuvanted (SHINGRIX) injection     No current facility-administered medications for this visit.       Past Medical History:   Patient Active Problem List   Diagnosis     Hypothyroidism     CARDIOVASCULAR SCREENING; LDL GOAL LESS THAN 160     Nephrolithiasis     High risk medications (not anticoagulants) long-term use     Injury of left hip     Intermittent asthma     Chronic polyarticular juvenile rheumatoid arthritis (H)     Cervical cancer screening     Past Medical History:   Diagnosis Date     ASCUS of cervix with negative high risk HPV 10/27/2010     Contraception      Grave's disease      Hypothyroid      Inflammatory arthritis      Intermittent asthma 09/10/2013     Nephrolithiasis 06/16/2011     Osteopenia 02/14/2013     Rheumatoid arthritis(714.0)        CC Dr. Stoll on close of this encounter.

## 2021-05-19 NOTE — LETTER
5/19/2021         RE: Noni Mccormick  [address] [place] [facility] [address]        Dear Colleague,    Thank you for referring your patient, Noni Mccormick, to the [facility]. Please see a copy of my visit note below.    [facility] Dermatology Note  Encounter Date: May 19, 2021  Office Visit     Dermatology Problem List:  1. BCC, left anterior thigh  - Treated with WLE on 4/27/20  - Wound appears to be healing appropriately. Patient reports having removed the sutures already.   - OK to treat as normal skin.   - Continue sun voidance and protection.   - skin cancer screening q 6 months until 4/27/22.   2. AK - L upper arm x1 - s/p cryo  3. Hx of DN - R buttocks - s/p excision ~2005 (outside dermatologist)    ____________________________________________    Assessment & Plan:    # History of nonmelanoma skin cancer, no clincial evidence of recurrence. Well healed  - Sun protection: Counseled SPF30+ sunscreen, UPF clothing, sun avoidance, tanning bed avoidance.   -Continue with skin exams every 6mo until 2022    # History of DN: No evidence of recurrence. While these are benign, they are a marker for increased melanoma risk.  - Recommended yearly skin checks.    # Cherry angioma(s).    - No further intervention needed.    # Multiple clinically benign nevi.    - No further intervention needed.   - ABCD's of melanoma were reviewed with patient and handout provided.   - Sunscreen: Apply 20 minutes prior to going outdoors and reapply every two hours, when wet or sweating. We recommend using an SPF 30 or higher, and to use one that is water resistant.       # Seborrheic keratosis, non irritated.   - No further intervention needed.     # Solar lentigines.   - No further intervention needed.   -discussed importance of continued sun protection  -could consider laser treatment in future    Procedures Performed:   None.      Follow-up: 6 month(s) in-person, or earlier  "for new or changing lesions    Staff and Scribe:     Scribe Disclosure:   I, Devinsergei Shultz, am serving as a scribe to document services personally performed by Jannet Segovia PA-C, based on data collection and the provider's statements to me.  Provider Disclosure:   The documentation recorded by the scribe accurately reflects the services I personally performed and the decisions made by me.    All risks, benefits and alternatives were discussed with patient.  Patient is in agreement and understands the assessment and plan.  All questions were answered.    Jannet Segovia PA-C, MPAS  [facility] [place]: Phone: [phone], Fax: [phone]  [facility]: Phone: [phone],  Fax: [phone]  ____________________________________________    CC: Skin Check (no new spots of concern. Hx of AK, DN and BCC)    HPI:  Ms. Noni Mccormick is a(n) 50 year old female who presents today as a return patient for a skin check.    Last seen 11/04/2020 for a skin check. At that time, 1 AK was treated with cryo. Hx BCC on the thigh.     Today, she has no areas of concern. She is asking if there are any treatment options for the \"age spots\" on her forearms/hands.    Patient is otherwise feeling well, without additional concerns.    Labs:  NA    Physical Exam:  Vitals: There were no vitals taken for this visit.  SKIN: Total skin excluding the undergarment areas was performed. The exam included the head/face, neck, both arms, chest, back, abdomen, both legs, digits and/or nails.   - Larry Type II  - There is a well healed surgical scar without erythema, nodularity or telangiectasias on the left anterior thigh (site of BCC).   - There are dome shaped bright red papules on the trunk and extremities.   - Multiple regular brown pigmented macules and papules are identified on the trunk and extremities.   - Scattered brown macules on sun exposed " areas.  - There are waxy stuck on tan to brown papules on the trunk and extremities.  - No other lesions of concern on areas examined.     Medications:  Current Outpatient Medications   Medication     albuterol (PROAIR RESPICLICK) 108 (90 Base) MCG/ACT inhaler     aluminum chloride (DRYSOL) 20 % external solution     Cholecalciferol (VITAMIN D) 1000 UNIT capsule     famotidine (PEPCID) 20 MG tablet     folic acid (FOLVITE) 1 MG tablet     inFLIXimab (REMICADE) 100 MG injection     levothyroxine (SYNTHROID/LEVOTHROID) 112 MCG tablet     losartan (COZAAR) 25 MG tablet     methotrexate sodium 2.5 MG TABS     norethin-eth estrad triphasic (ARANELLE) 0.5/1/0.5-35 MG-MCG tablet     [START ON 6/21/2021] pneumococcal vaccine (PNEUMOVAX 23) 25 MCG/0.5ML injection     [START ON 6/21/2021] zoster vaccine recombinant adjuvanted (SHINGRIX) injection     No current facility-administered medications for this visit.       Past Medical History:   Patient Active Problem List   Diagnosis     Hypothyroidism     CARDIOVASCULAR SCREENING; LDL GOAL LESS THAN 160     Nephrolithiasis     High risk medications (not anticoagulants) long-term use     Injury of left hip     Intermittent asthma     Chronic polyarticular juvenile rheumatoid arthritis (H)     Cervical cancer screening     Past Medical History:   Diagnosis Date     ASCUS of cervix with negative high risk HPV 10/27/2010     Contraception      Grave's disease      Hypothyroid      Inflammatory arthritis      Intermittent asthma 09/10/2013     Nephrolithiasis 06/16/2011     Osteopenia 02/14/2013     Rheumatoid arthritis(714.0)        CC Dr. Stoll on close of this encounter.      Again, thank you for allowing me to participate in the care of your patient.        Sincerely,        Jannet Segovia PA-C

## 2021-05-19 NOTE — NURSING NOTE
Noni Mccormick's goals for this visit include:   Chief Complaint   Patient presents with     Skin Check     no new spots of concern. Hx of AK, DN and BCC     She requests these members of her care team be copied on today's visit information:     PCP: Sophia Lagos    Referring Provider:  No referring provider defined for this encounter.    There were no vitals taken for this visit.    Do you need any medication refills at today's visit? Angela Salcedo LPN

## 2021-05-20 RX ORDER — BISACODYL 5 MG/1
5 TABLET, DELAYED RELEASE ORAL SEE ADMIN INSTRUCTIONS
Qty: 2 TABLET | Refills: 0 | COMMUNITY
Start: 2021-05-20 | End: 2021-07-09

## 2021-05-20 RX ORDER — POLYETHYLENE GLYCOL 3350 17 G/17G
17 POWDER, FOR SOLUTION ORAL DAILY
Qty: 510 G | Refills: 0 | Status: SHIPPED | OUTPATIENT
Start: 2021-05-20 | End: 2021-07-09

## 2021-05-20 RX ORDER — SODIUM, POTASSIUM,MAG SULFATES 17.5-3.13G
1 SOLUTION, RECONSTITUTED, ORAL ORAL SEE ADMIN INSTRUCTIONS
Qty: 177 ML | Refills: 0 | Status: SHIPPED | OUTPATIENT
Start: 2021-05-20 | End: 2021-07-09

## 2021-05-24 ENCOUNTER — OFFICE VISIT (OUTPATIENT)
Dept: FAMILY MEDICINE | Facility: CLINIC | Age: 51
End: 2021-05-24
Payer: COMMERCIAL

## 2021-05-24 VITALS
WEIGHT: 133 LBS | HEIGHT: 64 IN | BODY MASS INDEX: 22.71 KG/M2 | SYSTOLIC BLOOD PRESSURE: 117 MMHG | TEMPERATURE: 98.9 F | DIASTOLIC BLOOD PRESSURE: 82 MMHG | HEART RATE: 97 BPM

## 2021-05-24 DIAGNOSIS — I10 HYPERTENSION GOAL BP (BLOOD PRESSURE) < 140/90: Primary | ICD-10-CM

## 2021-05-24 DIAGNOSIS — R61 EXCESSIVE SWEATING: ICD-10-CM

## 2021-05-24 DIAGNOSIS — G25.81 RESTLESS LEG SYNDROME: ICD-10-CM

## 2021-05-24 LAB
ANION GAP SERPL CALCULATED.3IONS-SCNC: 1 MMOL/L (ref 3–14)
BUN SERPL-MCNC: 13 MG/DL (ref 7–30)
CALCIUM SERPL-MCNC: 8.8 MG/DL (ref 8.5–10.1)
CHLORIDE SERPL-SCNC: 108 MMOL/L (ref 94–109)
CO2 SERPL-SCNC: 29 MMOL/L (ref 20–32)
CREAT SERPL-MCNC: 0.71 MG/DL (ref 0.52–1.04)
FERRITIN SERPL-MCNC: 17 NG/ML (ref 8–252)
GFR SERPL CREATININE-BSD FRML MDRD: >90 ML/MIN/{1.73_M2}
GLUCOSE SERPL-MCNC: 100 MG/DL (ref 70–99)
IRON SATN MFR SERPL: 32 % (ref 15–46)
IRON SERPL-MCNC: 117 UG/DL (ref 35–180)
POTASSIUM SERPL-SCNC: 3.4 MMOL/L (ref 3.4–5.3)
SODIUM SERPL-SCNC: 138 MMOL/L (ref 133–144)
TIBC SERPL-MCNC: 365 UG/DL (ref 240–430)
VIT B12 SERPL-MCNC: 428 PG/ML (ref 193–986)

## 2021-05-24 PROCEDURE — 82728 ASSAY OF FERRITIN: CPT | Performed by: FAMILY MEDICINE

## 2021-05-24 PROCEDURE — 82607 VITAMIN B-12: CPT | Performed by: FAMILY MEDICINE

## 2021-05-24 PROCEDURE — 83550 IRON BINDING TEST: CPT | Performed by: FAMILY MEDICINE

## 2021-05-24 PROCEDURE — 83540 ASSAY OF IRON: CPT | Performed by: FAMILY MEDICINE

## 2021-05-24 PROCEDURE — 99214 OFFICE O/P EST MOD 30 MIN: CPT | Performed by: FAMILY MEDICINE

## 2021-05-24 PROCEDURE — 80048 BASIC METABOLIC PNL TOTAL CA: CPT | Performed by: FAMILY MEDICINE

## 2021-05-24 PROCEDURE — 36415 COLL VENOUS BLD VENIPUNCTURE: CPT | Performed by: FAMILY MEDICINE

## 2021-05-24 RX ORDER — LOSARTAN POTASSIUM 25 MG/1
25 TABLET ORAL DAILY
Qty: 90 TABLET | Refills: 3 | Status: SHIPPED | OUTPATIENT
Start: 2021-05-24 | End: 2022-06-01

## 2021-05-24 RX ORDER — GABAPENTIN 300 MG/1
300 CAPSULE ORAL AT BEDTIME
Qty: 90 CAPSULE | Refills: 0 | Status: SHIPPED | OUTPATIENT
Start: 2021-05-24 | End: 2021-07-31

## 2021-05-24 ASSESSMENT — MIFFLIN-ST. JEOR: SCORE: 1200.34

## 2021-05-24 NOTE — PROGRESS NOTES
"    Assessment & Plan     Hypertension goal BP (blood pressure) < 140/90  At goal on meds, will refill, due for labwork  - losartan (COZAAR) 25 MG tablet; Take 1 tablet (25 mg) by mouth daily  - Basic metabolic panel    Excessive sweating  Refill as needed  - aluminum chloride (DRYSOL) 20 % external solution; Apply topically At Bedtime    Restless leg syndrome  Trial of gabapentin. May help with her neuropathy as well  - gabapentin (NEURONTIN) 300 MG capsule; Take 1 capsule (300 mg) by mouth At Bedtime  - Ferritin  - Iron and iron binding capacity  - Vitamin B12                 No follow-ups on file.    Sophia Lagos MD  [facility] SUMAN Cheney is a 50 year old who presents for the following health issues     HPI     Hypertension Follow-up      Do you check your blood pressure regularly outside of the clinic? No     Are you following a low salt diet? Yes    Are your blood pressures ever more than 140 on the top number (systolic) OR more   than 90 on the bottom number (diastolic), for example 140/90?     reflill reflux medication    Pt with HTN well controlled on meds, it is working well . Due for labwork  Pt with hyperhidrosis needing refill   Pt gets creepy crawly sensation in legs at night. Gets better with walking around  It is quite bother some. Also has been told she probably has some neuropathy as the distal half of her feet are always cols and numb.               Review of Systems   Constitutional, HEENT, cardiovascular, pulmonary, gi and gu systems are negative, except as otherwise noted.      Objective    /82   Pulse 97   Temp 98.9  F (37.2  C) (Oral)   Ht 1.613 m (5' 3.5\")   Wt 60.3 kg (133 lb)   BMI 23.19 kg/m    Body mass index is 23.19 kg/m .  Physical Exam   GENERAL: healthy, alert and no distress  NECK: no adenopathy, no asymmetry, masses, or scars and thyroid normal to palpation  RESP: lungs clear to auscultation - no rales, rhonchi or wheezes  CV: " regular rate and rhythm, normal S1 S2, no S3 or S4, no murmur, click or rub, no peripheral edema and peripheral pulses strong  ABDOMEN: soft, nontender, no hepatosplenomegaly, no masses and bowel sounds normal  MS: no gross musculoskeletal defects noted, no edema    Results for orders placed or performed in visit on 05/24/21 (from the past 24 hour(s))   Basic metabolic panel   Result Value Ref Range    Sodium 138 133 - 144 mmol/L    Potassium 3.4 3.4 - 5.3 mmol/L    Chloride 108 94 - 109 mmol/L    Carbon Dioxide 29 20 - 32 mmol/L    Anion Gap 1 (L) 3 - 14 mmol/L    Glucose 100 (H) 70 - 99 mg/dL    Urea Nitrogen 13 7 - 30 mg/dL    Creatinine 0.71 0.52 - 1.04 mg/dL    GFR Estimate >90 >60 mL/min/[1.73_m2]    GFR Estimate If Black >90 >60 mL/min/[1.73_m2]    Calcium 8.8 8.5 - 10.1 mg/dL   Ferritin   Result Value Ref Range    Ferritin 17 8 - 252 ng/mL   Iron and iron binding capacity   Result Value Ref Range    Iron 117 35 - 180 ug/dL    Iron Binding Cap 365 240 - 430 ug/dL    Iron Saturation Index 32 15 - 46 %

## 2021-05-25 DIAGNOSIS — Z11.59 ENCOUNTER FOR SCREENING FOR OTHER VIRAL DISEASES: ICD-10-CM

## 2021-05-25 LAB
SARS-COV-2 RNA RESP QL NAA+PROBE: NORMAL
SPECIMEN SOURCE: NORMAL

## 2021-05-25 PROCEDURE — U0005 INFEC AGEN DETEC AMPLI PROBE: HCPCS | Performed by: SURGERY

## 2021-05-25 PROCEDURE — U0003 INFECTIOUS AGENT DETECTION BY NUCLEIC ACID (DNA OR RNA); SEVERE ACUTE RESPIRATORY SYNDROME CORONAVIRUS 2 (SARS-COV-2) (CORONAVIRUS DISEASE [COVID-19]), AMPLIFIED PROBE TECHNIQUE, MAKING USE OF HIGH THROUGHPUT TECHNOLOGIES AS DESCRIBED BY CMS-2020-01-R: HCPCS | Performed by: SURGERY

## 2021-05-25 ASSESSMENT — ASTHMA QUESTIONNAIRES: ACT_TOTALSCORE: 21

## 2021-05-26 LAB
LABORATORY COMMENT REPORT: NORMAL
SARS-COV-2 RNA RESP QL NAA+PROBE: NEGATIVE
SPECIMEN SOURCE: NORMAL

## 2021-05-28 ENCOUNTER — HOSPITAL ENCOUNTER (OUTPATIENT)
Facility: AMBULATORY SURGERY CENTER | Age: 51
Discharge: HOME | End: 2021-05-28
Attending: SURGERY | Admitting: SURGERY
Payer: COMMERCIAL

## 2021-05-28 VITALS
OXYGEN SATURATION: 97 % | TEMPERATURE: 98 F | SYSTOLIC BLOOD PRESSURE: 104 MMHG | RESPIRATION RATE: 16 BRPM | DIASTOLIC BLOOD PRESSURE: 75 MMHG | HEART RATE: 73 BPM

## 2021-05-28 DIAGNOSIS — Z12.11 SCREEN FOR COLON CANCER: Primary | ICD-10-CM

## 2021-05-28 LAB — COLONOSCOPY: NORMAL

## 2021-05-28 PROCEDURE — 99153 MOD SED SAME PHYS/QHP EA: CPT | Mod: 59 | Performed by: SURGERY

## 2021-05-28 PROCEDURE — 99152 MOD SED SAME PHYS/QHP 5/>YRS: CPT | Mod: 59 | Performed by: SURGERY

## 2021-05-28 PROCEDURE — G8907 PT DOC NO EVENTS ON DISCHARG: HCPCS

## 2021-05-28 PROCEDURE — 45378 DIAGNOSTIC COLONOSCOPY: CPT

## 2021-05-28 PROCEDURE — G8918 PT W/O PREOP ORDER IV AB PRO: HCPCS

## 2021-05-28 PROCEDURE — G0121 COLON CA SCRN NOT HI RSK IND: HCPCS | Performed by: SURGERY

## 2021-05-28 RX ORDER — FENTANYL CITRATE 50 UG/ML
INJECTION, SOLUTION INTRAMUSCULAR; INTRAVENOUS PRN
Status: DISCONTINUED | OUTPATIENT
Start: 2021-05-28 | End: 2021-05-28 | Stop reason: HOSPADM

## 2021-05-28 RX ORDER — LIDOCAINE 40 MG/G
CREAM TOPICAL
Status: DISCONTINUED | OUTPATIENT
Start: 2021-05-28 | End: 2021-05-29 | Stop reason: HOSPADM

## 2021-05-28 RX ORDER — ONDANSETRON 2 MG/ML
4 INJECTION INTRAMUSCULAR; INTRAVENOUS
Status: DISCONTINUED | OUTPATIENT
Start: 2021-05-28 | End: 2021-05-29 | Stop reason: HOSPADM

## 2021-06-04 ENCOUNTER — INFUSION THERAPY VISIT (OUTPATIENT)
Dept: INFUSION THERAPY | Facility: CLINIC | Age: 51
End: 2021-06-04
Payer: COMMERCIAL

## 2021-06-04 VITALS
SYSTOLIC BLOOD PRESSURE: 123 MMHG | RESPIRATION RATE: 16 BRPM | WEIGHT: 133 LBS | DIASTOLIC BLOOD PRESSURE: 76 MMHG | BODY MASS INDEX: 23.19 KG/M2 | TEMPERATURE: 98.3 F | HEART RATE: 70 BPM | OXYGEN SATURATION: 98 %

## 2021-06-04 DIAGNOSIS — M08.3 CHRONIC POLYARTICULAR JUVENILE RHEUMATOID ARTHRITIS (H): Primary | ICD-10-CM

## 2021-06-04 DIAGNOSIS — Z79.899 HIGH RISK MEDICATIONS (NOT ANTICOAGULANTS) LONG-TERM USE: ICD-10-CM

## 2021-06-04 PROCEDURE — 99207 PR NO CHARGE LOS: CPT

## 2021-06-04 PROCEDURE — 96413 CHEMO IV INFUSION 1 HR: CPT | Performed by: INTERNAL MEDICINE

## 2021-06-04 PROCEDURE — 96375 TX/PRO/DX INJ NEW DRUG ADDON: CPT | Performed by: INTERNAL MEDICINE

## 2021-06-04 RX ORDER — METHYLPREDNISOLONE SODIUM SUCCINATE 125 MG/2ML
125 INJECTION, POWDER, LYOPHILIZED, FOR SOLUTION INTRAMUSCULAR; INTRAVENOUS ONCE
Status: COMPLETED | OUTPATIENT
Start: 2021-06-04 | End: 2021-06-04

## 2021-06-04 RX ORDER — ACETAMINOPHEN 325 MG/1
650 TABLET ORAL ONCE
Status: CANCELLED
Start: 2021-06-04 | End: 2021-06-04

## 2021-06-04 RX ORDER — DIPHENHYDRAMINE HCL 25 MG
25 CAPSULE ORAL ONCE
Status: COMPLETED | OUTPATIENT
Start: 2021-06-04 | End: 2021-06-04

## 2021-06-04 RX ORDER — DIPHENHYDRAMINE HCL 25 MG
25 CAPSULE ORAL ONCE
Status: CANCELLED
Start: 2021-06-04 | End: 2021-06-04

## 2021-06-04 RX ORDER — METHYLPREDNISOLONE SODIUM SUCCINATE 125 MG/2ML
125 INJECTION, POWDER, LYOPHILIZED, FOR SOLUTION INTRAMUSCULAR; INTRAVENOUS ONCE
Status: CANCELLED
Start: 2021-06-04 | End: 2021-06-04

## 2021-06-04 RX ADMIN — METHYLPREDNISOLONE SODIUM SUCCINATE 125 MG: 125 INJECTION INTRAMUSCULAR; INTRAVENOUS at 11:42

## 2021-06-04 RX ADMIN — Medication 25 MG: at 11:42

## 2021-06-04 RX ADMIN — Medication 250 ML: at 11:40

## 2021-06-04 NOTE — PROGRESS NOTES
Infusion Nursing Note:  Noni Mccormick presents today for Remicade.    Patient seen by provider today: No   present during visit today: Not Applicable.    Note: N/A.    Intravenous Access:  Peripheral IV placed.    Treatment Conditions:  Biological Infusion Checklist:  ~~~ NOTE: If the patient answers yes to any of the questions below, hold the infusion and contact ordering provider or on-call provider.    1. Have you recently had an elevated temperature, fever, chills, productive cough, coughing for 3 weeks or longer or hemoptysis, abnormal vital signs, night sweats,  chest pain or have you noticed a decrease in your appetite, unexplained weight loss or fatigue? No  2. Do you have any open wounds or new incisions? No  3. Do you have any recent or upcoming hospitalizations, surgeries or dental procedures? No  4. Do you currently have or recently have had any signs of illness or infection or are you on any antibiotics? No  5. Have you had any new, sudden or worsening abdominal pain? No  6. Have you or anyone in your household received a live vaccination in the past 4 weeks? Please note:  No live vaccines while on biologic/chemotherapy until 6 months after the last treatment.  Patient can receive the flu vaccine (shot only) and the pneumovax.  It is optimal for the patient to get these vaccines mid cycle, but they can be given at any time as long as it is not on the day of the infusion. No  7. Have you recently been diagnosed with any new nervous system diseases (ie. Multiple sclerosis, Guillain [place], seizures, neurological changes) or cancer diagnosis? No  8. Are you on any form of radiation or chemotherapy? No  9. Are you pregnant or breast feeding or do you have plans of pregnancy in the future? No  10. Have you been having any signs of worsening depression or suicidal ideations?  (benlysta only) No  11. Have there been any other new onset medical symptoms? No        Post Infusion  Assessment:  Patient tolerated infusion without incident.  Site patent and intact, free from redness, edema or discomfort.  No evidence of extravasations.  Access discontinued per protocol.       Discharge Plan:   AVS to patient via MYCHART.  Patient will return 07/09/2021 for next appointment.   Patient discharged in stable condition accompanied by: self.  Departure Mode: Ambulatory.      Kristy Alejandro RN

## 2021-07-05 ENCOUNTER — DOCUMENTATION ONLY (OUTPATIENT)
Dept: LAB | Facility: CLINIC | Age: 51
End: 2021-07-05

## 2021-07-05 DIAGNOSIS — M06.09 RHEUMATOID ARTHRITIS OF MULTIPLE SITES WITHOUT RHEUMATOID FACTOR (H): Primary | ICD-10-CM

## 2021-07-06 ENCOUNTER — TELEPHONE (OUTPATIENT)
Dept: RHEUMATOLOGY | Facility: CLINIC | Age: 51
End: 2021-07-06

## 2021-07-06 RX ORDER — ACETAMINOPHEN 325 MG/1
650 TABLET ORAL ONCE
Status: CANCELLED
Start: 2021-07-06 | End: 2021-07-06

## 2021-07-06 RX ORDER — METHYLPREDNISOLONE SODIUM SUCCINATE 125 MG/2ML
125 INJECTION, POWDER, LYOPHILIZED, FOR SOLUTION INTRAMUSCULAR; INTRAVENOUS ONCE
Status: CANCELLED
Start: 2021-07-06 | End: 2021-07-06

## 2021-07-06 RX ORDER — DIPHENHYDRAMINE HCL 25 MG
25 CAPSULE ORAL ONCE
Status: CANCELLED
Start: 2021-07-06 | End: 2021-07-06

## 2021-07-06 NOTE — TELEPHONE ENCOUNTER
Confirmed patient has appt for infusion.     ARIE GaldamezN, RN   Medical Specialty Care Coordinator   [facility]

## 2021-07-09 ENCOUNTER — TELEPHONE (OUTPATIENT)
Dept: RHEUMATOLOGY | Facility: CLINIC | Age: 51
End: 2021-07-09

## 2021-07-09 ENCOUNTER — INFUSION THERAPY VISIT (OUTPATIENT)
Dept: INFUSION THERAPY | Facility: CLINIC | Age: 51
End: 2021-07-09
Payer: COMMERCIAL

## 2021-07-09 VITALS — RESPIRATION RATE: 16 BRPM | BODY MASS INDEX: 23.26 KG/M2 | WEIGHT: 133.4 LBS | TEMPERATURE: 98 F

## 2021-07-09 DIAGNOSIS — Z79.899 HIGH RISK MEDICATIONS (NOT ANTICOAGULANTS) LONG-TERM USE: ICD-10-CM

## 2021-07-09 DIAGNOSIS — M08.3 CHRONIC POLYARTICULAR JUVENILE RHEUMATOID ARTHRITIS (H): Primary | ICD-10-CM

## 2021-07-09 DIAGNOSIS — M06.09 RHEUMATOID ARTHRITIS OF MULTIPLE SITES WITHOUT RHEUMATOID FACTOR (H): ICD-10-CM

## 2021-07-09 LAB
ALBUMIN SERPL-MCNC: 3.4 G/DL (ref 3.4–5)
ALT SERPL W P-5'-P-CCNC: 16 U/L (ref 0–50)
AST SERPL W P-5'-P-CCNC: 15 U/L (ref 0–45)
CREAT SERPL-MCNC: 0.69 MG/DL (ref 0.52–1.04)
CRP SERPL-MCNC: 7.5 MG/L (ref 0–8)
ERYTHROCYTE [DISTWIDTH] IN BLOOD BY AUTOMATED COUNT: 13 % (ref 10–15)
ERYTHROCYTE [SEDIMENTATION RATE] IN BLOOD BY WESTERGREN METHOD: 58 MM/H (ref 0–30)
GFR SERPL CREATININE-BSD FRML MDRD: >90 ML/MIN/{1.73_M2}
HCT VFR BLD AUTO: 40.9 % (ref 35–47)
HGB BLD-MCNC: 13.5 G/DL (ref 11.7–15.7)
MCH RBC QN AUTO: 30.1 PG (ref 26.5–33)
MCHC RBC AUTO-ENTMCNC: 33 G/DL (ref 31.5–36.5)
MCV RBC AUTO: 91 FL (ref 78–100)
PLATELET # BLD AUTO: 456 10E9/L (ref 150–450)
RBC # BLD AUTO: 4.49 10E12/L (ref 3.8–5.2)
WBC # BLD AUTO: 4.8 10E9/L (ref 4–11)

## 2021-07-09 PROCEDURE — 82040 ASSAY OF SERUM ALBUMIN: CPT | Performed by: STUDENT IN AN ORGANIZED HEALTH CARE EDUCATION/TRAINING PROGRAM

## 2021-07-09 PROCEDURE — 96375 TX/PRO/DX INJ NEW DRUG ADDON: CPT | Performed by: INTERNAL MEDICINE

## 2021-07-09 PROCEDURE — 84460 ALANINE AMINO (ALT) (SGPT): CPT | Performed by: STUDENT IN AN ORGANIZED HEALTH CARE EDUCATION/TRAINING PROGRAM

## 2021-07-09 PROCEDURE — 96413 CHEMO IV INFUSION 1 HR: CPT | Performed by: INTERNAL MEDICINE

## 2021-07-09 PROCEDURE — 84450 TRANSFERASE (AST) (SGOT): CPT | Performed by: STUDENT IN AN ORGANIZED HEALTH CARE EDUCATION/TRAINING PROGRAM

## 2021-07-09 PROCEDURE — 86140 C-REACTIVE PROTEIN: CPT | Performed by: STUDENT IN AN ORGANIZED HEALTH CARE EDUCATION/TRAINING PROGRAM

## 2021-07-09 PROCEDURE — 82565 ASSAY OF CREATININE: CPT | Performed by: STUDENT IN AN ORGANIZED HEALTH CARE EDUCATION/TRAINING PROGRAM

## 2021-07-09 PROCEDURE — 99207 PR NO CHARGE LOS: CPT

## 2021-07-09 PROCEDURE — 36415 COLL VENOUS BLD VENIPUNCTURE: CPT | Performed by: STUDENT IN AN ORGANIZED HEALTH CARE EDUCATION/TRAINING PROGRAM

## 2021-07-09 PROCEDURE — 85027 COMPLETE CBC AUTOMATED: CPT | Performed by: STUDENT IN AN ORGANIZED HEALTH CARE EDUCATION/TRAINING PROGRAM

## 2021-07-09 PROCEDURE — 85652 RBC SED RATE AUTOMATED: CPT | Performed by: STUDENT IN AN ORGANIZED HEALTH CARE EDUCATION/TRAINING PROGRAM

## 2021-07-09 RX ORDER — ACETAMINOPHEN 325 MG/1
650 TABLET ORAL ONCE
Status: CANCELLED
Start: 2021-07-09 | End: 2021-07-09

## 2021-07-09 RX ORDER — DIPHENHYDRAMINE HCL 25 MG
25 CAPSULE ORAL ONCE
Status: COMPLETED | OUTPATIENT
Start: 2021-07-09 | End: 2021-07-09

## 2021-07-09 RX ORDER — DIPHENHYDRAMINE HCL 25 MG
25 CAPSULE ORAL ONCE
Status: CANCELLED
Start: 2021-07-09 | End: 2021-07-09

## 2021-07-09 RX ORDER — METHYLPREDNISOLONE SODIUM SUCCINATE 125 MG/2ML
125 INJECTION, POWDER, LYOPHILIZED, FOR SOLUTION INTRAMUSCULAR; INTRAVENOUS ONCE
Status: CANCELLED
Start: 2021-07-09 | End: 2021-07-09

## 2021-07-09 RX ORDER — METHYLPREDNISOLONE SODIUM SUCCINATE 125 MG/2ML
125 INJECTION, POWDER, LYOPHILIZED, FOR SOLUTION INTRAMUSCULAR; INTRAVENOUS ONCE
Status: COMPLETED | OUTPATIENT
Start: 2021-07-09 | End: 2021-07-09

## 2021-07-09 RX ADMIN — METHYLPREDNISOLONE SODIUM SUCCINATE 125 MG: 125 INJECTION INTRAMUSCULAR; INTRAVENOUS at 11:43

## 2021-07-09 RX ADMIN — Medication 250 ML: at 11:42

## 2021-07-09 RX ADMIN — Medication 25 MG: at 11:34

## 2021-07-09 ASSESSMENT — PAIN SCALES - GENERAL: PAINLEVEL: NO PAIN (0)

## 2021-07-09 NOTE — PROGRESS NOTES
Infusion Nursing Note:  Noni Mccormick presents today for rapid remicade.    Patient seen by provider today: No   present during visit today: Not Applicable.    Note: Noni took Tylenol 650mg and Benadryl 25mg at home prior to coming.      Intravenous Access:  Peripheral IV placed.    Treatment Conditions:  Biological Infusion Checklist:  ~~~ NOTE: If the patient answers yes to any of the questions below, hold the infusion and contact ordering provider or on-call provider.    1. Have you recently had an elevated temperature, fever, chills, productive cough, coughing for 3 weeks or longer or hemoptysis, abnormal vital signs, night sweats,  chest pain or have you noticed a decrease in your appetite, unexplained weight loss or fatigue? No  2. Do you have any open wounds or new incisions? No  3. Do you have any recent or upcoming hospitalizations, surgeries or dental procedures? No  4. Do you currently have or recently have had any signs of illness or infection or are you on any antibiotics? No  5. Have you had any new, sudden or worsening abdominal pain? No  6. Have you or anyone in your household received a live vaccination in the past 4 weeks? Please note:  No live vaccines while on biologic/chemotherapy until 6 months after the last treatment.  Patient can receive the flu vaccine (shot only) and the pneumovax.  It is optimal for the patient to get these vaccines mid cycle, but they can be given at any time as long as it is not on the day of the infusion. No  7. Have you recently been diagnosed with any new nervous system diseases (ie. Multiple sclerosis, Guillain [place], seizures, neurological changes) or cancer diagnosis? No  8. Are you on any form of radiation or chemotherapy? No  9. Are you pregnant or breast feeding or do you have plans of pregnancy in the future? No  10. Have you been having any signs of worsening depression or suicidal ideations?  (benlysta only) No  11. Have there been any  other new onset medical symptoms? No        Post Infusion Assessment:  Patient tolerated infusion without incident.  Blood return noted pre and post infusion.  Site patent and intact, free from redness, edema or discomfort.  No evidence of extravasations.  Access discontinued per protocol.       Discharge Plan:   Patient discharged in stable condition accompanied by: self.  Departure Mode: Ambulatory.  Verbally reviewed next remicade on 8/13/21.      Jessica Ronquillo RN

## 2021-07-09 NOTE — TELEPHONE ENCOUNTER
Patient called back. Discussed results. She had no questions or concerns at this time.     ARIE GaldamezN, RN   Medical Specialty Care Coordinator   [facility]

## 2021-07-09 NOTE — TELEPHONE ENCOUNTER
Called and LVM for patient to discuss labs.    JOSÉ MIGUEL Negrete [facility] Rheumatology

## 2021-07-09 NOTE — RESULT ENCOUNTER NOTE
Normal monitoring labs - normal liver, kidney tests and cell counts. CRP is normal. Sed rate is stable but elevated.     Sterling Gonzalez MD  7/9/2021  1:11 PM

## 2021-07-09 NOTE — TELEPHONE ENCOUNTER
----- Message from Sterling Gonzalez MD sent at 7/9/2021  1:11 PM CDT -----  Normal monitoring labs - normal liver, kidney tests and cell counts. CRP is normal. Sed rate is stable but elevated.     Sterling Gonzalez MD  7/9/2021  1:11 PM

## 2021-07-09 NOTE — TELEPHONE ENCOUNTER
Confirmed patient reviewed labs via [employer].      JOSÉ MIGUEL Negrete [facility]

## 2021-07-19 DIAGNOSIS — M06.09 RHEUMATOID ARTHRITIS OF MULTIPLE SITES WITHOUT RHEUMATOID FACTOR (H): ICD-10-CM

## 2021-07-21 RX ORDER — METHOTREXATE 2.5 MG/1
20 TABLET ORAL
Qty: 96 TABLET | Refills: 0 | Status: SHIPPED | OUTPATIENT
Start: 2021-07-21 | End: 2021-10-18

## 2021-07-21 NOTE — TELEPHONE ENCOUNTER
METHOTREXATE 2.5 MG TABLET  Last Written Prescription Date:  4/23/21  Last Fill Quantity: 96,   # refills: 0  Last Office Visit: 4/21/21  Future Office visit:  none    CBC RESULTS: Recent Labs   Lab Test 07/09/21  1109   WBC 4.8   RBC 4.49   HGB 13.5   HCT 40.9   MCV 91   MCH 30.1   MCHC 33.0   RDW 13.0   *       Creatinine   Date Value Ref Range Status   07/09/2021 0.69 0.52 - 1.04 mg/dL Final   ]    Liver Function Studies -   Recent Labs   Lab Test 07/09/21  1109 06/15/18  1210 03/30/18  0820   PROTTOTAL  --   --  7.6   ALBUMIN 3.4   < > 3.2*   BILITOTAL  --   --  0.5   ALKPHOS  --   --  27*   AST 15  --  16   ALT 16  --  18    < > = values in this interval not displayed.       Routing refill request to provider for review/approval because:  DMARD

## 2021-07-30 DIAGNOSIS — G25.81 RESTLESS LEG SYNDROME: ICD-10-CM

## 2021-07-30 NOTE — TELEPHONE ENCOUNTER
Routing refill request to provider for review/approval because:  Drug not on the [facility] refill protocol     Becky SARGENTN, RN

## 2021-07-31 RX ORDER — GABAPENTIN 300 MG/1
CAPSULE ORAL
Qty: 90 CAPSULE | Refills: 0 | Status: SHIPPED | OUTPATIENT
Start: 2021-07-31 | End: 2021-10-16

## 2021-08-13 ENCOUNTER — INFUSION THERAPY VISIT (OUTPATIENT)
Dept: INFUSION THERAPY | Facility: CLINIC | Age: 51
End: 2021-08-13
Payer: COMMERCIAL

## 2021-08-13 VITALS
RESPIRATION RATE: 18 BRPM | WEIGHT: 134.7 LBS | OXYGEN SATURATION: 97 % | DIASTOLIC BLOOD PRESSURE: 80 MMHG | TEMPERATURE: 98.6 F | SYSTOLIC BLOOD PRESSURE: 126 MMHG | HEART RATE: 88 BPM | BODY MASS INDEX: 23.49 KG/M2

## 2021-08-13 DIAGNOSIS — Z79.899 HIGH RISK MEDICATIONS (NOT ANTICOAGULANTS) LONG-TERM USE: ICD-10-CM

## 2021-08-13 DIAGNOSIS — M08.3 CHRONIC POLYARTICULAR JUVENILE RHEUMATOID ARTHRITIS (H): Primary | ICD-10-CM

## 2021-08-13 PROCEDURE — 96413 CHEMO IV INFUSION 1 HR: CPT | Performed by: NURSE PRACTITIONER

## 2021-08-13 PROCEDURE — 99207 PR NO CHARGE LOS: CPT

## 2021-08-13 PROCEDURE — 96375 TX/PRO/DX INJ NEW DRUG ADDON: CPT | Performed by: NURSE PRACTITIONER

## 2021-08-13 RX ORDER — DIPHENHYDRAMINE HCL 25 MG
25 CAPSULE ORAL ONCE
Status: COMPLETED | OUTPATIENT
Start: 2021-08-13 | End: 2021-08-13

## 2021-08-13 RX ORDER — METHYLPREDNISOLONE SODIUM SUCCINATE 125 MG/2ML
125 INJECTION, POWDER, LYOPHILIZED, FOR SOLUTION INTRAMUSCULAR; INTRAVENOUS ONCE
Status: CANCELLED
Start: 2021-08-13 | End: 2021-08-13

## 2021-08-13 RX ORDER — ACETAMINOPHEN 325 MG/1
650 TABLET ORAL ONCE
Status: CANCELLED
Start: 2021-08-13 | End: 2021-08-13

## 2021-08-13 RX ORDER — METHYLPREDNISOLONE SODIUM SUCCINATE 125 MG/2ML
125 INJECTION, POWDER, LYOPHILIZED, FOR SOLUTION INTRAMUSCULAR; INTRAVENOUS ONCE
Status: COMPLETED | OUTPATIENT
Start: 2021-08-13 | End: 2021-08-13

## 2021-08-13 RX ORDER — DIPHENHYDRAMINE HCL 25 MG
25 CAPSULE ORAL ONCE
Status: CANCELLED
Start: 2021-08-13 | End: 2021-08-13

## 2021-08-13 RX ADMIN — Medication 25 MG: at 11:48

## 2021-08-13 RX ADMIN — Medication 250 ML: at 11:58

## 2021-08-13 RX ADMIN — METHYLPREDNISOLONE SODIUM SUCCINATE 125 MG: 125 INJECTION INTRAMUSCULAR; INTRAVENOUS at 11:59

## 2021-08-13 ASSESSMENT — PAIN SCALES - GENERAL: PAINLEVEL: MILD PAIN (3)

## 2021-08-13 NOTE — PROGRESS NOTES
"Infusion Nursing Note:  Noni Mccormick presents today for Rapid Remicade.    Patient seen by provider today: No   present during visit today: Not Applicable.    Note: Pt denies any reason to hold therapy today, states he RA is primarily to her bilateral hands, but the pain \"never really goes away\". Take 25 mg of Benadryl at home and also receives additional 25 mg before start of infusion.      Intravenous Access:  Peripheral IV placed.    Treatment Conditions:  Biological Infusion Checklist:  ~~~ NOTE: If the patient answers yes to any of the questions below, hold the infusion and contact ordering provider or on-call provider.    1. Have you recently had an elevated temperature, fever, chills, productive cough, coughing for 3 weeks or longer or hemoptysis, abnormal vital signs, night sweats,  chest pain or have you noticed a decrease in your appetite, unexplained weight loss or fatigue? No  2. Do you have any open wounds or new incisions? No  3. Do you have any recent or upcoming hospitalizations, surgeries or dental procedures? No  4. Do you currently have or recently have had any signs of illness or infection or are you on any antibiotics? No  5. Have you had any new, sudden or worsening abdominal pain? No  6. Have you or anyone in your household received a live vaccination in the past 4 weeks? Please note:  No live vaccines while on biologic/chemotherapy until 6 months after the last treatment.  Patient can receive the flu vaccine (shot only) and the pneumovax.  It is optimal for the patient to get these vaccines mid cycle, but they can be given at any time as long as it is not on the day of the infusion. No  7. Have you recently been diagnosed with any new nervous system diseases (ie. Multiple sclerosis, Guillain [place], seizures, neurological changes) or cancer diagnosis? No  8. Are you on any form of radiation or chemotherapy? No  9. Are you pregnant or breast feeding or do you have plans of " pregnancy in the future? No  10. Have you been having any signs of worsening depression or suicidal ideations?  (benlysta only) No  11. Have there been any other new onset medical symptoms? No        Post Infusion Assessment:  Patient tolerated infusion without incident.  Blood return noted pre and post infusion.  Site patent and intact, free from redness, edema or discomfort.  No evidence of extravasations.  Access discontinued per protocol.  Biologic Infusion Post Education: Call the triage nurse at your clinic or seek medical attention if you have chills and/or temperature greater than or equal to 100.5, uncontrolled nausea/vomiting, diarrhea, constipation, dizziness, shortness of breath, chest pain, heart palpitations, weakness or any other new or concerning symptoms, questions or concerns.  You cannot have any live virus vaccines prior to or during treatment or up to 6 months post infusion.  If you have an upcoming surgery, medical procedure or dental procedure during treatment, this should be discussed with your ordering physician and your surgeon/dentist.  If you are having any concerning symptom, if you are unsure if you should get your next infusion or wish to speak to a provider before your next infusion, please call your care coordinator or triage nurse at your clinic to notify them so we can adequately serve you.       Discharge Plan:  Return for labs and infusion on 09/17/2021   Discharge instructions reviewed with: Patient.  Patient and/or family verbalized understanding of discharge instructions and all questions answered.  Patient discharged in stable condition accompanied by: self.  Departure Mode: Ambulatory.      Coral Herrera RN

## 2021-09-12 ENCOUNTER — HEALTH MAINTENANCE LETTER (OUTPATIENT)
Age: 51
End: 2021-09-12

## 2021-09-17 ENCOUNTER — LAB (OUTPATIENT)
Dept: LAB | Facility: CLINIC | Age: 51
End: 2021-09-17
Payer: COMMERCIAL

## 2021-09-17 ENCOUNTER — INFUSION THERAPY VISIT (OUTPATIENT)
Dept: INFUSION THERAPY | Facility: CLINIC | Age: 51
End: 2021-09-17
Payer: COMMERCIAL

## 2021-09-17 VITALS
DIASTOLIC BLOOD PRESSURE: 73 MMHG | WEIGHT: 135.6 LBS | HEART RATE: 84 BPM | OXYGEN SATURATION: 97 % | SYSTOLIC BLOOD PRESSURE: 127 MMHG | TEMPERATURE: 98.2 F | BODY MASS INDEX: 23.64 KG/M2

## 2021-09-17 DIAGNOSIS — M06.09 RHEUMATOID ARTHRITIS OF MULTIPLE SITES WITHOUT RHEUMATOID FACTOR (H): ICD-10-CM

## 2021-09-17 DIAGNOSIS — Z79.899 HIGH RISK MEDICATIONS (NOT ANTICOAGULANTS) LONG-TERM USE: ICD-10-CM

## 2021-09-17 DIAGNOSIS — M08.3 CHRONIC POLYARTICULAR JUVENILE RHEUMATOID ARTHRITIS (H): Primary | ICD-10-CM

## 2021-09-17 LAB
ALBUMIN SERPL-MCNC: 3.2 G/DL (ref 3.4–5)
ALT SERPL W P-5'-P-CCNC: 17 U/L (ref 0–50)
AST SERPL W P-5'-P-CCNC: 15 U/L (ref 0–45)
CREAT SERPL-MCNC: 0.66 MG/DL (ref 0.52–1.04)
CRP SERPL-MCNC: 9.2 MG/L (ref 0–8)
ERYTHROCYTE [DISTWIDTH] IN BLOOD BY AUTOMATED COUNT: 13.2 % (ref 10–15)
ERYTHROCYTE [SEDIMENTATION RATE] IN BLOOD BY WESTERGREN METHOD: 63 MM/HR (ref 0–30)
GFR SERPL CREATININE-BSD FRML MDRD: >90 ML/MIN/1.73M2
HCT VFR BLD AUTO: 36.5 % (ref 35–47)
HGB BLD-MCNC: 12.4 G/DL (ref 11.7–15.7)
HOLD SPECIMEN: NORMAL
MCH RBC QN AUTO: 30.4 PG (ref 26.5–33)
MCHC RBC AUTO-ENTMCNC: 34 G/DL (ref 31.5–36.5)
MCV RBC AUTO: 90 FL (ref 78–100)
PLATELET # BLD AUTO: 410 10E3/UL (ref 150–450)
RBC # BLD AUTO: 4.08 10E6/UL (ref 3.8–5.2)
WBC # BLD AUTO: 7.3 10E3/UL (ref 4–11)

## 2021-09-17 PROCEDURE — 82040 ASSAY OF SERUM ALBUMIN: CPT

## 2021-09-17 PROCEDURE — 36415 COLL VENOUS BLD VENIPUNCTURE: CPT

## 2021-09-17 PROCEDURE — 86140 C-REACTIVE PROTEIN: CPT

## 2021-09-17 PROCEDURE — 85652 RBC SED RATE AUTOMATED: CPT

## 2021-09-17 PROCEDURE — 82565 ASSAY OF CREATININE: CPT

## 2021-09-17 PROCEDURE — 99207 PR NO CHARGE LOS: CPT

## 2021-09-17 PROCEDURE — 96413 CHEMO IV INFUSION 1 HR: CPT | Performed by: NURSE PRACTITIONER

## 2021-09-17 PROCEDURE — 96375 TX/PRO/DX INJ NEW DRUG ADDON: CPT | Performed by: NURSE PRACTITIONER

## 2021-09-17 PROCEDURE — 85027 COMPLETE CBC AUTOMATED: CPT

## 2021-09-17 PROCEDURE — 84460 ALANINE AMINO (ALT) (SGPT): CPT

## 2021-09-17 PROCEDURE — 84450 TRANSFERASE (AST) (SGOT): CPT

## 2021-09-17 RX ORDER — DIPHENHYDRAMINE HCL 25 MG
25 CAPSULE ORAL ONCE
Status: CANCELLED
Start: 2021-09-17 | End: 2021-09-17

## 2021-09-17 RX ORDER — ACETAMINOPHEN 325 MG/1
650 TABLET ORAL ONCE
Status: CANCELLED
Start: 2021-09-17 | End: 2021-09-17

## 2021-09-17 RX ORDER — DIPHENHYDRAMINE HCL 25 MG
25 CAPSULE ORAL ONCE
Status: COMPLETED | OUTPATIENT
Start: 2021-09-17 | End: 2021-09-17

## 2021-09-17 RX ORDER — METHYLPREDNISOLONE SODIUM SUCCINATE 125 MG/2ML
125 INJECTION, POWDER, LYOPHILIZED, FOR SOLUTION INTRAMUSCULAR; INTRAVENOUS ONCE
Status: COMPLETED | OUTPATIENT
Start: 2021-09-17 | End: 2021-09-17

## 2021-09-17 RX ORDER — METHYLPREDNISOLONE SODIUM SUCCINATE 125 MG/2ML
125 INJECTION, POWDER, LYOPHILIZED, FOR SOLUTION INTRAMUSCULAR; INTRAVENOUS ONCE
Status: CANCELLED
Start: 2021-09-17 | End: 2021-09-17

## 2021-09-17 RX ADMIN — Medication 25 MG: at 11:47

## 2021-09-17 RX ADMIN — METHYLPREDNISOLONE SODIUM SUCCINATE 125 MG: 125 INJECTION INTRAMUSCULAR; INTRAVENOUS at 11:58

## 2021-09-17 RX ADMIN — Medication 250 ML: at 11:57

## 2021-09-17 ASSESSMENT — PAIN SCALES - GENERAL: PAINLEVEL: NO PAIN (0)

## 2021-09-17 NOTE — PROGRESS NOTES
Infusion Nursing Note:  Noni Mccormick presents today for Rapid Remicade.    Patient seen by provider today: No   present during visit today: Not Applicable.    Note: Pt denies any new medical complaints, see flow sheet for assessment.      Intravenous Access:  Peripheral IV placed.    Treatment Conditions:  Biological Infusion Checklist:  ~~~ NOTE: If the patient answers yes to any of the questions below, hold the infusion and contact ordering provider or on-call provider.    1. Have you recently had an elevated temperature, fever, chills, productive cough, coughing for 3 weeks or longer or hemoptysis, abnormal vital signs, night sweats,  chest pain or have you noticed a decrease in your appetite, unexplained weight loss or fatigue? No  2. Do you have any open wounds or new incisions? No  3. Do you have any recent or upcoming hospitalizations, surgeries or dental procedures? No  4. Do you currently have or recently have had any signs of illness or infection or are you on any antibiotics? No  5. Have you had any new, sudden or worsening abdominal pain? No  6. Have you or anyone in your household received a live vaccination in the past 4 weeks? Please note:  No live vaccines while on biologic/chemotherapy until 6 months after the last treatment.  Patient can receive the flu vaccine (shot only) and the pneumovax.  It is optimal for the patient to get these vaccines mid cycle, but they can be given at any time as long as it is not on the day of the infusion. No  7. Have you recently been diagnosed with any new nervous system diseases (ie. Multiple sclerosis, Guillain [place], seizures, neurological changes) or cancer diagnosis? No  8. Are you on any form of radiation or chemotherapy? No  9. Are you pregnant or breast feeding or do you have plans of pregnancy in the future? No  10. Have you been having any signs of worsening depression or suicidal ideations?  (benlysta only) No  11. Have there been any  other new onset medical symptoms? No        Post Infusion Assessment:  Patient tolerated infusion without incident.  Blood return noted pre and post infusion.  Site patent and intact, free from redness, edema or discomfort.  No evidence of extravasations.  Access discontinued per protocol.       Discharge Plan:   Patient discharged in stable condition accompanied by: self.  Departure Mode: Ambulatory.  Pt will RTC 10/22/21 for Rapid Remicade.      Nick Peacock RN

## 2021-10-14 DIAGNOSIS — M06.09 RHEUMATOID ARTHRITIS OF MULTIPLE SITES WITHOUT RHEUMATOID FACTOR (H): ICD-10-CM

## 2021-10-14 DIAGNOSIS — G25.81 RESTLESS LEG SYNDROME: ICD-10-CM

## 2021-10-14 NOTE — TELEPHONE ENCOUNTER
Routing refill request to provider for review/approval because:  Drug not on the [facility] refill protocol   Sierra Warren BSN, RN

## 2021-10-16 RX ORDER — GABAPENTIN 300 MG/1
CAPSULE ORAL
Qty: 90 CAPSULE | Refills: 0 | Status: SHIPPED | OUTPATIENT
Start: 2021-10-16 | End: 2022-02-15

## 2021-10-18 NOTE — TELEPHONE ENCOUNTER
Monitoring labs required every 8-12 weeks for medication refills.  METHOTREXATE 2.5 MG TABLET      Last Written Prescription Date:  7/21/21  Last Fill Quantity: 96,   # refills: 0  Last Office Visit: 4/21/21  Future Office visit:  12/15/21    Routing refill request to provider for review/approval because:  Drug not on rheumatology refill protocol     CBC RESULTS: Recent Labs   Lab Test 09/17/21  1100   WBC 7.3   RBC 4.08   HGB 12.4   HCT 36.5   MCV 90   MCH 30.4   MCHC 34.0   RDW 13.2          Creatinine   Date Value Ref Range Status   09/17/2021 0.66 0.52 - 1.04 mg/dL Final   07/09/2021 0.69 0.52 - 1.04 mg/dL Final   ]    Liver Function Studies -   Recent Labs   Lab Test 09/17/21  1100 06/15/18  1210 03/30/18  0820   PROTTOTAL  --   --  7.6   ALBUMIN 3.2*   < > 3.2*   BILITOTAL  --   --  0.5   ALKPHOS  --   --  27*   AST 15   < > 16   ALT 17   < > 18    < > = values in this interval not displayed.

## 2021-10-19 ENCOUNTER — TELEPHONE (OUTPATIENT)
Dept: RHEUMATOLOGY | Facility: CLINIC | Age: 51
End: 2021-10-19

## 2021-10-19 NOTE — TELEPHONE ENCOUNTER
[facility] Call Center    Phone Message    May a detailed message be left on voicemail: yes     Reason for Call: Medication Refill Request    Has the patient contacted the pharmacy for the refill? Yes   Name of medication being requested: methtrexate  Provider who prescribed the medication: Carlos  Pharmacy: [facility] in [place]  Date medication is needed: ASAP, was requested last week 10/14         Action Taken: Message routed to:  Adult Clinics: Rheumatology p [phone]    Travel Screening: Not Applicable

## 2021-10-19 NOTE — TELEPHONE ENCOUNTER
methotrexate sodium 2.5 MG TABS TAKE 8 TABLETS (20 MG) BY MOUTH EVERY 7 DAYS LAB DUE EVERY 8-12 WEEKS  Last Written Prescription Date:  7/21/21  Last Fill Quantity: 96,   # refills: 0  Last Office Visit: 4/21/21  Future Office visit:  12/15/21    CBC RESULTS: Recent Labs   Lab Test 09/17/21  1100   WBC 7.3   RBC 4.08   HGB 12.4   HCT 36.5   MCV 90   MCH 30.4   MCHC 34.0   RDW 13.2          Creatinine   Date Value Ref Range Status   09/17/2021 0.66 0.52 - 1.04 mg/dL Final   07/09/2021 0.69 0.52 - 1.04 mg/dL Final   ]    Liver Function Studies -   Recent Labs   Lab Test 09/17/21  1100 06/15/18  1210 03/30/18  0820   PROTTOTAL  --   --  7.6   ALBUMIN 3.2*   < > 3.2*   BILITOTAL  --   --  0.5   ALKPHOS  --   --  27*   AST 15   < > 16   ALT 17   < > 18    < > = values in this interval not displayed.          DMARD- request sent to Dr Coelho 10/18/21 5:14 PM for SIGNATURE/ AUTHORIZATION. MESSAGE SENT TO PATIENT IN THIS REGARD.

## 2021-10-22 ENCOUNTER — INFUSION THERAPY VISIT (OUTPATIENT)
Dept: INFUSION THERAPY | Facility: CLINIC | Age: 51
End: 2021-10-22
Payer: COMMERCIAL

## 2021-10-22 VITALS
TEMPERATURE: 96.8 F | RESPIRATION RATE: 16 BRPM | OXYGEN SATURATION: 97 % | HEART RATE: 83 BPM | WEIGHT: 134.5 LBS | SYSTOLIC BLOOD PRESSURE: 115 MMHG | BODY MASS INDEX: 23.45 KG/M2 | DIASTOLIC BLOOD PRESSURE: 76 MMHG

## 2021-10-22 DIAGNOSIS — M08.3 CHRONIC POLYARTICULAR JUVENILE RHEUMATOID ARTHRITIS (H): Primary | ICD-10-CM

## 2021-10-22 DIAGNOSIS — Z79.899 HIGH RISK MEDICATIONS (NOT ANTICOAGULANTS) LONG-TERM USE: ICD-10-CM

## 2021-10-22 PROCEDURE — 96413 CHEMO IV INFUSION 1 HR: CPT | Performed by: NURSE PRACTITIONER

## 2021-10-22 PROCEDURE — 96375 TX/PRO/DX INJ NEW DRUG ADDON: CPT | Performed by: NURSE PRACTITIONER

## 2021-10-22 PROCEDURE — 99207 PR NO CHARGE LOS: CPT

## 2021-10-22 RX ORDER — ACETAMINOPHEN 325 MG/1
650 TABLET ORAL ONCE
Status: CANCELLED
Start: 2021-10-22 | End: 2021-10-22

## 2021-10-22 RX ORDER — METHYLPREDNISOLONE SODIUM SUCCINATE 125 MG/2ML
125 INJECTION, POWDER, LYOPHILIZED, FOR SOLUTION INTRAMUSCULAR; INTRAVENOUS ONCE
Status: COMPLETED | OUTPATIENT
Start: 2021-10-22 | End: 2021-10-22

## 2021-10-22 RX ORDER — METHYLPREDNISOLONE SODIUM SUCCINATE 125 MG/2ML
125 INJECTION, POWDER, LYOPHILIZED, FOR SOLUTION INTRAMUSCULAR; INTRAVENOUS ONCE
Status: CANCELLED
Start: 2021-10-22 | End: 2021-10-22

## 2021-10-22 RX ORDER — DIPHENHYDRAMINE HCL 25 MG
25 CAPSULE ORAL ONCE
Status: CANCELLED
Start: 2021-10-22 | End: 2021-10-22

## 2021-10-22 RX ORDER — DIPHENHYDRAMINE HCL 25 MG
25 CAPSULE ORAL ONCE
Status: COMPLETED | OUTPATIENT
Start: 2021-10-22 | End: 2021-10-22

## 2021-10-22 RX ADMIN — Medication 25 MG: at 11:44

## 2021-10-22 RX ADMIN — Medication 250 ML: at 11:54

## 2021-10-22 RX ADMIN — METHYLPREDNISOLONE SODIUM SUCCINATE 125 MG: 125 INJECTION INTRAMUSCULAR; INTRAVENOUS at 11:57

## 2021-10-22 ASSESSMENT — PAIN SCALES - GENERAL: PAINLEVEL: SEVERE PAIN (6)

## 2021-10-22 NOTE — PROGRESS NOTES
Infusion Nursing Note:  Noni Mccormick presents today for Remicade.    Patient seen by provider today: No   present during visit today: Not Applicable.    Note: Pt reports her hands have been more painful and stiff this past week. denies any medical concerns. The pt reports tolerating their treatments well.      Intravenous Access:  Peripheral IV placed.    Treatment Conditions:  Biological Infusion Checklist:  ~~~ NOTE: If the patient answers yes to any of the questions below, hold the infusion and contact ordering provider or on-call provider.    1. Have you recently had an elevated temperature, fever, chills, productive cough, coughing for 3 weeks or longer or hemoptysis, abnormal vital signs, night sweats,  chest pain or have you noticed a decrease in your appetite, unexplained weight loss or fatigue? No  2. Do you have any open wounds or new incisions? No  3. Do you have any recent or upcoming hospitalizations, surgeries or dental procedures? No  4. Do you currently have or recently have had any signs of illness or infection or are you on any antibiotics? No  5. Have you had any new, sudden or worsening abdominal pain? No  6. Have you or anyone in your household received a live vaccination in the past 4 weeks? Please note:  No live vaccines while on biologic/chemotherapy until 6 months after the last treatment.  Patient can receive the flu vaccine (shot only) and the pneumovax.  It is optimal for the patient to get these vaccines mid cycle, but they can be given at any time as long as it is not on the day of the infusion. No  7. Have you recently been diagnosed with any new nervous system diseases (ie. Multiple sclerosis, Guillain [place], seizures, neurological changes) or cancer diagnosis? No  8. Are you on any form of radiation or chemotherapy? No - Taking PO Methotraxte - Provider aware  9. Are you pregnant or breast feeding or do you have plans of pregnancy in the future? No  10. Have you  been having any signs of worsening depression or suicidal ideations?  (benlysta only) N/A  11. Have there been any other new onset medical symptoms? No        Post Infusion Assessment:  Patient tolerated infusion without incident.  Site patent and intact, free from redness, edema or discomfort.  No evidence of extravasations.  Access discontinued per protocol.  Biologic Infusion Post Education: Call the triage nurse at your clinic or seek medical attention if you have chills and/or temperature greater than or equal to 100.5, uncontrolled nausea/vomiting, diarrhea, constipation, dizziness, shortness of breath, chest pain, heart palpitations, weakness or any other new or concerning symptoms, questions or concerns.  You cannot have any live virus vaccines prior to or during treatment or up to 6 months post infusion.  If you have an upcoming surgery, medical procedure or dental procedure during treatment, this should be discussed with your ordering physician and your surgeon/dentist.  If you are having any concerning symptom, if you are unsure if you should get your next infusion or wish to speak to a provider before your next infusion, please call your care coordinator or triage nurse at your clinic to notify them so we can adequately serve you.       Discharge Plan:   AVS to patient via [employer].  Patient will return 11/26/21 for next appointment.   Patient discharged in stable condition accompanied by: self.  Departure Mode: Ambulatory.      Sachi Cardoso RN

## 2021-10-23 RX ORDER — METHOTREXATE 2.5 MG/1
20 TABLET ORAL
Qty: 96 TABLET | Refills: 0 | Status: SHIPPED | OUTPATIENT
Start: 2021-10-23 | End: 2022-01-04

## 2021-11-24 ENCOUNTER — TELEPHONE (OUTPATIENT)
Dept: RHEUMATOLOGY | Facility: CLINIC | Age: 51
End: 2021-11-24
Payer: COMMERCIAL

## 2021-11-24 NOTE — TELEPHONE ENCOUNTER
Called patient and advised that she reschedule her infusion for two weeks from now and touch base with us if she does get sick. Advised that she stop her Methotrexate if she gets sick. She expressed understanding and had no further questions. RN transferred her to the Cancer scheduling line.     Joann Burch, BSN, RN   Rheumatology RN Care Coordinator   [facility]   [phone]

## 2021-11-24 NOTE — TELEPHONE ENCOUNTER
[facility] Call Center    Phone Message    May a detailed message be left on voicemail: yes     Reason for Call: Other: Pt is calling because she as an appointment for infusion on Friday but her  just tested positive for COVID on Monday. Pt is wondering about what she should do. Pt had a negative test on Monday and Tuesday, she is also vaccinated. Please call pt at [phone]    Action Taken: Message routed to:  Adult Clinics: Rheumatology p [phone]    Travel Screening: Not Applicable

## 2021-12-02 ENCOUNTER — MYC MEDICAL ADVICE (OUTPATIENT)
Dept: FAMILY MEDICINE | Facility: CLINIC | Age: 51
End: 2021-12-02
Payer: COMMERCIAL

## 2021-12-15 ENCOUNTER — OFFICE VISIT (OUTPATIENT)
Dept: RHEUMATOLOGY | Facility: CLINIC | Age: 51
End: 2021-12-15
Payer: COMMERCIAL

## 2021-12-15 VITALS
DIASTOLIC BLOOD PRESSURE: 79 MMHG | HEART RATE: 77 BPM | BODY MASS INDEX: 22.49 KG/M2 | WEIGHT: 129 LBS | OXYGEN SATURATION: 100 % | SYSTOLIC BLOOD PRESSURE: 124 MMHG

## 2021-12-15 DIAGNOSIS — M06.09 RHEUMATOID ARTHRITIS OF MULTIPLE SITES WITHOUT RHEUMATOID FACTOR (H): Primary | ICD-10-CM

## 2021-12-15 PROCEDURE — 99214 OFFICE O/P EST MOD 30 MIN: CPT | Performed by: STUDENT IN AN ORGANIZED HEALTH CARE EDUCATION/TRAINING PROGRAM

## 2021-12-15 ASSESSMENT — PAIN SCALES - GENERAL: PAINLEVEL: MODERATE PAIN (4)

## 2021-12-15 NOTE — PATIENT INSTRUCTIONS
Will do Remicade infusions every 6 weeks.     Continue Methotrexate 8 tab once a week     Follow up in 6 months

## 2021-12-15 NOTE — PROGRESS NOTES
Rheumatology clinic visit note.     Subjective : Noni is a 51-year-old female with past medical history of juvenile rheumatoid arthritis, hypothyroidism seen in clinic for management of JRA. She was diagnosed with JRA at 3 years of age.  She has been maintained on a stable dose of Remicade 300 mg every 5 weeks and methotrexate for more than 10 years.  She denies any side effects with the medication. Her last remicade infusion was last Friday.     Her ESR, CRP has been persistently high for the last 10 years.  Her last sed rate was higher than her previous values.  However she denies any significant worsening.  She does report having joint pain flares 3-4 times a week.  Mostly has swelling in her hands.     April 21, 2021 - First dose of 20 mg  Methotrexate caused some nausea but it is better now.  Overall she has noticed improvement after increasing methotrexate to 20 mg.  Last methotrexate monitoring labs done in 2/2021 showed slight improvement in sed rate and CRP.  She gets Remicade infusions every 5 weeks.  Denies any side effects with infusion.     December 15, 2021 - She had COVID infection around [facility] with only mild symptoms. Her last Remicade was on 10/22/21, she gets it every 5 weeks. Last infusion was held due to COVID-19 infection. She denies any RA flares. She was sore in summer and took Ibuprofen which helped. She is on Methotrexate 8 tab once a week. She gets skin check every 6 months.     Review of systems negative except for those mentioned above    Reviewed past medical, surgical, family history    Pertinent labs:     Component      Latest Ref Rng & Units 9/17/2021   WBC      4.0 - 11.0 10e3/uL 7.3   RBC Count      3.80 - 5.20 10e6/uL 4.08   Hemoglobin      11.7 - 15.7 g/dL 12.4   Hematocrit      35.0 - 47.0 % 36.5   MCV      78 - 100 fL 90   MCH      26.5 - 33.0 pg 30.4   MCHC      31.5 - 36.5 g/dL 34.0   RDW      10.0 - 15.0 % 13.2   Platelet Count      150 - 450 10e3/uL 410    Creatinine      0.52 - 1.04 mg/dL 0.66   GFR Estimate      >60 mL/min/1.73m2 >90   AST      0 - 45 U/L 15   ALT      0 - 50 U/L 17   Albumin      3.4 - 5.0 g/dL 3.2 (L)   CRP Inflammation      0.0 - 8.0 mg/L 9.2 (H)   Sed Rate      0 - 30 mm/hr 63 (H)     Physical exam: Bilateral fifth finger flexion contracture, swan-neck deformities over third fourth fingers. No active synovitis noted over MCP, PIP, DIP joints, Bilateral wrists, elbows. Denies any tenderness over the joints.  Hammertoes present both feet.     Assessment :     Juvenile rheumatoid arthritis  High risk medication use-methotrexate and Remicade    Juvenile rheumatoid arthritis: She has been maintained on Remicade infusions 300 mg every 5 weeks and methotrexate. Methotrexate dose was increased to 20 mg in 1/2021 due to joint pain flares and elevated inflammatory markers.  She held her last Remicade infusion due to recent COVID infection. She is feeling well and was wondering if Remicade interval can be increased. Will change Remicade infusions to every 6 weeks. Continue Methotrexate 8 tab once a week.     High risk medication use: Methotrexate labs every 10 weeks.  Last labs done in 9/2021 showed normal CBC, liver, kidney function test.  Sed rate- 63, CRP - 9.2    Vaccinations: Vaccinations reviewed with Ms. Mccormick. Risks and benefits of vaccinations were discussed.  - Influenza: encouraged yearly vaccination  - Fucftop51: prescription sent   - [id number]: to receive 8 weeks after [id number] is administered  - Zostavax: one dose done in 8/2021, need second dose.   -COVID ( Moderna ) - 1/28/21, 2/25/21, need booster dose.       Plan    Continue methotrexate 8 tablets once a week    Change Remicade infusions to every 6 weeks    Methotrexate monitoring labs every 8 to 12 weeks    Follow-up in 6 months.       Sterling Gonzalez MD  [facility]  Department of Rheumatology & Autoimmune Disorders  [facility] [place]:  [phone]  Pager - [phone]

## 2021-12-15 NOTE — NURSING NOTE
"Chief Complaint   Patient presents with     RECHECK     rheumatoid arthritis       Initial /79 (BP Location: Left arm, Patient Position: Sitting, Cuff Size: Adult Regular)   Pulse 77   Wt 58.5 kg (129 lb)   SpO2 100%   BMI 22.49 kg/m   Estimated body mass index is 22.49 kg/m  as calculated from the following:    Height as of 5/24/21: 1.613 m (5' 3.5\").    Weight as of this encounter: 58.5 kg (129 lb)..    She requests these members of her care team be copied on today's visit information:     PCP: Sophia Lagos      Do you need any medication refills at today's visit? NO     Mirta Baldwin MA   Email: [email or web address]  [facility] - Rheumatology  Phone: [phone]  Fax: [phone]      "

## 2021-12-31 ENCOUNTER — INFUSION THERAPY VISIT (OUTPATIENT)
Dept: INFUSION THERAPY | Facility: CLINIC | Age: 51
End: 2021-12-31
Payer: COMMERCIAL

## 2021-12-31 ENCOUNTER — LAB (OUTPATIENT)
Dept: LAB | Facility: CLINIC | Age: 51
End: 2021-12-31

## 2021-12-31 VITALS
BODY MASS INDEX: 22.84 KG/M2 | TEMPERATURE: 98.5 F | DIASTOLIC BLOOD PRESSURE: 84 MMHG | OXYGEN SATURATION: 100 % | WEIGHT: 131 LBS | HEART RATE: 84 BPM | RESPIRATION RATE: 16 BRPM | SYSTOLIC BLOOD PRESSURE: 129 MMHG

## 2021-12-31 DIAGNOSIS — M08.3 CHRONIC POLYARTICULAR JUVENILE RHEUMATOID ARTHRITIS (H): Primary | ICD-10-CM

## 2021-12-31 DIAGNOSIS — Z79.899 HIGH RISK MEDICATIONS (NOT ANTICOAGULANTS) LONG-TERM USE: ICD-10-CM

## 2021-12-31 DIAGNOSIS — M06.09 RHEUMATOID ARTHRITIS OF MULTIPLE SITES WITHOUT RHEUMATOID FACTOR (H): ICD-10-CM

## 2021-12-31 LAB
ALBUMIN SERPL-MCNC: 3.2 G/DL (ref 3.4–5)
ALT SERPL W P-5'-P-CCNC: 20 U/L (ref 0–50)
AST SERPL W P-5'-P-CCNC: 14 U/L (ref 0–45)
CREAT SERPL-MCNC: 0.63 MG/DL (ref 0.52–1.04)
CRP SERPL-MCNC: 11 MG/L (ref 0–8)
ERYTHROCYTE [DISTWIDTH] IN BLOOD BY AUTOMATED COUNT: 13.3 % (ref 10–15)
ERYTHROCYTE [SEDIMENTATION RATE] IN BLOOD BY WESTERGREN METHOD: 84 MM/HR (ref 0–30)
GFR SERPL CREATININE-BSD FRML MDRD: >90 ML/MIN/1.73M2
HCT VFR BLD AUTO: 37.9 % (ref 35–47)
HGB BLD-MCNC: 12.5 G/DL (ref 11.7–15.7)
HOLD SPECIMEN: NORMAL
MCH RBC QN AUTO: 30.2 PG (ref 26.5–33)
MCHC RBC AUTO-ENTMCNC: 33 G/DL (ref 31.5–36.5)
MCV RBC AUTO: 92 FL (ref 78–100)
PLATELET # BLD AUTO: 379 10E3/UL (ref 150–450)
RBC # BLD AUTO: 4.14 10E6/UL (ref 3.8–5.2)
WBC # BLD AUTO: 4.9 10E3/UL (ref 4–11)

## 2021-12-31 PROCEDURE — 36415 COLL VENOUS BLD VENIPUNCTURE: CPT

## 2021-12-31 PROCEDURE — 82040 ASSAY OF SERUM ALBUMIN: CPT

## 2021-12-31 PROCEDURE — 85027 COMPLETE CBC AUTOMATED: CPT

## 2021-12-31 PROCEDURE — 99207 PR NO CHARGE LOS: CPT

## 2021-12-31 PROCEDURE — 84460 ALANINE AMINO (ALT) (SGPT): CPT

## 2021-12-31 PROCEDURE — 86140 C-REACTIVE PROTEIN: CPT

## 2021-12-31 PROCEDURE — 96413 CHEMO IV INFUSION 1 HR: CPT | Performed by: NURSE PRACTITIONER

## 2021-12-31 PROCEDURE — 82565 ASSAY OF CREATININE: CPT

## 2021-12-31 PROCEDURE — 84450 TRANSFERASE (AST) (SGOT): CPT

## 2021-12-31 PROCEDURE — 96375 TX/PRO/DX INJ NEW DRUG ADDON: CPT | Performed by: NURSE PRACTITIONER

## 2021-12-31 PROCEDURE — 85652 RBC SED RATE AUTOMATED: CPT

## 2021-12-31 RX ORDER — DIPHENHYDRAMINE HCL 25 MG
25 CAPSULE ORAL ONCE
Status: CANCELLED
Start: 2022-01-28 | End: 2022-01-28

## 2021-12-31 RX ORDER — METHYLPREDNISOLONE SODIUM SUCCINATE 125 MG/2ML
125 INJECTION, POWDER, LYOPHILIZED, FOR SOLUTION INTRAMUSCULAR; INTRAVENOUS ONCE
Status: CANCELLED
Start: 2022-01-28 | End: 2022-01-28

## 2021-12-31 RX ORDER — METHYLPREDNISOLONE SODIUM SUCCINATE 125 MG/2ML
125 INJECTION, POWDER, LYOPHILIZED, FOR SOLUTION INTRAMUSCULAR; INTRAVENOUS ONCE
Status: COMPLETED | OUTPATIENT
Start: 2021-12-31 | End: 2021-12-31

## 2021-12-31 RX ORDER — ACETAMINOPHEN 325 MG/1
650 TABLET ORAL ONCE
Status: CANCELLED
Start: 2022-01-28 | End: 2022-01-28

## 2021-12-31 RX ORDER — DIPHENHYDRAMINE HCL 25 MG
25 CAPSULE ORAL ONCE
Status: COMPLETED | OUTPATIENT
Start: 2021-12-31 | End: 2021-12-31

## 2021-12-31 RX ADMIN — Medication 25 MG: at 10:06

## 2021-12-31 RX ADMIN — METHYLPREDNISOLONE SODIUM SUCCINATE 125 MG: 125 INJECTION INTRAMUSCULAR; INTRAVENOUS at 10:18

## 2021-12-31 RX ADMIN — Medication 250 ML: at 10:18

## 2021-12-31 ASSESSMENT — PAIN SCALES - GENERAL: PAINLEVEL: MILD PAIN (2)

## 2021-12-31 NOTE — PROGRESS NOTES
Infusion Nursing Note:  Noni Mccormick presents today for Remicade.    Patient seen by provider today: No   present during visit today: Not Applicable.    Note: N/A.    Intravenous Access:  Peripheral IV placed.    Treatment Conditions:  Biological Infusion Checklist:  ~~~ NOTE: If the patient answers yes to any of the questions below, hold the infusion and contact ordering provider or on-call provider.    1. Have you recently had an elevated temperature, fever, chills, productive cough, coughing for 3 weeks or longer or hemoptysis, abnormal vital signs, night sweats,  chest pain or have you noticed a decrease in your appetite, unexplained weight loss or fatigue? No  2. Do you have any open wounds or new incisions? No  3. Do you have any recent or upcoming hospitalizations, surgeries or dental procedures? No  4. Do you currently have or recently have had any signs of illness or infection or are you on any antibiotics? No  5. Have you had any new, sudden or worsening abdominal pain? No  6. Have you or anyone in your household received a live vaccination in the past 4 weeks? Please note:  No live vaccines while on biologic/chemotherapy until 6 months after the last treatment.  Patient can receive the flu vaccine (shot only) and the pneumovax.  It is optimal for the patient to get these vaccines mid cycle, but they can be given at any time as long as it is not on the day of the infusion. No  7. Have you recently been diagnosed with any new nervous system diseases (ie. Multiple sclerosis, Guillain [place], seizures, neurological changes) or cancer diagnosis? No  8. Are you on any form of radiation or chemotherapy? No  9. Are you pregnant or breast feeding or do you have plans of pregnancy in the future? No  10. Have you been having any signs of worsening depression or suicidal ideations?  (benlysta only) No  11. Have there been any other new onset medical symptoms? No      Post Infusion  Assessment:  Patient tolerated infusion without incident.  Blood return noted pre and post infusion.  Site patent and intact, free from redness, edema or discomfort.  No evidence of extravasations.  Access discontinued per protocol.     Discharge Plan:   Patient will return 2/11/2022 for next appointment.   Patient discharged in stable condition accompanied by: self.  Departure Mode: Ambulatory.    Soledad Mendez RN-BSN, PHN, OCN  [facility]

## 2022-01-01 DIAGNOSIS — M06.09 RHEUMATOID ARTHRITIS OF MULTIPLE SITES WITHOUT RHEUMATOID FACTOR (H): ICD-10-CM

## 2022-01-04 NOTE — TELEPHONE ENCOUNTER
METHOTREXATE 2.5 MG TABLET      Last Written Prescription Date:  10-  Last Fill Quantity: 96,   # refills: 0  Last Office Visit: 12-  Future Office visit:  6-    CBC RESULTS: Recent Labs   Lab Test 12/31/21  0939   WBC 4.9   RBC 4.14   HGB 12.5   HCT 37.9   MCV 92   MCH 30.2   MCHC 33.0   RDW 13.3          Creatinine   Date Value Ref Range Status   12/31/2021 0.63 0.52 - 1.04 mg/dL Final   07/09/2021 0.69 0.52 - 1.04 mg/dL Final   ]    Liver Function Studies -   Recent Labs   Lab Test 12/31/21  0939 06/15/18  1210 03/30/18  0820   PROTTOTAL  --   --  7.6   ALBUMIN 3.2*   < > 3.2*   BILITOTAL  --   --  0.5   ALKPHOS  --   --  27*   AST 14   < > 16   ALT 20   < > 18    < > = values in this interval not displayed.       Routing refill request to provider for review/approval because:  Not on protocol.        Kathleen M Doege RN

## 2022-01-06 RX ORDER — METHOTREXATE 2.5 MG/1
20 TABLET ORAL
Qty: 96 TABLET | Refills: 0 | Status: SHIPPED | OUTPATIENT
Start: 2022-01-06 | End: 2022-04-03

## 2022-01-26 DIAGNOSIS — Z30.41 ENCOUNTER FOR SURVEILLANCE OF CONTRACEPTIVE PILLS: ICD-10-CM

## 2022-01-26 RX ORDER — NORETHINDRONE AND ETHINYL ESTRADIOL 7-9-5
KIT ORAL
Qty: 84 TABLET | Refills: 0 | Status: SHIPPED | OUTPATIENT
Start: 2022-01-26 | End: 2022-03-01

## 2022-01-26 NOTE — TELEPHONE ENCOUNTER
"Requested Prescriptions   Pending Prescriptions Disp Refills     ARANELLE 0.5/1/0.5-35 MG-MCG tablet [Pharmacy Med Name: ARANELLE 28 TABLET] 84 tablet 3     Sig: TAKE 1 TABLET BY MOUTH EVERY DAY       Contraceptives Protocol Passed - 1/26/2022  9:26 AM        Passed - Patient is not a current smoker if age is 35 or older        Passed - Recent (12 mo) or future (30 days) visit within the authorizing provider's specialty     Patient has had an office visit with the authorizing provider or a provider within the authorizing providers department within the previous 12 mos or has a future within next 30 days. See \"Patient Info\" tab in inbasket, or \"Choose Columns\" in Meds & Orders section of the refill encounter.              Passed - Medication is active on med list        Passed - No active pregnancy on record        Passed - No positive pregnancy test in past 12 months           Prescription approved per [facility] Refill Protocol.    Jennifer Marcus RN    "

## 2022-02-01 NOTE — TELEPHONE ENCOUNTER
"Faxed signed [place] papers to [phone]. Transmission confirmed by rightfax:  4/22/2019 8:27 AM Transmission Record  Sent to: To Whom it May Concern  Phone: [phone]  Billing information: '', ''  Remote ID: [id number]  Unique ID: \"[id number]\"  Elapsed time: 4 minutes, 4 seconds.  Used channel 54 on  \"[id number]\".  No ANI data.  No AOC data.  Resulting status code (0/339; 0/0): Success  Pages sent: 1 - 5  Delegate ID: \"IMAGEQUEST\"    Called patient with update.  "
Forms not in clinic, called, GUS with that update for patient and suggested re-fax to [phone]. Also notified her MD not in office today. Waiting for callback to [phone].  
[facility] Call Center    Phone Message    May a detailed message be left on voicemail: yes    Reason for Call: Form or Letter   Type or form/letter needing completion: [facility] forms. Patient states that she faxed over forms on Monday to the [phone] fax number and would like to check the status on forms. Please call to provide an update  Provider: Dr. Gonzalez  Date form needed: asap- they are due tomorrow  Once completed: Fax form to: number on forms      Action Taken: Message routed to:  Adult Clinics: Rheumatology p [phone]    
Talked with Noni who then refaxed forms to [phone], received in clinic. On MD's desk for signature Monday. Patient aware.  
negative...

## 2022-02-02 ENCOUNTER — OFFICE VISIT (OUTPATIENT)
Dept: DERMATOLOGY | Facility: CLINIC | Age: 52
End: 2022-02-02
Payer: COMMERCIAL

## 2022-02-02 DIAGNOSIS — D22.9 MULTIPLE BENIGN NEVI: ICD-10-CM

## 2022-02-02 DIAGNOSIS — Z86.018 HISTORY OF DYSPLASTIC NEVUS: ICD-10-CM

## 2022-02-02 DIAGNOSIS — Z85.828 HISTORY OF NONMELANOMA SKIN CANCER: Primary | ICD-10-CM

## 2022-02-02 DIAGNOSIS — L81.4 SOLAR LENTIGO: ICD-10-CM

## 2022-02-02 DIAGNOSIS — D84.9 IMMUNOSUPPRESSED STATUS (H): ICD-10-CM

## 2022-02-02 PROCEDURE — 99214 OFFICE O/P EST MOD 30 MIN: CPT | Performed by: PHYSICIAN ASSISTANT

## 2022-02-02 NOTE — PROGRESS NOTES
[facility] Dermatology Note  Encounter Date: Feb 2, 2022  Office Visit     Dermatology Problem List:  1. History of NMSC  - BCC, left anterior thigh, s/p WLE on 4/27/20  2. AK   - Left upper arm, s/p cryo  3. History of DN   - Right buttocks, s/p excision ~2005 (outside dermatologist)  4. SK, right mid cheek  - Discussed PDL and bleaching creams  5. Immunosuppressed -Hx RA - on Remicade and methotrexate  ____________________________________________    Assessment & Plan:     # History of nonmelanoma skin cancer, no clincial evidence of recurrence.  - Sun protection: Counseled SPF30+ sunscreen, UPF clothing, sun avoidance, tanning bed avoidance.  -continue annual skin exams    #Immunosuppressed - on Remicade and Methotrexate for RA  -continue with annual skin exams    # History of DN: No evidence of recurrence. While these are benign, they are a marker for increased melanoma risk.  - Recommended yearly skin checks.    # Multiple clinically benign nevi on the trunk and extremities.    - No further intervention needed.   - Signs and Symptoms of non-melanoma skin cancer and ABCDEs of melanoma reviewed with patient. Patient encouraged to perform monthly self skin exams and educated on how to perform them. UV precautions reviewed with patient. Patient was asked about new or changing moles/lesions on body.   - Sunscreen: Apply 20 minutes prior to going outdoors and reapply every two hours, when wet or sweating. We recommend using an SPF 30 or higher, and to use one that is water resistant.       # Solar lentigo, right mid cheek.  - Briefly discussed cosmetic PDL or bleaching creams. Patient will let us know if would like to pursue.    Procedures Performed:   None.    Follow-up: 1 year in-person for skin check, or earlier for new or changing lesions.    Staff and Scribe:     Scribe Disclosure:   Jazmin KAMARA, am serving as a scribe to document services personally performed by Jannet Segovia PA-C,  based on data collection and the provider's statements to me.    Provider Disclosure:   The documentation recorded by the scribe accurately reflects the services I personally performed and the decisions made by me.    All risks, benefits and alternatives were discussed with patient.  Patient is in agreement and understands the assessment and plan.  All questions were answered.    Jannet Segovia PA-C, MPAS  [facility]: Phone: [phone], Fax: [phone]  [facility]: Phone: [phone],  Fax: [phone]  [facility]: Phone: [phone], Fax: [phone]    ____________________________________________    CC: Skin Check (FBSE. Area of concern-right cheek. Hx of BCC)    HPI:  Ms. Noni Mccormick is a(n) 51 year old female who presents today as a return patient for skin check. Patient on Remicade and methotrexate for RA. Hx BCC on the L thigh as well as DN in the past.     Last seen on 5/19/21 for a skin check. No concerning lesions was noted.    Today, Noni notes concern of a flat dark spot on the right cheek. Asx.     Patient is otherwise feeling well, without additional concerns.    Labs:  NA    Physical Exam:  Vitals: There were no vitals taken for this visit.  SKIN: Total skin excluding the undergarment areas was performed. The exam included the head/face, neck, both arms, chest, back, abdomen, buttocks, both legs, digits and/or nails.  - Multiple regular brown pigmented macules and papules are identified on the trunk and extremities.   - Brown macule on the right mid cheek. Consistent with lentigo.  - Well healed scar on the left anterior thigh. No pigment recurrence.  - No other lesions of concern on areas examined.     Medications:  Current Outpatient Medications   Medication     albuterol (PROAIR RESPICLICK) 108 (90 Base) MCG/ACT inhaler     ARANELLE 0.5/1/0.5-35 MG-MCG tablet      famotidine (PEPCID) 20 MG tablet     folic acid (FOLVITE) 1 MG tablet     gabapentin (NEURONTIN) 300 MG capsule     inFLIXimab (REMICADE) 100 MG injection     levothyroxine (SYNTHROID/LEVOTHROID) 112 MCG tablet     losartan (COZAAR) 25 MG tablet     methotrexate sodium 2.5 MG TABS     aluminum chloride (DRYSOL) 20 % external solution     No current facility-administered medications for this visit.      Past Medical History:   Patient Active Problem List   Diagnosis     Hypothyroidism     CARDIOVASCULAR SCREENING; LDL GOAL LESS THAN 160     Nephrolithiasis     High risk medications (not anticoagulants) long-term use     Injury of left hip     Intermittent asthma     Chronic polyarticular juvenile rheumatoid arthritis (H)     Cervical cancer screening     Past Medical History:   Diagnosis Date     ASCUS of cervix with negative high risk HPV 10/27/2010     Contraception      Grave's disease      Hypothyroid      Inflammatory arthritis      Intermittent asthma 09/10/2013     Nephrolithiasis 06/16/2011     Osteopenia 02/14/2013     Rheumatoid arthritis(714.0)

## 2022-02-02 NOTE — LETTER
2/2/2022         RE: Noni Mccormick  [address] [facility] [id number]        Dear Colleague,    Thank you for referring your patient, Noni Mccormick, to the [facility]. Please see a copy of my visit note below.    [facility] Dermatology Note  Encounter Date: Feb 2, 2022  Office Visit     Dermatology Problem List:  1. History of NMSC  - BCC, left anterior thigh, s/p WLE on 4/27/20  2. AK   - Left upper arm, s/p cryo  3. History of DN   - Right buttocks, s/p excision ~2005 (outside dermatologist)  4. SK, right mid cheek  - Discussed PDL and bleaching creams  5. Immunosuppressed -Hx RA - on Remicade and methotrexate  ____________________________________________    Assessment & Plan:     # History of nonmelanoma skin cancer, no clincial evidence of recurrence.  - Sun protection: Counseled SPF30+ sunscreen, UPF clothing, sun avoidance, tanning bed avoidance.  -continue annual skin exams    #Immunosuppressed - on Remicade and Methotrexate for RA  -continue with annual skin exams    # History of DN: No evidence of recurrence. While these are benign, they are a marker for increased melanoma risk.  - Recommended yearly skin checks.    # Multiple clinically benign nevi on the trunk and extremities.    - No further intervention needed.   - Signs and Symptoms of non-melanoma skin cancer and ABCDEs of melanoma reviewed with patient. Patient encouraged to perform monthly self skin exams and educated on how to perform them. UV precautions reviewed with patient. Patient was asked about new or changing moles/lesions on body.   - Sunscreen: Apply 20 minutes prior to going outdoors and reapply every two hours, when wet or sweating. We recommend using an SPF 30 or higher, and to use one that is water resistant.       # Solar lentigo, right mid cheek.  - Briefly discussed cosmetic PDL or bleaching creams. Patient will let us know if would like to pursue.    Procedures  Performed:   None.    Follow-up: 1 year in-person for skin check, or earlier for new or changing lesions.    Staff and Scribe:     Scribe Disclosure:   I, Jazmin Real, am serving as a scribe to document services personally performed by Jannet Segovia PA-C, based on data collection and the provider's statements to me.    Provider Disclosure:   The documentation recorded by the scribe accurately reflects the services I personally performed and the decisions made by me.    All risks, benefits and alternatives were discussed with patient.  Patient is in agreement and understands the assessment and plan.  All questions were answered.    Jannet Segovia PA-C, [facility]  [facility] [place]: Phone: [phone], Fax: [phone]  [facility]: Phone: [phone],  Fax: [phone]  [facility]: Phone: [phone], Fax: [phone]    ____________________________________________    CC: Skin Check (FBSE. Area of concern-right cheek. Hx of BCC)    HPI:  Ms. Noni Mccormick is a(n) 51 year old female who presents today as a return patient for skin check. Patient on Remicade and methotrexate for RA. Hx BCC on the L thigh as well as DN in the past.     Last seen on 5/19/21 for a skin check. No concerning lesions was noted.    Today, Noni notes concern of a flat dark spot on the right cheek. Asx.     Patient is otherwise feeling well, without additional concerns.    Labs:  NA    Physical Exam:  Vitals: There were no vitals taken for this visit.  SKIN: Total skin excluding the undergarment areas was performed. The exam included the head/face, neck, both arms, chest, back, abdomen, buttocks, both legs, digits and/or nails.  - Multiple regular brown pigmented macules and papules are identified on the trunk and extremities.   - Brown macule on the right mid cheek. Consistent with lentigo.  - Well healed scar on the left  anterior thigh. No pigment recurrence.  - No other lesions of concern on areas examined.     Medications:  Current Outpatient Medications   Medication     albuterol (PROAIR RESPICLICK) 108 (90 Base) MCG/ACT inhaler     ARANELLE 0.5/1/0.5-35 MG-MCG tablet     famotidine (PEPCID) 20 MG tablet     folic acid (FOLVITE) 1 MG tablet     gabapentin (NEURONTIN) 300 MG capsule     inFLIXimab (REMICADE) 100 MG injection     levothyroxine (SYNTHROID/LEVOTHROID) 112 MCG tablet     losartan (COZAAR) 25 MG tablet     methotrexate sodium 2.5 MG TABS     aluminum chloride (DRYSOL) 20 % external solution     No current facility-administered medications for this visit.      Past Medical History:   Patient Active Problem List   Diagnosis     Hypothyroidism     CARDIOVASCULAR SCREENING; LDL GOAL LESS THAN 160     Nephrolithiasis     High risk medications (not anticoagulants) long-term use     Injury of left hip     Intermittent asthma     Chronic polyarticular juvenile rheumatoid arthritis (H)     Cervical cancer screening     Past Medical History:   Diagnosis Date     ASCUS of cervix with negative high risk HPV 10/27/2010     Contraception      Grave's disease      Hypothyroid      Inflammatory arthritis      Intermittent asthma 09/10/2013     Nephrolithiasis 06/16/2011     Osteopenia 02/14/2013     Rheumatoid arthritis(714.0)            Again, thank you for allowing me to participate in the care of your patient.        Sincerely,        Jannet Segovia PA-C

## 2022-02-02 NOTE — PATIENT INSTRUCTIONS
The ABCDEs of Melanoma    Skin cancer can develop anywhere on the skin. Ask someone for help when checking your skin, especially in hard to see places. If you notice a mole different from others, or that changes, enlarges, itches, or bleeds (even if it is small), you should see a dermatologist.              Sun protective clothing and Resources     Limeade (www.Protonet)  Athleta (www.[employer])  4Tech (www.Neograft Technologies)  Carve Designs (JIT Solaire) - affordable  Skinz (Neuropureskinz.com)    Long sleeve - Lizbet Cool DRI UPF 50 or Lyman PFG UPF 50  Hoodie - Lyman PFG UPF 50  Swimshirt/Rash Guard - Almita UPF 50 (on Amazon)  Neck - Outdoor Research Ubertubes (www.outdoorresBetter Walk.com)

## 2022-02-02 NOTE — NURSING NOTE
Noni Mccormick's goals for this visit include:   Chief Complaint   Patient presents with     Skin Check     FBSE. Area of concern-right cheek. Hx of BCC       She requests these members of her care team be copied on today's visit information:     PCP: Sophia Lagos    Referring Provider:  Referred Self, MD  No address on file    There were no vitals taken for this visit.    Do you need any medication refills at today's visit? Angela Owusu on 2/2/2022 at 2:03 PM

## 2022-02-07 ENCOUNTER — TELEPHONE (OUTPATIENT)
Dept: RHEUMATOLOGY | Facility: CLINIC | Age: 52
End: 2022-02-07
Payer: COMMERCIAL

## 2022-02-07 NOTE — TELEPHONE ENCOUNTER
[facility] CLINICAL DOCUMENTATION    Form Documentation Form or Letter Request    Type or form/letter needing completion: LA  Provider: Dr. Gonzalez  Has provider seen patient for office visit related to reason for form request? Yes  Date form needed: not stated on form  Once completed: Fax form to: [phone]     Forms on hold until provider return into clinic to sign. [employer] message sent to patient.      JOSÉ MIGUEL Negrete  [facility]

## 2022-02-09 NOTE — TELEPHONE ENCOUNTER
FMLA forms faxed. Patient informed by connie.     Mirta Baldwin MA   Email: [email or web address]  [facility] - Rheumatology  Phone: [phone]  Fax: [phone]

## 2022-02-11 ENCOUNTER — INFUSION THERAPY VISIT (OUTPATIENT)
Dept: INFUSION THERAPY | Facility: CLINIC | Age: 52
End: 2022-02-11
Payer: COMMERCIAL

## 2022-02-11 VITALS
OXYGEN SATURATION: 98 % | HEART RATE: 62 BPM | SYSTOLIC BLOOD PRESSURE: 119 MMHG | WEIGHT: 132.6 LBS | RESPIRATION RATE: 16 BRPM | BODY MASS INDEX: 23.12 KG/M2 | DIASTOLIC BLOOD PRESSURE: 72 MMHG | TEMPERATURE: 98.2 F

## 2022-02-11 DIAGNOSIS — M08.3 CHRONIC POLYARTICULAR JUVENILE RHEUMATOID ARTHRITIS (H): Primary | ICD-10-CM

## 2022-02-11 DIAGNOSIS — Z79.899 HIGH RISK MEDICATIONS (NOT ANTICOAGULANTS) LONG-TERM USE: ICD-10-CM

## 2022-02-11 PROCEDURE — 96413 CHEMO IV INFUSION 1 HR: CPT | Performed by: INTERNAL MEDICINE

## 2022-02-11 PROCEDURE — 96375 TX/PRO/DX INJ NEW DRUG ADDON: CPT | Performed by: INTERNAL MEDICINE

## 2022-02-11 PROCEDURE — 99207 PR NO CHARGE LOS: CPT

## 2022-02-11 RX ORDER — ACETAMINOPHEN 325 MG/1
650 TABLET ORAL ONCE
Status: COMPLETED | OUTPATIENT
Start: 2022-02-11 | End: 2022-02-11

## 2022-02-11 RX ORDER — DIPHENHYDRAMINE HCL 25 MG
25 CAPSULE ORAL ONCE
Status: COMPLETED | OUTPATIENT
Start: 2022-02-11 | End: 2022-02-11

## 2022-02-11 RX ORDER — ACETAMINOPHEN 325 MG/1
650 TABLET ORAL ONCE
Status: CANCELLED
Start: 2022-03-25 | End: 2022-03-25

## 2022-02-11 RX ORDER — METHYLPREDNISOLONE SODIUM SUCCINATE 125 MG/2ML
125 INJECTION, POWDER, LYOPHILIZED, FOR SOLUTION INTRAMUSCULAR; INTRAVENOUS ONCE
Status: COMPLETED | OUTPATIENT
Start: 2022-02-11 | End: 2022-02-11

## 2022-02-11 RX ORDER — DIPHENHYDRAMINE HCL 25 MG
25 CAPSULE ORAL ONCE
Status: CANCELLED
Start: 2022-03-25 | End: 2022-03-25

## 2022-02-11 RX ORDER — METHYLPREDNISOLONE SODIUM SUCCINATE 125 MG/2ML
125 INJECTION, POWDER, LYOPHILIZED, FOR SOLUTION INTRAMUSCULAR; INTRAVENOUS ONCE
Status: CANCELLED
Start: 2022-03-25 | End: 2022-03-25

## 2022-02-11 RX ADMIN — METHYLPREDNISOLONE SODIUM SUCCINATE 125 MG: 125 INJECTION INTRAMUSCULAR; INTRAVENOUS at 10:57

## 2022-02-11 RX ADMIN — ACETAMINOPHEN 650 MG: 325 TABLET ORAL at 10:46

## 2022-02-11 RX ADMIN — Medication 25 MG: at 10:46

## 2022-02-11 RX ADMIN — Medication 250 ML: at 10:56

## 2022-02-11 NOTE — PROGRESS NOTES
Infusion Nursing Note:  Noni Mccormick presents today for Rapid Remicade.    Patient seen by provider today: No   present during visit today: Not Applicable.    Note: N/A.      Intravenous Access:  Peripheral IV placed.    Treatment Conditions:  Biological Infusion Checklist:  ~~~ NOTE: If the patient answers yes to any of the questions below, hold the infusion and contact ordering provider or on-call provider.    1. Have you recently had an elevated temperature, fever, chills, productive cough, coughing for 3 weeks or longer or hemoptysis, abnormal vital signs, night sweats,  chest pain or have you noticed a decrease in your appetite, unexplained weight loss or fatigue? No  2. Do you have any open wounds or new incisions? No  3. Do you have any recent or upcoming hospitalizations, surgeries or dental procedures? No  4. Do you currently have or recently have had any signs of illness or infection or are you on any antibiotics? No  5. Have you had any new, sudden or worsening abdominal pain? No  6. Have you or anyone in your household received a live vaccination in the past 4 weeks? Please note:  No live vaccines while on biologic/chemotherapy until 6 months after the last treatment.  Patient can receive the flu vaccine (shot only) and the pneumovax.  It is optimal for the patient to get these vaccines mid cycle, but they can be given at any time as long as it is not on the day of the infusion. No  7. Have you recently been diagnosed with any new nervous system diseases (ie. Multiple sclerosis, Guillain Panther, seizures, neurological changes) or cancer diagnosis? No  8. Are you on any form of radiation or chemotherapy? No  9. Are you pregnant or breast feeding or do you have plans of pregnancy in the future? No  Have there been any other new onset medical symptoms? No        Post Infusion Assessment:  Patient tolerated infusion without incident.  Blood return noted pre and post infusion.  No evidence  of extravasations.  Access discontinued per protocol.  Biologic Infusion Post Education: Call the triage nurse at your clinic or seek medical attention if you have chills and/or temperature greater than or equal to 100.5, uncontrolled nausea/vomiting, diarrhea, constipation, dizziness, shortness of breath, chest pain, heart palpitations, weakness or any other new or concerning symptoms, questions or concerns.  You cannot have any live virus vaccines prior to or during treatment or up to 6 months post infusion.  If you have an upcoming surgery, medical procedure or dental procedure during treatment, this should be discussed with your ordering physician and your surgeon/dentist.  If you are having any concerning symptom, if you are unsure if you should get your next infusion or wish to speak to a provider before your next infusion, please call your care coordinator or triage nurse at your clinic to notify them so we can adequately serve you.       Discharge Plan:   Discharge instructions reviewed with: Patient.  Patient and/or family verbalized understanding of discharge instructions and all questions answered.  Patient discharged in stable condition accompanied by: self.  Departure Mode: Ambulatory.      Unique Min RN

## 2022-02-15 ENCOUNTER — MYC MEDICAL ADVICE (OUTPATIENT)
Dept: OBGYN | Facility: CLINIC | Age: 52
End: 2022-02-15
Payer: COMMERCIAL

## 2022-02-15 DIAGNOSIS — M06.09 RHEUMATOID ARTHRITIS OF MULTIPLE SITES WITHOUT RHEUMATOID FACTOR (H): ICD-10-CM

## 2022-02-15 DIAGNOSIS — Z30.41 ENCOUNTER FOR SURVEILLANCE OF CONTRACEPTIVE PILLS: ICD-10-CM

## 2022-02-15 DIAGNOSIS — G25.81 RESTLESS LEG SYNDROME: ICD-10-CM

## 2022-02-15 DIAGNOSIS — E03.8 OTHER SPECIFIED HYPOTHYROIDISM: ICD-10-CM

## 2022-02-15 RX ORDER — GABAPENTIN 300 MG/1
CAPSULE ORAL
Qty: 90 CAPSULE | Refills: 0 | Status: SHIPPED | OUTPATIENT
Start: 2022-02-15 | End: 2022-05-04

## 2022-02-15 NOTE — TELEPHONE ENCOUNTER
Routing refill request to provider for review/approval because:  Drug not on the [facility] refill protocol   Krys Ely RN

## 2022-02-15 NOTE — TELEPHONE ENCOUNTER
"Requested Prescriptions   Pending Prescriptions Disp Refills     levothyroxine (SYNTHROID/LEVOTHROID) 112 MCG tablet [Pharmacy Med Name: LEVOTHYROXINE 112 MCG TABLET] 90 tablet 3     Sig: TAKE 1 TABLET BY MOUTH DAILY       Thyroid Protocol Passed - 2/15/2022 11:55 AM        Passed - Patient is 12 years or older        Passed - Recent (12 mo) or future (30 days) visit within the authorizing provider's specialty     Patient has had an office visit with the authorizing provider or a provider within the authorizing providers department within the previous 12 mos or has a future within next 30 days. See \"Patient Info\" tab in inbasket, or \"Choose Columns\" in Meds & Orders section of the refill encounter.              Passed - Medication is active on med list        Passed - Normal TSH on file in past 12 months     Recent Labs   Lab Test 02/26/21  1526   TSH 0.69              Passed - No active pregnancy on record     If patient is pregnant or has had a positive pregnancy test, please check TSH.          Passed - No positive pregnancy test in past 12 months     If patient is pregnant or has had a positive pregnancy test, please check TSH.             ARANELLE 0.5/1/0.5-35 MG-MCG tablet [Pharmacy Med Name: ARANELLE 28 TABLET] 84 tablet 0     Sig: TAKE 1 TABLET BY MOUTH EVERY DAY       Contraceptives Protocol Passed - 2/15/2022 11:55 AM        Passed - Patient is not a current smoker if age is 35 or older        Passed - Recent (12 mo) or future (30 days) visit within the authorizing provider's specialty     Patient has had an office visit with the authorizing provider or a provider within the authorizing providers department within the previous 12 mos or has a future within next 30 days. See \"Patient Info\" tab in inbasket, or \"Choose Columns\" in Meds & Orders section of the refill encounter.              Passed - Medication is active on med list        Passed - No active pregnancy on record        Passed - No positive " pregnancy test in past 12 months           Last seen on 2/26/21 for preventative health visit. Next appt scheduled for 3/4/22.    Last prescribed Levothyroxine 2/26/21 90 tablets and 3 refills.    Last prescribed ARANELLE 1/26/22, 84 tablet and No Refills. Notes to Pharmacy: Please have patient schedule a yearly appointment with prescribing provider at the end of February, 2022.    RN attempted to contact pt by phone to ask if needs both RX's filled prior to her 3/4/22 appt or if has enough to get her through.    Unable to reach patient via phone.  Left message to call clinic back at [phone] or to check her [employer] LS15 message.    Soledad Carney, ARIEN RN

## 2022-02-16 NOTE — TELEPHONE ENCOUNTER
Pt read S[employer] message, hasn't yet responded.  Will give another day or 2 before reaching out again.    ARIE SchmidtN RN

## 2022-02-17 RX ORDER — METHOTREXATE 2.5 MG/1
20 TABLET ORAL
Qty: 96 TABLET | Refills: 0 | OUTPATIENT
Start: 2022-02-17

## 2022-02-17 RX ORDER — FOLIC ACID 1 MG/1
TABLET ORAL
Qty: 180 TABLET | Refills: 11 | Status: SHIPPED | OUTPATIENT
Start: 2022-02-17 | End: 2022-06-22

## 2022-02-17 NOTE — TELEPHONE ENCOUNTER
METHOTREXATE 2.5 MG TABLET Take 8 tablets (20 mg) by mouth every 7 days Lab due every 8-12 weeks  Last Written Prescription Date:  1/6/2022  Last Fill Quantity: 96,   # refills: 0   Last Office Visit: 12-  Future Office visit:  6-     methotrexate sodium 2.5 MG  #96 /0 RF( 12 weeks) 1/6/2022 / too soon/ denied

## 2022-02-17 NOTE — TELEPHONE ENCOUNTER
Still no response from read [employer] message.  No call back either.  Left another Vehr[employer] message for pt.    Jennifer Marcus RN

## 2022-02-18 RX ORDER — LEVOTHYROXINE SODIUM 112 UG/1
TABLET ORAL
OUTPATIENT
Start: 2022-02-18

## 2022-02-18 RX ORDER — NORETHINDRONE AND ETHINYL ESTRADIOL 7-9-5
KIT ORAL
OUTPATIENT
Start: 2022-02-18

## 2022-02-18 NOTE — TELEPHONE ENCOUNTER
Pt responded to the [employer] message sent to her.  She states she has enough of both of the requested medications to get her through to her appt on 3/4 with Miranda.    Routing to Miranda to deny rx refill requests as I am not able to.    Jennifer Marcus RN

## 2022-02-27 ENCOUNTER — HEALTH MAINTENANCE LETTER (OUTPATIENT)
Age: 52
End: 2022-02-27

## 2022-03-04 ENCOUNTER — OFFICE VISIT (OUTPATIENT)
Dept: OBGYN | Facility: CLINIC | Age: 52
End: 2022-03-04
Payer: COMMERCIAL

## 2022-03-04 ENCOUNTER — ANCILLARY PROCEDURE (OUTPATIENT)
Dept: MAMMOGRAPHY | Facility: CLINIC | Age: 52
End: 2022-03-04
Attending: NURSE PRACTITIONER
Payer: COMMERCIAL

## 2022-03-04 VITALS
HEART RATE: 85 BPM | OXYGEN SATURATION: 100 % | BODY MASS INDEX: 22.67 KG/M2 | WEIGHT: 130 LBS | DIASTOLIC BLOOD PRESSURE: 76 MMHG | SYSTOLIC BLOOD PRESSURE: 135 MMHG

## 2022-03-04 DIAGNOSIS — Z13.6 CARDIOVASCULAR SCREENING; LDL GOAL LESS THAN 130: ICD-10-CM

## 2022-03-04 DIAGNOSIS — Z13.1 SCREENING FOR DIABETES MELLITUS: ICD-10-CM

## 2022-03-04 DIAGNOSIS — Z01.419 ENCOUNTER FOR GYNECOLOGICAL EXAMINATION WITHOUT ABNORMAL FINDING: Primary | ICD-10-CM

## 2022-03-04 DIAGNOSIS — E03.8 OTHER SPECIFIED HYPOTHYROIDISM: ICD-10-CM

## 2022-03-04 DIAGNOSIS — Z12.31 VISIT FOR SCREENING MAMMOGRAM: ICD-10-CM

## 2022-03-04 DIAGNOSIS — M06.09 RHEUMATOID ARTHRITIS OF MULTIPLE SITES WITHOUT RHEUMATOID FACTOR (H): ICD-10-CM

## 2022-03-04 DIAGNOSIS — Z30.41 ENCOUNTER FOR SURVEILLANCE OF CONTRACEPTIVE PILLS: ICD-10-CM

## 2022-03-04 LAB
ALBUMIN SERPL-MCNC: 3.2 G/DL (ref 3.4–5)
ALT SERPL W P-5'-P-CCNC: 20 U/L (ref 0–50)
AST SERPL W P-5'-P-CCNC: 15 U/L (ref 0–45)
CREAT SERPL-MCNC: 0.73 MG/DL (ref 0.52–1.04)
CRP SERPL-MCNC: 7.9 MG/L (ref 0–8)
ERYTHROCYTE [DISTWIDTH] IN BLOOD BY AUTOMATED COUNT: 13.2 % (ref 10–15)
ERYTHROCYTE [SEDIMENTATION RATE] IN BLOOD BY WESTERGREN METHOD: 76 MM/HR (ref 0–30)
GFR SERPL CREATININE-BSD FRML MDRD: >90 ML/MIN/1.73M2
HCT VFR BLD AUTO: 36.8 % (ref 35–47)
HGB BLD-MCNC: 12.2 G/DL (ref 11.7–15.7)
MCH RBC QN AUTO: 30.7 PG (ref 26.5–33)
MCHC RBC AUTO-ENTMCNC: 33.2 G/DL (ref 31.5–36.5)
MCV RBC AUTO: 93 FL (ref 78–100)
PLATELET # BLD AUTO: 397 10E3/UL (ref 150–450)
RBC # BLD AUTO: 3.97 10E6/UL (ref 3.8–5.2)
TSH SERPL DL<=0.005 MIU/L-ACNC: 0.52 MU/L (ref 0.4–4)
WBC # BLD AUTO: 5.4 10E3/UL (ref 4–11)

## 2022-03-04 PROCEDURE — 84450 TRANSFERASE (AST) (SGOT): CPT | Performed by: NURSE PRACTITIONER

## 2022-03-04 PROCEDURE — 85027 COMPLETE CBC AUTOMATED: CPT | Performed by: NURSE PRACTITIONER

## 2022-03-04 PROCEDURE — 77067 SCR MAMMO BI INCL CAD: CPT | Mod: TC | Performed by: RADIOLOGY

## 2022-03-04 PROCEDURE — 36415 COLL VENOUS BLD VENIPUNCTURE: CPT | Performed by: NURSE PRACTITIONER

## 2022-03-04 PROCEDURE — 82040 ASSAY OF SERUM ALBUMIN: CPT | Performed by: NURSE PRACTITIONER

## 2022-03-04 PROCEDURE — 84443 ASSAY THYROID STIM HORMONE: CPT | Performed by: NURSE PRACTITIONER

## 2022-03-04 PROCEDURE — 86140 C-REACTIVE PROTEIN: CPT | Performed by: NURSE PRACTITIONER

## 2022-03-04 PROCEDURE — 82565 ASSAY OF CREATININE: CPT | Performed by: NURSE PRACTITIONER

## 2022-03-04 PROCEDURE — 99396 PREV VISIT EST AGE 40-64: CPT | Performed by: NURSE PRACTITIONER

## 2022-03-04 PROCEDURE — 85652 RBC SED RATE AUTOMATED: CPT | Performed by: NURSE PRACTITIONER

## 2022-03-04 PROCEDURE — 84460 ALANINE AMINO (ALT) (SGPT): CPT | Performed by: NURSE PRACTITIONER

## 2022-03-04 RX ORDER — LEVOTHYROXINE SODIUM 112 UG/1
112 TABLET ORAL DAILY
Qty: 90 TABLET | Refills: 3 | Status: CANCELLED | OUTPATIENT
Start: 2022-03-04

## 2022-03-04 RX ORDER — NORETHINDRONE AND ETHINYL ESTRADIOL 7-9-5
1 KIT ORAL DAILY
Qty: 84 TABLET | Refills: 3 | Status: SHIPPED | OUTPATIENT
Start: 2022-03-04 | End: 2022-06-01

## 2022-03-04 NOTE — PROGRESS NOTES
SUBJECTIVE:   CC: Noni Mccormick is an 51 year old woman who presents for preventive health visit.     Healthy Habits:     Getting at least 3 servings of Calcium per day:  Yes    Bi-annual eye exam:  Yes    Dental care twice a year:  Yes    Sleep apnea or symptoms of sleep apnea:  None    Diet:  Regular (no restrictions)    Frequency of exercise:  2-3 days/week    Duration of exercise:  30-45 minutes    Taking medications regularly:  Yes    Medication side effects:  None    PHQ-2 Total Score: 0    Additional concerns today:  No    During placebo pills, still getting cycles, but lighter.  Due for TSH today.    Today's PHQ-2 Score:   PHQ-2 ( 1999 [employer]) 3/4/2022   Q1: Little interest or pleasure in doing things 0   Q2: Feeling down, depressed or hopeless 0   PHQ-2 Score 0   PHQ-2 Total Score (12-17 Years)- Positive if 3 or more points; Administer PHQ-A if positive -   Q1: Little interest or pleasure in doing things Not at all   Q2: Feeling down, depressed or hopeless Not at all   PHQ-2 Score 0       Abuse: Current or Past (Physical, Sexual or Emotional) - No  Do you feel safe in your environment? YES      Social History     Tobacco Use     Smoking status: Never Smoker     Smokeless tobacco: Never Used   Substance Use Topics     Alcohol use: No       Alcohol Use 3/4/2022   Prescreen: >3 drinks/day or >7 drinks/week? No   Prescreen: >3 drinks/day or >7 drinks/week? -       Reviewed orders with patient.  Reviewed health maintenance and updated orders accordingly - Yes  Patient Active Problem List   Diagnosis     Hypothyroidism     CARDIOVASCULAR SCREENING; LDL GOAL LESS THAN 160     Nephrolithiasis     High risk medications (not anticoagulants) long-term use     Injury of left hip     Intermittent asthma     Chronic polyarticular juvenile rheumatoid arthritis (H)     Cervical cancer screening     Past Surgical History:   Procedure Laterality Date     COLONOSCOPY WITH CO2 INSUFFLATION N/A 5/28/2021     Procedure: COLONOSCOPY, WITH CO2 INSUFFLATION;  Surgeon: German Oakes DO;  Location: [facility]     JOINT REPLACEMENT, HIP RT/LT      (L)     JOINT REPLACEMENT, HIP RT/LT  3/2013    (R)     SURGICAL HISTORY OF -       Lithotripsy     ZZC PELVIS/HIP JOINT SURGERY UNLISTED         Social History     Tobacco Use     Smoking status: Never Smoker     Smokeless tobacco: Never Used   Substance Use Topics     Alcohol use: No     Family History   Problem Relation Age of Onset     Breast Cancer Mother      C.A.D. Father            Breast Cancer Screening:  Any new diagnosis of family breast, ovarian, or bowel cancer? No    Mammogram Screening: Recommended annual mammography  Pertinent mammograms are reviewed under the imaging tab.    History of abnormal Pap smear: See below-plan pap in 2024.  PAP / HPV Latest Ref Rng & Units 2/26/2021 1/29/2018 12/9/2014   PAP (Historical) - OTHER-NIL, See Result OTHER-NIL, See Result NIL   HPV16 NEG:Negative Negative Negative -   HPV18 NEG:Negative Negative Negative -   HRHPV NEG:Negative Negative Negative -     Reviewed and updated as needed this visit by clinical staff    Allergies  Meds              Reviewed and updated as needed this visit by Provider                 Past Medical History:   Diagnosis Date     ASCUS of cervix with negative high risk HPV 10/27/2010     Contraception      Grave's disease      Hypothyroid      Inflammatory arthritis      Intermittent asthma 09/10/2013     Nephrolithiasis 06/16/2011     Osteopenia 02/14/2013     Rheumatoid arthritis(714.0)       Past Surgical History:   Procedure Laterality Date     COLONOSCOPY WITH CO2 INSUFFLATION N/A 5/28/2021    Procedure: COLONOSCOPY, WITH CO2 INSUFFLATION;  Surgeon: German Oakes DO;  Location: [facility]     JOINT REPLACEMENT, HIP RT/LT      (L)     JOINT REPLACEMENT, HIP RT/LT  3/2013    (R)     SURGICAL HISTORY OF -       Lithotripsy     ZZC PELVIS/HIP JOINT SURGERY UNLISTED         Review of  Systems  CONSTITUTIONAL: NEGATIVE for fever, chills, change in weight  INTEGUMENTARU/SKIN: NEGATIVE for worrisome rashes, moles or lesions  EYES: NEGATIVE for vision changes or irritation  ENT: NEGATIVE for ear, mouth and throat problems  RESP: NEGATIVE for significant cough or SOB  BREAST: NEGATIVE for masses, tenderness or discharge  CV: NEGATIVE for chest pain, palpitations or peripheral edema  GI: NEGATIVE for nausea, abdominal pain, heartburn, or change in bowel habits  : NEGATIVE for unusual urinary or vaginal symptoms. Periods are regular.  MUSCULOSKELETAL: NEGATIVE for significant arthralgias or myalgia  NEURO: NEGATIVE for weakness, dizziness or paresthesias  PSYCHIATRIC: NEGATIVE for changes in mood or affect     OBJECTIVE:   /76 (BP Location: Left arm, Patient Position: Chair, Cuff Size: Adult Regular)   Pulse 85   Wt 59 kg (130 lb)   LMP 02/15/2022 (Exact Date)   SpO2 100%   Breastfeeding No   BMI 22.67 kg/m    Physical Exam  GENERAL: healthy, alert and no distress  EYES: Eyes grossly normal to inspection, PERRL and conjunctivae and sclerae normal  HENT: ear canals and TM's normal  NECK: no adenopathy, no asymmetry, masses, or scars and thyroid normal to palpation  RESP: lungs clear to auscultation - no rales, rhonchi or wheezes  BREAST: normal without masses, tenderness or nipple discharge and no palpable axillary masses or adenopathy  CV: regular rate and rhythm, normal S1 S2, no S3 or S4, no murmur, click or rub, no peripheral edema and peripheral pulses strong  ABDOMEN: soft, nontender, no hepatosplenomegaly, no masses and bowel sounds normal   (female): normal female external genitalia, normal urethral meatus, vaginal mucosa pink, and normal cervix/adnexa/uterus without masses or discharge  MS: no gross musculoskeletal defects noted, no edema  SKIN: no suspicious lesions or rashes  NEURO: Normal strength and tone, mentation intact and speech normal  PSYCH: mentation appears normal,  "affect normal/bright    ASSESSMENT/PLAN:   (Z01.419) Encounter for gynecological examination without abnormal finding  (primary encounter diagnosis)  Comment: Health maintenance reviewed. Mammogram scheduled today.     (Z30.41) Encounter for surveillance of contraceptive pills  Comment: Will refill oral contraceptive pill, plan to stop for a few months before next preventive visit to see if she continues to have cycles-would recommend barrier method during this time.   Plan: norethin-eth estrad triphasic (ARANELLE)         0.5/1/0.5-35 MG-MCG tablet         (E03.8) Other specified hypothyroidism  Comment: Await results and then send updated prescription based on TSH.  Plan: TSH with free T4 reflex         (Z13.1) Screening for diabetes mellitus  Plan: Glucose         (Z13.6) CARDIOVASCULAR SCREENING; LDL GOAL LESS THAN 130  Plan: Lipid panel reflex to direct LDL Fasting      COUNSELING:  Reviewed preventive health counseling, as reflected in patient instructions  Special attention given to:        Regular exercise       Healthy diet/nutrition       (Brenda)menopause management    Estimated body mass index is 23.12 kg/m  as calculated from the following:    Height as of 5/24/21: 1.613 m (5' 3.5\").    Weight as of 2/11/22: 60.1 kg (132 lb 9.6 oz).        She reports that she has never smoked. She has never used smokeless tobacco.      Counseling Resources:  ATP IV Guidelines  Pooled Cohorts Equation Calculator  Breast Cancer Risk Calculator  BRCA-Related Cancer Risk Assessment: FHS-7 Tool  FRAX Risk Assessment  ICSI Preventive Guidelines  Dietary Guidelines for Americans, 2010  USDA's MyPlate  ASA Prophylaxis  Lung CA Screening    [facility]  "

## 2022-03-07 RX ORDER — LEVOTHYROXINE SODIUM 112 UG/1
112 TABLET ORAL DAILY
Qty: 90 TABLET | Refills: 3 | Status: SHIPPED | OUTPATIENT
Start: 2022-03-07 | End: 2023-03-13

## 2022-03-16 ENCOUNTER — OFFICE VISIT (OUTPATIENT)
Dept: URGENT CARE | Facility: URGENT CARE | Age: 52
End: 2022-03-16
Payer: COMMERCIAL

## 2022-03-16 ENCOUNTER — NURSE TRIAGE (OUTPATIENT)
Dept: FAMILY MEDICINE | Facility: CLINIC | Age: 52
End: 2022-03-16
Payer: COMMERCIAL

## 2022-03-16 ENCOUNTER — MYC MEDICAL ADVICE (OUTPATIENT)
Dept: FAMILY MEDICINE | Facility: CLINIC | Age: 52
End: 2022-03-16
Payer: COMMERCIAL

## 2022-03-16 VITALS
OXYGEN SATURATION: 99 % | WEIGHT: 131 LBS | DIASTOLIC BLOOD PRESSURE: 80 MMHG | SYSTOLIC BLOOD PRESSURE: 130 MMHG | HEART RATE: 65 BPM | BODY MASS INDEX: 22.84 KG/M2 | TEMPERATURE: 98.9 F

## 2022-03-16 DIAGNOSIS — M08.3 CHRONIC POLYARTICULAR JUVENILE RHEUMATOID ARTHRITIS (H): ICD-10-CM

## 2022-03-16 DIAGNOSIS — R07.9 CHEST PAIN, UNSPECIFIED TYPE: Primary | ICD-10-CM

## 2022-03-16 PROCEDURE — 93000 ELECTROCARDIOGRAM COMPLETE: CPT | Performed by: FAMILY MEDICINE

## 2022-03-16 PROCEDURE — 99214 OFFICE O/P EST MOD 30 MIN: CPT | Performed by: FAMILY MEDICINE

## 2022-03-16 NOTE — TELEPHONE ENCOUNTER
Patient states was sitting at work and suddenly felt her heart racing and pounding. Checked blood pressure = 170/107 at 1:30  About 2:30 patient felt mild chest pain, chest feels heavy. Hands are tingly, c/o mild headache    Patient instructed to go to the ED now. Patient will have her  drive her    .Sadaf SARGENTN, RN      Reason for Disposition    Heart beating irregularly or very rapidly    Additional Information    Negative: Severe difficulty breathing (e.g., struggling for each breath, speaks in single words)    Negative: Passed out (i.e., fainted, collapsed and was not responding)    Negative: Difficult to awaken or acting confused (e.g., disoriented, slurred speech)    Negative: Shock suspected (e.g., cold/pale/clammy skin, too weak to stand, low BP, rapid pulse)    Negative: Chest pain lasting longer than 5 minutes and ANY of the following:* Over 45 years old* Over 30 years old and at least one cardiac risk factor (e.g., diabetes, high blood pressure, high cholesterol, smoker, or strong family history of heart disease)* History of heart disease (i.e., angina, heart attack, heart failure, bypass surgery, takes nitroglycerin)* Pain is crushing, pressure-like, or heavy    Negative: Heart beating < 50 beats per minute OR > 140 beats per minute    Negative: Visible sweat on face or sweat dripping down face    Negative: Sounds like a life-threatening emergency to the triager    Negative: Followed an injury to chest    Protocols used: CHEST PAIN-A-OH

## 2022-03-16 NOTE — PROGRESS NOTES
Chief BP     Patient on BP meds for hypertension   Was started by her rheumatologist   Been on losartan for years    Patient had 130 systolic blood pressure today  And noticed her heart was beating fast at work    Patient is a PT therapist assistant     Was just standing and had a bout of palpitation    Also had a bit of stress earlier at lunch an hour earlier than the palpitation    Since then the palpitation went away  But had some numbness and tingling in both hands and had some chest tightness    Chest discomfort lasted for couple of hours  And still has a dull headache on forehead     Chest pressure and is gone now    Worse with exertion relieved by rest: no  Worse with certain movements or position: no  Associated with food intake or symptoms of GERD: no  Worse with taking in a deep breath: no  Cough/colds or respiratory symptoms: no  Signs or symptoms of DVT no    Problem list, Medication list, Allergies, and Medical/Social/Surgical histories reviewed in EPIC and updated as appropriate.      ROS: review of systems negative except for noted above.    OBJECTIVE:  /80   Pulse 65   Temp 98.9  F (37.2  C) (Tympanic)   Wt 59.4 kg (131 lb)   LMP 03/15/2022 (Exact Date)   SpO2 99%   BMI 22.84 kg/m     General:   awake, alert, and cooperative.  NAD.   Head: Normocephalic, atraumatic.  Eyes: Conjunctiva clear,   ENT: normal exam  Heart: Regular rate and rhythm. No murmur.  Lungs: Chest is clear; no wheezes or rales.   Abdomen: soft nontender  Neuro: Alert and oriented - normal speech. No gross neurologic deficits  Psych: Appropriate mood and affect.   Musculoskeletal: no joint swelling. no chest wall tenderness reproducible of pain. No calf pain or tenderness. No signs or symptoms of DVT   Vascular: no cyanosis      EKG done in clinic, reviewed this myself : no acute ST-Twave changes       ASSESSMENT:    ICD-10-CM    1. Chest pain, unspecified type  R07.9 EKG 12-lead complete w/read - Clinics        ICD-10-CM    1. Chest pain, unspecified type  R07.9 EKG 12-lead complete w/read - Clinics   2. Chronic polyarticular juvenile rheumatoid arthritis (H)  M08.3          PLAN:   Chest pressure earlier today lasted couple of hours  Accompanied by palpitations    Risk factors: rheumatoid arthritis, hypertension, on birth control estrogen containing     EKG normal   However recommend ER for complete acute cardiorespiratory rule out  Rule out MI, rule out pulmonary embolism     Patient declined ambulance, risk of transport discussed   will drive  Not yet sure which ER to go EKG and AVS with sign out information given     Eileen Page MD

## 2022-03-25 ENCOUNTER — LAB (OUTPATIENT)
Dept: LAB | Facility: CLINIC | Age: 52
End: 2022-03-25
Payer: COMMERCIAL

## 2022-03-25 ENCOUNTER — INFUSION THERAPY VISIT (OUTPATIENT)
Dept: INFUSION THERAPY | Facility: CLINIC | Age: 52
End: 2022-03-25
Payer: COMMERCIAL

## 2022-03-25 VITALS
DIASTOLIC BLOOD PRESSURE: 83 MMHG | TEMPERATURE: 97.7 F | SYSTOLIC BLOOD PRESSURE: 131 MMHG | HEART RATE: 65 BPM | BODY MASS INDEX: 22.48 KG/M2 | OXYGEN SATURATION: 100 % | WEIGHT: 128.9 LBS | RESPIRATION RATE: 16 BRPM

## 2022-03-25 DIAGNOSIS — Z79.899 HIGH RISK MEDICATIONS (NOT ANTICOAGULANTS) LONG-TERM USE: ICD-10-CM

## 2022-03-25 DIAGNOSIS — Z13.6 CARDIOVASCULAR SCREENING; LDL GOAL LESS THAN 130: ICD-10-CM

## 2022-03-25 DIAGNOSIS — M08.3 CHRONIC POLYARTICULAR JUVENILE RHEUMATOID ARTHRITIS (H): Primary | ICD-10-CM

## 2022-03-25 DIAGNOSIS — M06.09 RHEUMATOID ARTHRITIS OF MULTIPLE SITES WITHOUT RHEUMATOID FACTOR (H): ICD-10-CM

## 2022-03-25 DIAGNOSIS — Z13.1 SCREENING FOR DIABETES MELLITUS: ICD-10-CM

## 2022-03-25 LAB
ALBUMIN SERPL-MCNC: 3.8 G/DL (ref 3.4–5)
ALT SERPL W P-5'-P-CCNC: 23 U/L (ref 0–50)
AST SERPL W P-5'-P-CCNC: 15 U/L (ref 0–45)
CHOLEST SERPL-MCNC: 210 MG/DL
CREAT SERPL-MCNC: 0.72 MG/DL (ref 0.52–1.04)
CRP SERPL-MCNC: 10.2 MG/L (ref 0–8)
ERYTHROCYTE [DISTWIDTH] IN BLOOD BY AUTOMATED COUNT: 13.1 % (ref 10–15)
ERYTHROCYTE [SEDIMENTATION RATE] IN BLOOD BY WESTERGREN METHOD: 64 MM/HR (ref 0–30)
FASTING STATUS PATIENT QL REPORTED: YES
FASTING STATUS PATIENT QL REPORTED: YES
GFR SERPL CREATININE-BSD FRML MDRD: >90 ML/MIN/1.73M2
GLUCOSE BLD-MCNC: 88 MG/DL (ref 70–99)
HCT VFR BLD AUTO: 42.9 % (ref 35–47)
HDLC SERPL-MCNC: 88 MG/DL
HGB BLD-MCNC: 14.1 G/DL (ref 11.7–15.7)
HOLD SPECIMEN: NORMAL
LDLC SERPL CALC-MCNC: 102 MG/DL
MCH RBC QN AUTO: 30.5 PG (ref 26.5–33)
MCHC RBC AUTO-ENTMCNC: 32.9 G/DL (ref 31.5–36.5)
MCV RBC AUTO: 93 FL (ref 78–100)
NONHDLC SERPL-MCNC: 122 MG/DL
PLATELET # BLD AUTO: 413 10E3/UL (ref 150–450)
RBC # BLD AUTO: 4.62 10E6/UL (ref 3.8–5.2)
TRIGL SERPL-MCNC: 98 MG/DL
WBC # BLD AUTO: 5.2 10E3/UL (ref 4–11)

## 2022-03-25 PROCEDURE — 36415 COLL VENOUS BLD VENIPUNCTURE: CPT

## 2022-03-25 PROCEDURE — 85027 COMPLETE CBC AUTOMATED: CPT

## 2022-03-25 PROCEDURE — 96375 TX/PRO/DX INJ NEW DRUG ADDON: CPT | Performed by: NURSE PRACTITIONER

## 2022-03-25 PROCEDURE — 84460 ALANINE AMINO (ALT) (SGPT): CPT

## 2022-03-25 PROCEDURE — 84450 TRANSFERASE (AST) (SGOT): CPT

## 2022-03-25 PROCEDURE — 82947 ASSAY GLUCOSE BLOOD QUANT: CPT

## 2022-03-25 PROCEDURE — 82565 ASSAY OF CREATININE: CPT

## 2022-03-25 PROCEDURE — 86140 C-REACTIVE PROTEIN: CPT

## 2022-03-25 PROCEDURE — 85652 RBC SED RATE AUTOMATED: CPT

## 2022-03-25 PROCEDURE — 82040 ASSAY OF SERUM ALBUMIN: CPT

## 2022-03-25 PROCEDURE — 80061 LIPID PANEL: CPT

## 2022-03-25 PROCEDURE — 96413 CHEMO IV INFUSION 1 HR: CPT | Performed by: NURSE PRACTITIONER

## 2022-03-25 RX ORDER — ACETAMINOPHEN 325 MG/1
650 TABLET ORAL ONCE
Status: CANCELLED
Start: 2022-05-06 | End: 2022-05-06

## 2022-03-25 RX ORDER — DIPHENHYDRAMINE HCL 25 MG
25 CAPSULE ORAL ONCE
Status: COMPLETED | OUTPATIENT
Start: 2022-03-25 | End: 2022-03-25

## 2022-03-25 RX ORDER — METHYLPREDNISOLONE SODIUM SUCCINATE 125 MG/2ML
125 INJECTION, POWDER, LYOPHILIZED, FOR SOLUTION INTRAMUSCULAR; INTRAVENOUS ONCE
Status: COMPLETED | OUTPATIENT
Start: 2022-03-25 | End: 2022-03-25

## 2022-03-25 RX ORDER — METHYLPREDNISOLONE SODIUM SUCCINATE 125 MG/2ML
125 INJECTION, POWDER, LYOPHILIZED, FOR SOLUTION INTRAMUSCULAR; INTRAVENOUS ONCE
Status: CANCELLED
Start: 2022-05-06 | End: 2022-05-06

## 2022-03-25 RX ORDER — DIPHENHYDRAMINE HCL 25 MG
25 CAPSULE ORAL ONCE
Status: CANCELLED
Start: 2022-05-06 | End: 2022-05-06

## 2022-03-25 RX ADMIN — Medication 250 ML: at 12:28

## 2022-03-25 RX ADMIN — METHYLPREDNISOLONE SODIUM SUCCINATE 125 MG: 125 INJECTION INTRAMUSCULAR; INTRAVENOUS at 12:25

## 2022-03-25 RX ADMIN — Medication 25 MG: at 12:23

## 2022-03-25 ASSESSMENT — PAIN SCALES - GENERAL: PAINLEVEL: MILD PAIN (2)

## 2022-03-25 NOTE — PROGRESS NOTES
Infusion Nursing Note:  Noni Mccormick presents today for Remicade.    Patient seen by provider today: No   present during visit today: Not Applicable.    Note: Pt reports a couple weeks ago she went to the ED with elevated BP, chest pain, and palpitations. She reports she was thoroughly worked up and felt as though it was related to anxiety. Pt denies any other medical concerns.    Pt reports taking 650 mg of Tylenol and 25 mg of Benadryl at home. An additional 25 mg of Benadryl was given while here along with Solumedrol. The pt felt concerned about taking Solumedrol with how she has been feeling recently with her blood pressure and palpitations. She questioned if she needed Solumedrol today, she does report a history of a reaction 10+ years ago. Pharmacy was updated and reccommended continuing the Solumedrol. Pt updated, understood , and was agreeable with plan. She will send a message to her prescribing provider to further discuss this.      Intravenous Access:  Peripheral IV placed.    Treatment Conditions:  Biological Infusion Checklist:  ~~~ NOTE: If the patient answers yes to any of the questions below, hold the infusion and contact ordering provider or on-call provider.    1. Have you recently had an elevated temperature, fever, chills, productive cough, coughing for 3 weeks or longer or hemoptysis, abnormal vital signs, night sweats,  chest pain or have you noticed a decrease in your appetite, unexplained weight loss or fatigue? No  2. Do you have any open wounds or new incisions? No  3. Do you have any recent or upcoming hospitalizations, surgeries or dental procedures? No  4. Do you currently have or recently have had any signs of illness or infection or are you on any antibiotics? No  5. Have you had any new, sudden or worsening abdominal pain? No  6. Have you or anyone in your household received a live vaccination in the past 4 weeks? Please note:  No live vaccines while on  biologic/chemotherapy until 6 months after the last treatment.  Patient can receive the flu vaccine (shot only) and the pneumovax.  It is optimal for the patient to get these vaccines mid cycle, but they can be given at any time as long as it is not on the day of the infusion. No  7. Have you recently been diagnosed with any new nervous system diseases (ie. Multiple sclerosis, Guillain [place], seizures, neurological changes) or cancer diagnosis? No  8. Are you on any form of radiation or chemotherapy? No  9. Are you pregnant or breast feeding or do you have plans of pregnancy in the future? No  10. Have you been having any signs of worsening depression or suicidal ideations?  (benlysta only) No  11. Have there been any other new onset medical symptoms? No        Post Infusion Assessment:  Patient tolerated infusion without incident.  Site patent and intact, free from redness, edema or discomfort.  No evidence of extravasations.  Access discontinued per protocol.       Discharge Plan:   [facility]  Patient will return 5/6/22 for next appointment.   Patient discharged in stable condition accompanied by: self.  Departure Mode: Ambulatory.      Sachi Cardoso RN

## 2022-03-30 DIAGNOSIS — M06.09 RHEUMATOID ARTHRITIS OF MULTIPLE SITES WITHOUT RHEUMATOID FACTOR (H): ICD-10-CM

## 2022-04-01 NOTE — TELEPHONE ENCOUNTER
METHOTREXATE 2.5 MG TABLET     Take 8 tablets (20 mg) by mouth every 7 days Lab due every 8-12 weeks  Last Written Prescription Date:  1/6/22  Last Fill Quantity: 96,   # refills: 0   Last Office Visit: 12-  Future Office visit:  6-    CBC RESULTS: Recent Labs   Lab Test 03/25/22  1101   WBC 5.2   RBC 4.62   HGB 14.1   HCT 42.9   MCV 93   MCH 30.5   MCHC 32.9   RDW 13.1          Creatinine   Date Value Ref Range Status   03/25/2022 0.72 0.52 - 1.04 mg/dL Final   07/09/2021 0.69 0.52 - 1.04 mg/dL Final   ]    Liver Function Studies -   Recent Labs   Lab Test 03/25/22  1101 06/15/18  1210 03/30/18  0820   PROTTOTAL  --   --  7.6   ALBUMIN 3.8   < > 3.2*   BILITOTAL  --   --  0.5   ALKPHOS  --   --  27*   AST 15   < > 16   ALT 23   < > 18    < > = values in this interval not displayed.       Routing refill request to provider for review/approval because:  DMARd, labs 3/25/22, next visit 6/22/22

## 2022-04-03 RX ORDER — METHOTREXATE 2.5 MG/1
20 TABLET ORAL
Qty: 96 TABLET | Refills: 0 | Status: SHIPPED | OUTPATIENT
Start: 2022-04-03 | End: 2022-06-22

## 2022-04-04 NOTE — RESULT ENCOUNTER NOTE
Normal monitoring labs - normal liver, kidney tests and cell counts. Your inflammatory markers keep fluctuating. Let us know if any worsening symptoms.     Sterling Gonzalez MD

## 2022-05-04 DIAGNOSIS — G25.81 RESTLESS LEG SYNDROME: ICD-10-CM

## 2022-05-04 RX ORDER — GABAPENTIN 300 MG/1
CAPSULE ORAL
Qty: 90 CAPSULE | Refills: 0 | Status: SHIPPED | OUTPATIENT
Start: 2022-05-04 | End: 2022-06-01

## 2022-05-06 ENCOUNTER — INFUSION THERAPY VISIT (OUTPATIENT)
Dept: INFUSION THERAPY | Facility: CLINIC | Age: 52
End: 2022-05-06
Payer: COMMERCIAL

## 2022-05-06 VITALS
DIASTOLIC BLOOD PRESSURE: 76 MMHG | TEMPERATURE: 98.1 F | OXYGEN SATURATION: 100 % | WEIGHT: 130.8 LBS | SYSTOLIC BLOOD PRESSURE: 125 MMHG | RESPIRATION RATE: 16 BRPM | HEART RATE: 68 BPM | BODY MASS INDEX: 22.81 KG/M2

## 2022-05-06 DIAGNOSIS — Z79.899 HIGH RISK MEDICATIONS (NOT ANTICOAGULANTS) LONG-TERM USE: ICD-10-CM

## 2022-05-06 DIAGNOSIS — M08.3 CHRONIC POLYARTICULAR JUVENILE RHEUMATOID ARTHRITIS (H): Primary | ICD-10-CM

## 2022-05-06 PROCEDURE — 99207 PR NO CHARGE LOS: CPT

## 2022-05-06 PROCEDURE — 96413 CHEMO IV INFUSION 1 HR: CPT | Performed by: NURSE PRACTITIONER

## 2022-05-06 RX ORDER — ACETAMINOPHEN 325 MG/1
650 TABLET ORAL ONCE
Status: CANCELLED
Start: 2022-06-17 | End: 2022-06-17

## 2022-05-06 RX ORDER — METHYLPREDNISOLONE SODIUM SUCCINATE 125 MG/2ML
125 INJECTION, POWDER, LYOPHILIZED, FOR SOLUTION INTRAMUSCULAR; INTRAVENOUS ONCE
Status: CANCELLED
Start: 2022-06-17 | End: 2022-06-17

## 2022-05-06 RX ORDER — DIPHENHYDRAMINE HCL 25 MG
25 CAPSULE ORAL ONCE
Status: COMPLETED | OUTPATIENT
Start: 2022-05-06 | End: 2022-05-06

## 2022-05-06 RX ORDER — DIPHENHYDRAMINE HCL 25 MG
25 CAPSULE ORAL ONCE
Status: CANCELLED
Start: 2022-06-17 | End: 2022-06-17

## 2022-05-06 RX ADMIN — Medication 250 ML: at 11:50

## 2022-05-06 RX ADMIN — Medication 25 MG: at 11:37

## 2022-05-06 NOTE — PROGRESS NOTES
Infusion Nursing Note:  Noni Mccormick presents today for Remicade.    Patient seen by provider today: No   present during visit today: Not Applicable.    Note: Patient has received Remicade in the past without complications. She takes tylenol and benadryl at home and takes one does of benadryl here.     Intravenous Access:  Peripheral IV placed.    Treatment Conditions:  Biological Infusion Checklist:  ~~~ NOTE: If the patient answers yes to any of the questions below, hold the infusion and contact ordering provider or on-call provider.    1. Have you recently had an elevated temperature, fever, chills, productive cough, coughing for 3 weeks or longer or hemoptysis, abnormal vital signs, night sweats,  chest pain or have you noticed a decrease in your appetite, unexplained weight loss or fatigue? No  2. Do you have any open wounds or new incisions? No  3. Do you have any recent or upcoming hospitalizations, surgeries or dental procedures? No  4. Do you currently have or recently have had any signs of illness or infection or are you on any antibiotics? No  5. Have you had any new, sudden or worsening abdominal pain? No  6. Have you or anyone in your household received a live vaccination in the past 4 weeks? Please note:  No live vaccines while on biologic/chemotherapy until 6 months after the last treatment.  Patient can receive the flu vaccine (shot only) and the pneumovax.  It is optimal for the patient to get these vaccines mid cycle, but they can be given at any time as long as it is not on the day of the infusion. No  7. Have you recently been diagnosed with any new nervous system diseases (ie. Multiple sclerosis, Guillain [place], seizures, neurological changes) or cancer diagnosis? No  8. Are you on any form of radiation or chemotherapy? No  9. Are you pregnant or breast feeding or do you have plans of pregnancy in the future? No  10. Have there been any other new onset medical symptoms?  No        Post Infusion Assessment:  Patient tolerated infusion without incident.  Site patent and intact, free from redness, edema or discomfort.  No evidence of extravasations.  Access discontinued per protocol.  Biologic Infusion Post Education: Call the triage nurse at your clinic or seek medical attention if you have chills and/or temperature greater than or equal to 100.5, uncontrolled nausea/vomiting, diarrhea, constipation, dizziness, shortness of breath, chest pain, heart palpitations, weakness or any other new or concerning symptoms, questions or concerns.  You cannot have any live virus vaccines prior to or during treatment or up to 6 months post infusion.  If you have an upcoming surgery, medical procedure or dental procedure during treatment, this should be discussed with your ordering physician and your surgeon/dentist.  If you are having any concerning symptom, if you are unsure if you should get your next infusion or wish to speak to a provider before your next infusion, please call your care coordinator or triage nurse at your clinic to notify them so we can adequately serve you.       Discharge Plan:   AVS to patient via [facility].  Patient will return 6/17 for next appointment.   Patient discharged in stable condition accompanied by: self.  Departure Mode: Ambulatory.      Lucille Connelly RN

## 2022-06-01 ENCOUNTER — OFFICE VISIT (OUTPATIENT)
Dept: FAMILY MEDICINE | Facility: CLINIC | Age: 52
End: 2022-06-01
Payer: COMMERCIAL

## 2022-06-01 VITALS
SYSTOLIC BLOOD PRESSURE: 131 MMHG | DIASTOLIC BLOOD PRESSURE: 81 MMHG | TEMPERATURE: 98.6 F | RESPIRATION RATE: 18 BRPM | WEIGHT: 125 LBS | BODY MASS INDEX: 22.15 KG/M2 | HEIGHT: 63 IN | OXYGEN SATURATION: 97 % | HEART RATE: 83 BPM

## 2022-06-01 DIAGNOSIS — G25.81 RESTLESS LEG SYNDROME: ICD-10-CM

## 2022-06-01 DIAGNOSIS — J45.20 INTERMITTENT ASTHMA, UNCOMPLICATED: ICD-10-CM

## 2022-06-01 DIAGNOSIS — I10 HYPERTENSION GOAL BP (BLOOD PRESSURE) < 140/90: ICD-10-CM

## 2022-06-01 PROCEDURE — 90715 TDAP VACCINE 7 YRS/> IM: CPT | Performed by: FAMILY MEDICINE

## 2022-06-01 PROCEDURE — 99214 OFFICE O/P EST MOD 30 MIN: CPT | Mod: 25 | Performed by: FAMILY MEDICINE

## 2022-06-01 PROCEDURE — 90471 IMMUNIZATION ADMIN: CPT | Performed by: FAMILY MEDICINE

## 2022-06-01 RX ORDER — FAMOTIDINE 20 MG/1
20 TABLET, FILM COATED ORAL 2 TIMES DAILY
Qty: 180 TABLET | Refills: 3 | Status: SHIPPED | OUTPATIENT
Start: 2022-06-01 | End: 2023-06-12

## 2022-06-01 RX ORDER — LOSARTAN POTASSIUM 25 MG/1
25 TABLET ORAL DAILY
Qty: 90 TABLET | Refills: 3 | Status: SHIPPED | OUTPATIENT
Start: 2022-06-01 | End: 2023-07-16

## 2022-06-01 RX ORDER — ALBUTEROL SULFATE 90 UG/1
2 POWDER, METERED RESPIRATORY (INHALATION) EVERY 6 HOURS PRN
Qty: 1 EACH | Refills: 11 | Status: SHIPPED | OUTPATIENT
Start: 2022-06-01 | End: 2023-08-02

## 2022-06-01 RX ORDER — GABAPENTIN 300 MG/1
600 CAPSULE ORAL EVERY EVENING
Qty: 180 CAPSULE | Refills: 3 | Status: SHIPPED | OUTPATIENT
Start: 2022-06-01 | End: 2023-06-24

## 2022-06-01 ASSESSMENT — ASTHMA QUESTIONNAIRES: ACT_TOTALSCORE: 22

## 2022-06-01 ASSESSMENT — PAIN SCALES - GENERAL: PAINLEVEL: NO PAIN (0)

## 2022-06-01 NOTE — NURSING NOTE
Prior to immunization administration, verified patients identity using patient s name and date of birth. Please see Immunization Activity for additional information.     Screening Questionnaire for Adult Immunization    Are you sick today?   No   Do you have allergies to medications, food, a vaccine component or latex?   No   Have you ever had a serious reaction after receiving a vaccination?   No   Do you have a long-term health problem with heart, lung, kidney, or metabolic disease (e.g., diabetes), asthma, a blood disorder, no spleen, complement component deficiency, a cochlear implant, or a spinal fluid leak?  Are you on long-term aspirin therapy?   No   Do you have cancer, leukemia, HIV/AIDS, or any other immune system problem?   No   Do you have a parent, brother, or sister with an immune system problem?   No   In the past 3 months, have you taken medications that affect  your immune system, such as prednisone, other steroids, or anticancer drugs; drugs for the treatment of rheumatoid arthritis, Crohn s disease, or psoriasis; or have you had radiation treatments?   No   Have you had a seizure, or a brain or other nervous system problem?   No   During the past year, have you received a transfusion of blood or blood    products, or been given immune (gamma) globulin or antiviral drug?   No   For women: Are you pregnant or is there a chance you could become       pregnant during the next month?   No   Have you received any vaccinations in the past 4 weeks?   No     Immunization questionnaire answers were all negative.        Per orders of Dr. Fregoso, injection of tdap given by Laurie Dalton MA. Patient instructed to remain in clinic for 15 minutes afterwards, and to report any adverse reaction to me immediately.       Screening performed by Laurie Dalton MA on 6/1/2022 at 4:28 PM.

## 2022-06-01 NOTE — PROGRESS NOTES
"  Assessment & Plan     Restless leg syndrome  Increase to 600 qpm to help   - gabapentin (NEURONTIN) 300 MG capsule; Take 2 capsules (600 mg) by mouth every evening    Intermittent asthma, uncomplicated  Use as needed  - albuterol (PROAIR RESPICLICK) 108 (90 Base) MCG/ACT inhaler; Inhale 2 puffs into the lungs every 6 hours as needed for shortness of breath / dyspnea  - famotidine (PEPCID) 20 MG tablet; Take 1 tablet (20 mg) by mouth 2 times daily      Hypertension goal BP (blood pressure) < 140/90  At goal on meds  - losartan (COZAAR) 25 MG tablet; Take 1 tablet (25 mg) by mouth daily                 No follow-ups on file.    Sophia Lagos MD  [facility] SUMAN Cheney is a 51 year old who presents for the following health issues     History of Present Illness     Asthma:  She presents for follow up of asthma.  She has some cough, no wheezing, and no shortness of breath. She is using a relief medication a few times a week. She does not have a controller medication. Patient is aware of the following triggers: exercise or sports. The patient has not had a visit to the Emergency Room, Urgent Care or Hospital due to asthma since the last clinic visit.     Hypertension: She presents for follow up of hypertension.  She does not check blood pressure  regularly outside of the clinic. Outpatient blood pressures have not been over 140/90. She does not follow a low salt diet.       Pt with restless leg, on gabapentin. Not working as well as it was.   Will increase to 600 mg    Pt with asthma, uses albuterol inhaler intermittently    Pt with gerd causes cough. Needs refill of pepcid    Pt with htn well controlled on meds      Review of Systems   Constitutional, HEENT, cardiovascular, pulmonary, gi and gu systems are negative, except as otherwise noted.      Objective    /81   Pulse 83   Temp 98.6  F (37  C) (Oral)   Resp 18   Ht 1.6 m (5' 3\")   Wt 56.7 kg (125 lb)   LMP " 05/10/2022   SpO2 97%   BMI 22.14 kg/m    Body mass index is 22.14 kg/m .  Physical Exam   GENERAL: healthy, alert and no distress  NECK: no adenopathy, no asymmetry, masses, or scars and thyroid normal to palpation  RESP: lungs clear to auscultation - no rales, rhonchi or wheezes  CV: regular rate and rhythm, normal S1 S2, no S3 or S4, no murmur, click or rub, no peripheral edema and peripheral pulses strong  ABDOMEN: soft, nontender, no hepatosplenomegaly, no masses and bowel sounds normal  MS: no gross musculoskeletal defects noted, no edema    No results found for this or any previous visit (from the past 24 hour(s)).

## 2022-06-01 NOTE — LETTER
My Asthma Action Plan    Name: Noni Mccormick   YOB: 1970  Date: 6/1/2022   My doctor: Sophia Lagos MD   My clinic: [facility]        My Rescue Medicine:   Albuterol inhaler (Proair/Ventolin/Proventil HFA)  2-4 puffs EVERY 4 HOURS as needed. Use a spacer if recommended by your provider.   My Asthma Severity:   Intermittent / Exercise Induced  Know your asthma triggers: Patient is unaware of triggers  None          GREEN ZONE   Good Control    I feel good    No cough or wheeze    Can work, sleep and play without asthma symptoms       Take your asthma control medicine every day.     1. If exercise triggers your asthma, take your rescue medication    15 minutes before exercise or sports, and    During exercise if you have asthma symptoms  2. Spacer to use with inhaler: If you have a spacer, make sure to use it with your inhaler             YELLOW ZONE Getting Worse  I have ANY of these:    I do not feel good    Cough or wheeze    Chest feels tight    Wake up at night   1. Keep taking your Green Zone medications  2. Start taking your rescue medicine:    every 20 minutes for up to 1 hour. Then every 4 hours for 24-48 hours.  3. If you stay in the Yellow Zone for more than 12-24 hours, contact your doctor.  4. If you do not return to the Green Zone in 12-24 hours or you get worse, start taking your oral steroid medicine if prescribed by your provider.           RED ZONE Medical Alert - Get Help  I have ANY of these:    I feel awful    Medicine is not helping    Breathing getting harder    Trouble walking or talking    Nose opens wide to breathe       1. Take your rescue medicine NOW  2. If your provider has prescribed an oral steroid medicine, start taking it NOW  3. Call your doctor NOW  4. If you are still in the Red Zone after 20 minutes and you have not reached your doctor:    Take your rescue medicine again and    Call 911 or go to the emergency room right  away    See your regular doctor within 2 weeks of an Emergency Room or Urgent Care visit for follow-up treatment.          Annual Reminders:  Meet with Asthma Educator,  Flu Shot in the Fall, consider Pneumonia Vaccination for patients with asthma (aged 19 and older).    Pharmacy:    [facility] [id number] [place] [address] [facility]    Electronically signed by Sophia Lagos MD   Date: 06/01/22                    Asthma Triggers  How To Control Things That Make Your Asthma Worse    Triggers are things that make your asthma worse.  Look at the list below to help you find your triggers and   what you can do about them. You can help prevent asthma flare-ups by staying away from your triggers.      Trigger                                                          What you can do   Cigarette Smoke  Tobacco smoke can make asthma worse. Do not allow smoking in your home, car or around you.  Be sure no one smokes at a child s day care or school.  If you smoke, ask your health care provider for ways to help you quit.  Ask family members to quit too.  Ask your health care provider for a referral to Quit Plan to help you quit smoking, or call 3-454-614-PLAN.     Colds, Flu, Bronchitis  These are common triggers of asthma. Wash your hands often.  Don t touch your eyes, nose or mouth.  Get a flu shot every year.     Dust Mites  These are tiny bugs that live in cloth or carpet. They are too small to see. Wash sheets and blankets in hot water every week.   Encase pillows and mattress in dust mite proof covers.  Avoid having carpet if you can. If you have carpet, vacuum weekly.   Use a dust mask and HEPA vacuum.   Pollen and Outdoor Mold  Some people are allergic to trees, grass, or weed pollen, or molds. Try to keep your windows closed.  Limit time out doors when pollen count is high.   Ask you health care provider about taking medicine during allergy season.     Animal Dander  Some people  are allergic to skin flakes, urine or saliva from pets with fur or feathers. Keep pets with fur or feathers out of your home.    If you can t keep the pet outdoors, then keep the pet out of your bedroom.  Keep the bedroom door closed.  Keep pets off cloth furniture and away from stuffed toys.     Mice, Rats, and Cockroaches  Some people are allergic to the waste from these pests.   Cover food and garbage.  Clean up spills and food crumbs.  Store grease in the refrigerator.   Keep food out of the bedroom.   Indoor Mold  This can be a trigger if your home has high moisture. Fix leaking faucets, pipes, or other sources of water.   Clean moldy surfaces.  Dehumidify basement if it is damp and smelly.   Smoke, Strong Odors, and Sprays  These can reduce air quality. Stay away from strong odors and sprays, such as perfume, powder, hair spray, paints, smoke incense, paint, cleaning products, candles and new carpet.   Exercise or Sports  Some people with asthma have this trigger. Be active!  Ask your doctor about taking medicine before sports or exercise to prevent symptoms.    Warm up for 5-10 minutes before and after sports or exercise.     Other Triggers of Asthma  Cold air:  Cover your nose and mouth with a scarf.  Sometimes laughing or crying can be a trigger.  Some medicines and food can trigger asthma.

## 2022-06-17 ENCOUNTER — INFUSION THERAPY VISIT (OUTPATIENT)
Dept: INFUSION THERAPY | Facility: CLINIC | Age: 52
End: 2022-06-17
Payer: COMMERCIAL

## 2022-06-17 ENCOUNTER — TELEPHONE (OUTPATIENT)
Dept: RHEUMATOLOGY | Facility: CLINIC | Age: 52
End: 2022-06-17

## 2022-06-17 ENCOUNTER — LAB (OUTPATIENT)
Dept: LAB | Facility: CLINIC | Age: 52
End: 2022-06-17

## 2022-06-17 VITALS
SYSTOLIC BLOOD PRESSURE: 114 MMHG | BODY MASS INDEX: 21.77 KG/M2 | HEART RATE: 64 BPM | OXYGEN SATURATION: 99 % | RESPIRATION RATE: 16 BRPM | TEMPERATURE: 98.1 F | DIASTOLIC BLOOD PRESSURE: 74 MMHG | WEIGHT: 122.9 LBS

## 2022-06-17 DIAGNOSIS — M06.09 RHEUMATOID ARTHRITIS OF MULTIPLE SITES WITHOUT RHEUMATOID FACTOR (H): Primary | ICD-10-CM

## 2022-06-17 DIAGNOSIS — Z79.899 HIGH RISK MEDICATIONS (NOT ANTICOAGULANTS) LONG-TERM USE: ICD-10-CM

## 2022-06-17 DIAGNOSIS — M08.3 CHRONIC POLYARTICULAR JUVENILE RHEUMATOID ARTHRITIS (H): Primary | ICD-10-CM

## 2022-06-17 DIAGNOSIS — M06.09 RHEUMATOID ARTHRITIS OF MULTIPLE SITES WITHOUT RHEUMATOID FACTOR (H): ICD-10-CM

## 2022-06-17 PROCEDURE — 36415 COLL VENOUS BLD VENIPUNCTURE: CPT

## 2022-06-17 PROCEDURE — 82565 ASSAY OF CREATININE: CPT

## 2022-06-17 PROCEDURE — 84460 ALANINE AMINO (ALT) (SGPT): CPT

## 2022-06-17 PROCEDURE — 96375 TX/PRO/DX INJ NEW DRUG ADDON: CPT | Performed by: NURSE PRACTITIONER

## 2022-06-17 PROCEDURE — 85027 COMPLETE CBC AUTOMATED: CPT

## 2022-06-17 PROCEDURE — 85652 RBC SED RATE AUTOMATED: CPT

## 2022-06-17 PROCEDURE — 99207 PR NO CHARGE LOS: CPT

## 2022-06-17 PROCEDURE — 96413 CHEMO IV INFUSION 1 HR: CPT | Performed by: NURSE PRACTITIONER

## 2022-06-17 PROCEDURE — 86140 C-REACTIVE PROTEIN: CPT

## 2022-06-17 PROCEDURE — 82040 ASSAY OF SERUM ALBUMIN: CPT

## 2022-06-17 PROCEDURE — 84450 TRANSFERASE (AST) (SGOT): CPT

## 2022-06-17 RX ORDER — DIPHENHYDRAMINE HCL 25 MG
25 CAPSULE ORAL ONCE
Status: COMPLETED | OUTPATIENT
Start: 2022-06-17 | End: 2022-06-17

## 2022-06-17 RX ORDER — DIPHENHYDRAMINE HCL 25 MG
25 CAPSULE ORAL ONCE
Status: CANCELLED
Start: 2022-07-29 | End: 2022-07-29

## 2022-06-17 RX ORDER — METHYLPREDNISOLONE SODIUM SUCCINATE 125 MG/2ML
125 INJECTION, POWDER, LYOPHILIZED, FOR SOLUTION INTRAMUSCULAR; INTRAVENOUS ONCE
Status: CANCELLED
Start: 2022-07-29 | End: 2022-07-29

## 2022-06-17 RX ORDER — ACETAMINOPHEN 325 MG/1
650 TABLET ORAL ONCE
Status: CANCELLED
Start: 2022-07-29 | End: 2022-07-29

## 2022-06-17 RX ORDER — METHYLPREDNISOLONE SODIUM SUCCINATE 125 MG/2ML
125 INJECTION, POWDER, LYOPHILIZED, FOR SOLUTION INTRAMUSCULAR; INTRAVENOUS ONCE
Status: COMPLETED | OUTPATIENT
Start: 2022-06-17 | End: 2022-06-17

## 2022-06-17 RX ADMIN — METHYLPREDNISOLONE SODIUM SUCCINATE 125 MG: 125 INJECTION INTRAMUSCULAR; INTRAVENOUS at 11:53

## 2022-06-17 RX ADMIN — Medication 250 ML: at 11:51

## 2022-06-17 RX ADMIN — Medication 25 MG: at 11:53

## 2022-06-17 ASSESSMENT — PAIN SCALES - GENERAL: PAINLEVEL: NO PAIN (0)

## 2022-06-17 NOTE — PROGRESS NOTES
Infusion Nursing Note:  Noni Mccormick presents today for Remicade.    Patient seen by provider today: No   present during visit today: Not Applicable.    Note: Pt denies any medical concerns. The pt reports tolerating her treatments well.      Intravenous Access:  Peripheral IV placed.    Treatment Conditions:  Biological Infusion Checklist:  ~~~ NOTE: If the patient answers yes to any of the questions below, hold the infusion and contact ordering provider or on-call provider.    1. Have you recently had an elevated temperature, fever, chills, productive cough, coughing for 3 weeks or longer or hemoptysis, abnormal vital signs, night sweats,  chest pain or have you noticed a decrease in your appetite, unexplained weight loss or fatigue? No  2. Do you have any open wounds or new incisions? No  3. Do you have any recent or upcoming hospitalizations, surgeries or dental procedures? No  4. Do you currently have or recently have had any signs of illness or infection or are you on any antibiotics? No  5. Have you had any new, sudden or worsening abdominal pain? No  6. Have you or anyone in your household received a live vaccination in the past 4 weeks? Please note:  No live vaccines while on biologic/chemotherapy until 6 months after the last treatment.  Patient can receive the flu vaccine (shot only) and the pneumovax.  It is optimal for the patient to get these vaccines mid cycle, but they can be given at any time as long as it is not on the day of the infusion. No  7. Have you recently been diagnosed with any new nervous system diseases (ie. Multiple sclerosis, Guillain [place], seizures, neurological changes) or cancer diagnosis? No  8. Are you on any form of radiation or chemotherapy? No  9. Are you pregnant or breast feeding or do you have plans of pregnancy in the future? No  10. Have you been having any signs of worsening depression or suicidal ideations?  (benlysta only) No  11. Have there been  any other new onset medical symptoms? No    Post Infusion Assessment:  Patient tolerated infusion without incident.  Site patent and intact, free from redness, edema or discomfort.  No evidence of extravasations.  Access discontinued per protocol.  Biologic Infusion Post Education: Call the triage nurse at your clinic or seek medical attention if you have chills and/or temperature greater than or equal to 100.5, uncontrolled nausea/vomiting, diarrhea, constipation, dizziness, shortness of breath, chest pain, heart palpitations, weakness or any other new or concerning symptoms, questions or concerns.  You cannot have any live virus vaccines prior to or during treatment or up to 6 months post infusion.  If you have an upcoming surgery, medical procedure or dental procedure during treatment, this should be discussed with your ordering physician and your surgeon/dentist.  If you are having any concerning symptom, if you are unsure if you should get your next infusion or wish to speak to a provider before your next infusion, please call your care coordinator or triage nurse at your clinic to notify them so we can adequately serve you.     Discharge Plan:   AVS to patient via [employer].  Patient will return 7/29/22 for next appointment.   Patient discharged in stable condition accompanied by: self.  Departure Mode: Ambulatory.      Sachi Cardoso RN

## 2022-06-22 ENCOUNTER — OFFICE VISIT (OUTPATIENT)
Dept: RHEUMATOLOGY | Facility: CLINIC | Age: 52
End: 2022-06-22
Payer: COMMERCIAL

## 2022-06-22 VITALS
OXYGEN SATURATION: 100 % | WEIGHT: 123 LBS | DIASTOLIC BLOOD PRESSURE: 72 MMHG | BODY MASS INDEX: 21.79 KG/M2 | HEART RATE: 94 BPM | SYSTOLIC BLOOD PRESSURE: 110 MMHG

## 2022-06-22 DIAGNOSIS — M06.09 RHEUMATOID ARTHRITIS OF MULTIPLE SITES WITHOUT RHEUMATOID FACTOR (H): ICD-10-CM

## 2022-06-22 PROCEDURE — 99214 OFFICE O/P EST MOD 30 MIN: CPT | Performed by: STUDENT IN AN ORGANIZED HEALTH CARE EDUCATION/TRAINING PROGRAM

## 2022-06-22 RX ORDER — FOLIC ACID 1 MG/1
TABLET ORAL
Qty: 180 TABLET | Refills: 11 | Status: SHIPPED | OUTPATIENT
Start: 2022-06-22 | End: 2023-07-19

## 2022-06-22 RX ORDER — METHOTREXATE 2.5 MG/1
20 TABLET ORAL
Qty: 96 TABLET | Refills: 0 | Status: SHIPPED | OUTPATIENT
Start: 2022-06-22 | End: 2022-09-06

## 2022-06-22 ASSESSMENT — PAIN SCALES - GENERAL: PAINLEVEL: NO PAIN (0)

## 2022-06-22 NOTE — PATIENT INSTRUCTIONS
Follow up in a year     Continue Methotrexate 8 tab once a week     Continue Remicade every 6 weeks. Will do it without steroid premed. If doing fine then will change remicade infusions to every 8 weeks.

## 2022-06-22 NOTE — PROGRESS NOTES
Rheumatology clinic visit note.     Subjective : Noni is a 51-year-old female with past medical history of juvenile rheumatoid arthritis, hypothyroidism seen in clinic for management of JRA. She was diagnosed with JRA at 3 years of age.  She has been maintained on a stable dose of Remicade 300 mg every 5 weeks and methotrexate for more than 10 years.  She denies any side effects with the medication. Her last remicade infusion was last Friday.     Her ESR, CRP has been persistently high for the last 10 years.  Her last sed rate was higher than her previous values.  However she denies any significant worsening.  She does report having joint pain flares 3-4 times a week.  Mostly has swelling in her hands.     April 21, 2021 - First dose of 20 mg  Methotrexate caused some nausea but it is better now.  Overall she has noticed improvement after increasing methotrexate to 20 mg.  Last methotrexate monitoring labs done in 2/2021 showed slight improvement in sed rate and CRP.  She gets Remicade infusions every 5 weeks.  Denies any side effects with infusion.     December 15, 2021 - She had COVID infection around [facility] with only mild symptoms. Her last Remicade was on 10/22/21, she gets it every 5 weeks. Last infusion was held due to COVID-19 infection. She denies any RA flares. She was sore in summer and took Ibuprofen which helped. She is on Methotrexate 8 tab once a week. She gets skin check every 6 months.     June 22, 2022 - Her last infusion was last Friday. She is on remicade infusions every 6 weeks. She has not noticed any change after changing it to q 6 weeks. She is on Methotrexate 8 tab once a week. Denies any RA flares.     Review of systems negative except for those mentioned above    Reviewed past medical, surgical, family history    Pertinent labs:     Component      Latest Ref Rng & Units 6/17/2022 6/17/2022 6/17/2022 6/17/2022          11:01 AM 11:01 AM 11:01 AM 11:01 AM   WBC      4.0 - 11.0  10e3/uL 5.9      RBC Count      3.80 - 5.20 10e6/uL 4.42      Hemoglobin      11.7 - 15.7 g/dL 13.4      Hematocrit      35.0 - 47.0 % 39.2      MCV      78 - 100 fL 89      MCH      26.5 - 33.0 pg 30.3      MCHC      31.5 - 36.5 g/dL 34.2      RDW      10.0 - 15.0 % 12.8      Platelet Count      150 - 450 10e3/uL 413      Creatinine      0.52 - 1.04 mg/dL    0.78   GFR Estimate      >60 mL/min/1.73m2    >90   Sed Rate      0 - 30 mm/hr  52 (H)     CRP Inflammation      0.0 - 8.0 mg/L   3.0    Albumin      3.4 - 5.0 g/dL       ALT      0 - 50 U/L       AST      0 - 45 U/L         Component      Latest Ref Rng & Units 6/17/2022 6/17/2022 6/17/2022          11:01 AM 11:01 AM 11:01 AM   WBC      4.0 - 11.0 10e3/uL      RBC Count      3.80 - 5.20 10e6/uL      Hemoglobin      11.7 - 15.7 g/dL      Hematocrit      35.0 - 47.0 %      MCV      78 - 100 fL      MCH      26.5 - 33.0 pg      MCHC      31.5 - 36.5 g/dL      RDW      10.0 - 15.0 %      Platelet Count      150 - 450 10e3/uL      Creatinine      0.52 - 1.04 mg/dL      GFR Estimate      >60 mL/min/1.73m2      Sed Rate      0 - 30 mm/hr      CRP Inflammation      0.0 - 8.0 mg/L      Albumin      3.4 - 5.0 g/dL 3.8     ALT      0 - 50 U/L  33    AST      0 - 45 U/L   28     Physical exam: Bilateral fifth finger flexion contracture, swan-neck deformities over third fourth fingers. No active synovitis noted over MCP, PIP, DIP joints, Bilateral wrists, elbows. Denies any tenderness over the joints.  Hammertoes present both feet.     Assessment :     Juvenile rheumatoid arthritis  High risk medication use-methotrexate and Remicade    Juvenile rheumatoid arthritis: She has been maintained on Remicade infusions 300 mg every 5 weeks and methotrexate. Methotrexate dose was increased to 20 mg in 1/2021 due to joint pain flares and elevated inflammatory markers.  Her remicade infusions were changed to every 6 weeks and she did not notice any worsening.  If she will continue to  stay stable will decrease infusion to every 8 weeks.  Continue Methotrexate 8 tab once a week.     High risk medication use: Methotrexate labs every 10 weeks.  Last labs done on 6/17/22 showed normal CBC, liver, kidney function test.  Sed rate- 52, CRP - 3.     Vaccinations: Vaccinations reviewed with Ms. Mccormick. Risks and benefits of vaccinations were discussed.  - Influenza: encouraged yearly vaccination  - Govrefy55: prescription sent   - Izpmishmz30: to receive 8 weeks after [id number] is administered  - Zostavax: one dose done in 8/2021, need second dose.   - COVID ( Moderna ) - 1/28/21, 2/25/21, 1/15/22    Plan    Continue methotrexate 8 tablets once a week    Change Remicade infusions to every 6 weeks    Methotrexate monitoring labs every 8 to 12 weeks    Follow-up in a year       Sterling Gonzalez MD  [facility]  Department of Rheumatology & Autoimmune Disorders  [place]: [phone]  Pager - [phone]

## 2022-06-22 NOTE — NURSING NOTE
"Chief Complaint   Patient presents with     RECHECK     Rheumatoid arthritis of multiple sites without rheumatoid factor           Initial /72 (BP Location: Left arm, Patient Position: Sitting, Cuff Size: Adult Regular)   Pulse 94   Wt 55.8 kg (123 lb)   LMP 05/10/2022   SpO2 100%   BMI 21.79 kg/m   Estimated body mass index is 21.79 kg/m  as calculated from the following:    Height as of 6/1/22: 1.6 m (5' 3\").    Weight as of this encounter: 55.8 kg (123 lb)..    She requests these members of her care team be copied on today's visit information:     PCP: Sophia Lagos    Do you need any medication refills at today's visit? Yes     Mirta Baldwin MA   Email: [email or web address]  [facility] - Rheumatology  Phone: [phone]  Fax: [phone]      "

## 2022-07-29 ENCOUNTER — INFUSION THERAPY VISIT (OUTPATIENT)
Dept: INFUSION THERAPY | Facility: CLINIC | Age: 52
End: 2022-07-29
Payer: COMMERCIAL

## 2022-07-29 VITALS
HEART RATE: 55 BPM | OXYGEN SATURATION: 98 % | WEIGHT: 126.1 LBS | DIASTOLIC BLOOD PRESSURE: 68 MMHG | BODY MASS INDEX: 22.34 KG/M2 | RESPIRATION RATE: 16 BRPM | SYSTOLIC BLOOD PRESSURE: 114 MMHG | TEMPERATURE: 97.4 F

## 2022-07-29 DIAGNOSIS — Z79.899 HIGH RISK MEDICATIONS (NOT ANTICOAGULANTS) LONG-TERM USE: ICD-10-CM

## 2022-07-29 DIAGNOSIS — M08.3 CHRONIC POLYARTICULAR JUVENILE RHEUMATOID ARTHRITIS (H): Primary | ICD-10-CM

## 2022-07-29 PROCEDURE — 99207 PR NO CHARGE LOS: CPT

## 2022-07-29 PROCEDURE — 96413 CHEMO IV INFUSION 1 HR: CPT | Performed by: NURSE PRACTITIONER

## 2022-07-29 RX ORDER — DIPHENHYDRAMINE HCL 25 MG
25 CAPSULE ORAL ONCE
Status: CANCELLED
Start: 2022-08-03 | End: 2022-08-03

## 2022-07-29 RX ORDER — DIPHENHYDRAMINE HCL 25 MG
25 CAPSULE ORAL ONCE
Status: COMPLETED | OUTPATIENT
Start: 2022-07-29 | End: 2022-07-29

## 2022-07-29 RX ORDER — ACETAMINOPHEN 325 MG/1
650 TABLET ORAL ONCE
Status: CANCELLED
Start: 2022-08-03 | End: 2022-08-03

## 2022-07-29 RX ADMIN — Medication 25 MG: at 11:54

## 2022-07-29 RX ADMIN — Medication 250 ML: at 11:56

## 2022-07-29 NOTE — PROGRESS NOTES
Infusion Nursing Note:  Noni Mccormick presents today for Remicade.    Patient seen by provider today: No   present during visit today: Not Applicable.    Note: Patient reports tolerating infusions in the past without complications. She said No to the steroid this infusion. She took the tylenol and 25mg benadryl at home and taking other 25mg benadryl here.     Intravenous Access:  Peripheral IV placed.    Treatment Conditions:  Biological Infusion Checklist:  ~~~ NOTE: If the patient answers yes to any of the questions below, hold the infusion and contact ordering provider or on-call provider.    1. Have you recently had an elevated temperature, fever, chills, productive cough, coughing for 3 weeks or longer or hemoptysis, abnormal vital signs, night sweats,  chest pain or have you noticed a decrease in your appetite, unexplained weight loss or fatigue? No  2. Do you have any open wounds or new incisions? No  3. Do you have any recent or upcoming hospitalizations, surgeries or dental procedures? No  4. Do you currently have or recently have had any signs of illness or infection or are you on any antibiotics? No  5. Have you had any new, sudden or worsening abdominal pain? No  6. Have you or anyone in your household received a live vaccination in the past 4 weeks? Please note:  No live vaccines while on biologic/chemotherapy until 6 months after the last treatment.  Patient can receive the flu vaccine (shot only) and the pneumovax.  It is optimal for the patient to get these vaccines mid cycle, but they can be given at any time as long as it is not on the day of the infusion. No  7. Have you recently been diagnosed with any new nervous system diseases (ie. Multiple sclerosis, Guillain [place], seizures, neurological changes) or cancer diagnosis? No  8. Are you on any form of radiation or chemotherapy? No  9. Are you pregnant or breast feeding or do you have plans of pregnancy in the future?  No  10. Have you been having any signs of worsening depression or suicidal ideations?  (benlysta only) No  11. Have there been any other new onset medical symptoms? No      Post Infusion Assessment:  Patient tolerated infusion without incident.  Site patent and intact, free from redness, edema or discomfort.  No evidence of extravasations.  Access discontinued per protocol.     Discharge Plan:   AVS to patient via MYCHART.  Patient will return 9/9 for next appointment.   Patient discharged in stable condition accompanied by: self.  Departure Mode: Ambulatory.      Lucille Connelly RN

## 2022-08-02 ENCOUNTER — E-VISIT (OUTPATIENT)
Dept: FAMILY MEDICINE | Facility: CLINIC | Age: 52
End: 2022-08-02
Payer: COMMERCIAL

## 2022-08-02 DIAGNOSIS — U07.1 INFECTION DUE TO 2019 NOVEL CORONAVIRUS: Primary | ICD-10-CM

## 2022-08-02 PROCEDURE — 99422 OL DIG E/M SVC 11-20 MIN: CPT | Performed by: FAMILY MEDICINE

## 2022-09-01 ENCOUNTER — MYC MEDICAL ADVICE (OUTPATIENT)
Dept: FAMILY MEDICINE | Facility: CLINIC | Age: 52
End: 2022-09-01

## 2022-09-01 DIAGNOSIS — J45.20 MILD INTERMITTENT ASTHMA WITHOUT COMPLICATION: Primary | ICD-10-CM

## 2022-09-01 DIAGNOSIS — M06.09 RHEUMATOID ARTHRITIS OF MULTIPLE SITES WITHOUT RHEUMATOID FACTOR (H): ICD-10-CM

## 2022-09-02 RX ORDER — ALBUTEROL SULFATE 90 UG/1
2 AEROSOL, METERED RESPIRATORY (INHALATION) EVERY 6 HOURS
Qty: 18 G | Refills: 11 | Status: SHIPPED | OUTPATIENT
Start: 2022-09-02 | End: 2023-08-02

## 2022-09-06 RX ORDER — METHOTREXATE 2.5 MG/1
20 TABLET ORAL
Qty: 96 TABLET | Refills: 0 | Status: SHIPPED | OUTPATIENT
Start: 2022-09-06 | End: 2022-12-02

## 2022-09-06 NOTE — TELEPHONE ENCOUNTER
Medication/Dose: methotrexate sodium 2.5 MG TABS    Last Written : 6/22/22  Last Quantity: 96, # refills: 0  Last Office Visit :  6/22/22  Pending appointment: 6/21/23       WBC   Date Value Ref Range Status   07/09/2021 4.8 4.0 - 11.0 10e9/L Final     WBC Count   Date Value Ref Range Status   06/17/2022 5.9 4.0 - 11.0 10e3/uL Final     RBC Count   Date Value Ref Range Status   06/17/2022 4.42 3.80 - 5.20 10e6/uL Final   07/09/2021 4.49 3.8 - 5.2 10e12/L Final     Hemoglobin   Date Value Ref Range Status   06/17/2022 13.4 11.7 - 15.7 g/dL Final   07/09/2021 13.5 11.7 - 15.7 g/dL Final     Hematocrit   Date Value Ref Range Status   06/17/2022 39.2 35.0 - 47.0 % Final   07/09/2021 40.9 35.0 - 47.0 % Final     MCV   Date Value Ref Range Status   06/17/2022 89 78 - 100 fL Final   07/09/2021 91 78 - 100 fl Final     MCH   Date Value Ref Range Status   06/17/2022 30.3 26.5 - 33.0 pg Final   07/09/2021 30.1 26.5 - 33.0 pg Final     MCHC   Date Value Ref Range Status   06/17/2022 34.2 31.5 - 36.5 g/dL Final   07/09/2021 33.0 31.5 - 36.5 g/dL Final     RDW   Date Value Ref Range Status   06/17/2022 12.8 10.0 - 15.0 % Final   07/09/2021 13.0 10.0 - 15.0 % Final     Platelet Count   Date Value Ref Range Status   06/17/2022 413 150 - 450 10e3/uL Final   07/09/2021 456 (H) 150 - 450 10e9/L Final     AST   Date Value Ref Range Status   06/17/2022 28 0 - 45 U/L Final   07/09/2021 15 0 - 45 U/L Final     ALT   Date Value Ref Range Status   06/17/2022 33 0 - 50 U/L Final   07/09/2021 16 0 - 50 U/L Final     Creatinine   Date Value Ref Range Status   06/17/2022 0.78 0.52 - 1.04 mg/dL Final   07/09/2021 0.69 0.52 - 1.04 mg/dL Final     Albumin   Date Value Ref Range Status   06/17/2022 3.8 3.4 - 5.0 g/dL Final   07/09/2021 3.4 3.4 - 5.0 g/dL Final     C-Reactive Protein   Date Value Ref Range Status   11/04/2009 1.2 (A) 0 - 0.8 mg/dL      CRP Inflammation   Date Value Ref Range Status   06/17/2022 3.0 0.0 - 8.0 mg/L Final   07/09/2021  7.5 0.0 - 8.0 mg/L Final     No components found for: ESR      Prescription set up and routed to provider per Refill Protocol    ARIE RobertsN, RN

## 2022-09-09 ENCOUNTER — INFUSION THERAPY VISIT (OUTPATIENT)
Dept: INFUSION THERAPY | Facility: CLINIC | Age: 52
End: 2022-09-09
Payer: COMMERCIAL

## 2022-09-09 ENCOUNTER — LAB (OUTPATIENT)
Dept: LAB | Facility: CLINIC | Age: 52
End: 2022-09-09
Payer: COMMERCIAL

## 2022-09-09 VITALS
SYSTOLIC BLOOD PRESSURE: 102 MMHG | OXYGEN SATURATION: 98 % | HEART RATE: 74 BPM | TEMPERATURE: 97.8 F | WEIGHT: 124.5 LBS | RESPIRATION RATE: 16 BRPM | DIASTOLIC BLOOD PRESSURE: 68 MMHG | BODY MASS INDEX: 22.05 KG/M2

## 2022-09-09 DIAGNOSIS — Z79.899 HIGH RISK MEDICATIONS (NOT ANTICOAGULANTS) LONG-TERM USE: ICD-10-CM

## 2022-09-09 DIAGNOSIS — M08.3 CHRONIC POLYARTICULAR JUVENILE RHEUMATOID ARTHRITIS (H): Primary | ICD-10-CM

## 2022-09-09 DIAGNOSIS — M06.09 RHEUMATOID ARTHRITIS OF MULTIPLE SITES WITHOUT RHEUMATOID FACTOR (H): ICD-10-CM

## 2022-09-09 LAB
ALBUMIN SERPL-MCNC: 3.6 G/DL (ref 3.4–5)
ALT SERPL W P-5'-P-CCNC: 33 U/L (ref 0–50)
AST SERPL W P-5'-P-CCNC: 25 U/L (ref 0–45)
CREAT SERPL-MCNC: 0.64 MG/DL (ref 0.52–1.04)
CRP SERPL-MCNC: <2.9 MG/L (ref 0–8)
ERYTHROCYTE [DISTWIDTH] IN BLOOD BY AUTOMATED COUNT: 13.5 % (ref 10–15)
ERYTHROCYTE [SEDIMENTATION RATE] IN BLOOD BY WESTERGREN METHOD: 43 MM/HR (ref 0–30)
GFR SERPL CREATININE-BSD FRML MDRD: >90 ML/MIN/1.73M2
HCT VFR BLD AUTO: 38.6 % (ref 35–47)
HGB BLD-MCNC: 12.9 G/DL (ref 11.7–15.7)
HOLD SPECIMEN: NORMAL
MCH RBC QN AUTO: 31.1 PG (ref 26.5–33)
MCHC RBC AUTO-ENTMCNC: 33.4 G/DL (ref 31.5–36.5)
MCV RBC AUTO: 93 FL (ref 78–100)
PLATELET # BLD AUTO: 374 10E3/UL (ref 150–450)
RBC # BLD AUTO: 4.15 10E6/UL (ref 3.8–5.2)
WBC # BLD AUTO: 6.1 10E3/UL (ref 4–11)

## 2022-09-09 PROCEDURE — 86140 C-REACTIVE PROTEIN: CPT

## 2022-09-09 PROCEDURE — 82040 ASSAY OF SERUM ALBUMIN: CPT

## 2022-09-09 PROCEDURE — 84450 TRANSFERASE (AST) (SGOT): CPT

## 2022-09-09 PROCEDURE — 85027 COMPLETE CBC AUTOMATED: CPT

## 2022-09-09 PROCEDURE — 99207 PR NO CHARGE LOS: CPT

## 2022-09-09 PROCEDURE — 36415 COLL VENOUS BLD VENIPUNCTURE: CPT

## 2022-09-09 PROCEDURE — 85652 RBC SED RATE AUTOMATED: CPT

## 2022-09-09 PROCEDURE — 82565 ASSAY OF CREATININE: CPT

## 2022-09-09 PROCEDURE — 96413 CHEMO IV INFUSION 1 HR: CPT | Performed by: NURSE PRACTITIONER

## 2022-09-09 PROCEDURE — 84460 ALANINE AMINO (ALT) (SGPT): CPT

## 2022-09-09 RX ORDER — ACETAMINOPHEN 325 MG/1
650 TABLET ORAL ONCE
Status: CANCELLED
Start: 2022-10-21 | End: 2022-10-21

## 2022-09-09 RX ORDER — DIPHENHYDRAMINE HCL 25 MG
25 CAPSULE ORAL ONCE
Status: CANCELLED
Start: 2022-10-21 | End: 2022-10-21

## 2022-09-09 RX ORDER — DIPHENHYDRAMINE HCL 25 MG
25 CAPSULE ORAL ONCE
Status: COMPLETED | OUTPATIENT
Start: 2022-09-09 | End: 2022-09-09

## 2022-09-09 RX ADMIN — Medication 250 ML: at 11:53

## 2022-09-09 RX ADMIN — Medication 25 MG: at 11:35

## 2022-09-09 NOTE — PROGRESS NOTES
Infusion Nursing Note:  Noni Payton Mccormick presents today for Infliximab.    Patient seen by provider today: No   present during visit today: Not Applicable.    Note: Pre-med of oral benadryl given today.    Intravenous Access:  Peripheral IV placed.    Treatment Conditions:  Biological Infusion Checklist:  ~~~ NOTE: If the patient answers yes to any of the questions below, hold the infusion and contact ordering provider or on-call provider.    1. Have you recently had an elevated temperature, fever, chills, productive cough, coughing for 3 weeks or longer or hemoptysis, abnormal vital signs, night sweats,  chest pain or have you noticed a decrease in your appetite, unexplained weight loss or fatigue? No  2. Do you have any open wounds or new incisions? No  3. Do you have any recent or upcoming hospitalizations, surgeries or dental procedures? No  4. Do you currently have or recently have had any signs of illness or infection or are you on any antibiotics? No  5. Have you had any new, sudden or worsening abdominal pain? No  6. Have you or anyone in your household received a live vaccination in the past 4 weeks? Please note:  No live vaccines while on biologic/chemotherapy until 6 months after the last treatment.  Patient can receive the flu vaccine (shot only) and the pneumovax.  It is optimal for the patient to get these vaccines mid cycle, but they can be given at any time as long as it is not on the day of the infusion. No  7. Have you recently been diagnosed with any new nervous system diseases (ie. Multiple sclerosis, Guillain [place], seizures, neurological changes) or cancer diagnosis? No  8. Are you on any form of radiation or chemotherapy? No  9. Have there been any other new onset medical symptoms? No      Post Infusion Assessment:  Patient tolerated infusion without incident.  Site patent and intact, free from redness, edema or discomfort.  No evidence of extravasations.  Access discontinued  per protocol.  Biologic Infusion Post Education: Call the triage nurse at your clinic or seek medical attention if you have chills and/or temperature greater than or equal to 100.5, uncontrolled nausea/vomiting, diarrhea, constipation, dizziness, shortness of breath, chest pain, heart palpitations, weakness or any other new or concerning symptoms, questions or concerns.  You cannot have any live virus vaccines prior to or during treatment or up to 6 months post infusion.  If you have an upcoming surgery, medical procedure or dental procedure during treatment, this should be discussed with your ordering physician and your surgeon/dentist.  If you are having any concerning symptom, if you are unsure if you should get your next infusion or wish to speak to a provider before your next infusion, please call your care coordinator or triage nurse at your clinic to notify them so we can adequately serve you.     Discharge Plan:   Discharge instructions reviewed with: Patient.  Patient and/or family verbalized understanding of discharge instructions and all questions answered.  Patient discharged in stable condition accompanied by: self.  Departure Mode: Ambulatory.      Prisca Torres RN

## 2022-10-21 ENCOUNTER — INFUSION THERAPY VISIT (OUTPATIENT)
Dept: INFUSION THERAPY | Facility: CLINIC | Age: 52
End: 2022-10-21
Payer: COMMERCIAL

## 2022-10-21 VITALS
RESPIRATION RATE: 16 BRPM | DIASTOLIC BLOOD PRESSURE: 80 MMHG | TEMPERATURE: 98.1 F | BODY MASS INDEX: 21.51 KG/M2 | SYSTOLIC BLOOD PRESSURE: 117 MMHG | HEART RATE: 64 BPM | WEIGHT: 121.4 LBS | OXYGEN SATURATION: 100 %

## 2022-10-21 DIAGNOSIS — Z79.899 HIGH RISK MEDICATIONS (NOT ANTICOAGULANTS) LONG-TERM USE: ICD-10-CM

## 2022-10-21 DIAGNOSIS — M08.3 CHRONIC POLYARTICULAR JUVENILE RHEUMATOID ARTHRITIS (H): Primary | ICD-10-CM

## 2022-10-21 PROCEDURE — 99207 PR NO CHARGE LOS: CPT

## 2022-10-21 PROCEDURE — 96413 CHEMO IV INFUSION 1 HR: CPT | Performed by: NURSE PRACTITIONER

## 2022-10-21 RX ORDER — DIPHENHYDRAMINE HCL 25 MG
25 CAPSULE ORAL ONCE
Status: COMPLETED | OUTPATIENT
Start: 2022-10-21 | End: 2022-10-21

## 2022-10-21 RX ORDER — DIPHENHYDRAMINE HCL 25 MG
25 CAPSULE ORAL ONCE
Status: CANCELLED
Start: 2022-12-02 | End: 2022-12-02

## 2022-10-21 RX ORDER — ACETAMINOPHEN 325 MG/1
650 TABLET ORAL ONCE
Status: CANCELLED
Start: 2022-12-02 | End: 2022-12-02

## 2022-10-21 RX ADMIN — Medication 250 ML: at 11:51

## 2022-10-21 RX ADMIN — Medication 25 MG: at 11:42

## 2022-10-21 NOTE — PROGRESS NOTES
Infusion Nursing Note:  Noni Mccormick presents today for Rapid Remicade .    Patient seen by provider today: No   present during visit today: Not Applicable.    Note: Pre-med of oral benadryl 25mg given. Patient takes 25 mg of oral benadryl and oral tylenol before coming.    Intravenous Access:  Peripheral IV placed.    Treatment Conditions:  Biological Infusion Checklist:  ~~~ NOTE: If the patient answers yes to any of the questions below, hold the infusion and contact ordering provider or on-call provider.    1. Have you recently had an elevated temperature, fever, chills, productive cough, coughing for 3 weeks or longer or hemoptysis, abnormal vital signs, night sweats,  chest pain or have you noticed a decrease in your appetite, unexplained weight loss or fatigue? No  2. Do you have any open wounds or new incisions? No  3. Do you have any recent or upcoming hospitalizations, surgeries or dental procedures? No  4. Do you currently have or recently have had any signs of illness or infection or are you on any antibiotics? No  5. Have you had any new, sudden or worsening abdominal pain? No  6. Have you or anyone in your household received a live vaccination in the past 4 weeks? Please note:  No live vaccines while on biologic/chemotherapy until 6 months after the last treatment.  Patient can receive the flu vaccine (shot only) and the pneumovax.  It is optimal for the patient to get these vaccines mid cycle, but they can be given at any time as long as it is not on the day of the infusion. No  7. Have you recently been diagnosed with any new nervous system diseases (ie. Multiple sclerosis, Guillain [place], seizures, neurological changes) or cancer diagnosis? No  8. Are you on any form of radiation or chemotherapy? No  9. Have there been any other new onset medical symptoms? No    Post Infusion Assessment:  Patient tolerated infusion without incident.  Site patent and intact, free from redness, edema  or discomfort.  No evidence of extravasations.  Access discontinued per protocol.  Biologic Infusion Post Education: Call the triage nurse at your clinic or seek medical attention if you have chills and/or temperature greater than or equal to 100.5, uncontrolled nausea/vomiting, diarrhea, constipation, dizziness, shortness of breath, chest pain, heart palpitations, weakness or any other new or concerning symptoms, questions or concerns.  You cannot have any live virus vaccines prior to or during treatment or up to 6 months post infusion.  If you have an upcoming surgery, medical procedure or dental procedure during treatment, this should be discussed with your ordering physician and your surgeon/dentist.  If you are having any concerning symptom, if you are unsure if you should get your next infusion or wish to speak to a provider before your next infusion, please call your care coordinator or triage nurse at your clinic to notify them so we can adequately serve you.     Discharge Plan:   Discharge instructions reviewed with: Patient.  Patient and/or family verbalized understanding of discharge instructions and all questions answered.  Patient discharged in stable condition accompanied by: self.  Departure Mode: Ambulatory.      Prisca Torres RN

## 2022-11-29 DIAGNOSIS — M06.09 RHEUMATOID ARTHRITIS OF MULTIPLE SITES WITHOUT RHEUMATOID FACTOR (H): ICD-10-CM

## 2022-12-02 ENCOUNTER — LAB (OUTPATIENT)
Dept: LAB | Facility: CLINIC | Age: 52
End: 2022-12-02
Payer: COMMERCIAL

## 2022-12-02 ENCOUNTER — INFUSION THERAPY VISIT (OUTPATIENT)
Dept: INFUSION THERAPY | Facility: CLINIC | Age: 52
End: 2022-12-02
Payer: COMMERCIAL

## 2022-12-02 VITALS
BODY MASS INDEX: 21.01 KG/M2 | WEIGHT: 118.6 LBS | HEART RATE: 77 BPM | TEMPERATURE: 98.3 F | OXYGEN SATURATION: 97 % | RESPIRATION RATE: 16 BRPM | SYSTOLIC BLOOD PRESSURE: 125 MMHG | DIASTOLIC BLOOD PRESSURE: 81 MMHG

## 2022-12-02 DIAGNOSIS — M06.09 RHEUMATOID ARTHRITIS OF MULTIPLE SITES WITHOUT RHEUMATOID FACTOR (H): ICD-10-CM

## 2022-12-02 DIAGNOSIS — M08.3 CHRONIC POLYARTICULAR JUVENILE RHEUMATOID ARTHRITIS (H): Primary | ICD-10-CM

## 2022-12-02 DIAGNOSIS — Z79.899 HIGH RISK MEDICATIONS (NOT ANTICOAGULANTS) LONG-TERM USE: ICD-10-CM

## 2022-12-02 LAB
ALBUMIN SERPL-MCNC: 3.6 G/DL (ref 3.4–5)
ALT SERPL W P-5'-P-CCNC: 29 U/L (ref 0–50)
AST SERPL W P-5'-P-CCNC: 20 U/L (ref 0–45)
CREAT SERPL-MCNC: 0.65 MG/DL (ref 0.52–1.04)
CRP SERPL-MCNC: <2.9 MG/L (ref 0–8)
ERYTHROCYTE [DISTWIDTH] IN BLOOD BY AUTOMATED COUNT: 13.2 % (ref 10–15)
ERYTHROCYTE [SEDIMENTATION RATE] IN BLOOD BY WESTERGREN METHOD: 35 MM/HR (ref 0–30)
GFR SERPL CREATININE-BSD FRML MDRD: >90 ML/MIN/1.73M2
HCT VFR BLD AUTO: 39.3 % (ref 35–47)
HGB BLD-MCNC: 13.2 G/DL (ref 11.7–15.7)
HOLD SPECIMEN: NORMAL
MCH RBC QN AUTO: 30.6 PG (ref 26.5–33)
MCHC RBC AUTO-ENTMCNC: 33.6 G/DL (ref 31.5–36.5)
MCV RBC AUTO: 91 FL (ref 78–100)
PLATELET # BLD AUTO: 374 10E3/UL (ref 150–450)
RBC # BLD AUTO: 4.31 10E6/UL (ref 3.8–5.2)
WBC # BLD AUTO: 4.7 10E3/UL (ref 4–11)

## 2022-12-02 PROCEDURE — 85027 COMPLETE CBC AUTOMATED: CPT

## 2022-12-02 PROCEDURE — 85652 RBC SED RATE AUTOMATED: CPT

## 2022-12-02 PROCEDURE — 82565 ASSAY OF CREATININE: CPT

## 2022-12-02 PROCEDURE — 99207 PR NO CHARGE LOS: CPT

## 2022-12-02 PROCEDURE — 82040 ASSAY OF SERUM ALBUMIN: CPT

## 2022-12-02 PROCEDURE — 84450 TRANSFERASE (AST) (SGOT): CPT

## 2022-12-02 PROCEDURE — 36415 COLL VENOUS BLD VENIPUNCTURE: CPT

## 2022-12-02 PROCEDURE — 86140 C-REACTIVE PROTEIN: CPT

## 2022-12-02 PROCEDURE — 96413 CHEMO IV INFUSION 1 HR: CPT | Performed by: NURSE PRACTITIONER

## 2022-12-02 PROCEDURE — 84460 ALANINE AMINO (ALT) (SGPT): CPT

## 2022-12-02 RX ORDER — DIPHENHYDRAMINE HCL 25 MG
25 CAPSULE ORAL ONCE
Status: COMPLETED | OUTPATIENT
Start: 2022-12-02 | End: 2022-12-02

## 2022-12-02 RX ORDER — DIPHENHYDRAMINE HCL 25 MG
25 CAPSULE ORAL ONCE
Status: CANCELLED
Start: 2023-01-13 | End: 2023-01-13

## 2022-12-02 RX ORDER — ACETAMINOPHEN 325 MG/1
650 TABLET ORAL ONCE
Status: CANCELLED
Start: 2023-01-13 | End: 2023-01-13

## 2022-12-02 RX ADMIN — Medication 250 ML: at 11:59

## 2022-12-02 RX ADMIN — Medication 25 MG: at 12:00

## 2022-12-02 ASSESSMENT — PAIN SCALES - GENERAL: PAINLEVEL: NO PAIN (0)

## 2022-12-02 NOTE — TELEPHONE ENCOUNTER
METHOTREXATE 2.5 MG TABLET  Last Written Prescription Date:   9/6/2022  Last Fill Quantity: 96,   # refills: 0  Last Office Visit:  6/22/2022  Future Office visit:   6/21/2023    CBC RESULTS: Recent Labs   Lab Test 09/09/22  1103   WBC 6.1   RBC 4.15   HGB 12.9   HCT 38.6   MCV 93   MCH 31.1   MCHC 33.4   RDW 13.5          Creatinine   Date Value Ref Range Status   09/09/2022 0.64 0.52 - 1.04 mg/dL Final   07/09/2021 0.69 0.52 - 1.04 mg/dL Final   ]    Liver Function Studies -   Recent Labs   Lab Test 09/09/22  1103 06/15/18  1210 03/30/18  0820   PROTTOTAL  --   --  7.6   ALBUMIN 3.6   < > 3.2*   BILITOTAL  --   --  0.5   ALKPHOS  --   --  27*   AST 25   < > 16   ALT 33   < > 18    < > = values in this interval not displayed.       Routing refill request to provider for review/approval because:  Drug not on the [facility], P or [facility] refill protocol or controlled substance      Unique Ceja RN  Central Triage Red Flags/Med Refills

## 2022-12-02 NOTE — PROGRESS NOTES
Infusion Nursing Note:  Noni Mccormick presents today for rapid remicade.    Patient seen by provider today: No   present during visit today: Not Applicable.    Note: Pt doing well-voices no new concerns today.    Intravenous Access:  Peripheral IV placed.    Treatment Conditions:  Biological Infusion Checklist:  ~~~ NOTE: If the patient answers yes to any of the questions below, hold the infusion and contact ordering provider or on-call provider.    1. Have you recently had an elevated temperature, fever, chills, productive cough, coughing for 3 weeks or longer or hemoptysis, abnormal vital signs, night sweats,  chest pain or have you noticed a decrease in your appetite, unexplained weight loss or fatigue? No  2. Do you have any open wounds or new incisions? No  3. Do you have any recent or upcoming hospitalizations, surgeries or dental procedures? No  4. Do you currently have or recently have had any signs of illness or infection or are you on any antibiotics? No  5. Have you had any new, sudden or worsening abdominal pain? No  6. Have you or anyone in your household received a live vaccination in the past 4 weeks? Please note:  No live vaccines while on biologic/chemotherapy until 6 months after the last treatment.  Patient can receive the flu vaccine (shot only) and the pneumovax.  It is optimal for the patient to get these vaccines mid cycle, but they can be given at any time as long as it is not on the day of the infusion. No  7. Have you recently been diagnosed with any new nervous system diseases (ie. Multiple sclerosis, Guillain [place], seizures, neurological changes) or cancer diagnosis? No  8. Are you on any form of radiation or chemotherapy? No  9. Are you pregnant or breast feeding or do you have plans of pregnancy in the future? No  10. Have there been any other new onset medical symptoms? No      Post Infusion Assessment:  Patient tolerated infusion without incident.  Site patent and  intact, free from redness, edema or discomfort.  No evidence of extravasations.  Access discontinued per protocol.  Biologic Infusion Post Education: Call the triage nurse at your clinic or seek medical attention if you have chills and/or temperature greater than or equal to 100.5, uncontrolled nausea/vomiting, diarrhea, constipation, dizziness, shortness of breath, chest pain, heart palpitations, weakness or any other new or concerning symptoms, questions or concerns.  You cannot have any live virus vaccines prior to or during treatment or up to 6 months post infusion.  If you have an upcoming surgery, medical procedure or dental procedure during treatment, this should be discussed with your ordering physician and your surgeon/dentist.  If you are having any concerning symptom, if you are unsure if you should get your next infusion or wish to speak to a provider before your next infusion, please call your care coordinator or triage nurse at your clinic to notify them so we can adequately serve you.     Discharge Plan:   AVS to patient via [employer].  Patient will return 1/13/23 for next appointment. Future appts have been reviewed and crosschecked with appt note and plan.  Patient discharged in stable condition accompanied by: self.  Departure Mode: Ambulatory.      Brittney Travis RN

## 2022-12-06 RX ORDER — METHOTREXATE 2.5 MG/1
20 TABLET ORAL
Qty: 96 TABLET | Refills: 0 | Status: SHIPPED | OUTPATIENT
Start: 2022-12-06 | End: 2023-03-01

## 2022-12-06 NOTE — RESULT ENCOUNTER NOTE
Normal monitoring labs - normal liver, kidney tests and cell counts.  Sed rate has continued to go down which is good.    Sterling Gonzalez MD  12/6/2022  1:59 PM

## 2023-01-02 ENCOUNTER — TELEPHONE (OUTPATIENT)
Dept: RHEUMATOLOGY | Facility: CLINIC | Age: 53
End: 2023-01-02

## 2023-01-02 NOTE — TELEPHONE ENCOUNTER
Left Voicemail (1st Attempt) for the patient to call back and schedule the following:    Appointment type: Return  Provider:   Return date: 06/21/2023  Specialty phone number: [phone]  Additional appointment(s) needed:   Additonal Notes:     Dr. Gonzalez is not available, appointment needs to be rescheduled.    Also sent a [employer] message.    Sepideh R/Procedure    [facility]   Neurology, NeuroSurgery, NeuroPsychology and Pain Management Specialties  Medical/Surgical Adult Specialties

## 2023-01-09 RX ORDER — ACETAMINOPHEN 325 MG/1
650 TABLET ORAL ONCE
Status: CANCELLED
Start: 2023-01-13 | End: 2023-01-13

## 2023-01-09 RX ORDER — DIPHENHYDRAMINE HCL 25 MG
25 CAPSULE ORAL ONCE
Status: CANCELLED
Start: 2023-01-13 | End: 2023-01-13

## 2023-01-13 ENCOUNTER — INFUSION THERAPY VISIT (OUTPATIENT)
Dept: INFUSION THERAPY | Facility: CLINIC | Age: 53
End: 2023-01-13
Payer: COMMERCIAL

## 2023-01-13 VITALS
HEART RATE: 72 BPM | TEMPERATURE: 97.3 F | DIASTOLIC BLOOD PRESSURE: 82 MMHG | RESPIRATION RATE: 16 BRPM | BODY MASS INDEX: 21.59 KG/M2 | SYSTOLIC BLOOD PRESSURE: 120 MMHG | OXYGEN SATURATION: 99 % | WEIGHT: 121.9 LBS

## 2023-01-13 DIAGNOSIS — Z79.899 HIGH RISK MEDICATIONS (NOT ANTICOAGULANTS) LONG-TERM USE: ICD-10-CM

## 2023-01-13 DIAGNOSIS — M08.3 CHRONIC POLYARTICULAR JUVENILE RHEUMATOID ARTHRITIS (H): Primary | ICD-10-CM

## 2023-01-13 PROCEDURE — 96413 CHEMO IV INFUSION 1 HR: CPT | Performed by: INTERNAL MEDICINE

## 2023-01-13 PROCEDURE — 99207 PR NO CHARGE LOS: CPT

## 2023-01-13 RX ORDER — ACETAMINOPHEN 325 MG/1
650 TABLET ORAL ONCE
Status: CANCELLED
Start: 2023-02-24 | End: 2023-02-24

## 2023-01-13 RX ORDER — DIPHENHYDRAMINE HCL 25 MG
25 CAPSULE ORAL ONCE
Status: CANCELLED
Start: 2023-02-24 | End: 2023-02-24

## 2023-01-13 RX ORDER — DIPHENHYDRAMINE HCL 25 MG
25 CAPSULE ORAL ONCE
Status: COMPLETED | OUTPATIENT
Start: 2023-01-13 | End: 2023-01-13

## 2023-01-13 RX ADMIN — Medication 250 ML: at 11:19

## 2023-01-13 RX ADMIN — Medication 25 MG: at 11:21

## 2023-01-13 ASSESSMENT — PAIN SCALES - GENERAL: PAINLEVEL: NO PAIN (0)

## 2023-01-13 NOTE — PROGRESS NOTES
Infusion Nursing Note:  Noni Mccormick presents today for Remicade.    Patient seen by provider today: No   present during visit today: Not Applicable.    Note: The patient denies any concerns at this time and reports tolerating her infusions well. The patient took 650 mg of Tylenol and 25 mg PO Benadryl at home and 25 mg of Benadryl while here.     Intravenous Access:  Peripheral IV placed.    Treatment Conditions:  Biological Infusion Checklist:  ~~~ NOTE: If the patient answers yes to any of the questions below, hold the infusion and contact ordering provider or on-call provider.    1. Have you recently had an elevated temperature, fever, chills, productive cough, coughing for 3 weeks or longer or hemoptysis, abnormal vital signs, night sweats,  chest pain or have you noticed a decrease in your appetite, unexplained weight loss or fatigue? No  2. Do you have any open wounds or new incisions? No  3. Do you have any recent or upcoming hospitalizations, surgeries or dental procedures? No  4. Do you currently have or recently have had any signs of illness or infection or are you on any antibiotics? No  5. Have you had any new, sudden or worsening abdominal pain? No  6. Have you or anyone in your household received a live vaccination in the past 4 weeks? Please note:  No live vaccines while on biologic/chemotherapy until 6 months after the last treatment.  Patient can receive the flu vaccine (shot only) and the pneumovax.  It is optimal for the patient to get these vaccines mid cycle, but they can be given at any time as long as it is not on the day of the infusion. No  7. Have you recently been diagnosed with any new nervous system diseases (ie. Multiple sclerosis, Guillain [place], seizures, neurological changes) or cancer diagnosis? No  8. Are you on any form of radiation or chemotherapy? No  9. Are you pregnant or breast feeding or do you have plans of pregnancy in the future? No  10. Have you been  having any signs of worsening depression or suicidal ideations?  (benlysta only) No  11. Have there been any other new onset medical symptoms? No      Post Infusion Assessment:  Patient tolerated infusion without incident.  Site patent and intact, free from redness, edema or discomfort.  No evidence of extravasations.  Access discontinued per protocol.  Biologic Infusion Post Education: Call the triage nurse at your clinic or seek medical attention if you have chills and/or temperature greater than or equal to 100.5, uncontrolled nausea/vomiting, diarrhea, constipation, dizziness, shortness of breath, chest pain, heart palpitations, weakness or any other new or concerning symptoms, questions or concerns.  You cannot have any live virus vaccines prior to or during treatment or up to 6 months post infusion.  If you have an upcoming surgery, medical procedure or dental procedure during treatment, this should be discussed with your ordering physician and your surgeon/dentist.  If you are having any concerning symptom, if you are unsure if you should get your next infusion or wish to speak to a provider before your next infusion, please call your care coordinator or triage nurse at your clinic to notify them so we can adequately serve you.     Discharge Plan:   AVS to patient via [employer].  Patient will return 2/24/23 for next appointment. Future appts have been reviewed and crosschecked with appt note and plan.   Patient discharged in stable condition accompanied by: self.  Departure Mode: Ambulatory.      Sachi Cardoso RN

## 2023-02-24 ENCOUNTER — LAB (OUTPATIENT)
Dept: LAB | Facility: CLINIC | Age: 53
End: 2023-02-24
Payer: COMMERCIAL

## 2023-02-24 ENCOUNTER — INFUSION THERAPY VISIT (OUTPATIENT)
Dept: INFUSION THERAPY | Facility: CLINIC | Age: 53
End: 2023-02-24
Payer: COMMERCIAL

## 2023-02-24 VITALS
HEART RATE: 69 BPM | SYSTOLIC BLOOD PRESSURE: 121 MMHG | DIASTOLIC BLOOD PRESSURE: 79 MMHG | RESPIRATION RATE: 16 BRPM | WEIGHT: 121.1 LBS | TEMPERATURE: 98.1 F | OXYGEN SATURATION: 98 % | BODY MASS INDEX: 21.45 KG/M2

## 2023-02-24 DIAGNOSIS — M08.3 CHRONIC POLYARTICULAR JUVENILE RHEUMATOID ARTHRITIS (H): Primary | ICD-10-CM

## 2023-02-24 DIAGNOSIS — Z79.899 HIGH RISK MEDICATIONS (NOT ANTICOAGULANTS) LONG-TERM USE: ICD-10-CM

## 2023-02-24 DIAGNOSIS — M06.09 RHEUMATOID ARTHRITIS OF MULTIPLE SITES WITHOUT RHEUMATOID FACTOR (H): ICD-10-CM

## 2023-02-24 LAB
ALBUMIN SERPL-MCNC: 3.8 G/DL (ref 3.4–5)
ALT SERPL W P-5'-P-CCNC: 35 U/L (ref 0–50)
AST SERPL W P-5'-P-CCNC: 14 U/L (ref 0–45)
CREAT SERPL-MCNC: 0.63 MG/DL (ref 0.52–1.04)
CRP SERPL-MCNC: <2.9 MG/L (ref 0–8)
ERYTHROCYTE [DISTWIDTH] IN BLOOD BY AUTOMATED COUNT: 12.6 % (ref 10–15)
ERYTHROCYTE [SEDIMENTATION RATE] IN BLOOD BY WESTERGREN METHOD: 30 MM/HR (ref 0–30)
GFR SERPL CREATININE-BSD FRML MDRD: >90 ML/MIN/1.73M2
HCT VFR BLD AUTO: 39.4 % (ref 35–47)
HGB BLD-MCNC: 13.2 G/DL (ref 11.7–15.7)
HOLD SPECIMEN: NORMAL
MCH RBC QN AUTO: 30.6 PG (ref 26.5–33)
MCHC RBC AUTO-ENTMCNC: 33.5 G/DL (ref 31.5–36.5)
MCV RBC AUTO: 91 FL (ref 78–100)
PLATELET # BLD AUTO: 368 10E3/UL (ref 150–450)
RBC # BLD AUTO: 4.31 10E6/UL (ref 3.8–5.2)
WBC # BLD AUTO: 6.1 10E3/UL (ref 4–11)

## 2023-02-24 PROCEDURE — 85652 RBC SED RATE AUTOMATED: CPT

## 2023-02-24 PROCEDURE — 85027 COMPLETE CBC AUTOMATED: CPT

## 2023-02-24 PROCEDURE — 96413 CHEMO IV INFUSION 1 HR: CPT | Performed by: INTERNAL MEDICINE

## 2023-02-24 PROCEDURE — 99207 PR NO CHARGE LOS: CPT

## 2023-02-24 PROCEDURE — 82565 ASSAY OF CREATININE: CPT

## 2023-02-24 PROCEDURE — 82040 ASSAY OF SERUM ALBUMIN: CPT

## 2023-02-24 PROCEDURE — 86140 C-REACTIVE PROTEIN: CPT

## 2023-02-24 PROCEDURE — 84460 ALANINE AMINO (ALT) (SGPT): CPT

## 2023-02-24 PROCEDURE — 36415 COLL VENOUS BLD VENIPUNCTURE: CPT

## 2023-02-24 PROCEDURE — 84450 TRANSFERASE (AST) (SGOT): CPT

## 2023-02-24 RX ORDER — DIPHENHYDRAMINE HCL 25 MG
25 CAPSULE ORAL ONCE
Status: CANCELLED
Start: 2023-04-07 | End: 2023-04-07

## 2023-02-24 RX ORDER — ACETAMINOPHEN 325 MG/1
650 TABLET ORAL ONCE
Status: CANCELLED
Start: 2023-04-07 | End: 2023-04-07

## 2023-02-24 RX ADMIN — Medication 250 ML: at 12:28

## 2023-02-24 NOTE — PROGRESS NOTES
Infusion Nursing Note:  Noni Mccormick presents today for Remicade.    Patient seen by provider today: No   present during visit today: Not Applicable.    Note: Patient reports tolerating Remicade infusions in the past well. States she took her own supply of Tylenol and Benadryl prior.    Intravenous Access:  Peripheral IV placed.    Treatment Conditions:  Biological Infusion Checklist:  ~~~ NOTE: If the patient answers yes to any of the questions below, hold the infusion and contact ordering provider or on-call provider.    1. Have you recently had an elevated temperature, fever, chills, productive cough, coughing for 3 weeks or longer or hemoptysis, abnormal vital signs, night sweats,  chest pain or have you noticed a decrease in your appetite, unexplained weight loss or fatigue? No  2. Do you have any open wounds or new incisions? No  3. Do you have any recent or upcoming hospitalizations, surgeries or dental procedures? No  4. Do you currently have or recently have had any signs of illness or infection or are you on any antibiotics? No  5. Have you had any new, sudden or worsening abdominal pain? No  6. Have you or anyone in your household received a live vaccination in the past 4 weeks? Please note:  No live vaccines while on biologic/chemotherapy until 6 months after the last treatment.  Patient can receive the flu vaccine (shot only) and the pneumovax.  It is optimal for the patient to get these vaccines mid cycle, but they can be given at any time as long as it is not on the day of the infusion. No  7. Have you recently been diagnosed with any new nervous system diseases (ie. Multiple sclerosis, Guillain [place], seizures, neurological changes) or cancer diagnosis? No  8. Are you on any form of radiation or chemotherapy? No  9. Are you pregnant or breast feeding or do you have plans of pregnancy in the future? No  10. Have you been having any signs of worsening depression or suicidal  ideations?  (benlysta only) No  11. Have there been any other new onset medical symptoms? No      Post Infusion Assessment:  Patient tolerated infusion without incident.  Site patent and intact, free from redness, edema or discomfort.  No evidence of extravasations.  Access discontinued per protocol.  Biologic Infusion Post Education: Call the triage nurse at your clinic or seek medical attention if you have chills and/or temperature greater than or equal to 100.5, uncontrolled nausea/vomiting, diarrhea, constipation, dizziness, shortness of breath, chest pain, heart palpitations, weakness or any other new or concerning symptoms, questions or concerns.  You cannot have any live virus vaccines prior to or during treatment or up to 6 months post infusion.  If you have an upcoming surgery, medical procedure or dental procedure during treatment, this should be discussed with your ordering physician and your surgeon/dentist.  If you are having any concerning symptom, if you are unsure if you should get your next infusion or wish to speak to a provider before your next infusion, please call your care coordinator or triage nurse at your clinic to notify them so we can adequately serve you.     Discharge Plan:   Future appts have been reviewed and crosschecked with appt note and plan.  AVS to patient via [employer].  Patient will return 4/7/2023 for next appointment.   Patient discharged in stable condition accompanied by: self.  Departure Mode: Ambulatory.      Audrey Carrasquillo RN

## 2023-03-01 NOTE — RESULT ENCOUNTER NOTE
Normal monitoring labs - normal liver, kidney tests and cell counts.     Sterling Gonzalez MD  3/1/2023  10:07 AM

## 2023-03-12 DIAGNOSIS — E03.8 OTHER SPECIFIED HYPOTHYROIDISM: ICD-10-CM

## 2023-03-13 RX ORDER — LEVOTHYROXINE SODIUM 112 UG/1
TABLET ORAL
Qty: 90 TABLET | Refills: 0 | Status: SHIPPED | OUTPATIENT
Start: 2023-03-13 | End: 2023-04-11 | Stop reason: DRUGHIGH

## 2023-03-13 NOTE — TELEPHONE ENCOUNTER
"Requested Prescriptions   Pending Prescriptions Disp Refills     levothyroxine (SYNTHROID/LEVOTHROID) 112 MCG tablet [Pharmacy Med Name: LEVOTHYROXINE 112 MCG TABLET] 90 tablet 3     Sig: TAKE 1 TABLET BY MOUTH DAILY       Thyroid Protocol Failed - 3/12/2023  8:20 AM        Failed - Recent (12 mo) or future (30 days) visit within the authorizing provider's specialty     Patient has had an office visit with the authorizing provider or a provider within the authorizing providers department within the previous 12 mos or has a future within next 30 days. See \"Patient Info\" tab in inbasket, or \"Choose Columns\" in Meds & Orders section of the refill encounter.              Failed - Normal TSH on file in past 12 months     Recent Labs   Lab Test 03/04/22  1537   TSH 0.52              Passed - Patient is 12 years or older        Passed - Medication is active on med list        Passed - No active pregnancy on record     If patient is pregnant or has had a positive pregnancy test, please check TSH.          Passed - No positive pregnancy test in past 12 months     If patient is pregnant or has had a positive pregnancy test, please check TSH.               Pt last seen 3/4/2022 for annual    Last prescribed 3/7/2022 for 90 tablets with 3 refills    Pt has appt on 4/4/2023, RN routing to provider to advise on aga refill to get pt to appt.    Abi Joy RN on 3/13/2023 at 8:47 AM    "

## 2023-04-04 ENCOUNTER — OFFICE VISIT (OUTPATIENT)
Dept: OBGYN | Facility: CLINIC | Age: 53
End: 2023-04-04
Payer: COMMERCIAL

## 2023-04-04 VITALS
HEIGHT: 63 IN | WEIGHT: 123.4 LBS | DIASTOLIC BLOOD PRESSURE: 74 MMHG | HEART RATE: 71 BPM | SYSTOLIC BLOOD PRESSURE: 124 MMHG | BODY MASS INDEX: 21.86 KG/M2 | OXYGEN SATURATION: 99 %

## 2023-04-04 DIAGNOSIS — Z13.6 CARDIOVASCULAR SCREENING; LDL GOAL LESS THAN 130: ICD-10-CM

## 2023-04-04 DIAGNOSIS — E03.8 OTHER SPECIFIED HYPOTHYROIDISM: ICD-10-CM

## 2023-04-04 DIAGNOSIS — Z12.31 VISIT FOR SCREENING MAMMOGRAM: ICD-10-CM

## 2023-04-04 DIAGNOSIS — Z13.1 SCREENING FOR DIABETES MELLITUS: ICD-10-CM

## 2023-04-04 DIAGNOSIS — Z01.419 ENCOUNTER FOR GYNECOLOGICAL EXAMINATION WITHOUT ABNORMAL FINDING: Primary | ICD-10-CM

## 2023-04-04 PROCEDURE — 99396 PREV VISIT EST AGE 40-64: CPT | Performed by: NURSE PRACTITIONER

## 2023-04-04 RX ORDER — LEVOTHYROXINE SODIUM 112 UG/1
112 TABLET ORAL DAILY
Qty: 90 TABLET | Refills: 3 | Status: CANCELLED | OUTPATIENT
Start: 2023-04-04

## 2023-04-04 ASSESSMENT — ENCOUNTER SYMPTOMS
HEMATURIA: 0
SHORTNESS OF BREATH: 0
COUGH: 0
JOINT SWELLING: 0
SORE THROAT: 0
HEMATOCHEZIA: 0
FREQUENCY: 0
DIARRHEA: 0
PALPITATIONS: 0
EYE PAIN: 0
ABDOMINAL PAIN: 0
HEARTBURN: 0
FEVER: 0
BREAST MASS: 0
NERVOUS/ANXIOUS: 0
ARTHRALGIAS: 0
PARESTHESIAS: 0
DYSURIA: 0
CHILLS: 0
NAUSEA: 0
MYALGIAS: 0
DIZZINESS: 0
CONSTIPATION: 0
HEADACHES: 0
WEAKNESS: 0

## 2023-04-04 NOTE — PATIENT INSTRUCTIONS
If you have any questions regarding your visit, Please contact your care team.     [facility]: [phone]  To Schedule an Appointment 24/7  Call: [phone] HOURS TELEPHONE NUMBER     Miranda Uriostegui- APRN CNP      Rashid Soares-Surgery Scheduler  Silvia-Surgery Scheduler         Monday 7:30 am-5:00 pm    Tuesday 8:00 am-4:00 pm    Wednesday 7:30 am-4:00 pm    Thursday 8:00 am-11:00 am    Friday 7:30 am-4:00 pm [address] [place] [address] [phone] ask for [facility]  [phone] Fax    Imaging Scheduling all locations  [phone]    [facility] Delivery  [address] [place] [address] [address].  [place], [place] [id number] [phone]         Urgent Care locations:  [facility]   Monday-Friday  10 am - 8 pm  Saturday and Sunday   9 am - 5 pm     [phone]   If you need a medication refill, please contact your pharmacy. Please allow 3 business days for your refill to be completed.  As always, Thank you for trusting us with your healthcare needs!      see additional instructions from your care team below

## 2023-04-04 NOTE — PROGRESS NOTES
SUBJECTIVE:   CC: Noni is an 52 year old who presents for preventive health visit.     Healthy Habits:     Getting at least 3 servings of Calcium per day:  Yes    Bi-annual eye exam:  Yes    Dental care twice a year:  Yes    Sleep apnea or symptoms of sleep apnea:  None    Diet:  Regular (no restrictions)    Frequency of exercise:  2-3 days/week    Duration of exercise:  Greater than 60 minutes    Taking medications regularly:  Yes    Medication side effects:  None    PHQ-2 Total Score: 0    Additional concerns today:  No    Will plan to return to clinic fasting for labs in the next week and will check thyroid at that time.   Pap smear due next year.    Today's PHQ-2 Score:       4/4/2023     3:41 PM   PHQ-2 ( 1999 [employer])   Q1: Little interest or pleasure in doing things 0   Q2: Feeling down, depressed or hopeless 0   PHQ-2 Score 0   Q1: Little interest or pleasure in doing things Not at all   Q2: Feeling down, depressed or hopeless Not at all   PHQ-2 Score 0           Social History     Tobacco Use     Smoking status: Never     Smokeless tobacco: Never   Vaping Use     Vaping status: Never Used   Substance Use Topics     Alcohol use: No             4/4/2023     3:40 PM   Alcohol Use   Prescreen: >3 drinks/day or >7 drinks/week? No     Reviewed orders with patient.  Reviewed health maintenance and updated orders accordingly - Yes  Patient Active Problem List   Diagnosis     Hypothyroidism     CARDIOVASCULAR SCREENING; LDL GOAL LESS THAN 160     Nephrolithiasis     High risk medications (not anticoagulants) long-term use     Injury of left hip     Intermittent asthma     Chronic polyarticular juvenile rheumatoid arthritis (H)     Cervical cancer screening     Past Surgical History:   Procedure Laterality Date     COLONOSCOPY WITH CO2 INSUFFLATION N/A 5/28/2021    Procedure: COLONOSCOPY, WITH CO2 INSUFFLATION;  Surgeon: German Oakes DO;  Location: [facility]     JOINT REPLACEMENT, HIP RT/LT      (L)     JOINT  Addendum- up to BR voids cl yellow X1 before discharge to home.   REPLACEMENT, HIP RT/LT  3/2013    (R)     SURGICAL HISTORY OF -       Lithotripsy     ZZC PELVIS/HIP JOINT SURGERY UNLISTED         Social History     Tobacco Use     Smoking status: Never     Smokeless tobacco: Never   Vaping Use     Vaping status: Never Used   Substance Use Topics     Alcohol use: No     Family History   Problem Relation Age of Onset     Breast Cancer Mother      C.A.D. Father            Breast Cancer Screening:  Any new diagnosis of family breast, ovarian, or bowel cancer? No     Mammogram Screening: Recommended annual mammography  Pertinent mammograms are reviewed under the imaging tab.    History of abnormal Pap smear: No-Cotesting Q 3 years due to high risk medication use      Latest Ref Rng & Units 2/26/2021     3:18 PM 2/26/2021     3:16 PM 1/29/2018     8:22 AM   PAP / HPV   PAP (Historical)   OTHER-NIL, See Result   OTHER-NIL, See Result     HPV 16 DNA NEG^Negative Negative    Negative     HPV 18 DNA NEG^Negative Negative    Negative     Other HR HPV NEG^Negative Negative    Negative       Reviewed and updated as needed this visit by clinical staff   Tobacco  Allergies  Meds   Med Hx  Surg Hx  Fam Hx  Soc Hx        Reviewed and updated as needed this visit by Provider                 Past Medical History:   Diagnosis Date     ASCUS of cervix with negative high risk HPV 10/27/2010     Contraception      Grave's disease      Hypothyroid      Inflammatory arthritis      Intermittent asthma 09/10/2013     Nephrolithiasis 06/16/2011     Osteopenia 02/14/2013     Rheumatoid arthritis(714.0)       Past Surgical History:   Procedure Laterality Date     COLONOSCOPY WITH CO2 INSUFFLATION N/A 5/28/2021    Procedure: COLONOSCOPY, WITH CO2 INSUFFLATION;  Surgeon: German Oakes DO;  Location: [facility]     JOINT REPLACEMENT, HIP RT/LT      (L)     JOINT REPLACEMENT, HIP RT/LT  3/2013    (R)     SURGICAL HISTORY OF -       Lithotripsy     ZZC PELVIS/HIP JOINT SURGERY UNLISTED         Review of  "Systems  CONSTITUTIONAL: NEGATIVE for fever, chills, change in weight  INTEGUMENTARU/SKIN: NEGATIVE for worrisome rashes, moles or lesions  EYES: NEGATIVE for vision changes or irritation  ENT: NEGATIVE for ear, mouth and throat problems  RESP: NEGATIVE for significant cough or SOB  BREAST: NEGATIVE for masses, tenderness or discharge  CV: NEGATIVE for chest pain, palpitations or peripheral edema  GI: NEGATIVE for nausea, abdominal pain, heartburn, or change in bowel habits  : NEGATIVE for unusual urinary or vaginal symptoms. Periods are irregular-had a cycle early February, previous was late December. They are light  MUSCULOSKELETAL: NEGATIVE for significant arthralgias or myalgia  NEURO: NEGATIVE for weakness, dizziness or paresthesias  PSYCHIATRIC: NEGATIVE for changes in mood or affect     OBJECTIVE:   /74 (BP Location: Right arm, Patient Position: Sitting, Cuff Size: Adult Regular)   Pulse 71   Ht 1.6 m (5' 3\")   Wt 56 kg (123 lb 6.4 oz)   LMP 02/02/2023 (Exact Date)   SpO2 99%   BMI 21.86 kg/m    Physical Exam  GENERAL: healthy, alert and no distress  NECK: no adenopathy, no asymmetry, masses, or scars and thyroid normal to palpation  RESP: lungs clear to auscultation - no rales, rhonchi or wheezes  BREAST: normal without masses, tenderness or nipple discharge and no palpable axillary masses or adenopathy  CV: regular rate and rhythm, normal S1 S2, no S3 or S4, no murmur, click or rub, no peripheral edema and peripheral pulses strong  ABDOMEN: soft, nontender, no hepatosplenomegaly, no masses and bowel sounds normal   (female): normal female external genitalia, normal urethral meatus, vaginal mucosa pink, moist, well rugated, and normal cervix/adnexa/uterus without masses or discharge  MS: no gross musculoskeletal defects noted, no edema  SKIN: no suspicious lesions or rashes  NEURO: Normal strength and tone, mentation intact and speech normal  PSYCH: mentation appears normal, affect " normal/bright    ASSESSMENT/PLAN:   (Z01.419) Encounter for gynecological examination without abnormal finding  (primary encounter diagnosis)  Comment: Health maintenance reviewed    (E03.8) Other specified hypothyroidism  Comment: Will check TSH with fasting labs, has enough medication at home to last until she can return for labs.  Plan: TSH with free T4 reflex          (Z12.31) Visit for screening mammogram  Plan: MA SCREENING DIGITAL BILAT - Future  (s+30)        (Z13.1) Screening for diabetes mellitus  Plan: Glucose      (Z13.6) CARDIOVASCULAR SCREENING; LDL GOAL LESS THAN 130  Plan: Lipid panel reflex to direct LDL Fasting        COUNSELING:  Reviewed preventive health counseling, as reflected in patient instructions  Special attention given to:        Regular exercise       Healthy diet/nutrition       Osteoporosis prevention/bone health       (Brenda)menopause management        She reports that she has never smoked. She has never used smokeless tobacco.      KEENAN Bah [facility]

## 2023-04-07 ENCOUNTER — INFUSION THERAPY VISIT (OUTPATIENT)
Dept: INFUSION THERAPY | Facility: CLINIC | Age: 53
End: 2023-04-07
Payer: COMMERCIAL

## 2023-04-07 VITALS
HEART RATE: 79 BPM | WEIGHT: 124.1 LBS | RESPIRATION RATE: 16 BRPM | DIASTOLIC BLOOD PRESSURE: 63 MMHG | BODY MASS INDEX: 21.98 KG/M2 | TEMPERATURE: 98.1 F | SYSTOLIC BLOOD PRESSURE: 114 MMHG | OXYGEN SATURATION: 98 %

## 2023-04-07 DIAGNOSIS — Z79.899 HIGH RISK MEDICATIONS (NOT ANTICOAGULANTS) LONG-TERM USE: ICD-10-CM

## 2023-04-07 DIAGNOSIS — M08.3 CHRONIC POLYARTICULAR JUVENILE RHEUMATOID ARTHRITIS (H): Primary | ICD-10-CM

## 2023-04-07 PROCEDURE — 96413 CHEMO IV INFUSION 1 HR: CPT | Performed by: INTERNAL MEDICINE

## 2023-04-07 PROCEDURE — 99207 PR NO CHARGE LOS: CPT

## 2023-04-07 RX ORDER — DIPHENHYDRAMINE HCL 25 MG
25 CAPSULE ORAL ONCE
Status: CANCELLED
Start: 2023-04-07 | End: 2023-04-07

## 2023-04-07 RX ORDER — ACETAMINOPHEN 325 MG/1
650 TABLET ORAL ONCE
Status: CANCELLED
Start: 2023-04-07 | End: 2023-04-07

## 2023-04-07 RX ADMIN — Medication 250 ML: at 11:20

## 2023-04-07 NOTE — PROGRESS NOTES
Infusion Nursing Note:  Noni Mccormick presents today for Remicade.    Patient seen by provider today: No   present during visit today: Not Applicable.    Note: No new medical concerns today. Noni takes home tylenol and benadryl medication prior to infusion.     Intravenous Access:  Peripheral IV placed.    Treatment Conditions:  Biological Infusion Checklist:  ~~~ NOTE: If the patient answers yes to any of the questions below, hold the infusion and contact ordering provider or on-call provider.    1. Have you recently had an elevated temperature, fever, chills, productive cough, coughing for 3 weeks or longer or hemoptysis, abnormal vital signs, night sweats,  chest pain or have you noticed a decrease in your appetite, unexplained weight loss or fatigue? No  2. Do you have any open wounds or new incisions? No  3. Do you have any recent or upcoming hospitalizations, surgeries or dental procedures? No  4. Do you currently have or recently have had any signs of illness or infection or are you on any antibiotics? No  5. Have you had any new, sudden or worsening abdominal pain? No  6. Have you or anyone in your household received a live vaccination in the past 4 weeks? Please note:  No live vaccines while on biologic/chemotherapy until 6 months after the last treatment.  Patient can receive the flu vaccine (shot only) and the pneumovax.  It is optimal for the patient to get these vaccines mid cycle, but they can be given at any time as long as it is not on the day of the infusion. No  7. Have you recently been diagnosed with any new nervous system diseases (ie. Multiple sclerosis, Guillain [place], seizures, neurological changes) or cancer diagnosis? No  8. Are you on any form of radiation or chemotherapy? No  9. Have there been any other new onset medical symptoms? No    Post Infusion Assessment:  Patient tolerated infusion without incident.  Site patent and intact, free from redness, edema or  discomfort.  No evidence of extravasations.  Access discontinued per protocol.  Biologic Infusion Post Education: Call the triage nurse at your clinic or seek medical attention if you have chills and/or temperature greater than or equal to 100.5, uncontrolled nausea/vomiting, diarrhea, constipation, dizziness, shortness of breath, chest pain, heart palpitations, weakness or any other new or concerning symptoms, questions or concerns.  You cannot have any live virus vaccines prior to or during treatment or up to 6 months post infusion.  If you have an upcoming surgery, medical procedure or dental procedure during treatment, this should be discussed with your ordering physician and your surgeon/dentist.  If you are having any concerning symptom, if you are unsure if you should get your next infusion or wish to speak to a provider before your next infusion, please call your care coordinator or triage nurse at your clinic to notify them so we can adequately serve you.     Discharge Plan:   Discharge instructions reviewed with: Patient.  Patient and/or family verbalized understanding of discharge instructions and all questions answered.  Patient discharged in stable condition accompanied by: self.  Departure Mode: Ambulatory.      Prisca Torres RN

## 2023-04-08 ENCOUNTER — LAB (OUTPATIENT)
Dept: LAB | Facility: CLINIC | Age: 53
End: 2023-04-08
Payer: COMMERCIAL

## 2023-04-08 DIAGNOSIS — E03.8 OTHER SPECIFIED HYPOTHYROIDISM: ICD-10-CM

## 2023-04-08 DIAGNOSIS — Z13.6 CARDIOVASCULAR SCREENING; LDL GOAL LESS THAN 130: ICD-10-CM

## 2023-04-08 DIAGNOSIS — Z13.1 SCREENING FOR DIABETES MELLITUS: ICD-10-CM

## 2023-04-08 PROCEDURE — 36415 COLL VENOUS BLD VENIPUNCTURE: CPT

## 2023-04-08 PROCEDURE — 80061 LIPID PANEL: CPT

## 2023-04-08 PROCEDURE — 84439 ASSAY OF FREE THYROXINE: CPT

## 2023-04-08 PROCEDURE — 84443 ASSAY THYROID STIM HORMONE: CPT

## 2023-04-08 PROCEDURE — 82947 ASSAY GLUCOSE BLOOD QUANT: CPT

## 2023-04-10 LAB
CHOLEST SERPL-MCNC: 173 MG/DL
FASTING STATUS PATIENT QL REPORTED: YES
FASTING STATUS PATIENT QL REPORTED: YES
GLUCOSE BLD-MCNC: 94 MG/DL (ref 70–99)
HDLC SERPL-MCNC: 89 MG/DL
LDLC SERPL CALC-MCNC: 74 MG/DL
NONHDLC SERPL-MCNC: 84 MG/DL
T4 FREE SERPL-MCNC: 1.46 NG/DL (ref 0.76–1.46)
TRIGL SERPL-MCNC: 50 MG/DL
TSH SERPL DL<=0.005 MIU/L-ACNC: 0.04 MU/L (ref 0.4–4)

## 2023-04-11 RX ORDER — LEVOTHYROXINE SODIUM 100 UG/1
100 TABLET ORAL DAILY
Qty: 30 TABLET | Refills: 2 | Status: SHIPPED | OUTPATIENT
Start: 2023-04-11 | End: 2023-06-05 | Stop reason: DRUGHIGH

## 2023-05-07 DIAGNOSIS — E03.8 OTHER SPECIFIED HYPOTHYROIDISM: ICD-10-CM

## 2023-05-08 RX ORDER — LEVOTHYROXINE SODIUM 100 UG/1
TABLET ORAL
OUTPATIENT
Start: 2023-05-08

## 2023-05-16 ENCOUNTER — TELEPHONE (OUTPATIENT)
Dept: RHEUMATOLOGY | Facility: CLINIC | Age: 53
End: 2023-05-16
Payer: COMMERCIAL

## 2023-05-16 NOTE — TELEPHONE ENCOUNTER
----- Message from Sterling Gonzalez MD sent at 5/16/2023  2:31 PM CDT -----  Regarding: FW: Need new orders  Can you please put Remicade infusion orders.  ----- Message -----  From: Sona Diaz  Sent: 5/15/2023  11:36 AM CDT  To: Sterling Gonzalez MD  Subject: Need new orders                                  This patient needs new orders for Infliximab (Remicade) entered into [facility] for a scheduled appointment on 5/19/23 at 1130.  Please enter prior to infusion appointment.  Thank you     Sona

## 2023-05-18 ENCOUNTER — TELEPHONE (OUTPATIENT)
Dept: RHEUMATOLOGY | Facility: CLINIC | Age: 53
End: 2023-05-18
Payer: COMMERCIAL

## 2023-05-18 ENCOUNTER — TELEPHONE (OUTPATIENT)
Dept: RHEUMATOLOGY | Facility: CLINIC | Age: 53
End: 2023-05-18

## 2023-05-18 NOTE — TELEPHONE ENCOUNTER
Spoke with patient and reviewed notes below. She verbalized understanding. She will wait to hear back from our office in regards to appeal status before rescheduling. She was appreciative of the notice and had no further questions at this time.     Batsheva Herman, BSN, RN, PHN  Medical Specialty Care Coordinator  [facility]

## 2023-05-18 NOTE — TELEPHONE ENCOUNTER
Orders were reactivated in the current Therapy Plan by Infusion Pharmacist.    Plan needs to be signed today by Dr. Carlos Schmitt RN

## 2023-05-18 NOTE — TELEPHONE ENCOUNTER
2nd attempt to contact patient, no answer. Will attempt again this afternoon.     Batsheva Herman, BSN, RN, PHN  Medical Specialty Care Coordinator  [facility]

## 2023-05-18 NOTE — TELEPHONE ENCOUNTER
----- Message from Toma Avila sent at 5/18/2023 10:16 AM CDT -----  Regarding: RE: Infliximab PA denial, need to postpone appt tomorrow 05.19.23  Hi Batsheva,    Yes, it looks like she had a new insurance change as of 04.01.2023. She now has [employer] [place] insurance.    Thank you,    Toma Avila     ----- Message -----  From: Batsheva Herman, RN  Sent: 5/18/2023   9:47 AM CDT  To: Toma Avila; Sterling Gonzalez MD; #  Subject: RE: Infliximab PA denial, need to postpone a#    Hi Toma,     Thanks for the update. Did she have an insurance change? She has been on this dose for a while now..    Thanks!  ----- Message -----  From: Toma Avila  Sent: 5/18/2023   8:17 AM CDT  To: Sterling Gonzalez MD; #  Subject: Infliximab PA denial, need to postpone appt #    Hi,    The prior authorization for the patients Infliximab has been denied and the denial letter has been scanned into the patients chart.     The denial stated that they could not approve the 5mg/kg dosing every 6 weeks as this is not fda approved dosing.    Unfortunately we are unable to guarantee coverage for tomorrows appointment for Infliximab while we have a denial on file.    Would you like to appeal the denial on why this patients needs to be on 5mg/kg every 6 weeks dosing? We would need a letter of medical necessity.    Thank you,    Toma Avila

## 2023-05-18 NOTE — TELEPHONE ENCOUNTER
[employer] message sent to patient.     Batsheva Herman, BSN, RN, PHN  Medical Specialty Care Coordinator  [facility]

## 2023-05-24 DIAGNOSIS — M06.09 RHEUMATOID ARTHRITIS OF MULTIPLE SITES WITHOUT RHEUMATOID FACTOR (H): ICD-10-CM

## 2023-05-26 RX ORDER — METHOTREXATE 2.5 MG/1
20 TABLET ORAL
Qty: 96 TABLET | Refills: 1 | Status: SHIPPED | OUTPATIENT
Start: 2023-05-26 | End: 2023-07-19

## 2023-05-26 NOTE — TELEPHONE ENCOUNTER
Medication/Dose: methotrexate sodium 2.5 MG TABS    Last Written : 3/1/23  Last Quantity: 96, # refills: 0  Last Office Visit :  6/22/22  Pending appointment: 7/19/23  Pending lab appointment: 6/1/23      WBC   Date Value Ref Range Status   07/09/2021 4.8 4.0 - 11.0 10e9/L Final     WBC Count   Date Value Ref Range Status   02/24/2023 6.1 4.0 - 11.0 10e3/uL Final     RBC Count   Date Value Ref Range Status   02/24/2023 4.31 3.80 - 5.20 10e6/uL Final   07/09/2021 4.49 3.8 - 5.2 10e12/L Final     Hemoglobin   Date Value Ref Range Status   02/24/2023 13.2 11.7 - 15.7 g/dL Final   07/09/2021 13.5 11.7 - 15.7 g/dL Final     Hematocrit   Date Value Ref Range Status   02/24/2023 39.4 35.0 - 47.0 % Final   07/09/2021 40.9 35.0 - 47.0 % Final     MCV   Date Value Ref Range Status   02/24/2023 91 78 - 100 fL Final   07/09/2021 91 78 - 100 fl Final     MCH   Date Value Ref Range Status   02/24/2023 30.6 26.5 - 33.0 pg Final   07/09/2021 30.1 26.5 - 33.0 pg Final     MCHC   Date Value Ref Range Status   02/24/2023 33.5 31.5 - 36.5 g/dL Final   07/09/2021 33.0 31.5 - 36.5 g/dL Final     RDW   Date Value Ref Range Status   02/24/2023 12.6 10.0 - 15.0 % Final   07/09/2021 13.0 10.0 - 15.0 % Final     Platelet Count   Date Value Ref Range Status   02/24/2023 368 150 - 450 10e3/uL Final   07/09/2021 456 (H) 150 - 450 10e9/L Final     AST   Date Value Ref Range Status   02/24/2023 14 0 - 45 U/L Final   07/09/2021 15 0 - 45 U/L Final     ALT   Date Value Ref Range Status   02/24/2023 35 0 - 50 U/L Final   07/09/2021 16 0 - 50 U/L Final     Creatinine   Date Value Ref Range Status   02/24/2023 0.63 0.52 - 1.04 mg/dL Final   07/09/2021 0.69 0.52 - 1.04 mg/dL Final     Albumin   Date Value Ref Range Status   02/24/2023 3.8 3.4 - 5.0 g/dL Final   07/09/2021 3.4 3.4 - 5.0 g/dL Final     C-Reactive Protein   Date Value Ref Range Status   11/04/2009 1.2 (A) 0 - 0.8 mg/dL      CRP Inflammation   Date Value Ref Range Status   02/24/2023 <2.9  0.0 - 8.0 mg/L Final   07/09/2021 7.5 0.0 - 8.0 mg/L Final     No components found for: ESR    Prescription set up and routed to provider per Refill Protocol    PATTIE Roberts, RN  [facility] Refill Team

## 2023-06-01 ENCOUNTER — LAB (OUTPATIENT)
Dept: LAB | Facility: CLINIC | Age: 53
End: 2023-06-01
Payer: COMMERCIAL

## 2023-06-01 ENCOUNTER — ANCILLARY PROCEDURE (OUTPATIENT)
Dept: MAMMOGRAPHY | Facility: CLINIC | Age: 53
End: 2023-06-01
Attending: NURSE PRACTITIONER
Payer: COMMERCIAL

## 2023-06-01 DIAGNOSIS — Z12.31 VISIT FOR SCREENING MAMMOGRAM: ICD-10-CM

## 2023-06-01 DIAGNOSIS — E03.8 OTHER SPECIFIED HYPOTHYROIDISM: ICD-10-CM

## 2023-06-01 DIAGNOSIS — M06.09 RHEUMATOID ARTHRITIS OF MULTIPLE SITES WITHOUT RHEUMATOID FACTOR (H): ICD-10-CM

## 2023-06-01 LAB
ALBUMIN SERPL BCG-MCNC: 4.1 G/DL (ref 3.5–5.2)
ALT SERPL W P-5'-P-CCNC: 14 U/L (ref 10–35)
AST SERPL W P-5'-P-CCNC: 26 U/L (ref 10–35)
CREAT SERPL-MCNC: 0.69 MG/DL (ref 0.51–0.95)
CRP SERPL-MCNC: <3 MG/L
ERYTHROCYTE [DISTWIDTH] IN BLOOD BY AUTOMATED COUNT: 12.9 % (ref 10–15)
ERYTHROCYTE [SEDIMENTATION RATE] IN BLOOD BY WESTERGREN METHOD: 43 MM/HR (ref 0–30)
GFR SERPL CREATININE-BSD FRML MDRD: >90 ML/MIN/1.73M2
HCT VFR BLD AUTO: 38.6 % (ref 35–47)
HGB BLD-MCNC: 13 G/DL (ref 11.7–15.7)
MCH RBC QN AUTO: 30.7 PG (ref 26.5–33)
MCHC RBC AUTO-ENTMCNC: 33.7 G/DL (ref 31.5–36.5)
MCV RBC AUTO: 91 FL (ref 78–100)
PLATELET # BLD AUTO: 353 10E3/UL (ref 150–450)
RBC # BLD AUTO: 4.23 10E6/UL (ref 3.8–5.2)
WBC # BLD AUTO: 6.6 10E3/UL (ref 4–11)

## 2023-06-01 PROCEDURE — 82565 ASSAY OF CREATININE: CPT

## 2023-06-01 PROCEDURE — 84443 ASSAY THYROID STIM HORMONE: CPT

## 2023-06-01 PROCEDURE — 86140 C-REACTIVE PROTEIN: CPT

## 2023-06-01 PROCEDURE — 84460 ALANINE AMINO (ALT) (SGPT): CPT

## 2023-06-01 PROCEDURE — 84439 ASSAY OF FREE THYROXINE: CPT

## 2023-06-01 PROCEDURE — 36415 COLL VENOUS BLD VENIPUNCTURE: CPT

## 2023-06-01 PROCEDURE — 85027 COMPLETE CBC AUTOMATED: CPT

## 2023-06-01 PROCEDURE — 85652 RBC SED RATE AUTOMATED: CPT

## 2023-06-01 PROCEDURE — 84450 TRANSFERASE (AST) (SGOT): CPT

## 2023-06-01 PROCEDURE — 82040 ASSAY OF SERUM ALBUMIN: CPT

## 2023-06-01 PROCEDURE — 77067 SCR MAMMO BI INCL CAD: CPT | Mod: TC | Performed by: RADIOLOGY

## 2023-06-02 ENCOUNTER — TELEPHONE (OUTPATIENT)
Dept: OBGYN | Facility: CLINIC | Age: 53
End: 2023-06-02
Payer: COMMERCIAL

## 2023-06-02 LAB
T4 FREE SERPL-MCNC: 1.55 NG/DL (ref 0.9–1.7)
TSH SERPL DL<=0.005 MIU/L-ACNC: 0.25 UIU/ML (ref 0.3–4.2)

## 2023-06-04 DIAGNOSIS — E03.8 OTHER SPECIFIED HYPOTHYROIDISM: ICD-10-CM

## 2023-06-05 ENCOUNTER — MYC MEDICAL ADVICE (OUTPATIENT)
Dept: OBGYN | Facility: CLINIC | Age: 53
End: 2023-06-05
Payer: COMMERCIAL

## 2023-06-05 DIAGNOSIS — E03.8 OTHER SPECIFIED HYPOTHYROIDISM: Primary | ICD-10-CM

## 2023-06-05 RX ORDER — LEVOTHYROXINE SODIUM 100 UG/1
TABLET ORAL
Qty: 30 TABLET | Refills: 2 | OUTPATIENT
Start: 2023-06-05

## 2023-06-05 RX ORDER — LEVOTHYROXINE SODIUM 88 UG/1
88 TABLET ORAL DAILY
Qty: 30 TABLET | Refills: 2 | Status: SHIPPED | OUTPATIENT
Start: 2023-06-05 | End: 2023-06-23

## 2023-06-05 NOTE — TELEPHONE ENCOUNTER
Miranda Uriostegui, APRN CNP, attempted to reach pt by phone on 6/2 to discuss her thyroid lab results.    Pt is writing in asking about her results.    Routing to Miranda to see if she wants to respond to pt via [employer].    Jennifer Marcus RN

## 2023-06-05 NOTE — TELEPHONE ENCOUNTER
Pt should have one more refill at the pharmacy since a 30 day prescription of Synthroid was sent to the pharmacy on 4/11/23 with 2 additional refills.    Jennifer Marcus RN

## 2023-06-12 DIAGNOSIS — J45.20 INTERMITTENT ASTHMA, UNCOMPLICATED: ICD-10-CM

## 2023-06-12 RX ORDER — FAMOTIDINE 20 MG/1
20 TABLET, FILM COATED ORAL 2 TIMES DAILY
Qty: 180 TABLET | Refills: 0 | Status: SHIPPED | OUTPATIENT
Start: 2023-06-12 | End: 2023-08-02

## 2023-06-23 ENCOUNTER — MYC MEDICAL ADVICE (OUTPATIENT)
Dept: OBGYN | Facility: CLINIC | Age: 53
End: 2023-06-23
Payer: COMMERCIAL

## 2023-06-23 DIAGNOSIS — G25.81 RESTLESS LEG SYNDROME: ICD-10-CM

## 2023-06-23 DIAGNOSIS — E03.8 OTHER SPECIFIED HYPOTHYROIDISM: ICD-10-CM

## 2023-06-23 RX ORDER — LEVOTHYROXINE SODIUM 100 UG/1
100 TABLET ORAL DAILY
Qty: 90 TABLET | Refills: 1 | Status: SHIPPED | OUTPATIENT
Start: 2023-06-23 | End: 2024-01-15

## 2023-06-23 NOTE — TELEPHONE ENCOUNTER
Pt last seen 4/4/2023 for annual, last TSH on 6/1/2023 for 0.25, pt currently taking 88 mg levothyroxine daily.    Pt concerned for weight gain, increased fatigue and tingling in hands and asking if related to dose change.    RN routing to provider to advise.    Abi Joy RN on 6/23/2023 at 7:57 AM

## 2023-06-24 RX ORDER — GABAPENTIN 300 MG/1
600 CAPSULE ORAL EVERY EVENING
Qty: 180 CAPSULE | Refills: 0 | Status: SHIPPED | OUTPATIENT
Start: 2023-06-24 | End: 2023-08-02

## 2023-06-29 ENCOUNTER — INFUSION THERAPY VISIT (OUTPATIENT)
Dept: INFUSION THERAPY | Facility: CLINIC | Age: 53
End: 2023-06-29
Payer: COMMERCIAL

## 2023-06-29 VITALS
RESPIRATION RATE: 14 BRPM | DIASTOLIC BLOOD PRESSURE: 77 MMHG | BODY MASS INDEX: 22.2 KG/M2 | TEMPERATURE: 97.9 F | WEIGHT: 125.3 LBS | OXYGEN SATURATION: 99 % | SYSTOLIC BLOOD PRESSURE: 130 MMHG | HEART RATE: 78 BPM

## 2023-06-29 DIAGNOSIS — M08.3 CHRONIC POLYARTICULAR JUVENILE RHEUMATOID ARTHRITIS (H): Primary | ICD-10-CM

## 2023-06-29 DIAGNOSIS — Z79.899 HIGH RISK MEDICATIONS (NOT ANTICOAGULANTS) LONG-TERM USE: ICD-10-CM

## 2023-06-29 PROCEDURE — 99207 PR NO CHARGE LOS: CPT

## 2023-06-29 PROCEDURE — 96413 CHEMO IV INFUSION 1 HR: CPT | Performed by: NURSE PRACTITIONER

## 2023-06-29 RX ORDER — DIPHENHYDRAMINE HCL 25 MG
25 CAPSULE ORAL ONCE
Status: CANCELLED
Start: 2023-08-09 | End: 2023-08-09

## 2023-06-29 RX ORDER — ACETAMINOPHEN 325 MG/1
650 TABLET ORAL ONCE
Status: CANCELLED
Start: 2023-08-09 | End: 2023-08-09

## 2023-06-29 RX ORDER — IBUPROFEN 200 MG
200 TABLET ORAL EVERY 4 HOURS PRN
COMMUNITY

## 2023-06-29 RX ADMIN — Medication 250 ML: at 08:26

## 2023-06-29 NOTE — PROGRESS NOTES
Infusion Nursing Note:  Noni Mccormick presents today for Remicade infusion.    Patient seen by provider today: No   present during visit today: Not Applicable.    Note: Patient is 6 weeks overdue for infusion due to change in insurance and delay in getting Remicade authorized.  States she has been in pain and taking Advil regularly to perform activities of daily living.      Intravenous Access:  Peripheral IV placed.    Treatment Conditions:  Biological Infusion Checklist:  ~~~ NOTE: If the patient answers yes to any of the questions below, hold the infusion and contact ordering provider or on-call provider.    1. Have you recently had an elevated temperature, fever, chills, productive cough, coughing for 3 weeks or longer or hemoptysis,  abnormal vital signs, night sweats,  chest pain or have you noticed a decrease in your appetite, unexplained weight loss or fatigue? No  2. Do you have any open wounds or new incisions? No  3. Do you have any upcoming hospitalizations or surgeries? Does not include esophagogastroduodenoscopy, colonoscopy, endoscopic retrograde cholangiopancreatography (ERCP), endoscopic ultrasound (EUS), dental procedures or joint aspiration/steroid injections No  4. Do you currently have any signs of illness or infection or are you on any antibiotics? No  5. Have you had any new, sudden or worsening abdominal pain? No  6. Have you or anyone in your household received a live vaccination in the past 4 weeks? Please note: No live vaccines while on biologic/chemotherapy until 6 months after the last treatment. Patient can receive the flu vaccine (shot only), pneumovax and the Covid vaccine. It is optimal for the patient to get these vaccines mid cycle, but they can be given at any time as long as it is not on the day of the infusion. No  7. Have you recently been diagnosed with any new nervous system diseases (ie. Multiple sclerosis, Guillain [place], seizures, neurological changes) or  cancer diagnosis? Are you on any form of radiation or chemotherapy? No  8. Are you pregnant or breast feeding or do you have plans of pregnancy in the future? No  9. Have there been any other new onset medical symptoms? No    Post Infusion Assessment:  Patient tolerated infusion without incident.  Blood return noted pre and post infusion.  Site patent and intact, free from redness, edema or discomfort.  No evidence of extravasations.  Access discontinued per protocol.       Discharge Plan:   AVS to patient via MYCHART.  Patient will return 8/10/23 for next appointment.   Patient discharged in stable condition accompanied by: self.  Departure Mode: Ambulatory.      Maria Del Carmen Estrada RN

## 2023-07-05 ENCOUNTER — OFFICE VISIT (OUTPATIENT)
Dept: DERMATOLOGY | Facility: CLINIC | Age: 53
End: 2023-07-05
Payer: COMMERCIAL

## 2023-07-05 DIAGNOSIS — D22.9 MULTIPLE BENIGN NEVI: Primary | ICD-10-CM

## 2023-07-05 DIAGNOSIS — Z85.828 HISTORY OF NONMELANOMA SKIN CANCER: ICD-10-CM

## 2023-07-05 DIAGNOSIS — D18.01 CHERRY ANGIOMA: ICD-10-CM

## 2023-07-05 DIAGNOSIS — L81.4 SOLAR LENTIGO: ICD-10-CM

## 2023-07-05 DIAGNOSIS — Z86.018 HISTORY OF DYSPLASTIC NEVUS: ICD-10-CM

## 2023-07-05 DIAGNOSIS — L82.1 SEBORRHEIC KERATOSIS: ICD-10-CM

## 2023-07-05 PROCEDURE — 99214 OFFICE O/P EST MOD 30 MIN: CPT | Performed by: PHYSICIAN ASSISTANT

## 2023-07-05 ASSESSMENT — PAIN SCALES - GENERAL: PAINLEVEL: NO PAIN (0)

## 2023-07-05 NOTE — NURSING NOTE
Noni Mccormick's goals for this visit include:   Chief Complaint   Patient presents with     Skin Check     FBSE. No area of concern. History of NMSC.       She requests these members of her care team be copied on today's visit information:       PCP: Sophia Lagos    Referring Provider:  No referring provider defined for this encounter.    There were no vitals taken for this visit.    Do you need any medication refills at today's visit? No    Tila Juarez CMA on 7/5/2023 at 4:36 PM

## 2023-07-05 NOTE — PATIENT INSTRUCTIONS
Patient Education     Checking for Skin Cancer  You can find cancer early by checking your skin each month. There are 3 kinds of skin cancer. They are melanoma, basal cell carcinoma, and squamous cell carcinoma. Doing monthly skin checks is the best way to find new marks or skin changes. Follow the instructions below for checking your skin.   The ABCDEs of checking moles for melanoma   Check your moles or growths for signs of melanoma using ABCDE:   Asymmetry: the sides of the mole or growth don t match  Border: the edges are ragged, notched, or blurred  Color: the color within the mole or growth varies  Diameter: the mole or growth is larger than 6 mm (size of a pencil eraser)  Evolving: the size, shape, or color of the mole or growth is changing (evolving is not shown in the images below)    Checking for other types of skin cancer  Basal cell carcinoma or squamous cell carcinoma have symptoms such as:     A spot or mole that looks different from all other marks on your skin  Changes in how an area feels, such as itching, tenderness, or pain  Changes in the skin's surface, such as oozing, bleeding, or scaliness  A sore that does not heal  New swelling or redness beyond the border of a mole    Who s at risk?  Anyone can get skin cancer. But you are at greater risk if you have:   Fair skin, light-colored hair, or light-colored eyes  Many moles or abnormal moles on your skin  A history of sunburns from sunlight or tanning beds  A family history of skin cancer  A history of exposure to radiation or chemicals  A weakened immune system  If you have had skin cancer in the past, you are at risk for recurring skin cancer.   How to check your skin  Do your monthly skin checkups in front of a full-length mirror. Check all parts of your body, including your:   Head (ears, face, neck, and scalp)  Torso (front, back, and sides)  Arms (tops, undersides, upper, and lower armpits)  Hands (palms, backs, and fingers, including  under the nails)  Buttocks and genitals  Legs (front, back, and sides)  Feet (tops, soles, toes, including under the nails, and between toes)  If you have a lot of moles, take digital photos of them each month. Make sure to take photos both up close and from a distance. These can help you see if any moles change over time.   Most skin changes are not cancer. But if you see any changes in your skin, call your doctor right away. Only he or she can diagnose a problem. If you have skin cancer, seeing your doctor can be the first step toward getting the treatment that could save your life.   [employer] last reviewed this educational content on 4/1/2019 2000-2020 [employer] [address] [place] [address], [place] [address]. All rights reserved. This information is not intended as a substitute for professional medical care. Always follow your healthcare professional's instructions.       When should I call my doctor?  If you are worsening or not improving, please, contact us or seek urgent care as noted below.     Who should I call with questions (adults)?  [facility] (adult and pediatric): [phone]  [facility] (adult): [phone]  For urgent needs outside of business hours call the [facility] at [phone] and ask for the dermatology resident on call to be paged  If this is a medical emergency and you are unable to reach an ER, Call [phone]    Who should I call with questions (pediatric)?  [facility]- Pediatric Dermatology  Dr. Rach Cintron, Dr. Jamin Dewitt, Dr. Jolene Mattson, MIGUEL Sanchez, Dr. Daxa Aldana, Dr. Mary Walker & Dr. Familia Charles  Non-urgent nurse triage line; [phone]- Martha and Linda MONZON Care Coordinatorsergei Cutler (/Complex ) [phone]    If you need a prescription refill, please contact your pharmacy. Refills are approved or denied by our  Physicians during normal business hours, Monday through Fridays  Per office policy, refills will not be granted if you have not been seen within the past year (or sooner depending on your child's condition)    Scheduling Information:  Pediatric Appointment Scheduling and Call Center [phone]  Radiology Scheduling- [phone]  Sedation Unit Scheduling- [phone]  [place] Scheduling- General [phone]; Pediatric Dermatology [phone]  Main  Services: [phone]  Bengali: [phone]  Senegalese: [phone]  Hmong/Swedish/Romansh: [phone]  Preadmission Nursing Department Fax Number: [phone] (Fax all pre-operative paperwork to this number)    For urgent matters arising during evenings, weekends, or holidays that cannot wait for normal business hours please call [phone] and ask for the dermatology resident on call to be paged.

## 2023-07-05 NOTE — PROGRESS NOTES
[facility] Dermatology Note  Encounter Date: Jul 5, 2023  Office Visit     Dermatology Problem List:  Last skin check: 7/5/23  1. History of NMSC  - BCC, left anterior thigh, s/p WLE on 4/27/20  2. AK   - Left upper arm, s/p cryo  3. History of DN   - Right buttocks, s/p excision ~2005 (outside dermatologist)  4. SK, right mid cheek  - Discussed PDL and bleaching creams  5. Immunosuppressed -Hx RA - on Remicade and methotrexate  ____________________________________________     Assessment & Plan:     # History of nonmelanoma skin cancer, no clinical evidence of recurrence.  - ABCDEs: Counseled ABCDEs of melanoma: Asymmetry, Border (irregularity), Color (not uniform, changes in color), Diameter (greater than 6 mm which is about the size of a pencil eraser), and Evolving (any changes in preexisting moles).  - Sun protection: Counseled SPF30+ sunscreen, UPF clothing, sun avoidance, tanning bed avoidance.   - Recommended regular skin checks.    # Immunosuppressed with Remicade and methotrexate for RA. Discussed increased risk of skin cancers with immunosuppressing medications.   - ABCDEs: Counseled ABCDEs of melanoma: Asymmetry, Border (irregularity), Color (not uniform, changes in color), Diameter (greater than 6 mm which is about the size of a pencil eraser), and Evolving (any changes in preexisting moles).  - Sun protection: Counseled SPF30+ sunscreen, UPF clothing, sun avoidance, tanning bed avoidance.  - Continue annual skin exams     # History of dysplastic nevi, no clinical evidence of recurrence.  - ABCDEs: Counseled ABCDEs of melanoma: Asymmetry, Border (irregularity), Color (not uniform, changes in color), Diameter (greater than 6 mm which is about the size of a pencil eraser), and Evolving (any changes in preexisting moles).  - Sun protection: Counseled SPF30+ sunscreen, UPF clothing, sun avoidance, tanning bed avoidance.  - Recommended regular skin checks.     # Seborrheic keratosis, non  irritated on the trunk and extremities. Explained to patient benign nature of lesion. No treatment is necessary at this time unless the lesion changes or becomes symptomatic.     - Monitor for changes.    # Cherry Angiomas - trunk and extremities. Explained to patient benign nature of lesion. No treatment is necessary at this time unless the lesion changes or becomes symptomatic.      # Solar lentigines on the sun exposed skin areas. Benign nature was discussed. No further intervention required at this time.    - Sun precaution was advised including the use of sun screens of SPF 30 or higher, sun protective clothing, and avoidance of tanning beds.     -Referral to cosmetic derm for laser treatment, preferably in the fall or winter.      Procedures Performed:   None.     Follow-up: 1 year(s) in-person, or earlier for new or changing lesions    Staff and Scribe:     Scribe Disclosure:   I, Fely Maki, am serving as a scribe to document services personally performed by this physician, Jannet Segovia PA-C, based on data collection and the provider's statements to me.   Provider Disclosure:   The documentation recorded by the scribe accurately reflects the services I personally performed and the decisions made by me.    All risks, benefits and alternatives were discussed with patient.  Patient is in agreement and understands the assessment and plan.  All questions were answered.    Jannet Segovia PA-C, MPAS  [facility]: Phone: [phone], Fax: [phone]  [facility]: Phone: [phone],  Fax: [phone]  [facility]: Phone: [phone], Fax: [phone]  ____________________________________________    CC: Skin Check (FBSE. No area of concern. History of NMSC.)    HPI:  Ms. Noni Mccormick is a(n) 52 year old female who presents today as a return patient for a skin check. Pt reports she is  interested in some cosmetic procedures for spots on the face.     Last seen 2/2/22 for a skin check. At that time no concerning lesions were noted.     Patient is otherwise feeling well, without additional skin concerns.    Labs Reviewed:  N/A    Physical Exam:  Vitals: There were no vitals taken for this visit.  SKIN: Total skin excluding the undergarment areas was performed. The exam included the head/face, neck, both arms, chest, back, abdomen, both legs, digits and/or nails.   - Larry type II.  - Well healed scar at site of previous NMSC, no repigmentation or nodularity noted.  -Well healed scar at site of previous DN, no repigmentation or nodularity noted.  - There are dome shaped bright red papules on the trunk and extremities.   - Multiple regular brown pigmented macules and papules are identified on the trunk and extremities, >100.  - Scattered brown macules on sun exposed areas.  - There are waxy stuck on tan to brown papules on the trunk and extremities.    - No other lesions of concern on areas examined.     Medications:  Current Outpatient Medications   Medication     albuterol (PROAIR HFA/PROVENTIL HFA/VENTOLIN HFA) 108 (90 Base) MCG/ACT inhaler     albuterol (PROAIR RESPICLICK) 108 (90 Base) MCG/ACT inhaler     famotidine (PEPCID) 20 MG tablet     folic acid (FOLVITE) 1 MG tablet     gabapentin (NEURONTIN) 300 MG capsule     ibuprofen (ADVIL/MOTRIN) 200 MG tablet     inFLIXimab (REMICADE) 100 MG injection     levothyroxine (SYNTHROID/LEVOTHROID) 100 MCG tablet     losartan (COZAAR) 25 MG tablet     methotrexate sodium 2.5 MG TABS     No current facility-administered medications for this visit.      Past Medical History:   Patient Active Problem List   Diagnosis     Hypothyroidism     CARDIOVASCULAR SCREENING; LDL GOAL LESS THAN 160     Nephrolithiasis     High risk medications (not anticoagulants) long-term use     Injury of left hip     Intermittent asthma     Chronic polyarticular juvenile  rheumatoid arthritis (H)     Cervical cancer screening     Past Medical History:   Diagnosis Date     ASCUS of cervix with negative high risk HPV 10/27/2010     Contraception      Grave's disease      Hypothyroid      Inflammatory arthritis      Intermittent asthma 09/10/2013     Nephrolithiasis 06/16/2011     Osteopenia 02/14/2013     Rheumatoid arthritis(714.0)

## 2023-07-05 NOTE — LETTER
7/5/2023         RE: Noni Mccormick  [address] [facility] [address]        Dear Colleague,    Thank you for referring your patient, Noni Mccormick, to the [facility]. Please see a copy of my visit note below.    [facility] Dermatology Note  Encounter Date: Jul 5, 2023  Office Visit     Dermatology Problem List:  Last skin check: 7/5/23  1. History of NMSC  - BCC, left anterior thigh, s/p WLE on 4/27/20  2. AK   - Left upper arm, s/p cryo  3. History of DN   - Right buttocks, s/p excision ~2005 (outside dermatologist)  4. SK, right mid cheek  - Discussed PDL and bleaching creams  5. Immunosuppressed -Hx RA - on Remicade and methotrexate  ____________________________________________     Assessment & Plan:     # History of nonmelanoma skin cancer, no clinical evidence of recurrence.  - ABCDEs: Counseled ABCDEs of melanoma: Asymmetry, Border (irregularity), Color (not uniform, changes in color), Diameter (greater than 6 mm which is about the size of a pencil eraser), and Evolving (any changes in preexisting moles).  - Sun protection: Counseled SPF30+ sunscreen, UPF clothing, sun avoidance, tanning bed avoidance.   - Recommended regular skin checks.    # Immunosuppressed with Remicade and methotrexate for RA. Discussed increased risk of skin cancers with immunosuppressing medications.   - ABCDEs: Counseled ABCDEs of melanoma: Asymmetry, Border (irregularity), Color (not uniform, changes in color), Diameter (greater than 6 mm which is about the size of a pencil eraser), and Evolving (any changes in preexisting moles).  - Sun protection: Counseled SPF30+ sunscreen, UPF clothing, sun avoidance, tanning bed avoidance.  - Continue annual skin exams     # History of dysplastic nevi, no clinical evidence of recurrence.  - ABCDEs: Counseled ABCDEs of melanoma: Asymmetry, Border (irregularity), Color (not uniform, changes in color), Diameter (greater than 6 mm  which is about the size of a pencil eraser), and Evolving (any changes in preexisting moles).  - Sun protection: Counseled SPF30+ sunscreen, UPF clothing, sun avoidance, tanning bed avoidance.  - Recommended regular skin checks.     # Seborrheic keratosis, non irritated on the trunk and extremities. Explained to patient benign nature of lesion. No treatment is necessary at this time unless the lesion changes or becomes symptomatic.     - Monitor for changes.    # Cherry Angiomas - trunk and extremities. Explained to patient benign nature of lesion. No treatment is necessary at this time unless the lesion changes or becomes symptomatic.      # Solar lentigines on the sun exposed skin areas. Benign nature was discussed. No further intervention required at this time.    - Sun precaution was advised including the use of sun screens of SPF 30 or higher, sun protective clothing, and avoidance of tanning beds.     -Referral to cosmetic derm for laser treatment, preferably in the fall or winter.      Procedures Performed:   None.     Follow-up: 1 year(s) in-person, or earlier for new or changing lesions    Staff and Scribe:     Scribe Disclosure:   I, Fely Maki, am serving as a scribe to document services personally performed by this physician, Jannet Segovia PA-C, based on data collection and the provider's statements to me.   Provider Disclosure:   The documentation recorded by the scribe accurately reflects the services I personally performed and the decisions made by me.    All risks, benefits and alternatives were discussed with patient.  Patient is in agreement and understands the assessment and plan.  All questions were answered.    Jannet Segovia PA-C, [facility]  [facility]: Phone: [phone], Fax: [phone]  [facility]: Phone: [phone],  Fax: [phone]  [facility]: Phone: [phone], Fax:  [phone]  ____________________________________________    CC: Skin Check (FBSE. No area of concern. History of NMSC.)    HPI:  Ms. Noni Mccormick is a(n) 52 year old female who presents today as a return patient for a skin check. Pt reports she is interested in some cosmetic procedures for spots on the face.     Last seen 2/2/22 for a skin check. At that time no concerning lesions were noted.     Patient is otherwise feeling well, without additional skin concerns.    Labs Reviewed:  N/A    Physical Exam:  Vitals: There were no vitals taken for this visit.  SKIN: Total skin excluding the undergarment areas was performed. The exam included the head/face, neck, both arms, chest, back, abdomen, both legs, digits and/or nails.   - Larry type II.  - Well healed scar at site of previous NMSC, no repigmentation or nodularity noted.  -Well healed scar at site of previous DN, no repigmentation or nodularity noted.  - There are dome shaped bright red papules on the trunk and extremities.   - Multiple regular brown pigmented macules and papules are identified on the trunk and extremities, >100.  - Scattered brown macules on sun exposed areas.  - There are waxy stuck on tan to brown papules on the trunk and extremities.    - No other lesions of concern on areas examined.     Medications:  Current Outpatient Medications   Medication     albuterol (PROAIR HFA/PROVENTIL HFA/VENTOLIN HFA) 108 (90 Base) MCG/ACT inhaler     albuterol (PROAIR RESPICLICK) 108 (90 Base) MCG/ACT inhaler     famotidine (PEPCID) 20 MG tablet     folic acid (FOLVITE) 1 MG tablet     gabapentin (NEURONTIN) 300 MG capsule     ibuprofen (ADVIL/MOTRIN) 200 MG tablet     inFLIXimab (REMICADE) 100 MG injection     levothyroxine (SYNTHROID/LEVOTHROID) 100 MCG tablet     losartan (COZAAR) 25 MG tablet     methotrexate sodium 2.5 MG TABS     No current facility-administered medications for this visit.      Past Medical History:   Patient Active  Problem List   Diagnosis     Hypothyroidism     CARDIOVASCULAR SCREENING; LDL GOAL LESS THAN 160     Nephrolithiasis     High risk medications (not anticoagulants) long-term use     Injury of left hip     Intermittent asthma     Chronic polyarticular juvenile rheumatoid arthritis (H)     Cervical cancer screening     Past Medical History:   Diagnosis Date     ASCUS of cervix with negative high risk HPV 10/27/2010     Contraception      Grave's disease      Hypothyroid      Inflammatory arthritis      Intermittent asthma 09/10/2013     Nephrolithiasis 06/16/2011     Osteopenia 02/14/2013     Rheumatoid arthritis(714.0)        Again, thank you for allowing me to participate in the care of your patient.        Sincerely,        Jannet Segovia PA-C

## 2023-07-16 DIAGNOSIS — I10 HYPERTENSION GOAL BP (BLOOD PRESSURE) < 140/90: ICD-10-CM

## 2023-07-16 RX ORDER — LOSARTAN POTASSIUM 25 MG/1
TABLET ORAL
Qty: 90 TABLET | Refills: 0 | Status: SHIPPED | OUTPATIENT
Start: 2023-07-16 | End: 2023-08-02

## 2023-07-19 ENCOUNTER — OFFICE VISIT (OUTPATIENT)
Dept: RHEUMATOLOGY | Facility: CLINIC | Age: 53
End: 2023-07-19
Payer: COMMERCIAL

## 2023-07-19 ENCOUNTER — ANCILLARY PROCEDURE (OUTPATIENT)
Dept: GENERAL RADIOLOGY | Facility: CLINIC | Age: 53
End: 2023-07-19
Attending: ORTHOPAEDIC SURGERY
Payer: COMMERCIAL

## 2023-07-19 VITALS
OXYGEN SATURATION: 97 % | DIASTOLIC BLOOD PRESSURE: 79 MMHG | WEIGHT: 122 LBS | BODY MASS INDEX: 21.61 KG/M2 | SYSTOLIC BLOOD PRESSURE: 124 MMHG | HEART RATE: 100 BPM

## 2023-07-19 DIAGNOSIS — Z96.642 STATUS POST LEFT HIP REPLACEMENT: ICD-10-CM

## 2023-07-19 DIAGNOSIS — Z96.641 STATUS POST RIGHT HIP REPLACEMENT: ICD-10-CM

## 2023-07-19 DIAGNOSIS — M06.09 RHEUMATOID ARTHRITIS OF MULTIPLE SITES WITHOUT RHEUMATOID FACTOR (H): ICD-10-CM

## 2023-07-19 PROCEDURE — 99214 OFFICE O/P EST MOD 30 MIN: CPT | Performed by: STUDENT IN AN ORGANIZED HEALTH CARE EDUCATION/TRAINING PROGRAM

## 2023-07-19 PROCEDURE — 73522 X-RAY EXAM HIPS BI 3-4 VIEWS: CPT | Mod: TC | Performed by: RADIOLOGY

## 2023-07-19 RX ORDER — FOLIC ACID 1 MG/1
TABLET ORAL
Qty: 180 TABLET | Refills: 11 | Status: SHIPPED | OUTPATIENT
Start: 2023-07-19 | End: 2024-06-20

## 2023-07-19 RX ORDER — METHOTREXATE 2.5 MG/1
20 TABLET ORAL
Qty: 96 TABLET | Refills: 1 | Status: SHIPPED | OUTPATIENT
Start: 2023-07-19 | End: 2024-01-18

## 2023-07-19 ASSESSMENT — PAIN SCALES - GENERAL: PAINLEVEL: MILD PAIN (3)

## 2023-07-19 NOTE — PATIENT INSTRUCTIONS
You can get Prevnar 20 valent vaccine.     Continue remicade infusions every 6 weeks     Continue Methotrexate 8 tab once a week.     Follow up in a year

## 2023-07-19 NOTE — PROGRESS NOTES
Rheumatology clinic visit note.     Subjective : Noni is a 53-year-old female with past medical history of juvenile rheumatoid arthritis, hypothyroidism seen in clinic for management of JRA. She was diagnosed with JRA at 3 years of age.  She has been maintained on a stable dose of Remicade 300 mg every 5 weeks and methotrexate for more than 10 years.  She denies any side effects with the medication. Her last remicade infusion was last Friday.     Her ESR, CRP has been persistently high for the last 10 years.  Her last sed rate was higher than her previous values.  However she denies any significant worsening.  She does report having joint pain flares 3-4 times a week.  Mostly has swelling in her hands.     April 21, 2021 - First dose of 20 mg  Methotrexate caused some nausea but it is better now.  Overall she has noticed improvement after increasing methotrexate to 20 mg.  Last methotrexate monitoring labs done in 2/2021 showed slight improvement in sed rate and CRP.  She gets Remicade infusions every 5 weeks.  Denies any side effects with infusion.     December 15, 2021 - She had COVID infection around [facility] with only mild symptoms. Her last Remicade was on 10/22/21, she gets it every 5 weeks. Last infusion was held due to COVID-19 infection. She denies any RA flares. She was sore in summer and took Ibuprofen which helped. She is on Methotrexate 8 tab once a week. She gets skin check every 6 months.     June 22, 2022 - Her last infusion was last Friday. She is on remicade infusions every 6 weeks. She has not noticed any change after changing it to q 6 weeks. She is on Methotrexate 8 tab once a week. Denies any RA flares.     July 19, 2023 - Winter is better for her joints. She is on Remicade infusions every 6 weeks. She missed her Infusions for 12 weeks due to change in insurance. She is on Methotrexate 8 tab once a week.     Review of systems negative except for those mentioned above    Reviewed past  medical, surgical, family history    Pertinent labs:     Component      Latest Ref Rng 6/1/2023  2:23 PM   WBC      4.0 - 11.0 10e3/uL 6.6    RBC Count      3.80 - 5.20 10e6/uL 4.23    Hemoglobin      11.7 - 15.7 g/dL 13.0    Hematocrit      35.0 - 47.0 % 38.6    MCV      78 - 100 fL 91    MCH      26.5 - 33.0 pg 30.7    MCHC      31.5 - 36.5 g/dL 33.7    RDW      10.0 - 15.0 % 12.9    Platelet Count      150 - 450 10e3/uL 353    Creatinine      0.51 - 0.95 mg/dL 0.69    GFR Estimate      >60 mL/min/1.73m2 >90    AST      10 - 35 U/L 26    ALT      10 - 35 U/L 14    Albumin      3.5 - 5.2 g/dL 4.1    CRP Inflammation      <5.00 mg/L <3.00    Sed Rate      0 - 30 mm/hr 43 (H)       Physical exam: Bilateral fifth finger flexion contracture, swan-neck deformities over third fourth fingers. No active synovitis noted over MCP, PIP, DIP joints, Bilateral wrists, elbows. Denies any tenderness over the joints.  Hammertoes present both feet.     Assessment :     Juvenile rheumatoid arthritis  High risk medication use-methotrexate and Remicade    Juvenile rheumatoid arthritis: She has been maintained on Remicade infusions 300 mg every 5 weeks and methotrexate. Methotrexate dose was increased to 20 mg in 1/2021 due to joint pain flares and elevated inflammatory markers.  Her remicade infusions were changed to every 6 weeks and she did not notice any worsening.  If she will continue to be stable will decrease infusion to every 8 weeks.  Continue Methotrexate 8 tab once a week.     High risk medication use: Methotrexate labs every 10 weeks.  Last labs done on 6/23 showed normal CBC, liver, kidney function test.  Sed rate- 40.     Vaccinations: Vaccinations reviewed with Ms. Mccormick. Risks and benefits of vaccinations were discussed.  - Influenza: encouraged yearly vaccination  - [id number]: prescription sent   - [id number]: to receive 8 weeks after [id number] is administered  - Zostavax: one dose done in 8/2021, need second dose.    - COVID ( Moderna ) - 1/28/21, 2/25/21, 1/15/22    Plan    Continue methotrexate 8 tablets once a week    Continue Remicade infusions every 6 weeks    Methotrexate monitoring labs every 8 to 12 weeks    Follow-up in a year       Sterling Gonzalez MD  [facility]  Department of Rheumatology & Autoimmune Disorders  [facility]: [phone]  Pager - [phone]

## 2023-08-02 ENCOUNTER — OFFICE VISIT (OUTPATIENT)
Dept: FAMILY MEDICINE | Facility: CLINIC | Age: 53
End: 2023-08-02
Payer: COMMERCIAL

## 2023-08-02 VITALS
HEIGHT: 63 IN | RESPIRATION RATE: 18 BRPM | HEART RATE: 67 BPM | SYSTOLIC BLOOD PRESSURE: 114 MMHG | TEMPERATURE: 98.6 F | OXYGEN SATURATION: 97 % | DIASTOLIC BLOOD PRESSURE: 75 MMHG | WEIGHT: 122 LBS | BODY MASS INDEX: 21.62 KG/M2

## 2023-08-02 DIAGNOSIS — G25.81 RESTLESS LEG SYNDROME: ICD-10-CM

## 2023-08-02 DIAGNOSIS — M85.80 OSTEOPENIA, UNSPECIFIED LOCATION: ICD-10-CM

## 2023-08-02 DIAGNOSIS — J45.20 MILD INTERMITTENT ASTHMA WITHOUT COMPLICATION: ICD-10-CM

## 2023-08-02 DIAGNOSIS — I10 HYPERTENSION GOAL BP (BLOOD PRESSURE) < 140/90: ICD-10-CM

## 2023-08-02 DIAGNOSIS — E03.8 OTHER SPECIFIED HYPOTHYROIDISM: ICD-10-CM

## 2023-08-02 DIAGNOSIS — Z00.00 ROUTINE HISTORY AND PHYSICAL EXAMINATION OF ADULT: Primary | ICD-10-CM

## 2023-08-02 DIAGNOSIS — M06.09 RHEUMATOID ARTHRITIS OF MULTIPLE SITES WITH NEGATIVE RHEUMATOID FACTOR (H): ICD-10-CM

## 2023-08-02 LAB
ANION GAP SERPL CALCULATED.3IONS-SCNC: 12 MMOL/L (ref 7–15)
BUN SERPL-MCNC: 9.8 MG/DL (ref 6–20)
CALCIUM SERPL-MCNC: 9.1 MG/DL (ref 8.6–10)
CHLORIDE SERPL-SCNC: 104 MMOL/L (ref 98–107)
CHOLEST SERPL-MCNC: 178 MG/DL
CREAT SERPL-MCNC: 0.69 MG/DL (ref 0.51–0.95)
DEPRECATED HCO3 PLAS-SCNC: 24 MMOL/L (ref 22–29)
FERRITIN SERPL-MCNC: 44 NG/ML (ref 11–328)
GFR SERPL CREATININE-BSD FRML MDRD: >90 ML/MIN/1.73M2
GLUCOSE SERPL-MCNC: 96 MG/DL (ref 70–99)
HDLC SERPL-MCNC: 91 MG/DL
IRON BINDING CAPACITY (ROCHE): 299 UG/DL (ref 240–430)
IRON SATN MFR SERPL: 13 % (ref 15–46)
IRON SERPL-MCNC: 39 UG/DL (ref 37–145)
LDLC SERPL CALC-MCNC: 75 MG/DL
NONHDLC SERPL-MCNC: 87 MG/DL
POTASSIUM SERPL-SCNC: 3.6 MMOL/L (ref 3.4–5.3)
SODIUM SERPL-SCNC: 140 MMOL/L (ref 136–145)
T4 FREE SERPL-MCNC: 1.71 NG/DL (ref 0.9–1.7)
TRIGL SERPL-MCNC: 58 MG/DL
TSH SERPL DL<=0.005 MIU/L-ACNC: 0.29 UIU/ML (ref 0.3–4.2)

## 2023-08-02 PROCEDURE — 90677 PCV20 VACCINE IM: CPT | Performed by: FAMILY MEDICINE

## 2023-08-02 PROCEDURE — 80048 BASIC METABOLIC PNL TOTAL CA: CPT | Performed by: FAMILY MEDICINE

## 2023-08-02 PROCEDURE — 83550 IRON BINDING TEST: CPT | Performed by: FAMILY MEDICINE

## 2023-08-02 PROCEDURE — 80061 LIPID PANEL: CPT | Performed by: FAMILY MEDICINE

## 2023-08-02 PROCEDURE — 83540 ASSAY OF IRON: CPT | Performed by: FAMILY MEDICINE

## 2023-08-02 PROCEDURE — 82728 ASSAY OF FERRITIN: CPT | Performed by: FAMILY MEDICINE

## 2023-08-02 PROCEDURE — 99396 PREV VISIT EST AGE 40-64: CPT | Mod: 25 | Performed by: FAMILY MEDICINE

## 2023-08-02 PROCEDURE — 99214 OFFICE O/P EST MOD 30 MIN: CPT | Mod: 25 | Performed by: FAMILY MEDICINE

## 2023-08-02 PROCEDURE — 36415 COLL VENOUS BLD VENIPUNCTURE: CPT | Performed by: FAMILY MEDICINE

## 2023-08-02 PROCEDURE — 84443 ASSAY THYROID STIM HORMONE: CPT | Performed by: FAMILY MEDICINE

## 2023-08-02 PROCEDURE — 84439 ASSAY OF FREE THYROXINE: CPT | Performed by: FAMILY MEDICINE

## 2023-08-02 PROCEDURE — 90471 IMMUNIZATION ADMIN: CPT | Performed by: FAMILY MEDICINE

## 2023-08-02 RX ORDER — GABAPENTIN 300 MG/1
900 CAPSULE ORAL EVERY EVENING
Qty: 270 CAPSULE | Refills: 1 | Status: SHIPPED | OUTPATIENT
Start: 2023-08-02 | End: 2024-01-19

## 2023-08-02 RX ORDER — LEVOTHYROXINE SODIUM 100 UG/1
100 TABLET ORAL DAILY
Qty: 90 TABLET | Refills: 1 | Status: CANCELLED | OUTPATIENT
Start: 2023-08-02

## 2023-08-02 RX ORDER — ALBUTEROL SULFATE 90 UG/1
2 AEROSOL, METERED RESPIRATORY (INHALATION) EVERY 6 HOURS
Qty: 18 G | Refills: 11 | Status: SHIPPED | OUTPATIENT
Start: 2023-08-02 | End: 2024-01-19

## 2023-08-02 RX ORDER — LOSARTAN POTASSIUM 25 MG/1
25 TABLET ORAL DAILY
Qty: 90 TABLET | Refills: 3 | Status: SHIPPED | OUTPATIENT
Start: 2023-08-02 | End: 2024-09-01

## 2023-08-02 RX ORDER — FAMOTIDINE 20 MG/1
20 TABLET, FILM COATED ORAL 2 TIMES DAILY
Qty: 180 TABLET | Refills: 3 | Status: SHIPPED | OUTPATIENT
Start: 2023-08-02 | End: 2024-09-01

## 2023-08-02 ASSESSMENT — ENCOUNTER SYMPTOMS
HEMATURIA: 0
BREAST MASS: 0
PARESTHESIAS: 0
DIARRHEA: 0
DIZZINESS: 0
ARTHRALGIAS: 0
FEVER: 0
NERVOUS/ANXIOUS: 0
HEMATOCHEZIA: 0
NAUSEA: 0
ABDOMINAL PAIN: 0
EYE PAIN: 0
WEAKNESS: 0
SORE THROAT: 0
SHORTNESS OF BREATH: 0
PALPITATIONS: 0
FREQUENCY: 0
HEADACHES: 0
MYALGIAS: 0
JOINT SWELLING: 0
COUGH: 0
CONSTIPATION: 0
CHILLS: 0
HEARTBURN: 0
DYSURIA: 0

## 2023-08-02 ASSESSMENT — ASTHMA QUESTIONNAIRES: ACT_TOTALSCORE: 23

## 2023-08-02 ASSESSMENT — PAIN SCALES - GENERAL: PAINLEVEL: NO PAIN (0)

## 2023-08-02 NOTE — LETTER
My Asthma Action Plan    Name: Noni Mccormick   YOB: 1970  Date: 8/2/2023   My doctor: Sophia Lagos MD   My clinic: [facility]        My Rescue Medicine:   Albuterol inhaler (Proair/Ventolin/Proventil HFA)  2-4 puffs EVERY 4 HOURS as needed. Use a spacer if recommended by your provider.   My Asthma Severity:   Intermittent / Exercise Induced  Know your asthma triggers: Patient is unaware of triggers  None          GREEN ZONE   Good Control  I feel good  No cough or wheeze  Can work, sleep and play without asthma symptoms       Take your asthma control medicine every day.     If exercise triggers your asthma, take your rescue medication  15 minutes before exercise or sports, and  During exercise if you have asthma symptoms  Spacer to use with inhaler: If you have a spacer, make sure to use it with your inhaler             YELLOW ZONE Getting Worse  I have ANY of these:  I do not feel good  Cough or wheeze  Chest feels tight  Wake up at night   Keep taking your Green Zone medications  Start taking your rescue medicine:  every 20 minutes for up to 1 hour. Then every 4 hours for 24-48 hours.  If you stay in the Yellow Zone for more than 12-24 hours, contact your doctor.  If you do not return to the Green Zone in 12-24 hours or you get worse, start taking your oral steroid medicine if prescribed by your provider.           RED ZONE Medical Alert - Get Help  I have ANY of these:  I feel awful  Medicine is not helping  Breathing getting harder  Trouble walking or talking  Nose opens wide to breathe       Take your rescue medicine NOW  If your provider has prescribed an oral steroid medicine, start taking it NOW  Call your doctor NOW  If you are still in the Red Zone after 20 minutes and you have not reached your doctor:  Take your rescue medicine again and  Call 911 or go to the emergency room right away    See your regular doctor within 2 weeks of an Emergency Room or  Urgent Care visit for follow-up treatment.          Annual Reminders:  Meet with Asthma Educator,  Flu Shot in the Fall, consider Pneumonia Vaccination for patients with asthma (aged 19 and older).    Pharmacy:    [facility] [id number] [place] [address] INFUSION    Electronically signed by Sophia Lagos MD   Date: 08/02/23                    Asthma Triggers  How To Control Things That Make Your Asthma Worse    Triggers are things that make your asthma worse.  Look at the list below to help you find your triggers and   what you can do about them. You can help prevent asthma flare-ups by staying away from your triggers.      Trigger                                                          What you can do   Cigarette Smoke  Tobacco smoke can make asthma worse. Do not allow smoking in your home, car or around you.  Be sure no one smokes at a child s day care or school.  If you smoke, ask your health care provider for ways to help you quit.  Ask family members to quit too.  Ask your health care provider for a referral to Quit Plan to help you quit smoking, or call 8-838-807-PLAN.     Colds, Flu, Bronchitis  These are common triggers of asthma. Wash your hands often.  Don t touch your eyes, nose or mouth.  Get a flu shot every year.     Dust Mites  These are tiny bugs that live in cloth or carpet. They are too small to see. Wash sheets and blankets in hot water every week.   Encase pillows and mattress in dust mite proof covers.  Avoid having carpet if you can. If you have carpet, vacuum weekly.   Use a dust mask and HEPA vacuum.   Pollen and Outdoor Mold  Some people are allergic to trees, grass, or weed pollen, or molds. Try to keep your windows closed.  Limit time out doors when pollen count is high.   Ask you health care provider about taking medicine during allergy season.     Animal Dander  Some people are allergic to skin flakes, urine or saliva from pets with fur or  feathers. Keep pets with fur or feathers out of your home.    If you can t keep the pet outdoors, then keep the pet out of your bedroom.  Keep the bedroom door closed.  Keep pets off cloth furniture and away from stuffed toys.     Mice, Rats, and Cockroaches  Some people are allergic to the waste from these pests.   Cover food and garbage.  Clean up spills and food crumbs.  Store grease in the refrigerator.   Keep food out of the bedroom.   Indoor Mold  This can be a trigger if your home has high moisture. Fix leaking faucets, pipes, or other sources of water.   Clean moldy surfaces.  Dehumidify basement if it is damp and smelly.   Smoke, Strong Odors, and Sprays  These can reduce air quality. Stay away from strong odors and sprays, such as perfume, powder, hair spray, paints, smoke incense, paint, cleaning products, candles and new carpet.   Exercise or Sports  Some people with asthma have this trigger. Be active!  Ask your doctor about taking medicine before sports or exercise to prevent symptoms.    Warm up for 5-10 minutes before and after sports or exercise.     Other Triggers of Asthma  Cold air:  Cover your nose and mouth with a scarf.  Sometimes laughing or crying can be a trigger.  Some medicines and food can trigger asthma.

## 2023-08-02 NOTE — NURSING NOTE
Prior to immunization administration, verified patients identity using patient s name and date of birth. Please see Immunization Activity for additional information.     Screening Questionnaire for Adult Immunization    Are you sick today?   No   Do you have allergies to medications, food, a vaccine component or latex?   No   Have you ever had a serious reaction after receiving a vaccination?   No   Do you have a long-term health problem with heart, lung, kidney, or metabolic disease (e.g., diabetes), asthma, a blood disorder, no spleen, complement component deficiency, a cochlear implant, or a spinal fluid leak?  Are you on long-term aspirin therapy?   No   Do you have cancer, leukemia, HIV/AIDS, or any other immune system problem?   No   Do you have a parent, brother, or sister with an immune system problem?   No   In the past 3 months, have you taken medications that affect  your immune system, such as prednisone, other steroids, or anticancer drugs; drugs for the treatment of rheumatoid arthritis, Crohn s disease, or psoriasis; or have you had radiation treatments?   No   Have you had a seizure, or a brain or other nervous system problem?   No   During the past year, have you received a transfusion of blood or blood    products, or been given immune (gamma) globulin or antiviral drug?   No   For women: Are you pregnant or is there a chance you could become       pregnant during the next month?   No   Have you received any vaccinations in the past 4 weeks?   No     Immunization questionnaire answers were all negative.      Patient instructed to remain in clinic for 15 minutes afterwards, and to report any adverse reactions.     Screening performed by Laurie Dalton MA on 8/2/2023 at 4:13 PM.

## 2023-08-02 NOTE — PROGRESS NOTES
SUBJECTIVE:   CC: Noni is an 52 year old who presents for preventive health visit.       8/2/2023     3:48 PM   Additional Questions   Roomed by santino       Healthy Habits:     Getting at least 3 servings of Calcium per day:  Yes    Bi-annual eye exam:  Yes    Dental care twice a year:  Yes    Sleep apnea or symptoms of sleep apnea:  None    Diet:  Regular (no restrictions)    Frequency of exercise:  2-3 days/week    Duration of exercise:  45-60 minutes    Taking medications regularly:  Yes    Medication side effects:  None    Additional concerns today:  No                  Have you ever done Advance Care Planning? (For example, a Health Directive, POLST, or a discussion with a medical provider or your loved ones about your wishes): No, advance care planning information given to patient to review.  Patient declined advance care planning discussion at this time.    Social History     Tobacco Use    Smoking status: Never    Smokeless tobacco: Never   Substance Use Topics    Alcohol use: No             8/2/2023     3:33 PM   Alcohol Use   Prescreen: >3 drinks/day or >7 drinks/week? No          No data to display              Reviewed orders with patient.  Reviewed health maintenance and updated orders accordingly - Yes  Lab work is in process    Breast Cancer Screening:    [facility]HS-7:       3/4/2022     3:06 PM 6/1/2023     2:48 PM 8/2/2023     3:35 PM   Breast CA Risk Assessment ([facility]HS-7)   Did any of your first-degree relatives have breast or ovarian cancer? Yes Yes Yes   Did any of your relatives have bilateral breast cancer? No No No   Did any man in your family have breast cancer? No No No   Did any woman in your family have breast and ovarian cancer? No No No   Did any woman in your family have breast cancer before age 50 y? No No No   Do you have 2 or more relatives with breast and/or ovarian cancer? No No No   Do you have 2 or more relatives with breast and/or bowel cancer? No No No       Mammogram Screening:  Recommended annual mammography  Pertinent mammograms are reviewed under the imaging tab.    History of abnormal Pap smear: NO - age 30-65 PAP every 5 years with negative HPV co-testing recommended      Latest Ref Rng & Units 2/26/2021     3:18 PM 2/26/2021     3:16 PM 1/29/2018     8:22 AM   PAP / HPV   PAP (Historical)   OTHER-NIL, See Result  OTHER-NIL, See Result    HPV 16 DNA NEG^Negative Negative   Negative    HPV 18 DNA NEG^Negative Negative   Negative    Other HR HPV NEG^Negative Negative   Negative      Reviewed and updated as needed this visit by clinical staff   Tobacco  Allergies  Meds              Reviewed and updated as needed this visit by Provider                     Review of Systems   Constitutional:  Negative for chills and fever.   HENT:  Negative for congestion, ear pain, hearing loss and sore throat.    Eyes:  Negative for pain and visual disturbance.   Respiratory:  Negative for cough and shortness of breath.    Cardiovascular:  Negative for chest pain, palpitations and peripheral edema.   Gastrointestinal:  Negative for abdominal pain, constipation, diarrhea, heartburn, hematochezia and nausea.   Breasts:  Negative for tenderness, breast mass and discharge.   Genitourinary:  Negative for dysuria, frequency, genital sores, hematuria, pelvic pain, urgency, vaginal bleeding and vaginal discharge.   Musculoskeletal:  Negative for arthralgias, joint swelling and myalgias.   Skin:  Negative for rash.   Neurological:  Negative for dizziness, weakness, headaches and paresthesias.   Psychiatric/Behavioral:  Negative for mood changes. The patient is not nervous/anxious.      CONSTITUTIONAL: NEGATIVE for fever, chills, change in weight  INTEGUMENTARY/SKIN: NEGATIVE for worrisome rashes, moles or lesions  EYES: NEGATIVE for vision changes or irritation  ENT: NEGATIVE for ear, mouth and throat problems  RESP: NEGATIVE for significant cough or SOB  BREAST: NEGATIVE for masses, tenderness or  "discharge  CV: NEGATIVE for chest pain, palpitations or peripheral edema  GI: NEGATIVE for nausea, abdominal pain, heartburn, or change in bowel habits  : NEGATIVE for unusual urinary or vaginal symptoms. No vaginal bleeding.  MUSCULOSKELETAL: NEGATIVE for significant arthralgias or myalgia  NEURO: NEGATIVE for weakness, dizziness or paresthesias  PSYCHIATRIC: NEGATIVE for changes in mood or affect      OBJECTIVE:   /75   Pulse 67   Temp 98.6  F (37  C) (Oral)   Resp 18   Ht 1.6 m (5' 3\")   Wt 55.3 kg (122 lb)   LMP 07/02/2023   SpO2 97%   BMI 21.61 kg/m    Physical Exam  GENERAL: healthy, alert and no distress  EYES: Eyes grossly normal to inspection, PERRL and conjunctivae and sclerae normal  HENT: ear canals and TM's normal, nose and mouth without ulcers or lesions  NECK: no adenopathy, no asymmetry, masses, or scars and thyroid normal to palpation  RESP: lungs clear to auscultation - no rales, rhonchi or wheezes  CV: regular rate and rhythm, normal S1 S2, no S3 or S4, no murmur, click or rub, no peripheral edema and peripheral pulses strong  ABDOMEN: soft, nontender, no hepatosplenomegaly, no masses and bowel sounds normal  MS: no gross musculoskeletal defects noted, no edema  SKIN: no suspicious lesions or rashes  NEURO: Normal strength and tone, mentation intact and speech normal  PSYCH: mentation appears normal, affect normal/bright    Diagnostic Test Results:  Labs reviewed in Epic    ASSESSMENT/PLAN:   (Z00.00) Routine history and physical examination of adult  (primary encounter diagnosis)  Comment:   Plan:     (J45.20) Mild intermittent asthma without complication  Comment: use as needed  Plan: albuterol (PROAIR HFA/PROVENTIL HFA/VENTOLIN         HFA) 108 (90 Base) MCG/ACT inhaler, famotidine         (PEPCID) 20 MG tablet            (G25.81) Restless leg syndrome  Comment: worsening, will check iron levels. Can increase gabapentin to 3 per night if needed  Plan: gabapentin (NEURONTIN) 300 " MG capsule, **Iron         and iron binding capacity FUTURE 2mo,         **Ferritin FUTURE 2mo            (E03.8) Other specified hypothyroidism  Comment: managed by gyn  Plan:     (I10) Hypertension goal BP (blood pressure) < 140/90  Comment: at goal on meds  Plan: losartan (COZAAR) 25 MG tablet, **Basic         metabolic panel FUTURE 2mo, Lipid panel reflex         to direct LDL Fasting            (M85.80) Osteopenia, unspecified location  Comment: due  Plan: DX Hip/Pelvis/Spine            (M06.09) Rheumatoid arthritis of multiple sites with negative rheumatoid factor (H)  Comment: per rheumatology  Plan:     Patient has been advised of split billing requirements and indicates understanding: Yes      COUNSELING:  Reviewed preventive health counseling, as reflected in patient instructions       Regular exercise       Healthy diet/nutrition       Vision screening       Colorectal Cancer Screening        She reports that she has never smoked. She has never used smokeless tobacco.          Sophia Lagos MD  [facility]

## 2023-08-03 ENCOUNTER — TELEPHONE (OUTPATIENT)
Dept: FAMILY MEDICINE | Facility: CLINIC | Age: 53
End: 2023-08-03
Payer: COMMERCIAL

## 2023-08-04 ENCOUNTER — MYC MEDICAL ADVICE (OUTPATIENT)
Dept: FAMILY MEDICINE | Facility: CLINIC | Age: 53
End: 2023-08-04
Payer: COMMERCIAL

## 2023-08-04 DIAGNOSIS — E03.9 HYPOTHYROIDISM, UNSPECIFIED TYPE: Primary | ICD-10-CM

## 2023-08-07 RX ORDER — LEVOTHYROXINE SODIUM 88 UG/1
88 TABLET ORAL DAILY
Qty: 90 TABLET | Refills: 0 | Status: SHIPPED | OUTPATIENT
Start: 2023-08-07 | End: 2023-11-03

## 2023-08-10 ENCOUNTER — LAB (OUTPATIENT)
Dept: LAB | Facility: CLINIC | Age: 53
End: 2023-08-10
Payer: COMMERCIAL

## 2023-08-10 ENCOUNTER — INFUSION THERAPY VISIT (OUTPATIENT)
Dept: INFUSION THERAPY | Facility: CLINIC | Age: 53
End: 2023-08-10
Payer: COMMERCIAL

## 2023-08-10 ENCOUNTER — TELEPHONE (OUTPATIENT)
Dept: RHEUMATOLOGY | Facility: CLINIC | Age: 53
End: 2023-08-10

## 2023-08-10 VITALS
DIASTOLIC BLOOD PRESSURE: 78 MMHG | BODY MASS INDEX: 21.2 KG/M2 | RESPIRATION RATE: 16 BRPM | WEIGHT: 119.7 LBS | TEMPERATURE: 98 F | OXYGEN SATURATION: 98 % | HEART RATE: 66 BPM | SYSTOLIC BLOOD PRESSURE: 138 MMHG

## 2023-08-10 DIAGNOSIS — M06.09 RHEUMATOID ARTHRITIS OF MULTIPLE SITES WITHOUT RHEUMATOID FACTOR (H): Primary | ICD-10-CM

## 2023-08-10 DIAGNOSIS — M06.09 RHEUMATOID ARTHRITIS OF MULTIPLE SITES WITHOUT RHEUMATOID FACTOR (H): ICD-10-CM

## 2023-08-10 DIAGNOSIS — M08.3 CHRONIC POLYARTICULAR JUVENILE RHEUMATOID ARTHRITIS (H): ICD-10-CM

## 2023-08-10 DIAGNOSIS — Z79.899 HIGH RISK MEDICATIONS (NOT ANTICOAGULANTS) LONG-TERM USE: Primary | ICD-10-CM

## 2023-08-10 LAB
ALBUMIN SERPL BCG-MCNC: 4.5 G/DL (ref 3.5–5.2)
ALT SERPL W P-5'-P-CCNC: 15 U/L (ref 0–50)
AST SERPL W P-5'-P-CCNC: 23 U/L (ref 0–45)
CREAT SERPL-MCNC: 0.72 MG/DL (ref 0.51–0.95)
CRP SERPL-MCNC: 3.36 MG/L
ERYTHROCYTE [DISTWIDTH] IN BLOOD BY AUTOMATED COUNT: 12.9 % (ref 10–15)
GFR SERPL CREATININE-BSD FRML MDRD: >90 ML/MIN/1.73M2
HCT VFR BLD AUTO: 41.9 % (ref 35–47)
HGB BLD-MCNC: 13.8 G/DL (ref 11.7–15.7)
HOLD SPECIMEN: NORMAL
MCH RBC QN AUTO: 30.6 PG (ref 26.5–33)
MCHC RBC AUTO-ENTMCNC: 32.9 G/DL (ref 31.5–36.5)
MCV RBC AUTO: 93 FL (ref 78–100)
PLATELET # BLD AUTO: 421 10E3/UL (ref 150–450)
RBC # BLD AUTO: 4.51 10E6/UL (ref 3.8–5.2)
WBC # BLD AUTO: 4.4 10E3/UL (ref 4–11)

## 2023-08-10 PROCEDURE — 82565 ASSAY OF CREATININE: CPT

## 2023-08-10 PROCEDURE — 82040 ASSAY OF SERUM ALBUMIN: CPT

## 2023-08-10 PROCEDURE — 85027 COMPLETE CBC AUTOMATED: CPT

## 2023-08-10 PROCEDURE — 36415 COLL VENOUS BLD VENIPUNCTURE: CPT

## 2023-08-10 PROCEDURE — 96413 CHEMO IV INFUSION 1 HR: CPT | Performed by: NURSE PRACTITIONER

## 2023-08-10 PROCEDURE — 84460 ALANINE AMINO (ALT) (SGPT): CPT

## 2023-08-10 PROCEDURE — 84450 TRANSFERASE (AST) (SGOT): CPT

## 2023-08-10 PROCEDURE — 86140 C-REACTIVE PROTEIN: CPT

## 2023-08-10 RX ORDER — ACETAMINOPHEN 325 MG/1
650 TABLET ORAL ONCE
Status: CANCELLED
Start: 2023-09-21 | End: 2023-09-21

## 2023-08-10 RX ORDER — DIPHENHYDRAMINE HCL 25 MG
25 CAPSULE ORAL ONCE
Status: CANCELLED
Start: 2023-09-21 | End: 2023-09-21

## 2023-08-10 RX ORDER — FERROUS SULFATE 325(65) MG
25 TABLET ORAL
COMMUNITY

## 2023-08-10 RX ADMIN — Medication 250 ML: at 11:44

## 2023-08-10 NOTE — PROGRESS NOTES
Infusion Nursing Note:  Noni Mccormick presents today for Rapid Remicade.    Patient seen by provider today: No   present during visit today: Not Applicable.    Note: Patient voices no new concerns today. Had a visit with her primary recently and just started over the counter iron supplement after seeing primary last week.     Took own supply for 650mg tylenol, one 25mg of benadryl one hour post infusion and another 25mg right before infusion.    Intravenous Access:  Peripheral IV placed.    Treatment Conditions:  Biological Infusion Checklist:  ~~~ NOTE: If the patient answers yes to any of the questions below, hold the infusion and contact ordering provider or on-call provider.    Have you recently had an elevated temperature, fever, chills, productive cough, coughing for 3 weeks or longer or hemoptysis,  abnormal vital signs, night sweats,  chest pain or have you noticed a decrease in your appetite, unexplained weight loss or fatigue? No  Do you have any open wounds or new incisions? No  Do you have any upcoming hospitalizations or surgeries? Does not include esophagogastroduodenoscopy, colonoscopy, endoscopic retrograde cholangiopancreatography (ERCP), endoscopic ultrasound (EUS), dental procedures or joint aspiration/steroid injections No  Do you currently have any signs of illness or infection or are you on any antibiotics? No  Have you had any new, sudden or worsening abdominal pain? No  Have you or anyone in your household received a live vaccination in the past 4 weeks? Please note: No live vaccines while on biologic/chemotherapy until 6 months after the last treatment. Patient can receive the flu vaccine (shot only), pneumovax and the Covid vaccine. It is optimal for the patient to get these vaccines mid cycle, but they can be given at any time as long as it is not on the day of the infusion. No  Have you recently been diagnosed with any new nervous system diseases (ie. Multiple sclerosis,  Guillain [place], seizures, neurological changes) or cancer diagnosis? Are you on any form of radiation or chemotherapy? No  Are you pregnant or breast feeding or do you have plans of pregnancy in the future? No  Have you been having any signs of worsening depression or suicidal ideations?  (benlysta only) No  Have there been any other new onset medical symptoms? No  Have you had any new blood clots? (IVIG only) No      Post Infusion Assessment:  Patient tolerated infusion without incident.  Site patent and intact, free from redness, edema or discomfort.  No evidence of extravasations.  Access discontinued per protocol.       Discharge Plan:   AVS to patient via MYCHART.  Patient will return 9/22 for next appointment.   Patient discharged in stable condition accompanied by: self.  Departure Mode: Ambulatory.      Lucille Connelly RN

## 2023-08-11 NOTE — RESULT ENCOUNTER NOTE
Normal monitoring labs - normal liver, kidney tests and cell counts.     Sterling Gonzalez MD  8/11/2023  9:35 AM

## 2023-08-29 ENCOUNTER — ANCILLARY PROCEDURE (OUTPATIENT)
Dept: BONE DENSITY | Facility: CLINIC | Age: 53
End: 2023-08-29
Attending: FAMILY MEDICINE
Payer: COMMERCIAL

## 2023-08-29 DIAGNOSIS — Z78.0 ASYMPTOMATIC POSTMENOPAUSAL STATUS: ICD-10-CM

## 2023-08-29 DIAGNOSIS — M85.80 OSTEOPENIA, UNSPECIFIED LOCATION: ICD-10-CM

## 2023-08-29 PROCEDURE — 77080 DXA BONE DENSITY AXIAL: CPT | Mod: 59 | Performed by: INTERNAL MEDICINE

## 2023-08-29 PROCEDURE — 77081 DXA BONE DENSITY APPENDICULR: CPT | Performed by: INTERNAL MEDICINE

## 2023-09-22 ENCOUNTER — INFUSION THERAPY VISIT (OUTPATIENT)
Dept: INFUSION THERAPY | Facility: CLINIC | Age: 53
End: 2023-09-22
Payer: COMMERCIAL

## 2023-09-22 VITALS
BODY MASS INDEX: 21.54 KG/M2 | DIASTOLIC BLOOD PRESSURE: 96 MMHG | OXYGEN SATURATION: 98 % | WEIGHT: 121.6 LBS | HEART RATE: 65 BPM | TEMPERATURE: 97.8 F | SYSTOLIC BLOOD PRESSURE: 150 MMHG | RESPIRATION RATE: 16 BRPM

## 2023-09-22 DIAGNOSIS — M08.3 CHRONIC POLYARTICULAR JUVENILE RHEUMATOID ARTHRITIS (H): ICD-10-CM

## 2023-09-22 DIAGNOSIS — Z79.899 HIGH RISK MEDICATIONS (NOT ANTICOAGULANTS) LONG-TERM USE: Primary | ICD-10-CM

## 2023-09-22 PROCEDURE — 96413 CHEMO IV INFUSION 1 HR: CPT | Performed by: INTERNAL MEDICINE

## 2023-09-22 RX ORDER — ACETAMINOPHEN 325 MG/1
650 TABLET ORAL ONCE
Status: CANCELLED
Start: 2023-11-02 | End: 2023-11-02

## 2023-09-22 RX ORDER — DIPHENHYDRAMINE HCL 25 MG
25 CAPSULE ORAL ONCE
Status: CANCELLED
Start: 2023-11-02 | End: 2023-11-02

## 2023-09-22 NOTE — PROGRESS NOTES
Infusion Nursing Note:  Noni Mccormick presents today for Rapid Remicade.  Patient seen by provider today: No   present during visit today: Not Applicable.    Note:  Patient reports feeling well overall. Her hands and wrists continue to ache the closer she gets to infusion.      Patient took her own tylenol ( 650 Mg) and Benadryl ( 25 mg ) prior to coming in for infusion.       Intravenous Access:  Peripheral IV placed.    Treatment Conditions:  Biological Infusion Checklist:  ~~~ NOTE: If the patient answers yes to any of the questions below, hold the infusion and contact ordering provider or on-call provider.    Have you recently had an elevated temperature, fever, chills, productive cough, coughing for 3 weeks or longer or hemoptysis,  abnormal vital signs, night sweats,  chest pain or have you noticed a decrease in your appetite, unexplained weight loss or fatigue? No  Do you have any open wounds or new incisions? No  Do you have any upcoming hospitalizations or surgeries? Does not include esophagogastroduodenoscopy, colonoscopy, endoscopic retrograde cholangiopancreatography (ERCP), endoscopic ultrasound (EUS), dental procedures or joint aspiration/steroid injections No  Do you currently have any signs of illness or infection or are you on any antibiotics? No  Have you had any new, sudden or worsening abdominal pain? No  Have you or anyone in your household received a live vaccination in the past 4 weeks? Please note: No live vaccines while on biologic/chemotherapy until 6 months after the last treatment. Patient can receive the flu vaccine (shot only), pneumovax and the Covid vaccine. It is optimal for the patient to get these vaccines mid cycle, but they can be given at any time as long as it is not on the day of the infusion. No  Have you recently been diagnosed with any new nervous system diseases (ie. Multiple sclerosis, Guillain [place], seizures, neurological changes) or cancer diagnosis?  Are you on any form of radiation or chemotherapy? No  Are you pregnant or breast feeding or do you have plans of pregnancy in the future? N/A  Have you been having any signs of worsening depression or suicidal ideations?  (benlysta only) N/A  Have there been any other new onset medical symptoms? No  Have you had any new blood clots? (IVIG only) N/A      Post Infusion Assessment:  Patient tolerated infusion without incident.  Site patent and intact, free from redness, edema or discomfort.  No evidence of extravasations.  Access discontinued per protocol.  Biologic Infusion Post Education: Call the triage nurse at your clinic or seek medical attention if you have chills and/or temperature greater than or equal to 100.5, uncontrolled nausea/vomiting, diarrhea, constipation, dizziness, shortness of breath, chest pain, heart palpitations, weakness or any other new or concerning symptoms, questions or concerns.  You cannot have any live virus vaccines prior to or during treatment or up to 6 months post infusion.  If you have an upcoming surgery, medical procedure or dental procedure during treatment, this should be discussed with your ordering physician and your surgeon/dentist.  If you are having any concerning symptom, if you are unsure if you should get your next infusion or wish to speak to a provider before your next infusion, please call your care coordinator or triage nurse at your clinic to notify them so we can adequately serve you.       Discharge Plan:   Discharge instructions reviewed with: Patient.  Patient and/or family verbalized understanding of discharge instructions and all questions answered.  Patient discharged in stable condition accompanied by: self.  Departure Mode: Ambulatory.      Unique Min RN

## 2023-11-03 ENCOUNTER — INFUSION THERAPY VISIT (OUTPATIENT)
Dept: INFUSION THERAPY | Facility: CLINIC | Age: 53
End: 2023-11-03
Payer: COMMERCIAL

## 2023-11-03 ENCOUNTER — LAB (OUTPATIENT)
Dept: LAB | Facility: CLINIC | Age: 53
End: 2023-11-03
Payer: COMMERCIAL

## 2023-11-03 VITALS
SYSTOLIC BLOOD PRESSURE: 127 MMHG | TEMPERATURE: 97.5 F | RESPIRATION RATE: 16 BRPM | WEIGHT: 123.3 LBS | OXYGEN SATURATION: 95 % | BODY MASS INDEX: 21.84 KG/M2 | HEART RATE: 72 BPM | DIASTOLIC BLOOD PRESSURE: 88 MMHG

## 2023-11-03 DIAGNOSIS — E03.9 HYPOTHYROIDISM, UNSPECIFIED TYPE: ICD-10-CM

## 2023-11-03 DIAGNOSIS — Z79.899 HIGH RISK MEDICATIONS (NOT ANTICOAGULANTS) LONG-TERM USE: Primary | ICD-10-CM

## 2023-11-03 DIAGNOSIS — M06.09 RHEUMATOID ARTHRITIS OF MULTIPLE SITES WITHOUT RHEUMATOID FACTOR (H): ICD-10-CM

## 2023-11-03 DIAGNOSIS — M08.3 CHRONIC POLYARTICULAR JUVENILE RHEUMATOID ARTHRITIS (H): ICD-10-CM

## 2023-11-03 LAB
ALBUMIN SERPL BCG-MCNC: 4.4 G/DL (ref 3.5–5.2)
ALT SERPL W P-5'-P-CCNC: 26 U/L (ref 0–50)
AST SERPL W P-5'-P-CCNC: 32 U/L (ref 0–45)
CREAT SERPL-MCNC: 0.76 MG/DL (ref 0.51–0.95)
CRP SERPL-MCNC: <3 MG/L
EGFRCR SERPLBLD CKD-EPI 2021: >90 ML/MIN/1.73M2
ERYTHROCYTE [DISTWIDTH] IN BLOOD BY AUTOMATED COUNT: 13.9 % (ref 10–15)
ERYTHROCYTE [SEDIMENTATION RATE] IN BLOOD BY WESTERGREN METHOD: 11 MM/HR (ref 0–30)
HCT VFR BLD AUTO: 42.9 % (ref 35–47)
HGB BLD-MCNC: 14 G/DL (ref 11.7–15.7)
MCH RBC QN AUTO: 30.9 PG (ref 26.5–33)
MCHC RBC AUTO-ENTMCNC: 32.6 G/DL (ref 31.5–36.5)
MCV RBC AUTO: 95 FL (ref 78–100)
PLATELET # BLD AUTO: 399 10E3/UL (ref 150–450)
RBC # BLD AUTO: 4.53 10E6/UL (ref 3.8–5.2)
TSH SERPL DL<=0.005 MIU/L-ACNC: 1.53 UIU/ML (ref 0.3–4.2)
WBC # BLD AUTO: 6.4 10E3/UL (ref 4–11)

## 2023-11-03 PROCEDURE — 84450 TRANSFERASE (AST) (SGOT): CPT

## 2023-11-03 PROCEDURE — 84443 ASSAY THYROID STIM HORMONE: CPT

## 2023-11-03 PROCEDURE — 85652 RBC SED RATE AUTOMATED: CPT

## 2023-11-03 PROCEDURE — 36415 COLL VENOUS BLD VENIPUNCTURE: CPT

## 2023-11-03 PROCEDURE — 84460 ALANINE AMINO (ALT) (SGPT): CPT

## 2023-11-03 PROCEDURE — 96413 CHEMO IV INFUSION 1 HR: CPT | Performed by: STUDENT IN AN ORGANIZED HEALTH CARE EDUCATION/TRAINING PROGRAM

## 2023-11-03 PROCEDURE — 85027 COMPLETE CBC AUTOMATED: CPT

## 2023-11-03 PROCEDURE — 86140 C-REACTIVE PROTEIN: CPT

## 2023-11-03 PROCEDURE — 82565 ASSAY OF CREATININE: CPT

## 2023-11-03 PROCEDURE — 82040 ASSAY OF SERUM ALBUMIN: CPT

## 2023-11-03 RX ORDER — LEVOTHYROXINE SODIUM 88 UG/1
88 TABLET ORAL DAILY
Qty: 90 TABLET | Refills: 0 | Status: SHIPPED | OUTPATIENT
Start: 2023-11-03 | End: 2024-01-15

## 2023-11-03 RX ORDER — DIPHENHYDRAMINE HCL 25 MG
25 CAPSULE ORAL ONCE
Status: CANCELLED
Start: 2023-12-15 | End: 2023-12-15

## 2023-11-03 RX ORDER — ACETAMINOPHEN 325 MG/1
650 TABLET ORAL ONCE
Status: CANCELLED
Start: 2023-12-15 | End: 2023-12-15

## 2023-11-03 RX ADMIN — Medication 250 ML: at 11:14

## 2023-11-03 ASSESSMENT — PAIN SCALES - GENERAL: PAINLEVEL: MILD PAIN (2)

## 2023-11-03 NOTE — PROGRESS NOTES
Infusion Nursing Note:  Noni Mccormick presents today for Rapid Remicade.    Patient seen by provider today: No   present during visit today: Not Applicable.    Note: The patient reports feeling at her baseline today and denies any concerns at this time.     Patient took her own tylenol ( 650 Mg) and Benadryl ( 25 mg ) prior to coming in for infusion.        Intravenous Access:  Peripheral IV placed.    Treatment Conditions:  Biological Infusion Checklist:  ~~~ NOTE: If the patient answers yes to any of the questions below, hold the infusion and contact ordering provider or on-call provider.    Have you recently had an elevated temperature, fever, chills, productive cough, coughing for 3 weeks or longer or hemoptysis,  abnormal vital signs, night sweats,  chest pain or have you noticed a decrease in your appetite, unexplained weight loss or fatigue? No  Do you have any open wounds or new incisions? No  Do you have any upcoming hospitalizations or surgeries? Does not include esophagogastroduodenoscopy, colonoscopy, endoscopic retrograde cholangiopancreatography (ERCP), endoscopic ultrasound (EUS), dental procedures or joint aspiration/steroid injections No  Do you currently have any signs of illness or infection or are you on any antibiotics? No  Have you had any new, sudden or worsening abdominal pain? No  Have you or anyone in your household received a live vaccination in the past 4 weeks? Please note: No live vaccines while on biologic/chemotherapy until 6 months after the last treatment. Patient can receive the flu vaccine (shot only), pneumovax and the Covid vaccine. It is optimal for the patient to get these vaccines mid cycle, but they can be given at any time as long as it is not on the day of the infusion. No  Have you recently been diagnosed with any new nervous system diseases (ie. Multiple sclerosis, Guillain [place], seizures, neurological changes) or cancer diagnosis? Are you on any form  of radiation or chemotherapy? No  Are you pregnant or breast feeding or do you have plans of pregnancy in the future? No  Have you been having any signs of worsening depression or suicidal ideations?  (benlysta only) No  Have there been any other new onset medical symptoms? No  Have you had any new blood clots? (IVIG only) No      Post Infusion Assessment:  Patient tolerated infusion without incident.  Site patent and intact, free from redness, edema or discomfort.  No evidence of extravasations.  Access discontinued per protocol.  Biologic Infusion Post Education: Call the triage nurse at your clinic or seek medical attention if you have chills and/or temperature greater than or equal to 100.5, uncontrolled nausea/vomiting, diarrhea, constipation, dizziness, shortness of breath, chest pain, heart palpitations, weakness or any other new or concerning symptoms, questions or concerns.  You cannot have any live virus vaccines prior to or during treatment or up to 6 months post infusion.  If you have an upcoming surgery, medical procedure or dental procedure during treatment, this should be discussed with your ordering physician and your surgeon/dentist.  If you are having any concerning symptom, if you are unsure if you should get your next infusion or wish to speak to a provider before your next infusion, please call your care coordinator or triage nurse at your clinic to notify them so we can adequately serve you.       Discharge Plan:   AVS to patient via [employer] [facility] [employer].  Patient will return 12/15/23 for next appointment. Future appts have been reviewed and crosschecked with appt note and plan.   Patient discharged in stable condition accompanied by: self.  Departure Mode: Ambulatory.      Sachi Cardoso RN

## 2023-11-07 NOTE — RESULT ENCOUNTER NOTE
Normal monitoring labs - normal liver, kidney tests and cell counts.     Sterling Gonzalez MD  11/7/2023  9:56 AM

## 2023-12-15 ENCOUNTER — INFUSION THERAPY VISIT (OUTPATIENT)
Dept: INFUSION THERAPY | Facility: CLINIC | Age: 53
End: 2023-12-15
Payer: COMMERCIAL

## 2023-12-15 VITALS
OXYGEN SATURATION: 97 % | WEIGHT: 118.7 LBS | TEMPERATURE: 98 F | HEART RATE: 69 BPM | DIASTOLIC BLOOD PRESSURE: 78 MMHG | BODY MASS INDEX: 21.03 KG/M2 | RESPIRATION RATE: 16 BRPM | SYSTOLIC BLOOD PRESSURE: 114 MMHG

## 2023-12-15 DIAGNOSIS — M08.3 CHRONIC POLYARTICULAR JUVENILE RHEUMATOID ARTHRITIS (H): ICD-10-CM

## 2023-12-15 DIAGNOSIS — Z79.899 HIGH RISK MEDICATIONS (NOT ANTICOAGULANTS) LONG-TERM USE: Primary | ICD-10-CM

## 2023-12-15 PROCEDURE — 96413 CHEMO IV INFUSION 1 HR: CPT | Performed by: NURSE PRACTITIONER

## 2023-12-15 RX ORDER — DIPHENHYDRAMINE HCL 25 MG
25 CAPSULE ORAL ONCE
Status: CANCELLED
Start: 2024-01-26 | End: 2024-01-26

## 2023-12-15 RX ORDER — ACETAMINOPHEN 325 MG/1
650 TABLET ORAL ONCE
Status: CANCELLED
Start: 2024-01-26 | End: 2024-01-26

## 2023-12-15 RX ADMIN — Medication 250 ML: at 11:15

## 2023-12-15 NOTE — PROGRESS NOTES
Infusion Nursing Note:  Noni Mccormick presents today for Rapid Remicade.    Patient seen by provider today: No   present during visit today: Not Applicable.    Note: Noni stated the pain/ache in her hands increased this week as she got closer to her infusion time, rated the pain 5/10 today.    Patient stated she took her own benadryl and tylenol prior to coming in for infusion.     Intravenous Access:  Peripheral IV placed.    Treatment Conditions:  Biological Infusion Checklist:  ~~~ NOTE: If the patient answers yes to any of the questions below, hold the infusion and contact ordering provider or on-call provider.    Have you recently had an elevated temperature, fever, chills, productive cough, coughing for 3 weeks or longer or hemoptysis,  abnormal vital signs, night sweats,  chest pain or have you noticed a decrease in your appetite, unexplained weight loss or fatigue? No  Do you have any open wounds or new incisions? No  Do you have any upcoming hospitalizations or surgeries? Does not include esophagogastroduodenoscopy, colonoscopy, endoscopic retrograde cholangiopancreatography (ERCP), endoscopic ultrasound (EUS), dental procedures or joint aspiration/steroid injections No  Do you currently have any signs of illness or infection or are you on any antibiotics? No  Have you had any new, sudden or worsening abdominal pain? No  Have you or anyone in your household received a live vaccination in the past 4 weeks? Please note: No live vaccines while on biologic/chemotherapy until 6 months after the last treatment. Patient can receive the flu vaccine (shot only), pneumovax and the Covid vaccine. It is optimal for the patient to get these vaccines mid cycle, but they can be given at any time as long as it is not on the day of the infusion. No  Have you recently been diagnosed with any new nervous system diseases (ie. Multiple sclerosis, Guillain [place], seizures, neurological changes) or cancer  diagnosis? Are you on any form of radiation or chemotherapy? No  Have there been any other new onset medical symptoms? No    Post Infusion Assessment:  Patient tolerated infusion without incident.  Site patent and intact, free from redness, edema or discomfort.  No evidence of extravasations.  Access discontinued per protocol.  Biologic Infusion Post Education: Call the triage nurse at your clinic or seek medical attention if you have chills and/or temperature greater than or equal to 100.5, uncontrolled nausea/vomiting, diarrhea, constipation, dizziness, shortness of breath, chest pain, heart palpitations, weakness or any other new or concerning symptoms, questions or concerns.  You cannot have any live virus vaccines prior to or during treatment or up to 6 months post infusion.  If you have an upcoming surgery, medical procedure or dental procedure during treatment, this should be discussed with your ordering physician and your surgeon/dentist.  If you are having any concerning symptom, if you are unsure if you should get your next infusion or wish to speak to a provider before your next infusion, please call your care coordinator or triage nurse at your clinic to notify them so we can adequately serve you.     Discharge Plan:   Discharge instructions reviewed with: Patient.  Patient and/or family verbalized understanding of discharge instructions and all questions answered.  Patient discharged in stable condition accompanied by: self.  Departure Mode: Ambulatory.  Future appts have been reviewed and crosschecked with appt note and plan.      Prisca Torres RN

## 2024-01-15 DIAGNOSIS — E03.9 HYPOTHYROIDISM, UNSPECIFIED TYPE: ICD-10-CM

## 2024-01-15 DIAGNOSIS — M06.09 RHEUMATOID ARTHRITIS OF MULTIPLE SITES WITHOUT RHEUMATOID FACTOR (H): ICD-10-CM

## 2024-01-15 RX ORDER — LEVOTHYROXINE SODIUM 88 UG/1
88 TABLET ORAL DAILY
Qty: 90 TABLET | Refills: 3 | Status: SHIPPED | OUTPATIENT
Start: 2024-01-15 | End: 2024-09-01

## 2024-01-18 RX ORDER — METHOTREXATE 2.5 MG/1
TABLET ORAL
Qty: 96 TABLET | Refills: 1 | Status: SHIPPED | OUTPATIENT
Start: 2024-01-18 | End: 2024-06-20

## 2024-01-18 NOTE — TELEPHONE ENCOUNTER
METHOTREXATE 2.5 MG TABLET      Last Written Prescription Date:  7-19-23  Last Fill Quantity: 96,   # refills: 1  Last Office Visit: 7-19-23  Future Office visit:  7-17-24    CBC RESULTS:   Recent Labs   Lab Test 11/03/23  1040   WBC 6.4   RBC 4.53   HGB 14.0   HCT 42.9   MCV 95   MCH 30.9   MCHC 32.6   RDW 13.9          Creatinine   Date Value Ref Range Status   11/03/2023 0.76 0.51 - 0.95 mg/dL Final   07/09/2021 0.69 0.52 - 1.04 mg/dL Final   ]    Liver Function Studies -   Recent Labs   Lab Test 11/03/23  1040 06/15/18  1210 03/30/18  0820   PROTTOTAL  --   --  7.6   ALBUMIN 4.4   < > 3.2*   BILITOTAL  --   --  0.5   ALKPHOS  --   --  27*   AST 32   < > 16   ALT 26   < > 18    < > = values in this interval not displayed.       Routing refill request to provider for review/approval because:  Per protocol  Last labs: 11-3-23, FYI to clinic, has standing lab orders

## 2024-01-19 DIAGNOSIS — G25.81 RESTLESS LEG SYNDROME: ICD-10-CM

## 2024-01-19 DIAGNOSIS — J45.20 MILD INTERMITTENT ASTHMA WITHOUT COMPLICATION: ICD-10-CM

## 2024-01-19 RX ORDER — ALBUTEROL SULFATE 90 UG/1
2 AEROSOL, METERED RESPIRATORY (INHALATION) EVERY 6 HOURS
Qty: 18 G | Refills: 1 | Status: SHIPPED | OUTPATIENT
Start: 2024-01-19 | End: 2024-09-05

## 2024-01-19 RX ORDER — GABAPENTIN 300 MG/1
900 CAPSULE ORAL EVERY EVENING
Qty: 270 CAPSULE | Refills: 0 | Status: SHIPPED | OUTPATIENT
Start: 2024-01-19 | End: 2024-06-23

## 2024-02-08 ENCOUNTER — LAB (OUTPATIENT)
Dept: INFUSION THERAPY | Facility: CLINIC | Age: 54
End: 2024-02-08
Attending: NURSE PRACTITIONER
Payer: COMMERCIAL

## 2024-02-08 ENCOUNTER — INFUSION THERAPY VISIT (OUTPATIENT)
Dept: INFUSION THERAPY | Facility: CLINIC | Age: 54
End: 2024-02-08
Attending: STUDENT IN AN ORGANIZED HEALTH CARE EDUCATION/TRAINING PROGRAM
Payer: COMMERCIAL

## 2024-02-08 VITALS
WEIGHT: 122.8 LBS | SYSTOLIC BLOOD PRESSURE: 111 MMHG | RESPIRATION RATE: 18 BRPM | OXYGEN SATURATION: 98 % | TEMPERATURE: 97.8 F | BODY MASS INDEX: 21.75 KG/M2 | HEART RATE: 68 BPM | DIASTOLIC BLOOD PRESSURE: 62 MMHG

## 2024-02-08 DIAGNOSIS — M08.3 CHRONIC POLYARTICULAR JUVENILE RHEUMATOID ARTHRITIS (H): ICD-10-CM

## 2024-02-08 DIAGNOSIS — Z79.899 HIGH RISK MEDICATIONS (NOT ANTICOAGULANTS) LONG-TERM USE: Primary | ICD-10-CM

## 2024-02-08 DIAGNOSIS — M06.09 RHEUMATOID ARTHRITIS OF MULTIPLE SITES WITHOUT RHEUMATOID FACTOR (H): ICD-10-CM

## 2024-02-08 LAB
ALBUMIN SERPL BCG-MCNC: 4.3 G/DL (ref 3.5–5.2)
ALT SERPL W P-5'-P-CCNC: 14 U/L (ref 0–50)
AST SERPL W P-5'-P-CCNC: 21 U/L (ref 0–45)
CREAT SERPL-MCNC: 0.74 MG/DL (ref 0.51–0.95)
CRP SERPL-MCNC: <3 MG/L
EGFRCR SERPLBLD CKD-EPI 2021: >90 ML/MIN/1.73M2
ERYTHROCYTE [DISTWIDTH] IN BLOOD BY AUTOMATED COUNT: 13.7 % (ref 10–15)
ERYTHROCYTE [SEDIMENTATION RATE] IN BLOOD BY WESTERGREN METHOD: 10 MM/HR (ref 0–30)
HCT VFR BLD AUTO: 39.9 % (ref 35–47)
HGB BLD-MCNC: 13.1 G/DL (ref 11.7–15.7)
MCH RBC QN AUTO: 31.1 PG (ref 26.5–33)
MCHC RBC AUTO-ENTMCNC: 32.8 G/DL (ref 31.5–36.5)
MCV RBC AUTO: 95 FL (ref 78–100)
PLATELET # BLD AUTO: 364 10E3/UL (ref 150–450)
RBC # BLD AUTO: 4.21 10E6/UL (ref 3.8–5.2)
WBC # BLD AUTO: 4.8 10E3/UL (ref 4–11)

## 2024-02-08 PROCEDURE — 258N000003 HC RX IP 258 OP 636: Performed by: STUDENT IN AN ORGANIZED HEALTH CARE EDUCATION/TRAINING PROGRAM

## 2024-02-08 PROCEDURE — 84450 TRANSFERASE (AST) (SGOT): CPT

## 2024-02-08 PROCEDURE — 85041 AUTOMATED RBC COUNT: CPT

## 2024-02-08 PROCEDURE — 36415 COLL VENOUS BLD VENIPUNCTURE: CPT

## 2024-02-08 PROCEDURE — 99207 PR NO CHARGE LOS: CPT

## 2024-02-08 PROCEDURE — 250N000011 HC RX IP 250 OP 636: Mod: JZ | Performed by: STUDENT IN AN ORGANIZED HEALTH CARE EDUCATION/TRAINING PROGRAM

## 2024-02-08 PROCEDURE — 82565 ASSAY OF CREATININE: CPT

## 2024-02-08 PROCEDURE — 82040 ASSAY OF SERUM ALBUMIN: CPT

## 2024-02-08 PROCEDURE — 86140 C-REACTIVE PROTEIN: CPT

## 2024-02-08 PROCEDURE — 85652 RBC SED RATE AUTOMATED: CPT

## 2024-02-08 PROCEDURE — 84460 ALANINE AMINO (ALT) (SGPT): CPT

## 2024-02-08 PROCEDURE — 96413 CHEMO IV INFUSION 1 HR: CPT

## 2024-02-08 RX ORDER — ACETAMINOPHEN 325 MG/1
650 TABLET ORAL ONCE
Status: CANCELLED
Start: 2024-03-08 | End: 2024-03-08

## 2024-02-08 RX ORDER — DIPHENHYDRAMINE HCL 25 MG
25 CAPSULE ORAL ONCE
Status: CANCELLED
Start: 2024-03-08 | End: 2024-03-08

## 2024-02-08 RX ADMIN — INFLIXIMAB 300 MG: 100 INJECTION, POWDER, LYOPHILIZED, FOR SOLUTION INTRAVENOUS at 14:49

## 2024-02-08 RX ADMIN — SODIUM CHLORIDE 250 ML: 9 INJECTION, SOLUTION INTRAVENOUS at 14:47

## 2024-02-08 NOTE — PROGRESS NOTES
Infusion Nursing Note:  Noni Mccormick presents today for Rapid Infliximab.    Patient seen by provider today: No   present during visit today: Not Applicable.    Note: Pt denies any new medical concerns. The pt reports tolerating their treatments well and feels at her baseline today.      Intravenous Access:  Peripheral IV placed.    Treatment Conditions:  Biological Infusion Checklist:  ~~~ NOTE: If the patient answers yes to any of the questions below, hold the infusion and contact ordering provider or on-call provider.    Have you recently had an elevated temperature, fever, chills, productive cough, coughing for 3 weeks or longer or hemoptysis,  abnormal vital signs, night sweats,  chest pain or have you noticed a decrease in your appetite, unexplained weight loss or fatigue? No  Do you have any open wounds or new incisions? No  Do you have any upcoming hospitalizations or surgeries? Does not include esophagogastroduodenoscopy, colonoscopy, endoscopic retrograde cholangiopancreatography (ERCP), endoscopic ultrasound (EUS), dental procedures or joint aspiration/steroid injections No  Do you currently have any signs of illness or infection or are you on any antibiotics? No  Have you had any new, sudden or worsening abdominal pain? No  Have you or anyone in your household received a live vaccination in the past 4 weeks? Please note: No live vaccines while on biologic/chemotherapy until 6 months after the last treatment. Patient can receive the flu vaccine (shot only), pneumovax and the Covid vaccine. It is optimal for the patient to get these vaccines mid cycle, but they can be given at any time as long as it is not on the day of the infusion. No  Have you recently been diagnosed with any new nervous system diseases (ie. Multiple sclerosis, Guillain [place], seizures, neurological changes) or cancer diagnosis? Are you on any form of radiation or chemotherapy? No  Are you pregnant or breast feeding  or do you have plans of pregnancy in the future? No  Have there been any other new onset medical symptoms? No      Post Infusion Assessment:  Patient tolerated infusion without incident.  Blood return noted pre and post infusion.  Site patent and intact, free from redness, edema or discomfort.  No evidence of extravasations.  Access discontinued per protocol.  Biologic Infusion Post Education: Call the triage nurse at your clinic or seek medical attention if you have chills and/or temperature greater than or equal to 100.5, uncontrolled nausea/vomiting, diarrhea, constipation, dizziness, shortness of breath, chest pain, heart palpitations, weakness or any other new or concerning symptoms, questions or concerns.  You cannot have any live virus vaccines prior to or during treatment or up to 6 months post infusion.  If you have an upcoming surgery, medical procedure or dental procedure during treatment, this should be discussed with your ordering physician and your surgeon/dentist.  If you are having any concerning symptom, if you are unsure if you should get your next infusion or wish to speak to a provider before your next infusion, please call your care coordinator or triage nurse at your clinic to notify them so we can adequately serve you.       Discharge Plan:   Patient and/or family verbalized understanding of discharge instructions and all questions answered.  AVS to patient via [employer].  Patient will return in 6 weeks for next appointment. Advised patient to schedule more appts.   Patient discharged in stable condition accompanied by: self.  Departure Mode: Ambulatory.      Ava Farmer RN

## 2024-02-21 ENCOUNTER — TELEPHONE (OUTPATIENT)
Dept: RHEUMATOLOGY | Facility: CLINIC | Age: 54
End: 2024-02-21
Payer: COMMERCIAL

## 2024-02-21 NOTE — TELEPHONE ENCOUNTER
FMLA forms received. Placed in provider's hold folder for provider to review and complete upon returning from time off.     JOSÉ MIGUEL Stark   Email: [email or web address]  [facility] - Rheumatology  Phone: [phone]  Fax: [phone]

## 2024-02-29 NOTE — TELEPHONE ENCOUNTER
Forms faxed to [phone]. Confirmed successful via right fax.      JOSÉ MIGUEL Negrete  Rheumatology/Infectious disease  [facility]   Rheumatology ph:[phone]  Infectious Disease ph:[phone]

## 2024-03-21 ENCOUNTER — INFUSION THERAPY VISIT (OUTPATIENT)
Dept: INFUSION THERAPY | Facility: CLINIC | Age: 54
End: 2024-03-21
Attending: NURSE PRACTITIONER
Payer: COMMERCIAL

## 2024-03-21 VITALS
RESPIRATION RATE: 16 BRPM | SYSTOLIC BLOOD PRESSURE: 119 MMHG | HEART RATE: 65 BPM | WEIGHT: 122.1 LBS | OXYGEN SATURATION: 97 % | BODY MASS INDEX: 21.63 KG/M2 | TEMPERATURE: 97.8 F | DIASTOLIC BLOOD PRESSURE: 80 MMHG

## 2024-03-21 DIAGNOSIS — M08.3 CHRONIC POLYARTICULAR JUVENILE RHEUMATOID ARTHRITIS (H): ICD-10-CM

## 2024-03-21 DIAGNOSIS — Z79.899 HIGH RISK MEDICATIONS (NOT ANTICOAGULANTS) LONG-TERM USE: Primary | ICD-10-CM

## 2024-03-21 PROCEDURE — 99207 PR NO CHARGE LOS: CPT

## 2024-03-21 PROCEDURE — 96413 CHEMO IV INFUSION 1 HR: CPT

## 2024-03-21 PROCEDURE — 250N000011 HC RX IP 250 OP 636: Mod: JZ | Performed by: STUDENT IN AN ORGANIZED HEALTH CARE EDUCATION/TRAINING PROGRAM

## 2024-03-21 PROCEDURE — 258N000003 HC RX IP 258 OP 636: Performed by: STUDENT IN AN ORGANIZED HEALTH CARE EDUCATION/TRAINING PROGRAM

## 2024-03-21 RX ORDER — DIPHENHYDRAMINE HCL 25 MG
25 CAPSULE ORAL ONCE
Start: 2024-05-02 | End: 2024-05-02

## 2024-03-21 RX ORDER — ACETAMINOPHEN 325 MG/1
650 TABLET ORAL ONCE
Start: 2024-05-02 | End: 2024-05-02

## 2024-03-21 RX ADMIN — SODIUM CHLORIDE 250 ML: 9 INJECTION, SOLUTION INTRAVENOUS at 12:19

## 2024-03-21 RX ADMIN — INFLIXIMAB 300 MG: 100 INJECTION, POWDER, LYOPHILIZED, FOR SOLUTION INTRAVENOUS at 12:20

## 2024-03-21 NOTE — PROGRESS NOTES
Infusion Nursing Note:  Noni Mccormick presents today for Rapid Remicade.    Patient seen by provider today: No   present during visit today: Not Applicable.    Note: Patient reports feeling well overall today. States she is tolerating infusions.    Intravenous Access:  Peripheral IV placed.    Treatment Conditions:  Biological Infusion Checklist:  ~~~ NOTE: If the patient answers yes to any of the questions below, hold the infusion and contact ordering provider or on-call provider.    Have you recently had an elevated temperature, fever, chills, productive cough, coughing for 3 weeks or longer or hemoptysis,  abnormal vital signs, night sweats,  chest pain or have you noticed a decrease in your appetite, unexplained weight loss or fatigue? No  Do you have any open wounds or new incisions? No  Do you have any upcoming hospitalizations or surgeries? Does not include esophagogastroduodenoscopy, colonoscopy, endoscopic retrograde cholangiopancreatography (ERCP), endoscopic ultrasound (EUS), dental procedures or joint aspiration/steroid injections No  Do you currently have any signs of illness or infection or are you on any antibiotics? No  Have you had any new, sudden or worsening abdominal pain? No  Have you or anyone in your household received a live vaccination in the past 4 weeks? Please note: No live vaccines while on biologic/chemotherapy until 6 months after the last treatment. Patient can receive the flu vaccine (shot only), pneumovax and the Covid vaccine. It is optimal for the patient to get these vaccines mid cycle, but they can be given at any time as long as it is not on the day of the infusion. No  Have you recently been diagnosed with any new nervous system diseases (ie. Multiple sclerosis, Guillain [place], seizures, neurological changes) or cancer diagnosis? Are you on any form of radiation or chemotherapy? No  Are you pregnant or breast feeding or do you have plans of pregnancy in the  future? No  Have you been having any signs of worsening depression or suicidal ideations?  (benlysta only) No  Have there been any other new onset medical symptoms? No  Have you had any new blood clots? (IVIG only) No    Post Infusion Assessment:  Patient tolerated infusion without incident.  Site patent and intact, free from redness, edema or discomfort.  No evidence of extravasations.  Access discontinued per protocol.  Biologic Infusion Post Education: Call the triage nurse at your clinic or seek medical attention if you have chills and/or temperature greater than or equal to 100.5, uncontrolled nausea/vomiting, diarrhea, constipation, dizziness, shortness of breath, chest pain, heart palpitations, weakness or any other new or concerning symptoms, questions or concerns.  You cannot have any live virus vaccines prior to or during treatment or up to 6 months post infusion.  If you have an upcoming surgery, medical procedure or dental procedure during treatment, this should be discussed with your ordering physician and your surgeon/dentist.  If you are having any concerning symptom, if you are unsure if you should get your next infusion or wish to speak to a provider before your next infusion, please call your care coordinator or triage nurse at your clinic to notify them so we can adequately serve you.     Discharge Plan:   Future appts have been reviewed and crosschecked with appt note and plan.  AVS to patient via [employer].  Patient will return 5/2/24 for next appointment.   Patient discharged in stable condition accompanied by: self.  Departure Mode: Ambulatory.      Audrey Dooley RN BSN OCN

## 2024-03-25 ENCOUNTER — TELEPHONE (OUTPATIENT)
Dept: FAMILY MEDICINE | Facility: CLINIC | Age: 54
End: 2024-03-25
Payer: COMMERCIAL

## 2024-03-25 NOTE — TELEPHONE ENCOUNTER
Prior Authorization Retail Medication Request    Medication/Dose: Famotidine 20 mg tablet  Diagnosis and ICD code (if different than what is on RX):    Mild intermittent asthma without complication [J45.20]     New/renewal/insurance change PA/secondary ins. PA:  Previously Tried and Failed:    Rationale:  Alternative requested:  Not covered.  Send alternative or submit a PA.    Insurance   Primary: BC/ARIE kohler [place]  Insurance ID:  [id number]     Secondary (if applicable):  Insurance ID:      Pharmacy Information (if different than what is on RX)  Name:  [facility]  Phone:  [phone]  Fax: [phone]

## 2024-04-05 NOTE — TELEPHONE ENCOUNTER
Central Prior Authorization Team   Phone: [phone]    PA Initiation    Medication: Famotidine 20 mg tablet  Insurance Company: [employer] [place] - Phone [phone] Fax [phone]  Pharmacy Filling the [id number] [place] [facility] [place], [place] - [address]  Filling Pharmacy Phone: [phone]  Filling Pharmacy Fax:    Start Date: 4/5/2024

## 2024-04-09 NOTE — TELEPHONE ENCOUNTER
PRIOR AUTHORIZATION DENIED    Medication: Famotidine 20 mg tablet    Denial Date: 4/9/2024    Denial Rational: Medication is a plan exclusion         Appeal Information:  Drug exclusions can not be appealed.  This medication will not be covered by the prescription plan for any reason. The drug is not on formulary and there are no loopholes to gaining approval.

## 2024-05-02 ENCOUNTER — LAB (OUTPATIENT)
Dept: INFUSION THERAPY | Facility: CLINIC | Age: 54
End: 2024-05-02
Attending: NURSE PRACTITIONER
Payer: COMMERCIAL

## 2024-05-02 ENCOUNTER — TELEPHONE (OUTPATIENT)
Dept: RHEUMATOLOGY | Facility: CLINIC | Age: 54
End: 2024-05-02

## 2024-05-02 VITALS
RESPIRATION RATE: 18 BRPM | BODY MASS INDEX: 21.56 KG/M2 | WEIGHT: 121.7 LBS | DIASTOLIC BLOOD PRESSURE: 71 MMHG | OXYGEN SATURATION: 99 % | SYSTOLIC BLOOD PRESSURE: 105 MMHG | HEART RATE: 61 BPM | TEMPERATURE: 97.9 F

## 2024-05-02 DIAGNOSIS — Z79.899 HIGH RISK MEDICATIONS (NOT ANTICOAGULANTS) LONG-TERM USE: ICD-10-CM

## 2024-05-02 DIAGNOSIS — Z79.899 HIGH RISK MEDICATIONS (NOT ANTICOAGULANTS) LONG-TERM USE: Primary | ICD-10-CM

## 2024-05-02 DIAGNOSIS — M06.09 RHEUMATOID ARTHRITIS OF MULTIPLE SITES WITHOUT RHEUMATOID FACTOR (H): ICD-10-CM

## 2024-05-02 DIAGNOSIS — M08.3 CHRONIC POLYARTICULAR JUVENILE RHEUMATOID ARTHRITIS (H): ICD-10-CM

## 2024-05-02 DIAGNOSIS — M08.3 CHRONIC POLYARTICULAR JUVENILE RHEUMATOID ARTHRITIS (H): Primary | ICD-10-CM

## 2024-05-02 LAB
ALBUMIN SERPL BCG-MCNC: 4.3 G/DL (ref 3.5–5.2)
ALT SERPL W P-5'-P-CCNC: 16 U/L (ref 0–50)
AST SERPL W P-5'-P-CCNC: 26 U/L (ref 0–45)
CREAT SERPL-MCNC: 0.71 MG/DL (ref 0.51–0.95)
CRP SERPL-MCNC: <3 MG/L
EGFRCR SERPLBLD CKD-EPI 2021: >90 ML/MIN/1.73M2
ERYTHROCYTE [DISTWIDTH] IN BLOOD BY AUTOMATED COUNT: 12.9 % (ref 10–15)
ERYTHROCYTE [SEDIMENTATION RATE] IN BLOOD BY WESTERGREN METHOD: 8 MM/HR (ref 0–30)
HCT VFR BLD AUTO: 41.6 % (ref 35–47)
HGB BLD-MCNC: 13.5 G/DL (ref 11.7–15.7)
MCH RBC QN AUTO: 30.9 PG (ref 26.5–33)
MCHC RBC AUTO-ENTMCNC: 32.5 G/DL (ref 31.5–36.5)
MCV RBC AUTO: 95 FL (ref 78–100)
PLATELET # BLD AUTO: 339 10E3/UL (ref 150–450)
RBC # BLD AUTO: 4.37 10E6/UL (ref 3.8–5.2)
WBC # BLD AUTO: 5.9 10E3/UL (ref 4–11)

## 2024-05-02 PROCEDURE — 250N000011 HC RX IP 250 OP 636: Mod: JZ | Performed by: STUDENT IN AN ORGANIZED HEALTH CARE EDUCATION/TRAINING PROGRAM

## 2024-05-02 PROCEDURE — 84450 TRANSFERASE (AST) (SGOT): CPT

## 2024-05-02 PROCEDURE — 86140 C-REACTIVE PROTEIN: CPT

## 2024-05-02 PROCEDURE — 82565 ASSAY OF CREATININE: CPT

## 2024-05-02 PROCEDURE — 84460 ALANINE AMINO (ALT) (SGPT): CPT

## 2024-05-02 PROCEDURE — 258N000003 HC RX IP 258 OP 636: Performed by: STUDENT IN AN ORGANIZED HEALTH CARE EDUCATION/TRAINING PROGRAM

## 2024-05-02 PROCEDURE — 99207 PR NO CHARGE LOS: CPT

## 2024-05-02 PROCEDURE — 82040 ASSAY OF SERUM ALBUMIN: CPT

## 2024-05-02 PROCEDURE — 85027 COMPLETE CBC AUTOMATED: CPT

## 2024-05-02 PROCEDURE — 96413 CHEMO IV INFUSION 1 HR: CPT

## 2024-05-02 PROCEDURE — 36415 COLL VENOUS BLD VENIPUNCTURE: CPT

## 2024-05-02 PROCEDURE — 85652 RBC SED RATE AUTOMATED: CPT

## 2024-05-02 RX ORDER — MEPERIDINE HYDROCHLORIDE 25 MG/ML
25 INJECTION INTRAMUSCULAR; INTRAVENOUS; SUBCUTANEOUS EVERY 30 MIN PRN
Status: CANCELLED | OUTPATIENT
Start: 2024-05-02

## 2024-05-02 RX ORDER — ACETAMINOPHEN 325 MG/1
650 TABLET ORAL ONCE
Status: CANCELLED
Start: 2024-05-02 | End: 2024-05-02

## 2024-05-02 RX ORDER — EPINEPHRINE 1 MG/ML
0.3 INJECTION, SOLUTION, CONCENTRATE INTRAVENOUS EVERY 5 MIN PRN
Status: CANCELLED | OUTPATIENT
Start: 2024-05-02

## 2024-05-02 RX ORDER — ACETAMINOPHEN 325 MG/1
650 TABLET ORAL ONCE
Start: 2024-06-13 | End: 2024-06-13

## 2024-05-02 RX ORDER — ALBUTEROL SULFATE 0.83 MG/ML
2.5 SOLUTION RESPIRATORY (INHALATION)
Status: CANCELLED | OUTPATIENT
Start: 2024-05-02

## 2024-05-02 RX ORDER — HEPARIN SODIUM (PORCINE) LOCK FLUSH IV SOLN 100 UNIT/ML 100 UNIT/ML
5 SOLUTION INTRAVENOUS
Status: CANCELLED | OUTPATIENT
Start: 2024-05-02

## 2024-05-02 RX ORDER — METHYLPREDNISOLONE SODIUM SUCCINATE 125 MG/2ML
125 INJECTION, POWDER, LYOPHILIZED, FOR SOLUTION INTRAMUSCULAR; INTRAVENOUS
Status: CANCELLED
Start: 2024-05-02

## 2024-05-02 RX ORDER — DIPHENHYDRAMINE HYDROCHLORIDE 50 MG/ML
50 INJECTION INTRAMUSCULAR; INTRAVENOUS
Status: CANCELLED
Start: 2024-05-02

## 2024-05-02 RX ORDER — DIPHENHYDRAMINE HCL 25 MG
25 CAPSULE ORAL ONCE
Status: CANCELLED
Start: 2024-05-02 | End: 2024-05-02

## 2024-05-02 RX ORDER — DIPHENHYDRAMINE HCL 25 MG
25 CAPSULE ORAL ONCE
Start: 2024-06-13 | End: 2024-06-13

## 2024-05-02 RX ORDER — ALBUTEROL SULFATE 90 UG/1
1-2 AEROSOL, METERED RESPIRATORY (INHALATION)
Status: CANCELLED
Start: 2024-05-02

## 2024-05-02 RX ORDER — HEPARIN SODIUM,PORCINE 10 UNIT/ML
5-20 VIAL (ML) INTRAVENOUS DAILY PRN
Status: CANCELLED | OUTPATIENT
Start: 2024-05-02

## 2024-05-02 RX ADMIN — INFLIXIMAB 300 MG: 100 INJECTION, POWDER, LYOPHILIZED, FOR SOLUTION INTRAVENOUS at 11:42

## 2024-05-02 RX ADMIN — SODIUM CHLORIDE 250 ML: 9 INJECTION, SOLUTION INTRAVENOUS at 11:35

## 2024-05-02 NOTE — PROGRESS NOTES
Infusion Nursing Note:  Noni Mccormick presents today for Rapid Remicade.    Patient seen by provider today: No   present during visit today: Not Applicable.    Note: Pt denies any medical concerns. The pt reports tolerating their treatments well. She states she took her own tylenol and benadryl prior to coming to infusion.    IB sent to Batsheva Herman RN and Dr. Diamond regarding upcoming expiring orders.     Intravenous Access:  Peripheral IV placed.    Treatment Conditions:  Biological Infusion Checklist:  ~~~ NOTE: If the patient answers yes to any of the questions below, hold the infusion and contact ordering provider or on-call provider.    Have you recently had an elevated temperature, fever, chills, productive cough, coughing for 3 weeks or longer or hemoptysis,  abnormal vital signs, night sweats,  chest pain or have you noticed a decrease in your appetite, unexplained weight loss or fatigue? No  Do you have any open wounds or new incisions? No  Do you have any upcoming hospitalizations or surgeries? Does not include esophagogastroduodenoscopy, colonoscopy, endoscopic retrograde cholangiopancreatography (ERCP), endoscopic ultrasound (EUS), dental procedures or joint aspiration/steroid injections No  Do you currently have any signs of illness or infection or are you on any antibiotics? No  Have you had any new, sudden or worsening abdominal pain? No  Have you or anyone in your household received a live vaccination in the past 4 weeks? Please note: No live vaccines while on biologic/chemotherapy until 6 months after the last treatment. Patient can receive the flu vaccine (shot only), pneumovax and the Covid vaccine. It is optimal for the patient to get these vaccines mid cycle, but they can be given at any time as long as it is not on the day of the infusion. No  Have you recently been diagnosed with any new nervous system diseases (ie. Multiple sclerosis, Guillain [place], seizures,  neurological changes) or cancer diagnosis? Are you on any form of radiation or chemotherapy? No  Are you pregnant or breast feeding or do you have plans of pregnancy in the future? No  Have there been any other new onset medical symptoms? No    Post Infusion Assessment:  Patient tolerated infusion without incident.  Blood return noted pre and post infusion.  Site patent and intact, free from redness, edema or discomfort.  No evidence of extravasations.  Access discontinued per protocol.  Biologic Infusion Post Education: Call the triage nurse at your clinic or seek medical attention if you have chills and/or temperature greater than or equal to 100.5, uncontrolled nausea/vomiting, diarrhea, constipation, dizziness, shortness of breath, chest pain, heart palpitations, weakness or any other new or concerning symptoms, questions or concerns.  You cannot have any live virus vaccines prior to or during treatment or up to 6 months post infusion.  If you have an upcoming surgery, medical procedure or dental procedure during treatment, this should be discussed with your ordering physician and your surgeon/dentist.  If you are having any concerning symptom, if you are unsure if you should get your next infusion or wish to speak to a provider before your next infusion, please call your care coordinator or triage nurse at your clinic to notify them so we can adequately serve you.       Discharge Plan:   Patient and/or family verbalized understanding of discharge instructions and all questions answered.  AVS to patient via [employer].  Patient will return 6/13 for next appointment. Future appts have been reviewed and crosschecked with appt note and plan.  Patient discharged in stable condition accompanied by: self.  Departure Mode: Ambulatory.      Ava Farmer RN

## 2024-05-02 NOTE — TELEPHONE ENCOUNTER
Routing reordered pended Infliximab plan to Dr. Diamond to please review and sign when able.    Did not check solu-MEDROL injection 125 mg in pre-medications as this was not ordered previously.    Monica Schmitt RN

## 2024-05-03 DIAGNOSIS — M08.3 CHRONIC POLYARTICULAR JUVENILE RHEUMATOID ARTHRITIS (H): Primary | ICD-10-CM

## 2024-06-11 NOTE — TELEPHONE ENCOUNTER
Called patient and left generic vm asking them to call the clinic when they received the message. Clinic number provided.    ARIE BinghamN, RN   Rheumatology Care Coordinator   [facility]       Please send me an update of your home blood pressures in one week.     Please schedule a routine skin exam with Dr. Valadez      I recommend:  Eye exam every 2 years or annually for patients with diabetes, hypertension or chronic eye problems.    Dentist every 6 months.   Try and get at least 30 minutes of exercise 3-5 times per week.  Use a sunscreen with at least SPF of 30 on exposed skin. Use a sunscreen that protects against both UVA and UVB radiation. Wear sunglasses and a brimmed hat.     Please contact your pharmacy when you need a refill and they will send the request directly to us.

## 2024-06-13 ENCOUNTER — INFUSION THERAPY VISIT (OUTPATIENT)
Dept: INFUSION THERAPY | Facility: CLINIC | Age: 54
End: 2024-06-13
Attending: NURSE PRACTITIONER
Payer: COMMERCIAL

## 2024-06-13 VITALS
OXYGEN SATURATION: 98 % | HEART RATE: 60 BPM | TEMPERATURE: 98.1 F | RESPIRATION RATE: 16 BRPM | SYSTOLIC BLOOD PRESSURE: 127 MMHG | DIASTOLIC BLOOD PRESSURE: 77 MMHG | BODY MASS INDEX: 21.72 KG/M2 | WEIGHT: 122.6 LBS

## 2024-06-13 DIAGNOSIS — Z79.899 HIGH RISK MEDICATIONS (NOT ANTICOAGULANTS) LONG-TERM USE: Primary | ICD-10-CM

## 2024-06-13 DIAGNOSIS — M08.3 CHRONIC POLYARTICULAR JUVENILE RHEUMATOID ARTHRITIS (H): ICD-10-CM

## 2024-06-13 PROCEDURE — 96413 CHEMO IV INFUSION 1 HR: CPT

## 2024-06-13 PROCEDURE — 99207 PR NO CHARGE LOS: CPT

## 2024-06-13 PROCEDURE — 250N000011 HC RX IP 250 OP 636: Mod: JZ | Performed by: STUDENT IN AN ORGANIZED HEALTH CARE EDUCATION/TRAINING PROGRAM

## 2024-06-13 PROCEDURE — 258N000003 HC RX IP 258 OP 636: Mod: JZ | Performed by: STUDENT IN AN ORGANIZED HEALTH CARE EDUCATION/TRAINING PROGRAM

## 2024-06-13 RX ORDER — MEPERIDINE HYDROCHLORIDE 25 MG/ML
25 INJECTION INTRAMUSCULAR; INTRAVENOUS; SUBCUTANEOUS EVERY 30 MIN PRN
Status: CANCELLED | OUTPATIENT
Start: 2024-07-25

## 2024-06-13 RX ORDER — DIPHENHYDRAMINE HYDROCHLORIDE 50 MG/ML
50 INJECTION INTRAMUSCULAR; INTRAVENOUS
Status: CANCELLED
Start: 2024-07-25

## 2024-06-13 RX ORDER — METHYLPREDNISOLONE SODIUM SUCCINATE 125 MG/2ML
125 INJECTION, POWDER, LYOPHILIZED, FOR SOLUTION INTRAMUSCULAR; INTRAVENOUS
Status: CANCELLED
Start: 2024-07-25

## 2024-06-13 RX ORDER — ALBUTEROL SULFATE 0.83 MG/ML
2.5 SOLUTION RESPIRATORY (INHALATION)
Status: CANCELLED | OUTPATIENT
Start: 2024-07-25

## 2024-06-13 RX ORDER — DIPHENHYDRAMINE HCL 25 MG
25 CAPSULE ORAL ONCE
Status: CANCELLED
Start: 2024-07-25 | End: 2024-07-25

## 2024-06-13 RX ORDER — EPINEPHRINE 1 MG/ML
0.3 INJECTION, SOLUTION INTRAMUSCULAR; SUBCUTANEOUS EVERY 5 MIN PRN
Status: CANCELLED | OUTPATIENT
Start: 2024-07-25

## 2024-06-13 RX ORDER — HEPARIN SODIUM (PORCINE) LOCK FLUSH IV SOLN 100 UNIT/ML 100 UNIT/ML
5 SOLUTION INTRAVENOUS
Status: CANCELLED | OUTPATIENT
Start: 2024-07-25

## 2024-06-13 RX ORDER — HEPARIN SODIUM,PORCINE 10 UNIT/ML
5-20 VIAL (ML) INTRAVENOUS DAILY PRN
Status: CANCELLED | OUTPATIENT
Start: 2024-07-25

## 2024-06-13 RX ORDER — ACETAMINOPHEN 325 MG/1
650 TABLET ORAL ONCE
Status: CANCELLED
Start: 2024-07-25 | End: 2024-07-25

## 2024-06-13 RX ORDER — ALBUTEROL SULFATE 90 UG/1
1-2 AEROSOL, METERED RESPIRATORY (INHALATION)
Status: CANCELLED
Start: 2024-07-25

## 2024-06-13 RX ADMIN — INFLIXIMAB 300 MG: 100 INJECTION, POWDER, LYOPHILIZED, FOR SOLUTION INTRAVENOUS at 11:30

## 2024-06-13 RX ADMIN — SODIUM CHLORIDE 250 ML: 9 INJECTION, SOLUTION INTRAVENOUS at 11:15

## 2024-06-13 NOTE — PROGRESS NOTES
Infusion Nursing Note:  Noni Mccormick presents today for Rapid Remicade.    Patient seen by provider today: No   present during visit today: Not Applicable.    Note: Patient reports feeling well overall today. States she is tolerating infusions and denies new medical concerns at this time.    Intravenous Access:  Peripheral IV placed.    Treatment Conditions:  Biological Infusion Checklist:  ~~~ NOTE: If the patient answers yes to any of the questions below, hold the infusion and contact ordering provider or on-call provider.    Have you recently had an elevated temperature, fever, chills, productive cough, coughing for 3 weeks or longer or hemoptysis,  abnormal vital signs, night sweats,  chest pain or have you noticed a decrease in your appetite, unexplained weight loss or fatigue? No  Do you have any open wounds or new incisions? No  Do you have any upcoming hospitalizations or surgeries? Does not include esophagogastroduodenoscopy, colonoscopy, endoscopic retrograde cholangiopancreatography (ERCP), endoscopic ultrasound (EUS), dental procedures or joint aspiration/steroid injections No  Do you currently have any signs of illness or infection or are you on any antibiotics? No  Have you had any new, sudden or worsening abdominal pain? No  Have you or anyone in your household received a live vaccination in the past 4 weeks? Please note: No live vaccines while on biologic/chemotherapy until 6 months after the last treatment. Patient can receive the flu vaccine (shot only), pneumovax and the Covid vaccine. It is optimal for the patient to get these vaccines mid cycle, but they can be given at any time as long as it is not on the day of the infusion. No  Have you recently been diagnosed with any new nervous system diseases (ie. Multiple sclerosis, Guillain [place], seizures, neurological changes) or cancer diagnosis? Are you on any form of radiation or chemotherapy? No  Are you pregnant or breast  feeding or do you have plans of pregnancy in the future? No  Have you been having any signs of worsening depression or suicidal ideations?  (benlysta only) No  Have there been any other new onset medical symptoms? No  Have you had any new blood clots? (IVIG only) No    Post Infusion Assessment:  Patient tolerated infusion without incident.  Site patent and intact, free from redness, edema or discomfort.  No evidence of extravasations.  Access discontinued per protocol.  Biologic Infusion Post Education: Call the triage nurse at your clinic or seek medical attention if you have chills and/or temperature greater than or equal to 100.5, uncontrolled nausea/vomiting, diarrhea, constipation, dizziness, shortness of breath, chest pain, heart palpitations, weakness or any other new or concerning symptoms, questions or concerns.  You cannot have any live virus vaccines prior to or during treatment or up to 6 months post infusion.  If you have an upcoming surgery, medical procedure or dental procedure during treatment, this should be discussed with your ordering physician and your surgeon/dentist.  If you are having any concerning symptom, if you are unsure if you should get your next infusion or wish to speak to a provider before your next infusion, please call your care coordinator or triage nurse at your clinic to notify them so we can adequately serve you.     Discharge Plan:   Future appts have been reviewed and crosschecked with appt note and plan.  AVS to patient via [employer].  Patient will return 7/25/24 for next appointment.   Patient discharged in stable condition accompanied by: self.  Departure Mode: Ambulatory.      Audrey Dooley RN BSN OCN

## 2024-06-20 ENCOUNTER — HOSPITAL ENCOUNTER (OUTPATIENT)
Dept: GENERAL RADIOLOGY | Facility: HOSPITAL | Age: 54
Discharge: HOME | End: 2024-06-20
Attending: STUDENT IN AN ORGANIZED HEALTH CARE EDUCATION/TRAINING PROGRAM
Payer: COMMERCIAL

## 2024-06-20 ENCOUNTER — HOSPITAL ENCOUNTER (OUTPATIENT)
Dept: GENERAL RADIOLOGY | Facility: HOSPITAL | Age: 54
Discharge: HOME | End: 2024-06-20
Attending: STUDENT IN AN ORGANIZED HEALTH CARE EDUCATION/TRAINING PROGRAM | Admitting: STUDENT IN AN ORGANIZED HEALTH CARE EDUCATION/TRAINING PROGRAM
Payer: COMMERCIAL

## 2024-06-20 ENCOUNTER — OFFICE VISIT (OUTPATIENT)
Dept: RHEUMATOLOGY | Facility: CLINIC | Age: 54
End: 2024-06-20
Payer: COMMERCIAL

## 2024-06-20 VITALS
SYSTOLIC BLOOD PRESSURE: 123 MMHG | WEIGHT: 123.6 LBS | BODY MASS INDEX: 21.89 KG/M2 | DIASTOLIC BLOOD PRESSURE: 80 MMHG | OXYGEN SATURATION: 97 % | HEART RATE: 81 BPM

## 2024-06-20 DIAGNOSIS — M08.80 JIA (JUVENILE IDIOPATHIC ARTHRITIS) (H): Primary | ICD-10-CM

## 2024-06-20 DIAGNOSIS — M06.09 RHEUMATOID ARTHRITIS OF MULTIPLE SITES WITHOUT RHEUMATOID FACTOR (H): ICD-10-CM

## 2024-06-20 PROCEDURE — 73120 X-RAY EXAM OF HAND: CPT | Mod: 50

## 2024-06-20 PROCEDURE — 73110 X-RAY EXAM OF WRIST: CPT | Mod: 50

## 2024-06-20 PROCEDURE — 99215 OFFICE O/P EST HI 40 MIN: CPT | Performed by: STUDENT IN AN ORGANIZED HEALTH CARE EDUCATION/TRAINING PROGRAM

## 2024-06-20 PROCEDURE — 99417 PROLNG OP E/M EACH 15 MIN: CPT | Performed by: STUDENT IN AN ORGANIZED HEALTH CARE EDUCATION/TRAINING PROGRAM

## 2024-06-20 PROCEDURE — G2211 COMPLEX E/M VISIT ADD ON: HCPCS | Performed by: STUDENT IN AN ORGANIZED HEALTH CARE EDUCATION/TRAINING PROGRAM

## 2024-06-20 RX ORDER — METHOTREXATE 2.5 MG/1
20 TABLET ORAL
Qty: 96 TABLET | Refills: 1 | Status: SHIPPED | OUTPATIENT
Start: 2024-06-20

## 2024-06-20 RX ORDER — FOLIC ACID 1 MG/1
TABLET ORAL
Qty: 180 TABLET | Refills: 11 | Status: SHIPPED | OUTPATIENT
Start: 2024-06-20

## 2024-06-20 NOTE — PROGRESS NOTES
NEW PATIENT RHEUMATOLOGY VISIT     Assessment & Plan     Problem List    Hypothyroidism ([place])  Asthma   Hip dysplasia s/p bilateral hip replacements (1999 and 2010)   HTN  Congenital fifth digit flexion contractures      Orders Placed This Encounter   Procedures    XR Hand Bilateral 2 Views    XR Wrist Bilateral G/E 3 Views    XR Cervical Spine Comp w Obl & Flex/Ext    Erythrocyte sedimentation rate auto    CRP inflammation    ALT    AST    Creatinine    CBC with platelets    Hepatitis B surface antigen    Hepatitis B core antibody    Hepatitis B Surface Antibody      JOSE DE JESUS; Seronegative Rheumatoid arthritis   Comment: Diagnosed age of 3. Historically her arthritis has affected her hands and wrists with resulting contractures. She has had chronically elevated inflammatory markers, however, most recently have been within normal limits.  Has been well-controlled on Remicade infusions (various frequencies for over 10 years ) and methotrexate.  Continues to be doing well with normal inflammatory markers, no morning stiffness, and no episodes of joint pain or swelling.    Tx history:  Enbrel (unable to do self injections due to needle phobia)  Gold injections (renal toxicity)    Current therapy  Remicaid 300mg q 6 weeks   Methotrexate p.o. 20 mg weekly    Plan:  -Will plan on spacing her Remicade infusions to every 7 weeks following this next infusion.  Patient will message me after her infusion so I can change the order.  -Continue methotrexate 20 mg weekly and folic acid 2 mg daily  -Will send me short term disability paperwork - will sign this for her.   -Update x-rays    High risk medication use  Comment:   Quant gold negative (will update with next labs)  Hepatitis C serologies negative (2021)  Hep B (will update with next labs)  S/p Shingles, pneumonia and covid vaccines     Plan:  -Toxicity monitoring labs q 14 weeks (for convenience as gets labs with her infusions)    Preventative care in patient with autoimmune  disease  Comment:   Plan:   -Discussed with patient that people with autoimmune disease have increased risk of cardiovascular disease.  Recommended working with their primary care doctor to mitigate cardiovascular risk factors.   Gets yearly skin checks   -Discussed with patient that those of autoimmune disease and on immunosuppressive medications have a high risk of malignancies.  They are up-to-date on all routine cancer screenings including Pap smears, mammogram, and colonoscopy and yearly skin checks.     Repoductive health  Comment: Currently pre-menopausal, sexually active with men, uses condoms for birth control. No plans for pregnancy.  Plan:  -Counseled patient today on teratogenicity of methotrexate and recommendation for 2 forms of birth control      The longitudinal plan of care for the diagnosis(es)/condition(s) as documented were addressed during this visit. Due to the added complexity in care, I will continue to support Noni in the subsequent management and with ongoing continuity of care.    Return to Clinic in 6 months.      60 minutes spent on the day of the encounter doing chart review, history and exam, counseling and documentation.     Subjective         HPI    Previous pt of Dr. Gonzalez, last seen about a year ago.  Currently doing very well on Remicade 300mg infusions every 6 weeks and methotrexate 20 mg daily.  Denies any morning stiffness.  No pain with or swelling in any of the joints.  Historically, her arthritis affected her hands and wrists and sometimes her neck.  She denies any new shortness of breath or cough, rashes, neuropathy, or red painful eyes.  In the past she has been able to space Remicade every 8 weeks but due to increased activity the frequency was reduced, at its most intense that she was at 5 weeks infusions and methotrexate was increased to 20 mg weekly.  She has been on this combination medication for at least 10 years with good control of her arthritis.  She did  try Enbrel in the past but was unable to do self injections due to needle phobia.  She is also been on gold injections but suffered renal toxicity from this.  She also has a history of hip dysplasia and underwent bilateral hip replacements.    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  She has had no other extra-articular manifestations of her disease including no pulmonary involvement and no history of uveitis.      Lab and Imaging review:    I reviewed recent labs and imaging [id number] CRP and ESR within normal limits AST, ALT, creatinine, and CBC within normal limits      Current Outpatient Medications:     albuterol (PROAIR HFA/PROVENTIL HFA/VENTOLIN HFA) 108 (90 Base) MCG/ACT inhaler, INHALE 2 PUFFS INTO THE LUNGS EVERY 6 HOURS, Disp: 18 g, Rfl: 1    calcium citrate-vitamin D (CITRACAL) 200-6.25 MG-MCG TABS per tablet, Take 1 tablet by mouth 2 times daily, Disp: , Rfl:     famotidine (PEPCID) 20 MG tablet, Take 1 tablet (20 mg) by mouth 2 times daily, Disp: 180 tablet, Rfl: 3    ferrous sulfate (FEROSUL) 325 (65 Fe) MG tablet, Take 25 mg by mouth daily (with breakfast), Disp: , Rfl:     folic acid (FOLVITE) 1 MG tablet, TAKE 2 TABLETS BY MOUTH EVERY DAY, Disp: 180 tablet, Rfl: 11    gabapentin (NEURONTIN) 300 MG capsule, TAKE 3 CAPSULES (900 MG) BY MOUTH EVERY EVENING, Disp: 270 capsule, Rfl: 0    ibuprofen (ADVIL/MOTRIN) 200 MG tablet, Take 200 mg by mouth every 4 hours as needed for pain, Disp: , Rfl:     inFLIXimab (REMICADE) 100 MG injection, Inject 300 mg into the vein as needed, Disp: 30 mL, Rfl: 0    levothyroxine (SYNTHROID/LEVOTHROID) 88 MCG tablet, TAKE 1 TABLET (88 MCG) BY MOUTH DAILY NEEDS LABWORK FOR FURTHER REFILLS, Disp: 90 tablet, Rfl: 3    losartan (COZAAR) 25 MG tablet, Take 1 tablet (25 mg) by mouth daily, Disp: 90 tablet, Rfl: 3    methotrexate 2.5 MG tablet, Take 8  tablets (20 mg) by mouth every 7 days, Disp: 96 tablet, Rfl: 1  Allergies:  Allergies   Allergen Reactions    Amoxicillin Nausea and Cramps     Medical Hx:  Past Medical History:   Diagnosis Date    ASCUS of cervix with negative high risk HPV 10/27/2010    Contraception     Grave's disease     Hypothyroid     Inflammatory arthritis     Intermittent asthma 09/10/2013    Nephrolithiasis 06/16/2011    Osteopenia 02/14/2013    Rheumatoid arthritis(714.0)      Surgical Hx:  Past Surgical History:   Procedure Laterality Date    COLONOSCOPY WITH CO2 INSUFFLATION N/A 5/28/2021    Procedure: COLONOSCOPY, WITH CO2 INSUFFLATION;  Surgeon: German Oakes DO;  Location: [facility]    JOINT REPLACEMENT, HIP RT/LT      (L)    JOINT REPLACEMENT, HIP RT/LT  3/2013    (R)    SURGICAL HISTORY OF -       Lithotripsy    ZZC PELVIS/HIP JOINT SURGERY UNLISTED       Family Hx:  Family History   Problem Relation Age of Onset    Breast Cancer Mother     C.A.D. Father      Social Hx:  Social History     Tobacco Use    Smoking status: Never    Smokeless tobacco: Never   Vaping Use    Vaping status: Never Used   Substance Use Topics    Alcohol use: No    Drug use: No        Objective   Physical Exam   /80   Pulse 81   Wt 56.1 kg (123 lb 9.6 oz)   SpO2 97%   BMI 21.89 kg/m    General: alert, well appearing, no distress  HEENT: no alopecia, clear conjunctiva, no oral or nasal ulcers, no cervical lymphadenopathy  Cardiac: normal rate and rhythm, no murmur, rubs or gallops   Pulm: normal respiratory effort, clear to auscultation bilaterally   MSK: Bilateral fifth digit flexion contractures.  Bony hypertrophy over multiple PIPs, right second MCP subluxation, no synovitis over any of the MCPs, PIPs, or DIPs.  Bilateral fused wrists without any synovitis or tenderness palpation.  Bilateral elbows with full range of motion and no swelling, tenderness to palpation, or warmth.  Bilateral shoulders with full range of motion and no synovitis.   Bilateral hips and knees and ankles with full range of motion and no synovitis.  Skin: No rashes, warm and well-perfused.  No subcutaneous nodules.       Kerri Diamond MD  Rheumatology

## 2024-06-21 DIAGNOSIS — G25.81 RESTLESS LEG SYNDROME: ICD-10-CM

## 2024-06-21 DIAGNOSIS — M06.09 RHEUMATOID ARTHRITIS OF MULTIPLE SITES WITHOUT RHEUMATOID FACTOR (H): ICD-10-CM

## 2024-06-23 RX ORDER — GABAPENTIN 300 MG/1
CAPSULE ORAL
Qty: 270 CAPSULE | Refills: 0 | Status: SHIPPED | OUTPATIENT
Start: 2024-06-23 | End: 2024-09-01

## 2024-07-01 RX ORDER — METHOTREXATE 2.5 MG/1
TABLET ORAL
Qty: 96 TABLET | Refills: 1 | OUTPATIENT
Start: 2024-07-01

## 2024-07-03 ENCOUNTER — PATIENT OUTREACH (OUTPATIENT)
Dept: CARE COORDINATION | Facility: CLINIC | Age: 54
End: 2024-07-03
Payer: COMMERCIAL

## 2024-07-17 ENCOUNTER — PATIENT OUTREACH (OUTPATIENT)
Dept: CARE COORDINATION | Facility: CLINIC | Age: 54
End: 2024-07-17

## 2024-07-23 NOTE — PROGRESS NOTES
Preventive Care Visit  [facility]  KEENAN Bah CNP, OB/Gyn  Jul 25, 2024       SUBJECTIVE:   Noni is a 53 year old, presenting for the following:  Physical    HPI    Patient recently saw a new Rheumatologist and she had strongly encouraged 2 forms of contraception due to use of Methotrexate. Patient has been using condoms, mostly consistently. Cycles are irregular. Can have a few monthly cycles, then skip several cycles. No vasomotor symptoms of menopause at this time.   Was a bit surprised at the suggestion, but now that she has been thinking about it, is concerned about consistent contraception in general.  Has taken home pregnancy tests in the last year when cycles have skipped, just to be sure.     Social History     Tobacco Use    Smoking status: Never    Smokeless tobacco: Never   Substance Use Topics    Alcohol use: No             8/2/2023     3:33 PM   Alcohol Use   Prescreen: >3 drinks/day or >7 drinks/week? No     Reviewed orders with patient.  Reviewed health maintenance and updated orders accordingly - Yes  Patient Active Problem List   Diagnosis    Hypothyroidism    CARDIOVASCULAR SCREENING; LDL GOAL LESS THAN 160    Nephrolithiasis    High risk medications (not anticoagulants) long-term use    Injury of left hip    Intermittent asthma    Chronic polyarticular juvenile rheumatoid arthritis (H)    Cervical cancer screening     Past Surgical History:   Procedure Laterality Date    COLONOSCOPY WITH CO2 INSUFFLATION N/A 5/28/2021    Procedure: COLONOSCOPY, WITH CO2 INSUFFLATION;  Surgeon: German Oakes DO;  Location: [facility]    JOINT REPLACEMENT, HIP RT/LT      (L)    JOINT REPLACEMENT, HIP RT/LT  3/2013    (R)    SURGICAL HISTORY OF -       Lithotripsy    ZZC PELVIS/HIP JOINT SURGERY UNLISTED         Social History     Tobacco Use    Smoking status: Never    Smokeless tobacco: Never   Substance Use Topics    Alcohol use: No     Family History   Problem Relation Age  of Onset    Breast Cancer Mother     C.A.D. Father            Breast Cancer Screening:    Mammogram Screening - Patient under 40 years of age: Routine Mammogram Screening not recommended.   Pertinent mammograms are reviewed under the imaging tab.    History of abnormal Pap smear: No-cotest every 3 years due to use of immunosuppressant.        Latest Ref Rng & Units 2/26/2021     3:18 PM 2/26/2021     3:16 PM 1/29/2018     8:22 AM   PAP / HPV   PAP (Historical)   OTHER-NIL, See Result  OTHER-NIL, See Result    HPV 16 DNA NEG^Negative Negative   Negative    HPV 18 DNA NEG^Negative Negative   Negative    Other HR HPV NEG^Negative Negative   Negative            Latest Ref Rng & Units 2/26/2021     3:18 PM 2/26/2021     3:16 PM 1/29/2018     8:22 AM   PAP / HPV   PAP (Historical)   OTHER-NIL, See Result  OTHER-NIL, See Result    HPV 16 DNA NEG^Negative Negative   Negative    HPV 18 DNA NEG^Negative Negative   Negative    Other HR HPV NEG^Negative Negative   Negative      Reviewed and updated as needed this visit by clinical staff   Tobacco  Allergies  Meds   Med Hx  Surg Hx  Fam Hx  Soc Hx        Reviewed and updated as needed this visit by Provider                  Past Medical History:   Diagnosis Date    ASCUS of cervix with negative high risk HPV 10/27/2010    Contraception     Grave's disease     Hypothyroid     Inflammatory arthritis     Intermittent asthma 09/10/2013    Nephrolithiasis 06/16/2011    Osteopenia 02/14/2013    Rheumatoid arthritis(714.0)       Past Surgical History:   Procedure Laterality Date    COLONOSCOPY WITH CO2 INSUFFLATION N/A 5/28/2021    Procedure: COLONOSCOPY, WITH CO2 INSUFFLATION;  Surgeon: German Oakes DO;  Location: [facility]    JOINT REPLACEMENT, HIP RT/LT      (L)    JOINT REPLACEMENT, HIP RT/LT  3/2013    (R)    SURGICAL HISTORY OF -       Lithotripsy    ZZC PELVIS/HIP JOINT SURGERY UNLISTED       Review of Systems    Review of Systems  Constitutional, neuro, ENT, endocrine,  "pulmonary, cardiac, gastrointestinal, genitourinary, musculoskeletal, integument and psychiatric systems are negative, except as otherwise noted.    OBJECTIVE:   /80 (BP Location: Right arm, Cuff Size: Adult Regular)   Ht 1.6 m (5' 3\")   Wt 56.4 kg (124 lb 6.4 oz)   LMP 07/11/2024   BMI 22.04 kg/m     Estimated body mass index is 22.04 kg/m  as calculated from the following:    Height as of this encounter: 1.6 m (5' 3\").    Weight as of this encounter: 56.4 kg (124 lb 6.4 oz).  Physical Exam  GENERAL: alert and no distress  RESP: lungs clear to auscultation - no rales, rhonchi or wheezes  BREAST: normal without masses, tenderness or nipple discharge and no palpable axillary masses or adenopathy  CV: regular rate and rhythm, normal S1 S2, no S3 or S4, no murmur, click or rub, no peripheral edema  ABDOMEN: soft, nontender, no hepatosplenomegaly, no masses and bowel sounds normal   (female) w/bimanual: normal female external genitalia, normal urethral meatus, normal vaginal mucosa, and normal cervix/adnexa/uterus without masses or discharge  MS: no gross musculoskeletal defects noted, no edema  SKIN: no suspicious lesions or rashes  NEURO: Normal strength and tone, mentation intact and speech normal  PSYCH: mentation appears normal, affect normal/bright    ASSESSMENT/PLAN:   Encounter for annual routine gynecological examination  - Pap Imaged Thin Layer Screen with HPV - Recommended Age 30 - 65 Years    Visit for screening mammogram  - MA Screening Bilateral w/ Josemanuel; Future    CARDIOVASCULAR SCREENING; LDL GOAL LESS THAN 160  - Lipid panel reflex to direct LDL Fasting; Future  - Lipid panel reflex to direct LDL Fasting    Screening for diabetes mellitus  - Glucose; Future  - Glucose    Other specified hypothyroidism  - TSH with free T4 reflex; Future  - TSH with free T4 reflex    High risk medications (not anticoagulants) long-term use  We discussed her concerns about pregnancy after discussion with " Rheumatology. Discussed that with irregular cycles, ovulation can be unpredictable, we discussed decline in ovarian function with age. Discussed risks vs benefits of hormonal medication. Patient would feel more at ease starting something that can easily be discontinued, will try Micronor and discussed rationale for this over combined oral contraceptive pill. Discussed taking regularly, possible side effects. Also discussed FSH/Estradiol in the next year or so.   - norethindrone (MICRONOR) 0.35 MG tablet; Take 1 tablet (0.35 mg) by mouth daily    Counseling  Reviewed preventive health counseling, as reflected in patient instructions  Special attention given to:        Regular exercise       Healthy diet/nutrition       Contraception       (Brenda)menopause management        She reports that she has never smoked. She has never used smokeless tobacco.          Signed Electronically by: KEENAN Bah CNP

## 2024-07-25 ENCOUNTER — INFUSION THERAPY VISIT (OUTPATIENT)
Dept: INFUSION THERAPY | Facility: CLINIC | Age: 54
End: 2024-07-25
Attending: NURSE PRACTITIONER
Payer: COMMERCIAL

## 2024-07-25 ENCOUNTER — OFFICE VISIT (OUTPATIENT)
Dept: OBGYN | Facility: CLINIC | Age: 54
End: 2024-07-25
Payer: COMMERCIAL

## 2024-07-25 VITALS
HEIGHT: 63 IN | WEIGHT: 124.4 LBS | DIASTOLIC BLOOD PRESSURE: 80 MMHG | SYSTOLIC BLOOD PRESSURE: 122 MMHG | BODY MASS INDEX: 22.04 KG/M2

## 2024-07-25 VITALS — BODY MASS INDEX: 22.09 KG/M2 | WEIGHT: 124.7 LBS

## 2024-07-25 DIAGNOSIS — M08.3 CHRONIC POLYARTICULAR JUVENILE RHEUMATOID ARTHRITIS (H): ICD-10-CM

## 2024-07-25 DIAGNOSIS — Z79.899 HIGH RISK MEDICATIONS (NOT ANTICOAGULANTS) LONG-TERM USE: Primary | ICD-10-CM

## 2024-07-25 DIAGNOSIS — E03.8 OTHER SPECIFIED HYPOTHYROIDISM: ICD-10-CM

## 2024-07-25 DIAGNOSIS — Z13.6 CARDIOVASCULAR SCREENING; LDL GOAL LESS THAN 160: ICD-10-CM

## 2024-07-25 DIAGNOSIS — Z79.899 HIGH RISK MEDICATIONS (NOT ANTICOAGULANTS) LONG-TERM USE: ICD-10-CM

## 2024-07-25 DIAGNOSIS — Z12.31 VISIT FOR SCREENING MAMMOGRAM: ICD-10-CM

## 2024-07-25 DIAGNOSIS — Z01.419 ENCOUNTER FOR ANNUAL ROUTINE GYNECOLOGICAL EXAMINATION: Primary | ICD-10-CM

## 2024-07-25 DIAGNOSIS — Z13.1 SCREENING FOR DIABETES MELLITUS: ICD-10-CM

## 2024-07-25 DIAGNOSIS — M06.09 RHEUMATOID ARTHRITIS OF MULTIPLE SITES WITHOUT RHEUMATOID FACTOR (H): ICD-10-CM

## 2024-07-25 LAB
ALBUMIN SERPL BCG-MCNC: 4.2 G/DL (ref 3.5–5.2)
ALT SERPL W P-5'-P-CCNC: 16 U/L (ref 0–50)
AST SERPL W P-5'-P-CCNC: 23 U/L (ref 0–45)
BASOPHILS # BLD AUTO: 0.1 10E3/UL (ref 0–0.2)
BASOPHILS NFR BLD AUTO: 1 %
CHOLEST SERPL-MCNC: 167 MG/DL
CREAT SERPL-MCNC: 0.71 MG/DL (ref 0.51–0.95)
CRP SERPL-MCNC: <3 MG/L
EGFRCR SERPLBLD CKD-EPI 2021: >90 ML/MIN/1.73M2
EOSINOPHIL # BLD AUTO: 0.1 10E3/UL (ref 0–0.7)
EOSINOPHIL NFR BLD AUTO: 1 %
ERYTHROCYTE [DISTWIDTH] IN BLOOD BY AUTOMATED COUNT: 12.9 % (ref 10–15)
ERYTHROCYTE [SEDIMENTATION RATE] IN BLOOD BY WESTERGREN METHOD: 30 MM/HR (ref 0–30)
FASTING STATUS PATIENT QL REPORTED: YES
FASTING STATUS PATIENT QL REPORTED: YES
GLUCOSE SERPL-MCNC: 94 MG/DL (ref 70–99)
HBV CORE AB SERPL QL IA: NONREACTIVE
HBV SURFACE AB SERPL IA-ACNC: <3.5 M[IU]/ML
HBV SURFACE AB SERPL IA-ACNC: NONREACTIVE M[IU]/ML
HBV SURFACE AG SERPL QL IA: NONREACTIVE
HCT VFR BLD AUTO: 40.7 % (ref 35–47)
HDLC SERPL-MCNC: 90 MG/DL
HGB BLD-MCNC: 13.4 G/DL (ref 11.7–15.7)
IMM GRANULOCYTES # BLD: 0 10E3/UL
IMM GRANULOCYTES NFR BLD: 0 %
LDLC SERPL CALC-MCNC: 69 MG/DL
LYMPHOCYTES # BLD AUTO: 1.1 10E3/UL (ref 0.8–5.3)
LYMPHOCYTES NFR BLD AUTO: 22 %
MCH RBC QN AUTO: 31.4 PG (ref 26.5–33)
MCHC RBC AUTO-ENTMCNC: 32.9 G/DL (ref 31.5–36.5)
MCV RBC AUTO: 95 FL (ref 78–100)
MONOCYTES # BLD AUTO: 0.5 10E3/UL (ref 0–1.3)
MONOCYTES NFR BLD AUTO: 9 %
NEUTROPHILS # BLD AUTO: 3.5 10E3/UL (ref 1.6–8.3)
NEUTROPHILS NFR BLD AUTO: 67 %
NONHDLC SERPL-MCNC: 77 MG/DL
PLATELET # BLD AUTO: 359 10E3/UL (ref 150–450)
RBC # BLD AUTO: 4.27 10E6/UL (ref 3.8–5.2)
TRIGL SERPL-MCNC: 40 MG/DL
TSH SERPL DL<=0.005 MIU/L-ACNC: 3.77 UIU/ML (ref 0.3–4.2)
WBC # BLD AUTO: 5.2 10E3/UL (ref 4–11)

## 2024-07-25 PROCEDURE — 86481 TB AG RESPONSE T-CELL SUSP: CPT | Performed by: NURSE PRACTITIONER

## 2024-07-25 PROCEDURE — 96413 CHEMO IV INFUSION 1 HR: CPT

## 2024-07-25 PROCEDURE — 82040 ASSAY OF SERUM ALBUMIN: CPT | Performed by: NURSE PRACTITIONER

## 2024-07-25 PROCEDURE — 99207 PR NO CHARGE LOS: CPT

## 2024-07-25 PROCEDURE — 85025 COMPLETE CBC W/AUTO DIFF WBC: CPT | Performed by: NURSE PRACTITIONER

## 2024-07-25 PROCEDURE — 85652 RBC SED RATE AUTOMATED: CPT | Performed by: NURSE PRACTITIONER

## 2024-07-25 PROCEDURE — 87624 HPV HI-RISK TYP POOLED RSLT: CPT | Performed by: NURSE PRACTITIONER

## 2024-07-25 PROCEDURE — 82947 ASSAY GLUCOSE BLOOD QUANT: CPT | Performed by: NURSE PRACTITIONER

## 2024-07-25 PROCEDURE — 250N000011 HC RX IP 250 OP 636: Performed by: STUDENT IN AN ORGANIZED HEALTH CARE EDUCATION/TRAINING PROGRAM

## 2024-07-25 PROCEDURE — 86706 HEP B SURFACE ANTIBODY: CPT | Performed by: NURSE PRACTITIONER

## 2024-07-25 PROCEDURE — 80061 LIPID PANEL: CPT | Performed by: NURSE PRACTITIONER

## 2024-07-25 PROCEDURE — 86704 HEP B CORE ANTIBODY TOTAL: CPT | Performed by: NURSE PRACTITIONER

## 2024-07-25 PROCEDURE — 36415 COLL VENOUS BLD VENIPUNCTURE: CPT | Performed by: NURSE PRACTITIONER

## 2024-07-25 PROCEDURE — 87340 HEPATITIS B SURFACE AG IA: CPT | Performed by: NURSE PRACTITIONER

## 2024-07-25 PROCEDURE — 99459 PELVIC EXAMINATION: CPT | Performed by: NURSE PRACTITIONER

## 2024-07-25 PROCEDURE — 84443 ASSAY THYROID STIM HORMONE: CPT | Performed by: NURSE PRACTITIONER

## 2024-07-25 PROCEDURE — 99396 PREV VISIT EST AGE 40-64: CPT | Performed by: NURSE PRACTITIONER

## 2024-07-25 PROCEDURE — G0145 SCR C/V CYTO,THINLAYER,RESCR: HCPCS | Performed by: NURSE PRACTITIONER

## 2024-07-25 PROCEDURE — 86140 C-REACTIVE PROTEIN: CPT | Performed by: NURSE PRACTITIONER

## 2024-07-25 PROCEDURE — 84460 ALANINE AMINO (ALT) (SGPT): CPT | Performed by: NURSE PRACTITIONER

## 2024-07-25 PROCEDURE — 84450 TRANSFERASE (AST) (SGOT): CPT | Performed by: NURSE PRACTITIONER

## 2024-07-25 PROCEDURE — G0124 SCREEN C/V THIN LAYER BY MD: HCPCS | Performed by: PATHOLOGY

## 2024-07-25 PROCEDURE — 82565 ASSAY OF CREATININE: CPT | Performed by: NURSE PRACTITIONER

## 2024-07-25 PROCEDURE — 258N000003 HC RX IP 258 OP 636: Performed by: STUDENT IN AN ORGANIZED HEALTH CARE EDUCATION/TRAINING PROGRAM

## 2024-07-25 RX ORDER — DIPHENHYDRAMINE HYDROCHLORIDE 50 MG/ML
50 INJECTION INTRAMUSCULAR; INTRAVENOUS
Status: CANCELLED
Start: 2024-09-05

## 2024-07-25 RX ORDER — MEPERIDINE HYDROCHLORIDE 25 MG/ML
25 INJECTION INTRAMUSCULAR; INTRAVENOUS; SUBCUTANEOUS EVERY 30 MIN PRN
Status: CANCELLED | OUTPATIENT
Start: 2024-09-05

## 2024-07-25 RX ORDER — ACETAMINOPHEN 325 MG/1
650 TABLET ORAL ONCE
Status: CANCELLED
Start: 2024-09-05 | End: 2024-09-05

## 2024-07-25 RX ORDER — ALBUTEROL SULFATE 90 UG/1
1-2 AEROSOL, METERED RESPIRATORY (INHALATION)
Status: CANCELLED
Start: 2024-09-05

## 2024-07-25 RX ORDER — HEPARIN SODIUM,PORCINE 10 UNIT/ML
5-20 VIAL (ML) INTRAVENOUS DAILY PRN
Status: CANCELLED | OUTPATIENT
Start: 2024-09-05

## 2024-07-25 RX ORDER — HEPARIN SODIUM (PORCINE) LOCK FLUSH IV SOLN 100 UNIT/ML 100 UNIT/ML
5 SOLUTION INTRAVENOUS
Status: CANCELLED | OUTPATIENT
Start: 2024-09-05

## 2024-07-25 RX ORDER — DIPHENHYDRAMINE HCL 25 MG
25 CAPSULE ORAL ONCE
Status: CANCELLED
Start: 2024-09-05 | End: 2024-09-05

## 2024-07-25 RX ORDER — EPINEPHRINE 1 MG/ML
0.3 INJECTION, SOLUTION INTRAMUSCULAR; SUBCUTANEOUS EVERY 5 MIN PRN
Status: CANCELLED | OUTPATIENT
Start: 2024-09-05

## 2024-07-25 RX ORDER — METHYLPREDNISOLONE SODIUM SUCCINATE 125 MG/2ML
125 INJECTION, POWDER, LYOPHILIZED, FOR SOLUTION INTRAMUSCULAR; INTRAVENOUS
Status: CANCELLED
Start: 2024-09-05

## 2024-07-25 RX ORDER — ACETAMINOPHEN AND CODEINE PHOSPHATE 120; 12 MG/5ML; MG/5ML
0.35 SOLUTION ORAL DAILY
Qty: 84 TABLET | Refills: 3 | Status: SHIPPED | OUTPATIENT
Start: 2024-07-25

## 2024-07-25 RX ORDER — ALBUTEROL SULFATE 0.83 MG/ML
2.5 SOLUTION RESPIRATORY (INHALATION)
Status: CANCELLED | OUTPATIENT
Start: 2024-09-05

## 2024-07-25 RX ADMIN — INFLIXIMAB 300 MG: 100 INJECTION, POWDER, LYOPHILIZED, FOR SOLUTION INTRAVENOUS at 11:39

## 2024-07-25 RX ADMIN — SODIUM CHLORIDE 250 ML: 9 INJECTION, SOLUTION INTRAVENOUS at 11:24

## 2024-07-25 NOTE — PROGRESS NOTES
Infusion Nursing Note:  Noni Mccormick presents today for Rapid Remicade .    Patient seen by provider today: No   present during visit today: Not Applicable.    Note: Patient reports that her hands continue ache and are painful. States the past few weeks have been worse for her due to increased humidity and weather changes. Contemplating switching to every 7 weeks with her remicade but is now thinking that she will stay at 6 weeks.     Patient took her own tylenol and benadryl ahead of coming in today. No longer using steroids as a premed.       Intravenous Access:  Peripheral IV placed.    Treatment Conditions:  Biological Infusion Checklist:  ~~~ NOTE: If the patient answers yes to any of the questions below, hold the infusion and contact ordering provider or on-call provider.    Have you recently had an elevated temperature, fever, chills, productive cough, coughing for 3 weeks or longer or hemoptysis,  abnormal vital signs, night sweats,  chest pain or have you noticed a decrease in your appetite, unexplained weight loss or fatigue? No  Do you have any open wounds or new incisions? No  Do you have any upcoming hospitalizations or surgeries? Does not include esophagogastroduodenoscopy, colonoscopy, endoscopic retrograde cholangiopancreatography (ERCP), endoscopic ultrasound (EUS), dental procedures or joint aspiration/steroid injections No  Do you currently have any signs of illness or infection or are you on any antibiotics? No  Have you had any new, sudden or worsening abdominal pain? No  Have you or anyone in your household received a live vaccination in the past 4 weeks? Please note: No live vaccines while on biologic/chemotherapy until 6 months after the last treatment. Patient can receive the flu vaccine (shot only), pneumovax and the Covid vaccine. It is optimal for the patient to get these vaccines mid cycle, but they can be given at any time as long as it is not on the day of the  infusion. No  Have you recently been diagnosed with any new nervous system diseases (ie. Multiple sclerosis, Guillain Kemp, seizures, neurological changes) or cancer diagnosis? Are you on any form of radiation or chemotherapy? No  Are you pregnant or breast feeding or do you have plans of pregnancy in the future? N/A  Have you been having any signs of worsening depression or suicidal ideations?  (benlysta only) N/A  Have there been any other new onset medical symptoms? No  Have you had any new blood clots? (IVIG only) N/A      Post Infusion Assessment:  Patient tolerated infusion without incident.  Blood return noted pre and post infusion.  No evidence of extravasations.  Access discontinued per protocol.  Biologic Infusion Post Education: Call the triage nurse at your clinic or seek medical attention if you have chills and/or temperature greater than or equal to 100.5, uncontrolled nausea/vomiting, diarrhea, constipation, dizziness, shortness of breath, chest pain, heart palpitations, weakness or any other new or concerning symptoms, questions or concerns.  You cannot have any live virus vaccines prior to or during treatment or up to 6 months post infusion.  If you have an upcoming surgery, medical procedure or dental procedure during treatment, this should be discussed with your ordering physician and your surgeon/dentist.  If you are having any concerning symptom, if you are unsure if you should get your next infusion or wish to speak to a provider before your next infusion, please call your care coordinator or triage nurse at your clinic to notify them so we can adequately serve you.       Discharge Plan:   Discharge instructions reviewed with: Patient.  Patient and/or family verbalized understanding of discharge instructions and all questions answered.  Patient discharged in stable condition accompanied by: self.  Departure Mode: Ambulatory.      Unique Min RN

## 2024-07-26 LAB
GAMMA INTERFERON BACKGROUND BLD IA-ACNC: 0.03 IU/ML
M TB IFN-G BLD-IMP: NEGATIVE
M TB IFN-G CD4+ BCKGRND COR BLD-ACNC: 3.46 IU/ML
MITOGEN IGNF BCKGRD COR BLD-ACNC: 0.01 IU/ML
MITOGEN IGNF BCKGRD COR BLD-ACNC: 0.01 IU/ML
QUANTIFERON MITOGEN: 3.49 IU/ML
QUANTIFERON NIL TUBE: 0.03 IU/ML
QUANTIFERON TB1 TUBE: 0.04 IU/ML
QUANTIFERON TB2 TUBE: 0.04

## 2024-07-31 LAB
HPV HR 12 DNA CVX QL NAA+PROBE: NEGATIVE
HPV16 DNA CVX QL NAA+PROBE: NEGATIVE
HPV18 DNA CVX QL NAA+PROBE: NEGATIVE
HUMAN PAPILLOMA VIRUS FINAL DIAGNOSIS: NORMAL

## 2024-08-01 LAB
BKR LAB AP GYN ADEQUACY: ABNORMAL
BKR LAB AP GYN INTERPRETATION: ABNORMAL
BKR LAB AP LMP: ABNORMAL
BKR LAB AP PREVIOUS ABNORMAL: ABNORMAL
PATH REPORT.COMMENTS IMP SPEC: ABNORMAL
PATH REPORT.COMMENTS IMP SPEC: ABNORMAL
PATH REPORT.RELEVANT HX SPEC: ABNORMAL

## 2024-08-02 ENCOUNTER — PATIENT OUTREACH (OUTPATIENT)
Dept: OBGYN | Facility: CLINIC | Age: 54
End: 2024-08-02
Payer: COMMERCIAL

## 2024-08-02 NOTE — TELEPHONE ENCOUNTER
7/25/24 ASCUS, neg HPV. Pt taking immunosuppressants Infliximab and methotrexate. Plan cotest in 1 year

## 2024-09-05 ENCOUNTER — INFUSION THERAPY VISIT (OUTPATIENT)
Dept: INFUSION THERAPY | Facility: CLINIC | Age: 54
End: 2024-09-05
Attending: NURSE PRACTITIONER
Payer: COMMERCIAL

## 2024-09-05 ENCOUNTER — OFFICE VISIT (OUTPATIENT)
Dept: FAMILY MEDICINE | Facility: CLINIC | Age: 54
End: 2024-09-05
Payer: COMMERCIAL

## 2024-09-05 VITALS
HEART RATE: 78 BPM | BODY MASS INDEX: 21.97 KG/M2 | RESPIRATION RATE: 17 BRPM | SYSTOLIC BLOOD PRESSURE: 124 MMHG | WEIGHT: 124 LBS | DIASTOLIC BLOOD PRESSURE: 83 MMHG | HEIGHT: 63 IN | OXYGEN SATURATION: 94 % | TEMPERATURE: 98.2 F

## 2024-09-05 DIAGNOSIS — J45.20 MILD INTERMITTENT ASTHMA WITHOUT COMPLICATION: ICD-10-CM

## 2024-09-05 DIAGNOSIS — E03.9 HYPOTHYROIDISM, UNSPECIFIED TYPE: ICD-10-CM

## 2024-09-05 DIAGNOSIS — I10 HYPERTENSION GOAL BP (BLOOD PRESSURE) < 140/90: ICD-10-CM

## 2024-09-05 DIAGNOSIS — G25.81 RESTLESS LEG SYNDROME: ICD-10-CM

## 2024-09-05 DIAGNOSIS — D84.821 IMMUNODEFICIENCY DUE TO DRUGS (CODE) (H): ICD-10-CM

## 2024-09-05 DIAGNOSIS — Z79.899 HIGH RISK MEDICATIONS (NOT ANTICOAGULANTS) LONG-TERM USE: Primary | ICD-10-CM

## 2024-09-05 DIAGNOSIS — M08.3 CHRONIC POLYARTICULAR JUVENILE RHEUMATOID ARTHRITIS (H): ICD-10-CM

## 2024-09-05 DIAGNOSIS — Z00.00 ROUTINE GENERAL MEDICAL EXAMINATION AT A HEALTH CARE FACILITY: Primary | ICD-10-CM

## 2024-09-05 PROCEDURE — 99214 OFFICE O/P EST MOD 30 MIN: CPT | Mod: 25 | Performed by: FAMILY MEDICINE

## 2024-09-05 PROCEDURE — 99207 PR NO CHARGE LOS: CPT

## 2024-09-05 PROCEDURE — 250N000011 HC RX IP 250 OP 636: Performed by: STUDENT IN AN ORGANIZED HEALTH CARE EDUCATION/TRAINING PROGRAM

## 2024-09-05 PROCEDURE — 258N000003 HC RX IP 258 OP 636: Performed by: STUDENT IN AN ORGANIZED HEALTH CARE EDUCATION/TRAINING PROGRAM

## 2024-09-05 PROCEDURE — 99396 PREV VISIT EST AGE 40-64: CPT | Performed by: FAMILY MEDICINE

## 2024-09-05 PROCEDURE — 96413 CHEMO IV INFUSION 1 HR: CPT

## 2024-09-05 RX ORDER — MEPERIDINE HYDROCHLORIDE 25 MG/ML
25 INJECTION INTRAMUSCULAR; INTRAVENOUS; SUBCUTANEOUS EVERY 30 MIN PRN
OUTPATIENT
Start: 2024-10-17

## 2024-09-05 RX ORDER — HEPARIN SODIUM,PORCINE 10 UNIT/ML
5-20 VIAL (ML) INTRAVENOUS DAILY PRN
OUTPATIENT
Start: 2024-10-17

## 2024-09-05 RX ORDER — ALBUTEROL SULFATE 0.83 MG/ML
2.5 SOLUTION RESPIRATORY (INHALATION)
OUTPATIENT
Start: 2024-10-17

## 2024-09-05 RX ORDER — ALBUTEROL SULFATE 90 UG/1
2 AEROSOL, METERED RESPIRATORY (INHALATION) EVERY 6 HOURS
Qty: 18 G | Refills: 1 | Status: SHIPPED | OUTPATIENT
Start: 2024-09-05

## 2024-09-05 RX ORDER — ACETAMINOPHEN 325 MG/1
650 TABLET ORAL ONCE
Start: 2024-10-17 | End: 2024-10-17

## 2024-09-05 RX ORDER — ALBUTEROL SULFATE 90 UG/1
1-2 AEROSOL, METERED RESPIRATORY (INHALATION)
Start: 2024-10-17

## 2024-09-05 RX ORDER — METHYLPREDNISOLONE SODIUM SUCCINATE 125 MG/2ML
125 INJECTION, POWDER, LYOPHILIZED, FOR SOLUTION INTRAMUSCULAR; INTRAVENOUS
Start: 2024-10-17

## 2024-09-05 RX ORDER — DIPHENHYDRAMINE HYDROCHLORIDE 50 MG/ML
50 INJECTION INTRAMUSCULAR; INTRAVENOUS
Start: 2024-10-17

## 2024-09-05 RX ORDER — GABAPENTIN 300 MG/1
900 CAPSULE ORAL EVERY EVENING
Qty: 270 CAPSULE | Refills: 3 | Status: SHIPPED | OUTPATIENT
Start: 2024-09-05

## 2024-09-05 RX ORDER — LOSARTAN POTASSIUM 25 MG/1
25 TABLET ORAL DAILY
Qty: 90 TABLET | Refills: 3 | Status: SHIPPED | OUTPATIENT
Start: 2024-09-05

## 2024-09-05 RX ORDER — DIPHENHYDRAMINE HCL 25 MG
25 CAPSULE ORAL ONCE
Start: 2024-10-17 | End: 2024-10-17

## 2024-09-05 RX ORDER — LEVOTHYROXINE SODIUM 88 UG/1
88 TABLET ORAL DAILY
Qty: 90 TABLET | Refills: 3 | Status: SHIPPED | OUTPATIENT
Start: 2024-09-05

## 2024-09-05 RX ORDER — FAMOTIDINE 20 MG/1
20 TABLET, FILM COATED ORAL 2 TIMES DAILY
Qty: 180 TABLET | Refills: 3 | Status: SHIPPED | OUTPATIENT
Start: 2024-09-05

## 2024-09-05 RX ORDER — EPINEPHRINE 1 MG/ML
0.3 INJECTION, SOLUTION INTRAMUSCULAR; SUBCUTANEOUS EVERY 5 MIN PRN
OUTPATIENT
Start: 2024-10-17

## 2024-09-05 RX ORDER — HEPARIN SODIUM (PORCINE) LOCK FLUSH IV SOLN 100 UNIT/ML 100 UNIT/ML
5 SOLUTION INTRAVENOUS
OUTPATIENT
Start: 2024-10-17

## 2024-09-05 RX ADMIN — SODIUM CHLORIDE 250 ML: 9 INJECTION, SOLUTION INTRAVENOUS at 11:16

## 2024-09-05 RX ADMIN — INFLIXIMAB 300 MG: 100 INJECTION, POWDER, LYOPHILIZED, FOR SOLUTION INTRAVENOUS at 11:26

## 2024-09-05 ASSESSMENT — ASTHMA QUESTIONNAIRES
QUESTION_3 LAST FOUR WEEKS HOW OFTEN DID YOUR ASTHMA SYMPTOMS (WHEEZING, COUGHING, SHORTNESS OF BREATH, CHEST TIGHTNESS OR PAIN) WAKE YOU UP AT NIGHT OR EARLIER THAN USUAL IN THE MORNING: NOT AT ALL
QUESTION_5 LAST FOUR WEEKS HOW WOULD YOU RATE YOUR ASTHMA CONTROL: COMPLETELY CONTROLLED
ACT_TOTALSCORE: 23
QUESTION_1 LAST FOUR WEEKS HOW MUCH OF THE TIME DID YOUR ASTHMA KEEP YOU FROM GETTING AS MUCH DONE AT WORK, SCHOOL OR AT HOME: NONE OF THE TIME
QUESTION_4 LAST FOUR WEEKS HOW OFTEN HAVE YOU USED YOUR RESCUE INHALER OR NEBULIZER MEDICATION (SUCH AS ALBUTEROL): TWO OR THREE TIMES PER WEEK
QUESTION_2 LAST FOUR WEEKS HOW OFTEN HAVE YOU HAD SHORTNESS OF BREATH: NOT AT ALL
ACT_TOTALSCORE: 23

## 2024-09-05 ASSESSMENT — PAIN SCALES - GENERAL: PAINLEVEL: MODERATE PAIN (4)

## 2024-09-05 NOTE — PROGRESS NOTES
Preventive Care Visit  [facility]  Sophia Lagos MD, Family Medicine  Sep 5, 2024      Assessment & Plan     Routine general medical examination at a health care facility      Mild intermittent asthma without complication  Doing well on meds  - famotidine (PEPCID) 20 MG tablet; Take 1 tablet (20 mg) by mouth 2 times daily.  - albuterol (PROAIR HFA/PROVENTIL HFA/VENTOLIN HFA) 108 (90 Base) MCG/ACT inhaler; Inhale 2 puffs into the lungs every 6 hours.    Restless leg syndrome  Working well  - gabapentin (NEURONTIN) 300 MG capsule; Take 3 capsules (900 mg) by mouth every evening.    Hypertension goal BP (blood pressure) < 140/90  At goal on meds  - losartan (COZAAR) 25 MG tablet; Take 1 tablet (25 mg) by mouth daily.  - **Basic metabolic panel FUTURE 14d; Future    Hypothyroidism, unspecified type  Stable on meds  - levothyroxine (SYNTHROID/LEVOTHROID) 88 MCG tablet; Take 1 tablet (88 mcg) by mouth daily. 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    History of Present Illness     Asthma:  She presents for follow up of asthma.  She has no cough, no wheezing, and no shortness of breath.  She is using a relief medication a few times a week. She does not have a controller medication. Patient is aware of the following triggers: exercise or sports. The patient has not had a visit to the Emergency Room, Urgent Care or Hospital due to asthma since the last clinic visit.     She eats  0-1 servings of fruits and vegetables daily.She consumes 0 sweetened beverage(s) daily.She exercises with enough effort to increase her heart rate 60 or more minutes per day.  She exercises with enough effort to increase her heart rate 3 or less days per week.   She is taking medications regularly.    Doing well  No concerns  Saw gyn  Needs meds refilled  They are working well             No data to display                  8/2/2023   Nutrition   Three or more servings of calcium each day? Yes   Diet: regular (no restrictions)            8/2/2023   Exercise   Frequency of exercise: 2-3 days/week               8/2/2023   Dental   Dentist two times every year? Yes               Today's PHQ-2 Score:       9/5/2024     8:46 AM   PHQ-2 ( 1999 Pf[employer])   Q1: Little interest or pleasure in doing things 0   Q2: Feeling down, depressed or hopeless 0   PHQ-2 Score 0   Q1: Little interest or pleasure in doing things Not at all   Q2: Feeling down, depressed or hopeless Not at all   PHQ-2 Score 0           8/2/2023   Substance Use   Alcohol more than 3/day or more than 7/wk No        Social History     Tobacco Use    Smoking status: Never    Smokeless tobacco: Never   Vaping Use    Vaping status: Never Used   Substance Use Topics    Alcohol use: No    Drug use: No           8/2/2023   LAST FHS-7 RESULTS   1st degree relative breast or ovarian cancer Yes   Any relative bilateral breast cancer No   Any male have breast cancer No   Any ONE woman have BOTH breast AND ovarian cancer No   Any woman with breast cancer before 50yrs No   2 or more relatives with breast AND/OR ovarian cancer No   2 or more relatives with breast AND/OR bowel cancer No           Mammogram Screening - Mammogram every 1-2 years updated in Health Maintenance based on mutual decision making      History of abnormal Pap smear: YES - reflected in Problem List and Health Maintenance accordingly        Latest Ref Rng & Units 7/25/2024     9:17 AM 2/26/2021     3:18  "PM 2/26/2021     3:16 PM   PAP / HPV   PAP  Atypical squamous cells of undetermined significance (ASC-US)      PAP (Historical)    OTHER-NIL, See Result    HPV 16 DNA Negative Negative  Negative     HPV 18 DNA Negative Negative  Negative     Other HR HPV Negative Negative  Negative       ASCVD Risk   The 10-year ASCVD risk score (Lidia PARRA, et al., 2019) is: 1.1%    Values used to calculate the score:      Age: 54 years      Sex: Female      Is Non- : No      Diabetic: No      Tobacco smoker: No      Systolic Blood Pressure: 124 mmHg      Is BP treated: Yes      HDL Cholesterol: 90 mg/dL      Total Cholesterol: 167 mg/dL    Completed 1 year ago--dexa       Reviewed and updated as needed this visit by Provider                          Review of Systems  Constitutional, HEENT, cardiovascular, pulmonary, gi and gu systems are negative, except as otherwise noted.     Objective    Exam  /83   Pulse 78   Temp 98.2  F (36.8  C) (Oral)   Resp 17   Ht 1.6 m (5' 3\")   Wt 56.2 kg (124 lb)   LMP 07/11/2024   SpO2 94%   BMI 21.97 kg/m     Estimated body mass index is 21.97 kg/m  as calculated from the following:    Height as of this encounter: 1.6 m (5' 3\").    Weight as of this encounter: 56.2 kg (124 lb).    Physical Exam  GENERAL: alert and no distress  EYES: Eyes grossly normal to inspection, PERRL and conjunctivae and sclerae normal  HENT: ear canals and TM's normal, nose and mouth without ulcers or lesions  NECK: no adenopathy, no asymmetry, masses, or scars  RESP: lungs clear to auscultation - no rales, rhonchi or wheezes  CV: regular rate and rhythm, normal S1 S2, no S3 or S4, no murmur, click or rub, no peripheral edema  ABDOMEN: soft, nontender, no hepatosplenomegaly, no masses and bowel sounds normal  MS: no gross musculoskeletal defects noted, no edema  SKIN: no suspicious lesions or rashes  NEURO: Normal strength and tone, mentation intact and speech normal  PSYCH: " mentation appears normal, affect normal/bright        Signed Electronically by: Sophia Lagos MD

## 2024-09-05 NOTE — PROGRESS NOTES
Infusion Nursing Note:  Noni Mccormick presents today for Rapid Remicade.    Patient seen by provider today: Yes- primary care MD   present during visit today: Not Applicable.    Note: Patient states that she continues to struggle with hand and wrist pain at the 6 week dominique of remicade. She's hoping the once the weather changes she will feel a little better. If not, she plans to discuss going back to every 5 week infusions for adequate relief and function.       Intravenous Access:  Peripheral IV placed.    Treatment Conditions:  Biological Infusion Checklist:  ~~~ NOTE: If the patient answers yes to any of the questions below, hold the infusion and contact ordering provider or on-call provider.    Have you recently had an elevated temperature, fever, chills, productive cough, coughing for 3 weeks or longer or hemoptysis,  abnormal vital signs, night sweats,  chest pain or have you noticed a decrease in your appetite, unexplained weight loss or fatigue? No  Do you have any open wounds or new incisions? No  Do you have any upcoming hospitalizations or surgeries? Does not include esophagogastroduodenoscopy, colonoscopy, endoscopic retrograde cholangiopancreatography (ERCP), endoscopic ultrasound (EUS), dental procedures or joint aspiration/steroid injections No  Do you currently have any signs of illness or infection or are you on any antibiotics? No  Have you had any new, sudden or worsening abdominal pain? No  Have you or anyone in your household received a live vaccination in the past 4 weeks? Please note: No live vaccines while on biologic/chemotherapy until 6 months after the last treatment. Patient can receive the flu vaccine (shot only), pneumovax and the Covid vaccine. It is optimal for the patient to get these vaccines mid cycle, but they can be given at any time as long as it is not on the day of the infusion. No  Have you recently been diagnosed with any new nervous system diseases (ie.  Multiple sclerosis, Guillain [place], seizures, neurological changes) or cancer diagnosis? Are you on any form of radiation or chemotherapy? No  Are you pregnant or breast feeding or do you have plans of pregnancy in the future? No  Have you been having any signs of worsening depression or suicidal ideations?  (benlysta only) N/A  Have there been any other new onset medical symptoms? No  Have you had any new blood clots? (IVIG only) N/A      Post Infusion Assessment:  Patient tolerated infusion without incident.  Blood return noted pre and post infusion.  No evidence of extravasations.  Access discontinued per protocol.  Biologic Infusion Post Education: Call the triage nurse at your clinic or seek medical attention if you have chills and/or temperature greater than or equal to 100.5, uncontrolled nausea/vomiting, diarrhea, constipation, dizziness, shortness of breath, chest pain, heart palpitations, weakness or any other new or concerning symptoms, questions or concerns.  You cannot have any live virus vaccines prior to or during treatment or up to 6 months post infusion.  If you have an upcoming surgery, medical procedure or dental procedure during treatment, this should be discussed with your ordering physician and your surgeon/dentist.  If you are having any concerning symptom, if you are unsure if you should get your next infusion or wish to speak to a provider before your next infusion, please call your care coordinator or triage nurse at your clinic to notify them so we can adequately serve you.       Discharge Plan:   Discharge instructions reviewed with: Patient.  Patient and/or family verbalized understanding of discharge instructions and all questions answered.  Patient discharged in stable condition accompanied by: self.  Departure Mode: Ambulatory.      Unique Min RN

## 2024-09-05 NOTE — PATIENT INSTRUCTIONS
Patient Education   Preventive Care Advice   This is general advice given by our system to help you stay healthy. However, your care team may have specific advice just for you. Please talk to your care team about your preventive care needs.  Nutrition  Eat 5 or more servings of fruits and vegetables each day.  Try wheat bread, brown rice and whole grain pasta (instead of white bread, rice, and pasta).  Get enough calcium and vitamin D. Check the label on foods and aim for 100% of the RDA (recommended daily allowance).  Lifestyle  Exercise at least 150 minutes each week  (30 minutes a day, 5 days a week).  Do muscle strengthening activities 2 days a week. These help control your weight and prevent disease.  No smoking.  Wear sunscreen to prevent skin cancer.  Have a dental exam and cleaning every 6 months.  Yearly exams  See your health care team every year to talk about:  Any changes in your health.  Any medicines your care team has prescribed.  Preventive care, family planning, and ways to prevent chronic diseases.  Shots (vaccines)   HPV shots (up to age 26), if you've never had them before.  Hepatitis B shots (up to age 59), if you've never had them before.  COVID-19 shot: Get this shot when it's due.  Flu shot: Get a flu shot every year.  Tetanus shot: Get a tetanus shot every 10 years.  Pneumococcal, hepatitis A, and RSV shots: Ask your care team if you need these based on your risk.  Shingles shot (for age 50 and up)  General health tests  Diabetes screening:  Starting at age 35, Get screened for diabetes at least every 3 years.  If you are younger than age 35, ask your care team if you should be screened for diabetes.  Cholesterol test: At age 39, start having a cholesterol test every 5 years, or more often if advised.  Bone density scan (DEXA): At age 50, ask your care team if you should have this scan for osteoporosis (brittle bones).  Hepatitis C: Get tested at least once in your life.  STIs (sexually  transmitted infections)  Before age 24: Ask your care team if you should be screened for STIs.  After age 24: Get screened for STIs if you're at risk. You are at risk for STIs (including HIV) if:  You are sexually active with more than one person.  You don't use condoms every time.  You or a partner was diagnosed with a sexually transmitted infection.  If you are at risk for HIV, ask about PrEP medicine to prevent HIV.  Get tested for HIV at least once in your life, whether you are at risk for HIV or not.  Cancer screening tests  Cervical cancer screening: If you have a cervix, begin getting regular cervical cancer screening tests starting at age 21.  Breast cancer scan (mammogram): If you've ever had breasts, begin having regular mammograms starting at age 40. This is a scan to check for breast cancer.  Colon cancer screening: It is important to start screening for colon cancer at age 45.  Have a colonoscopy test every 10 years (or more often if you're at risk) Or, ask your provider about stool tests like a FIT test every year or Cologuard test every 3 years.  To learn more about your testing options, visit:   .  For help making a decision, visit:   https://bit.ly/pi21874.  Prostate cancer screening test: If you have a prostate, ask your care team if a prostate cancer screening test (PSA) at age 55 is right for you.  Lung cancer screening: If you are a current or former smoker ages 50 to 80, ask your care team if ongoing lung cancer screenings are right for you.  For informational purposes only. Not to replace the advice of your health care provider. Copyright   2023 [employer]. All rights reserved. Clinically reviewed by the [facility] Transitions Program. [employer] 527394 - REV 01/24.

## 2024-09-05 NOTE — LETTER
My Asthma Action Plan    Name: Noni Mccormick   YOB: 1970  Date: 9/5/2024   My doctor: Sophia Lagos MD   My clinic: [facility]        My Rescue Medicine:   Albuterol inhaler (Proair/Ventolin/Proventil HFA)  2-4 puffs EVERY 4 HOURS as needed. Use a spacer if recommended by your provider.   My Asthma Severity:   Intermittent / Exercise Induced  Know your asthma triggers: exercise or sports  None          GREEN ZONE   Good Control  I feel good  No cough or wheeze  Can work, sleep and play without asthma symptoms       Take your asthma control medicine every day.     If exercise triggers your asthma, take your rescue medication  15 minutes before exercise or sports, and  During exercise if you have asthma symptoms  Spacer to use with inhaler: If you have a spacer, make sure to use it with your inhaler             YELLOW ZONE Getting Worse  I have ANY of these:  I do not feel good  Cough or wheeze  Chest feels tight  Wake up at night   Keep taking your Green Zone medications  Start taking your rescue medicine:  every 20 minutes for up to 1 hour. Then every 4 hours for 24-48 hours.  If you stay in the Yellow Zone for more than 12-24 hours, contact your doctor.  If you do not return to the Green Zone in 12-24 hours or you get worse, start taking your oral steroid medicine if prescribed by your provider.           RED ZONE Medical Alert - Get Help  I have ANY of these:  I feel awful  Medicine is not helping  Breathing getting harder  Trouble walking or talking  Nose opens wide to breathe       Take your rescue medicine NOW  If your provider has prescribed an oral steroid medicine, start taking it NOW  Call your doctor NOW  If you are still in the Red Zone after 20 minutes and you have not reached your doctor:  Take your rescue medicine again and  Call 911 or go to the emergency room right away    See your regular doctor within 2 weeks of an Emergency Room or Urgent Care  visit for follow-up treatment.          Annual Reminders:  Meet with Asthma Educator,  Flu Shot in the Fall, consider Pneumonia Vaccination for patients with asthma (aged 19 and older).    Pharmacy:    [facility] [address] [place] [address] [facility]FUSION    Electronically signed by Sophia Lagos MD   Date: 09/05/24                    Asthma Triggers  How To Control Things That Make Your Asthma Worse    Triggers are things that make your asthma worse.  Look at the list below to help you find your triggers and   what you can do about them. You can help prevent asthma flare-ups by staying away from your triggers.      Trigger                                                          What you can do   Cigarette Smoke  Tobacco smoke can make asthma worse. Do not allow smoking in your home, car or around you.  Be sure no one smokes at a child s day care or school.  If you smoke, ask your health care provider for ways to help you quit.  Ask family members to quit too.  Ask your health care provider for a referral to Quit Plan to help you quit smoking, or call 0-264-195-PLAN.     Colds, Flu, Bronchitis  These are common triggers of asthma. Wash your hands often.  Don t touch your eyes, nose or mouth.  Get a flu shot every year.     Dust Mites  These are tiny bugs that live in cloth or carpet. They are too small to see. Wash sheets and blankets in hot water every week.   Encase pillows and mattress in dust mite proof covers.  Avoid having carpet if you can. If you have carpet, vacuum weekly.   Use a dust mask and HEPA vacuum.   Pollen and Outdoor Mold  Some people are allergic to trees, grass, or weed pollen, or molds. Try to keep your windows closed.  Limit time out doors when pollen count is high.   Ask you health care provider about taking medicine during allergy season.     Animal Dander  Some people are allergic to skin flakes, urine or saliva from pets with fur or feathers.  Keep pets with fur or feathers out of your home.    If you can t keep the pet outdoors, then keep the pet out of your bedroom.  Keep the bedroom door closed.  Keep pets off cloth furniture and away from stuffed toys.     Mice, Rats, and Cockroaches  Some people are allergic to the waste from these pests.   Cover food and garbage.  Clean up spills and food crumbs.  Store grease in the refrigerator.   Keep food out of the bedroom.   Indoor Mold  This can be a trigger if your home has high moisture. Fix leaking faucets, pipes, or other sources of water.   Clean moldy surfaces.  Dehumidify basement if it is damp and smelly.   Smoke, Strong Odors, and Sprays  These can reduce air quality. Stay away from strong odors and sprays, such as perfume, powder, hair spray, paints, smoke incense, paint, cleaning products, candles and new carpet.   Exercise or Sports  Some people with asthma have this trigger. Be active!  Ask your doctor about taking medicine before sports or exercise to prevent symptoms.    Warm up for 5-10 minutes before and after sports or exercise.     Other Triggers of Asthma  Cold air:  Cover your nose and mouth with a scarf.  Sometimes laughing or crying can be a trigger.  Some medicines and food can trigger asthma.

## 2024-10-11 ENCOUNTER — MYC REFILL (OUTPATIENT)
Dept: FAMILY MEDICINE | Facility: CLINIC | Age: 54
End: 2024-10-11
Payer: COMMERCIAL

## 2024-10-11 DIAGNOSIS — I10 HYPERTENSION GOAL BP (BLOOD PRESSURE) < 140/90: ICD-10-CM

## 2024-10-11 RX ORDER — LOSARTAN POTASSIUM 25 MG/1
25 TABLET ORAL DAILY
Qty: 90 TABLET | Refills: 3 | OUTPATIENT
Start: 2024-10-11

## 2024-10-17 ENCOUNTER — INFUSION THERAPY VISIT (OUTPATIENT)
Dept: INFUSION THERAPY | Facility: CLINIC | Age: 54
End: 2024-10-17
Attending: NURSE PRACTITIONER
Payer: COMMERCIAL

## 2024-10-17 ENCOUNTER — ANCILLARY PROCEDURE (OUTPATIENT)
Dept: MAMMOGRAPHY | Facility: CLINIC | Age: 54
End: 2024-10-17
Attending: NURSE PRACTITIONER
Payer: COMMERCIAL

## 2024-10-17 ENCOUNTER — LAB (OUTPATIENT)
Dept: INFUSION THERAPY | Facility: CLINIC | Age: 54
End: 2024-10-17
Attending: STUDENT IN AN ORGANIZED HEALTH CARE EDUCATION/TRAINING PROGRAM
Payer: COMMERCIAL

## 2024-10-17 VITALS
HEART RATE: 59 BPM | BODY MASS INDEX: 21.88 KG/M2 | WEIGHT: 123.5 LBS | DIASTOLIC BLOOD PRESSURE: 82 MMHG | SYSTOLIC BLOOD PRESSURE: 135 MMHG | OXYGEN SATURATION: 97 % | TEMPERATURE: 98 F

## 2024-10-17 DIAGNOSIS — Z79.899 HIGH RISK MEDICATIONS (NOT ANTICOAGULANTS) LONG-TERM USE: Primary | ICD-10-CM

## 2024-10-17 DIAGNOSIS — I10 HYPERTENSION GOAL BP (BLOOD PRESSURE) < 140/90: ICD-10-CM

## 2024-10-17 DIAGNOSIS — M08.3 CHRONIC POLYARTICULAR JUVENILE RHEUMATOID ARTHRITIS (H): ICD-10-CM

## 2024-10-17 DIAGNOSIS — Z12.31 VISIT FOR SCREENING MAMMOGRAM: ICD-10-CM

## 2024-10-17 DIAGNOSIS — M08.80 JIA (JUVENILE IDIOPATHIC ARTHRITIS) (H): ICD-10-CM

## 2024-10-17 LAB
ALT SERPL W P-5'-P-CCNC: 9 U/L (ref 0–50)
ANION GAP SERPL CALCULATED.3IONS-SCNC: 10 MMOL/L (ref 7–15)
AST SERPL W P-5'-P-CCNC: 19 U/L (ref 0–45)
BUN SERPL-MCNC: 9.1 MG/DL (ref 6–20)
CALCIUM SERPL-MCNC: 9.1 MG/DL (ref 8.8–10.4)
CHLORIDE SERPL-SCNC: 104 MMOL/L (ref 98–107)
CREAT SERPL-MCNC: 0.78 MG/DL (ref 0.51–0.95)
CRP SERPL-MCNC: <3 MG/L
EGFRCR SERPLBLD CKD-EPI 2021: 90 ML/MIN/1.73M2
ERYTHROCYTE [DISTWIDTH] IN BLOOD BY AUTOMATED COUNT: 13.2 % (ref 10–15)
ERYTHROCYTE [SEDIMENTATION RATE] IN BLOOD BY WESTERGREN METHOD: 21 MM/HR (ref 0–30)
GLUCOSE SERPL-MCNC: 95 MG/DL (ref 70–99)
HCO3 SERPL-SCNC: 26 MMOL/L (ref 22–29)
HCT VFR BLD AUTO: 40.9 % (ref 35–47)
HGB BLD-MCNC: 13.5 G/DL (ref 11.7–15.7)
HOLD SPECIMEN: NORMAL
MCH RBC QN AUTO: 31 PG (ref 26.5–33)
MCHC RBC AUTO-ENTMCNC: 33 G/DL (ref 31.5–36.5)
MCV RBC AUTO: 94 FL (ref 78–100)
PLATELET # BLD AUTO: 456 10E3/UL (ref 150–450)
POTASSIUM SERPL-SCNC: 4.3 MMOL/L (ref 3.4–5.3)
RBC # BLD AUTO: 4.35 10E6/UL (ref 3.8–5.2)
SODIUM SERPL-SCNC: 140 MMOL/L (ref 135–145)
WBC # BLD AUTO: 5.9 10E3/UL (ref 4–11)

## 2024-10-17 PROCEDURE — 96413 CHEMO IV INFUSION 1 HR: CPT

## 2024-10-17 PROCEDURE — 85652 RBC SED RATE AUTOMATED: CPT

## 2024-10-17 PROCEDURE — 86140 C-REACTIVE PROTEIN: CPT

## 2024-10-17 PROCEDURE — 84460 ALANINE AMINO (ALT) (SGPT): CPT

## 2024-10-17 PROCEDURE — 84450 TRANSFERASE (AST) (SGOT): CPT

## 2024-10-17 PROCEDURE — 77063 BREAST TOMOSYNTHESIS BI: CPT | Mod: TC | Performed by: RADIOLOGY

## 2024-10-17 PROCEDURE — 250N000011 HC RX IP 250 OP 636: Mod: JZ | Performed by: STUDENT IN AN ORGANIZED HEALTH CARE EDUCATION/TRAINING PROGRAM

## 2024-10-17 PROCEDURE — 80048 BASIC METABOLIC PNL TOTAL CA: CPT

## 2024-10-17 PROCEDURE — 82947 ASSAY GLUCOSE BLOOD QUANT: CPT

## 2024-10-17 PROCEDURE — 36415 COLL VENOUS BLD VENIPUNCTURE: CPT

## 2024-10-17 PROCEDURE — 77067 SCR MAMMO BI INCL CAD: CPT | Mod: TC | Performed by: RADIOLOGY

## 2024-10-17 PROCEDURE — 85027 COMPLETE CBC AUTOMATED: CPT

## 2024-10-17 PROCEDURE — 258N000003 HC RX IP 258 OP 636: Performed by: STUDENT IN AN ORGANIZED HEALTH CARE EDUCATION/TRAINING PROGRAM

## 2024-10-17 RX ORDER — ACETAMINOPHEN 325 MG/1
650 TABLET ORAL ONCE
Start: 2024-11-28 | End: 2024-11-28

## 2024-10-17 RX ORDER — ALBUTEROL SULFATE 0.83 MG/ML
2.5 SOLUTION RESPIRATORY (INHALATION)
Status: CANCELLED | OUTPATIENT
Start: 2024-11-28

## 2024-10-17 RX ORDER — DIPHENHYDRAMINE HCL 25 MG
25 CAPSULE ORAL ONCE
Start: 2024-11-28 | End: 2024-11-28

## 2024-10-17 RX ORDER — HEPARIN SODIUM (PORCINE) LOCK FLUSH IV SOLN 100 UNIT/ML 100 UNIT/ML
5 SOLUTION INTRAVENOUS
OUTPATIENT
Start: 2024-11-28

## 2024-10-17 RX ORDER — METHYLPREDNISOLONE SODIUM SUCCINATE 125 MG/2ML
125 INJECTION INTRAMUSCULAR; INTRAVENOUS
Status: CANCELLED
Start: 2024-11-28

## 2024-10-17 RX ORDER — EPINEPHRINE 1 MG/ML
0.3 INJECTION, SOLUTION INTRAMUSCULAR; SUBCUTANEOUS EVERY 5 MIN PRN
Status: CANCELLED | OUTPATIENT
Start: 2024-11-28

## 2024-10-17 RX ORDER — ALBUTEROL SULFATE 90 UG/1
1-2 INHALANT RESPIRATORY (INHALATION)
Status: CANCELLED
Start: 2024-11-28

## 2024-10-17 RX ORDER — DIPHENHYDRAMINE HYDROCHLORIDE 50 MG/ML
50 INJECTION INTRAMUSCULAR; INTRAVENOUS
Status: CANCELLED
Start: 2024-11-28

## 2024-10-17 RX ORDER — MEPERIDINE HYDROCHLORIDE 25 MG/ML
25 INJECTION INTRAMUSCULAR; INTRAVENOUS; SUBCUTANEOUS EVERY 30 MIN PRN
Status: CANCELLED | OUTPATIENT
Start: 2024-11-28

## 2024-10-17 RX ORDER — HEPARIN SODIUM,PORCINE 10 UNIT/ML
5-20 VIAL (ML) INTRAVENOUS DAILY PRN
OUTPATIENT
Start: 2024-11-28

## 2024-10-17 RX ADMIN — INFLIXIMAB 300 MG: 100 INJECTION, POWDER, LYOPHILIZED, FOR SOLUTION INTRAVENOUS at 12:19

## 2024-10-17 ASSESSMENT — PAIN SCALES - GENERAL: PAINLEVEL: NO PAIN (0)

## 2024-10-17 NOTE — PROGRESS NOTES
Infusion Nursing Note:  Noni Mccormick presents today for Rapid Remicade.    Patient seen by provider today: No   present during visit today: Not Applicable.    Note: Pt denies any medical concerns today, says usually by now she is achy and can tell that it's time for her infusion. Today she has no pain and no complaints, see flow sheet for assessment.      Intravenous Access:  Peripheral IV placed.    Treatment Conditions:  Biological Infusion Checklist:  ~~~ NOTE: If the patient answers yes to any of the questions below, hold the infusion and contact ordering provider or on-call provider.    Have you recently had an elevated temperature, fever, chills, productive cough, coughing for 3 weeks or longer or hemoptysis,  abnormal vital signs, night sweats,  chest pain or have you noticed a decrease in your appetite, unexplained weight loss or fatigue? No  Do you have any open wounds or new incisions? No  Do you have any upcoming hospitalizations or surgeries? Does not include esophagogastroduodenoscopy, colonoscopy, endoscopic retrograde cholangiopancreatography (ERCP), endoscopic ultrasound (EUS), dental procedures or joint aspiration/steroid injections No  Do you currently have any signs of illness or infection or are you on any antibiotics? No  Have you had any new, sudden or worsening abdominal pain? No  Have you or anyone in your household received a live vaccination in the past 4 weeks? Please note: No live vaccines while on biologic/chemotherapy until 6 months after the last treatment. Patient can receive the flu vaccine (shot only), pneumovax and the Covid vaccine. It is optimal for the patient to get these vaccines mid cycle, but they can be given at any time as long as it is not on the day of the infusion. No  Have you recently been diagnosed with any new nervous system diseases (ie. Multiple sclerosis, Guillain [place], seizures, neurological changes) or cancer diagnosis? Are you on any form  of radiation or chemotherapy? No  Are you pregnant or breast feeding or do you have plans of pregnancy in the future? No  Have you been having any signs of worsening depression or suicidal ideations?  (benlysta only) No  Have there been any other new onset medical symptoms? No  Have you had any new blood clots? (IVIG only) No      Post Infusion Assessment:  Patient tolerated infusion without incident.  Blood return noted pre and post infusion.  Site patent and intact, free from redness, edema or discomfort.  No evidence of extravasations.  Access discontinued per protocol.       Discharge Plan:   Patient discharged in stable condition accompanied by: self.  Departure Mode: Ambulatory.  PT will RTC 11/29/24 for Rapid Remicade. Appts verified and pt aware.      Nick Peacock RN

## 2024-10-30 ENCOUNTER — OFFICE VISIT (OUTPATIENT)
Dept: DERMATOLOGY | Facility: CLINIC | Age: 54
End: 2024-10-30
Payer: COMMERCIAL

## 2024-10-30 DIAGNOSIS — L82.1 SEBORRHEIC KERATOSIS: ICD-10-CM

## 2024-10-30 DIAGNOSIS — Z85.828 HISTORY OF NONMELANOMA SKIN CANCER: ICD-10-CM

## 2024-10-30 DIAGNOSIS — Z86.018 HISTORY OF DYSPLASTIC NEVUS: ICD-10-CM

## 2024-10-30 DIAGNOSIS — D22.9 MULTIPLE BENIGN NEVI: Primary | ICD-10-CM

## 2024-10-30 DIAGNOSIS — D49.2 NEOPLASM OF SKIN: ICD-10-CM

## 2024-10-30 DIAGNOSIS — D18.01 CHERRY ANGIOMA: ICD-10-CM

## 2024-10-30 DIAGNOSIS — L81.4 SOLAR LENTIGO: ICD-10-CM

## 2024-10-30 PROCEDURE — 11102 TANGNTL BX SKIN SINGLE LES: CPT | Performed by: PHYSICIAN ASSISTANT

## 2024-10-30 PROCEDURE — 99214 OFFICE O/P EST MOD 30 MIN: CPT | Mod: 25 | Performed by: PHYSICIAN ASSISTANT

## 2024-10-30 PROCEDURE — 88305 TISSUE EXAM BY PATHOLOGIST: CPT | Performed by: DERMATOLOGY

## 2024-10-30 RX ORDER — CLINDAMYCIN HYDROCHLORIDE 300 MG/1
CAPSULE ORAL
COMMUNITY
Start: 2024-10-09

## 2024-10-30 NOTE — PATIENT INSTRUCTIONS
Patient Education       Proper skin care from [facility]:    -Eliminate harsh soaps as they strip the natural oils from the skin, often resulting in dry itchy skin ( i.e. Dial, Zest, Kiswahili Spring)  -Use mild soaps such as Cetaphil or Dove Sensitive Skin in the shower. You do not need to use soap on arms, legs, and trunk every time you shower unless visibly soiled.   -Avoid hot or cold showers.  -After showering, lightly dry off and apply moisturizing within 2-3 minutes. This will help trap moisture in the skin.   -Aggressive use of a moisturizer at least 1-2 times a day to the entire body (including -Vanicream, Cetaphil, Aquaphor or Cerave) and moisturize hands after every washing.  -We recommend using moisturizers that come in a tub that needs to be scooped out, not a pump. This has more of an oil base. It will hold moisture in your skin much better than a water base moisturizer. The above recommended are non-pore clogging.      Wear a sunscreen with at least SPF 30 on your face, ears, neck and V of the chest daily. Wear sunscreen on other areas of the body if those areas are exposed to the sun throughout the day. Sunscreens can contain physical and/or chemical blockers. Physical blockers are less likely to clog pores, these include zinc oxide and titanium dioxide. Reapply every two hour and after swimming.     Sunscreen examples: https://www.ewg.org/sunscreen/    UV radiation  UVA radiation remains constant throughout the day and throughout the year. It is a longer wavelength than UVB and therefore penetrates deeper into the skin leading to immediate and delayed tanning, photoaging, and skin cancer. 70-80% of UVA and UVB radiation occurs between the hours of 10am-2pm.  UVB radiation  UVB radiation causes the most harmful effects and is more significant during the summer months. However, snow and ice can reflect UVB radiation leading to skin damage during the winter months as well. UVB radiation is  responsible for tanning, burning, inflammation, delayed erythema (pinkness), pigmentation (brown spots), and skin cancer.     I recommend self monthly full body exams and yearly full body exams with a dermatology provider. If you develop a new or changing lesion please follow up for examination. Most skin cancers are pink and scaly or pink and pearly. However, we do see blue/brown/black skin cancers.  Consider the ABCDEs of melanoma when giving yourself your monthly full body exam ( don't forget the groin, buttocks, feet, toes, etc). A-asymmetry, B-borders, C-color, D-diameter, E-elevation or evolving. If you see any of these changes please follow up in clinic. If you cannot see your back I recommend purchasing a hand held mirror to use with a larger wall mirror.       Checking for Skin Cancer  You can find cancer early by checking your skin each month. There are 3 kinds of skin cancer. They are melanoma, basal cell carcinoma, and squamous cell carcinoma. Doing monthly skin checks is the best way to find new marks or skin changes. Follow the instructions below for checking your skin.   The ABCDEs of checking moles for melanoma   Check your moles or growths for signs of melanoma using ABCDE:   Asymmetry: the sides of the mole or growth don t match  Border: the edges are ragged, notched, or blurred  Color: the color within the mole or growth varies  Diameter: the mole or growth is larger than 6 mm (size of a pencil eraser)  Evolving: the size, shape, or color of the mole or growth is changing (evolving is not shown in the images below)    Checking for other types of skin cancer  Basal cell carcinoma or squamous cell carcinoma have symptoms such as:     A spot or mole that looks different from all other marks on your skin  Changes in how an area feels, such as itching, tenderness, or pain  Changes in the skin's surface, such as oozing, bleeding, or scaliness  A sore that does not heal  New swelling or redness beyond  the border of a mole    Who s at risk?  Anyone can get skin cancer. But you are at greater risk if you have:   Fair skin, light-colored hair, or light-colored eyes  Many moles or abnormal moles on your skin  A history of sunburns from sunlight or tanning beds  A family history of skin cancer  A history of exposure to radiation or chemicals  A weakened immune system  If you have had skin cancer in the past, you are at risk for recurring skin cancer.   How to check your skin  Do your monthly skin checkups in front of a full-length mirror. Check all parts of your body, including your:   Head (ears, face, neck, and scalp)  Torso (front, back, and sides)  Arms (tops, undersides, upper, and lower armpits)  Hands (palms, backs, and fingers, including under the nails)  Buttocks and genitals  Legs (front, back, and sides)  Feet (tops, soles, toes, including under the nails, and between toes)  If you have a lot of moles, take digital photos of them each month. Make sure to take photos both up close and from a distance. These can help you see if any moles change over time.   Most skin changes are not cancer. But if you see any changes in your skin, call your doctor right away. Only he or she can diagnose a problem. If you have skin cancer, seeing your doctor can be the first step toward getting the treatment that could save your life.   [employer] last reviewed this educational content on 4/1/2019 2000-2020 [employer]. [address] [place] [address], [place] [address]. All rights reserved. This information is not intended as a substitute for professional medical care. Always follow your healthcare professional's instructions.

## 2024-10-30 NOTE — LETTER
10/30/2024      Noni Mccormick  [address]   [place] [address]      Dear Colleague,    Thank you for referring your patient, Noni Mccormick, to the [facility]. Please see a copy of my visit note below.    [facility] Dermatology Note  Encounter Date: Oct 30, 2024  Office Visit     Dermatology Problem List:  Last skin check:10/30/24    1. History of NMSC  - BCC, left anterior thigh, s/p WLE on 4/27/20  2. AK   - Left upper arm, s/p cryo  3. History of DN   - Right buttocks, s/p excision ~2005 (outside dermatologist)  4. SK, right mid cheek  - Discussed PDL and bleaching creams    PMHx: Immunosuppressed -Hx RA - on Remicade and methotrexate  ____________________________________________     Assessment & Plan:     #NUB R upper abdomen Ddx: DN vs MM vs other  - Shave biopsy performed today, see procedure note below.    # History of nonmelanoma skin cancer, no clinical evidence of recurrence.  - ABCDEs: Counseled ABCDEs of melanoma: Asymmetry, Border (irregularity), Color (not uniform, changes in color), Diameter (greater than 6 mm which is about the size of a pencil eraser), and Evolving (any changes in preexisting moles).  - Sun protection: Counseled SPF30+ sunscreen, UPF clothing, sun avoidance, tanning bed avoidance.   - Recommended regular skin checks.    # Immunosuppressed with Remicade and methotrexate for RA. Discussed increased risk of skin cancers with immunosuppressing medications.   - ABCDEs: Counseled ABCDEs of melanoma: Asymmetry, Border (irregularity), Color (not uniform, changes in color), Diameter (greater than 6 mm which is about the size of a pencil eraser), and Evolving (any changes in preexisting moles).  - Sun protection: Counseled SPF30+ sunscreen, UPF clothing, sun avoidance, tanning bed avoidance.  - Continue annual skin exams     # History of dysplastic nevi, no clinical evidence of recurrence.  - ABCDEs: Counseled ABCDEs of  melanoma: Asymmetry, Border (irregularity), Color (not uniform, changes in color), Diameter (greater than 6 mm which is about the size of a pencil eraser), and Evolving (any changes in preexisting moles).  - Sun protection: Counseled SPF30+ sunscreen, UPF clothing, sun avoidance, tanning bed avoidance.  - Recommended regular skin checks.    # Benign findings: multiple benign nevi, lentigines, cherry angiomas, SKs  - edu on benign etiology  - Signs and Symptoms of non-melanoma skin cancer and ABCDEs of melanoma reviewed with patient. Patient encouraged to perform monthly self skin exams and educated on how to perform them. UV precautions reviewed with patient. Patient was asked about new or changing moles/lesions on body.   - Sunscreen: Apply 20 minutes prior to going outdoors and reapply every two hours, when wet or sweating. We recommend using an SPF 30 or higher, and to use one that is water resistant.     - RTC for changes     Procedures Performed:   - Shave biopsy procedure note, location(s): see above. After discussion of benefits and risks including but not limited to bleeding, infection, scar, incomplete removal, recurrence, and non-diagnostic biopsy, written consent and photographs were obtained. The area was cleaned with isopropyl alcohol. 0.5mL of 1% lidocaine with epinephrine was injected to obtain adequate anesthesia of lesion(s). Shave biopsy at site(s) performed. Hemostasis was achieved with aluminium chloride. Petrolatum ointment and a sterile dressing were applied. The patient tolerated the procedure and no complications were noted. The patient was provided with verbal and written post care instructions.     Follow-up: 1 year(s) in-person, or earlier for new or changing lesions    Staff and Scribe:     Scribe Disclosure:   VIGNESH KAMARA, am serving as a scribe; to document services personally performed by Jannet Segovia PA-C -based on data collection and the provider's statements to me.      Provider Disclosure:  I agree with above History, Review of Systems, Physical exam and Plan.  I have reviewed the content of the documentation and have edited it as needed. I have personally performed the services documented here and the documentation accurately represents those services and the decisions I have made.      Electronically signed by:    All risks, benefits and alternatives were discussed with patient.  Patient is in agreement and understands the assessment and plan.  All questions were answered.    Jannet Segovia PA-C, MPAS  [facility]: Phone: [phone], Fax: [phone]  [facility]: Phone: [phone],  Fax: [phone]  [facility]: Phone: [phone], Fax: [phone]  ____________________________________________    CC: Skin Check (Pt here for yearly FBSC. No areas of concern.)    HPI:  Ms. Noni Mccormick is a(n) 54 year old female who presents today as a return patient for a skin check. Patient did not report any spots of concern.     Last seen 7/5/23 for a skin check. At that time no concerning lesions were noted.     Has a hx of NMSC and DN. No family hx of skin cancer.     Patient is otherwise feeling well, without additional skin concerns.    Labs Reviewed:  N/A    Physical Exam:  Vitals: There were no vitals taken for this visit.  SKIN: Total skin excluding the undergarment areas was performed. The exam included the head/face, neck, both arms, chest, back, abdomen, both legs, digits and/or nails.   - Larry's skin type II, has >100 nevi  - There are dome shaped bright red papules on the trunk.   - Multiple regular brown pigmented macules and papules are identified on the trunk and extremities.   - Scattered brown macules on sun exposed areas.  - There are waxy stuck on tan to brown papules on the trunk.    -There are well healed surgical scars without  erythema, nodularity or telangiectasias on the prior NMSC sites, NERD  - R upper abdomen there is a 3 mm dark brown macule with globules in the periphery and surrounding erythema   - No other lesions of concern on areas examined.     Medications:  Current Outpatient Medications   Medication Sig Dispense Refill     albuterol (PROAIR HFA/PROVENTIL HFA/VENTOLIN HFA) 108 (90 Base) MCG/ACT inhaler Inhale 2 puffs into the lungs every 6 hours. 18 g 1     calcium citrate-vitamin D (CITRACAL) 200-6.25 MG-MCG TABS per tablet Take 1 tablet by mouth 2 times daily       clindamycin (CLEOCIN) 300 MG capsule take 1 capsule by mouth three times a day for 7 days       famotidine (PEPCID) 20 MG tablet Take 1 tablet (20 mg) by mouth 2 times daily. 180 tablet 3     ferrous sulfate (FEROSUL) 325 (65 Fe) MG tablet Take 25 mg by mouth daily (with breakfast)       folic acid (FOLVITE) 1 MG tablet TAKE 2 TABLETS BY MOUTH EVERY  tablet 11     gabapentin (NEURONTIN) 300 MG capsule Take 3 capsules (900 mg) by mouth every evening. 270 capsule 3     ibuprofen (ADVIL/MOTRIN) 200 MG tablet Take 200 mg by mouth every 4 hours as needed for pain       inFLIXimab (REMICADE) 100 MG injection Inject 300 mg into the vein as needed 30 mL 0     levothyroxine (SYNTHROID/LEVOTHROID) 88 MCG tablet Take 1 tablet (88 mcg) by mouth daily. Needs labwork for further refills 90 tablet 3     losartan (COZAAR) 25 MG tablet Take 1 tablet (25 mg) by mouth daily. 90 tablet 3     methotrexate 2.5 MG tablet Take 8 tablets (20 mg) by mouth every 7 days 96 tablet 1     norethindrone (MICRONOR) 0.35 MG tablet Take 1 tablet (0.35 mg) by mouth daily 84 tablet 3     No current facility-administered medications for this visit.      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        Sincerely,        Jannet Segovia PA-C

## 2024-10-30 NOTE — PROGRESS NOTES
[facility] Dermatology Note  Encounter Date: Oct 30, 2024  Office Visit     Dermatology Problem List:  Last skin check:10/30/24    1. History of NMSC  - BCC, left anterior thigh, s/p WLE on 4/27/20  2. AK   - Left upper arm, s/p cryo  3. History of DN   - Right buttocks, s/p excision ~2005 (outside dermatologist)  4. SK, right mid cheek  - Discussed PDL and bleaching creams    PMHx: Immunosuppressed -Hx RA - on Remicade and methotrexate  ____________________________________________     Assessment & Plan:     #NUB R upper abdomen Ddx: DN vs MM vs other  - Shave biopsy performed today, see procedure note below.    # History of nonmelanoma skin cancer, no clinical evidence of recurrence.  - ABCDEs: Counseled ABCDEs of melanoma: Asymmetry, Border (irregularity), Color (not uniform, changes in color), Diameter (greater than 6 mm which is about the size of a pencil eraser), and Evolving (any changes in preexisting moles).  - Sun protection: Counseled SPF30+ sunscreen, UPF clothing, sun avoidance, tanning bed avoidance.   - Recommended regular skin checks.    # Immunosuppressed with Remicade and methotrexate for RA. Discussed increased risk of skin cancers with immunosuppressing medications.   - ABCDEs: Counseled ABCDEs of melanoma: Asymmetry, Border (irregularity), Color (not uniform, changes in color), Diameter (greater than 6 mm which is about the size of a pencil eraser), and Evolving (any changes in preexisting moles).  - Sun protection: Counseled SPF30+ sunscreen, UPF clothing, sun avoidance, tanning bed avoidance.  - Continue annual skin exams     # History of dysplastic nevi, no clinical evidence of recurrence.  - ABCDEs: Counseled ABCDEs of melanoma: Asymmetry, Border (irregularity), Color (not uniform, changes in color), Diameter (greater than 6 mm which is about the size of a pencil eraser), and Evolving (any changes in preexisting moles).  - Sun protection: Counseled SPF30+ sunscreen, UPF  clothing, sun avoidance, tanning bed avoidance.  - Recommended regular skin checks.    # Benign findings: multiple benign nevi, lentigines, cherry angiomas, SKs  - edu on benign etiology  - Signs and Symptoms of non-melanoma skin cancer and ABCDEs of melanoma reviewed with patient. Patient encouraged to perform monthly self skin exams and educated on how to perform them. UV precautions reviewed with patient. Patient was asked about new or changing moles/lesions on body.   - Sunscreen: Apply 20 minutes prior to going outdoors and reapply every two hours, when wet or sweating. We recommend using an SPF 30 or higher, and to use one that is water resistant.     - RTC for changes     Procedures Performed:   - Shave biopsy procedure note, location(s): see above. After discussion of benefits and risks including but not limited to bleeding, infection, scar, incomplete removal, recurrence, and non-diagnostic biopsy, written consent and photographs were obtained. The area was cleaned with isopropyl alcohol. 0.5mL of 1% lidocaine with epinephrine was injected to obtain adequate anesthesia of lesion(s). Shave biopsy at site(s) performed. Hemostasis was achieved with aluminium chloride. Petrolatum ointment and a sterile dressing were applied. The patient tolerated the procedure and no complications were noted. The patient was provided with verbal and written post care instructions.     Follow-up: 1 year(s) in-person, or earlier for new or changing lesions    Staff and Scribe:     Scribe Disclosure:   I, VIGNESH MULTANI, am serving as a scribe; to document services personally performed by Jannet Segovia PA-C -based on data collection and the provider's statements to me.     Provider Disclosure:  I agree with above History, Review of Systems, Physical exam and Plan.  I have reviewed the content of the documentation and have edited it as needed. I have personally performed the services documented here and the documentation  accurately represents those services and the decisions I have made.      Electronically signed by:    All risks, benefits and alternatives were discussed with patient.  Patient is in agreement and understands the assessment and plan.  All questions were answered.    Jannet Segovia PA-C, MPAS  [facility]: Phone: [phone], Fax: [phone]  [facility]: Phone: [phone],  Fax: [phone]  [facility]: Phone: [phone], Fax: [phone]  ____________________________________________    CC: Skin Check (Pt here for yearly FBSC. No areas of concern.)    HPI:  Ms. Noni Mccormick is a(n) 54 year old female who presents today as a return patient for a skin check. Patient did not report any spots of concern.     Last seen 7/5/23 for a skin check. At that time no concerning lesions were noted.     Has a hx of NMSC and DN. No family hx of skin cancer.     Patient is otherwise feeling well, without additional skin concerns.    Labs Reviewed:  N/A    Physical Exam:  Vitals: There were no vitals taken for this visit.  SKIN: Total skin excluding the undergarment areas was performed. The exam included the head/face, neck, both arms, chest, back, abdomen, both legs, digits and/or nails.   - Larry's skin type II, has >100 nevi  - There are dome shaped bright red papules on the trunk.   - Multiple regular brown pigmented macules and papules are identified on the trunk and extremities.   - Scattered brown macules on sun exposed areas.  - There are waxy stuck on tan to brown papules on the trunk.    -There are well healed surgical scars without erythema, nodularity or telangiectasias on the prior NMSC sites, NERD  - R upper abdomen there is a 3 mm dark brown macule with globules in the periphery and surrounding erythema   - No other lesions of concern on areas examined.     Medications:  Current  Outpatient Medications   Medication Sig Dispense Refill    albuterol (PROAIR HFA/PROVENTIL HFA/VENTOLIN HFA) 108 (90 Base) MCG/ACT inhaler Inhale 2 puffs into the lungs every 6 hours. 18 g 1    calcium citrate-vitamin D (CITRACAL) 200-6.25 MG-MCG TABS per tablet Take 1 tablet by mouth 2 times daily      clindamycin (CLEOCIN) 300 MG capsule take 1 capsule by mouth three times a day for 7 days      famotidine (PEPCID) 20 MG tablet Take 1 tablet (20 mg) by mouth 2 times daily. 180 tablet 3    ferrous sulfate (FEROSUL) 325 (65 Fe) MG tablet Take 25 mg by mouth daily (with breakfast)      folic acid (FOLVITE) 1 MG tablet TAKE 2 TABLETS BY MOUTH EVERY  tablet 11    gabapentin (NEURONTIN) 300 MG capsule Take 3 capsules (900 mg) by mouth every evening. 270 capsule 3    ibuprofen (ADVIL/MOTRIN) 200 MG tablet Take 200 mg by mouth every 4 hours as needed for pain      inFLIXimab (REMICADE) 100 MG injection Inject 300 mg into the vein as needed 30 mL 0    levothyroxine (SYNTHROID/LEVOTHROID) 88 MCG tablet Take 1 tablet (88 mcg) by mouth daily. Needs labwork for further refills 90 tablet 3    losartan (COZAAR) 25 MG tablet Take 1 tablet (25 mg) by mouth daily. 90 tablet 3    methotrexate 2.5 MG tablet Take 8 tablets (20 mg) by mouth every 7 days 96 tablet 1    norethindrone (MICRONOR) 0.35 MG tablet Take 1 tablet (0.35 mg) by mouth daily 84 tablet 3     No current facility-administered medications for this visit.      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

## 2024-10-30 NOTE — NURSING NOTE
The following medication was given:     MEDICATION:  Lidocaine with epinephrine 1%  ROUTE: [place]  SITE: see procedure note  DOSE: see procedure note  LOT #: [id number]  : [employer]  EXPIRATION DATE: 6/1/26  NDC#: 15479-147-79    Was there drug waste? Yes  Multi-dose vial: Yes    Idania See LPN  October 30, 2024

## 2024-10-30 NOTE — NURSING NOTE
Noni Mccormick's goals for this visit include:   Chief Complaint   Patient presents with    Skin Check     Pt here for yearly FBSC. No areas of concern.       She requests these members of her care team be copied on today's visit information:     PCP: System, Provider Not In    Referring Provider:  Referred Self, MD  No address on file    There were no vitals taken for this visit.    Do you need any medication refills at today's visit?       Niya Lynn LPN on 10/30/2024 at 8:35 AM

## 2024-11-03 LAB
PATH REPORT.COMMENTS IMP SPEC: NORMAL
PATH REPORT.COMMENTS IMP SPEC: NORMAL
PATH REPORT.FINAL DX SPEC: NORMAL
PATH REPORT.GROSS SPEC: NORMAL
PATH REPORT.MICROSCOPIC SPEC OTHER STN: NORMAL
PATH REPORT.RELEVANT HX SPEC: NORMAL

## 2024-11-07 DIAGNOSIS — M08.3 CHRONIC POLYARTICULAR JUVENILE RHEUMATOID ARTHRITIS (H): Primary | ICD-10-CM

## 2024-11-07 DIAGNOSIS — Z79.899 HIGH RISK MEDICATIONS (NOT ANTICOAGULANTS) LONG-TERM USE: ICD-10-CM

## 2024-11-07 RX ORDER — MEPERIDINE HYDROCHLORIDE 25 MG/ML
25 INJECTION INTRAMUSCULAR; INTRAVENOUS; SUBCUTANEOUS
OUTPATIENT
Start: 2024-11-07

## 2024-11-07 RX ORDER — ALBUTEROL SULFATE 90 UG/1
1-2 INHALANT RESPIRATORY (INHALATION)
Start: 2024-11-07

## 2024-11-07 RX ORDER — DIPHENHYDRAMINE HYDROCHLORIDE 50 MG/ML
25 INJECTION INTRAMUSCULAR; INTRAVENOUS
Status: CANCELLED
Start: 2024-11-07

## 2024-11-07 RX ORDER — EPINEPHRINE 1 MG/ML
0.3 INJECTION, SOLUTION, CONCENTRATE INTRAVENOUS EVERY 5 MIN PRN
OUTPATIENT
Start: 2024-11-07

## 2024-11-07 RX ORDER — HEPARIN SODIUM (PORCINE) LOCK FLUSH IV SOLN 100 UNIT/ML 100 UNIT/ML
5 SOLUTION INTRAVENOUS
Status: CANCELLED | OUTPATIENT
Start: 2024-11-07

## 2024-11-07 RX ORDER — METHYLPREDNISOLONE SODIUM SUCCINATE 40 MG/ML
40 INJECTION INTRAMUSCULAR; INTRAVENOUS
Status: CANCELLED
Start: 2024-11-07

## 2024-11-07 RX ORDER — ALBUTEROL SULFATE 0.83 MG/ML
2.5 SOLUTION RESPIRATORY (INHALATION)
Status: CANCELLED | OUTPATIENT
Start: 2024-11-07

## 2024-11-07 RX ORDER — HEPARIN SODIUM,PORCINE 10 UNIT/ML
5-20 VIAL (ML) INTRAVENOUS DAILY PRN
OUTPATIENT
Start: 2024-11-07

## 2024-11-07 RX ORDER — METHYLPREDNISOLONE SODIUM SUCCINATE 40 MG/ML
40 INJECTION INTRAMUSCULAR; INTRAVENOUS
Start: 2024-11-07

## 2024-11-07 RX ORDER — EPINEPHRINE 1 MG/ML
0.3 INJECTION, SOLUTION, CONCENTRATE INTRAVENOUS EVERY 5 MIN PRN
Status: CANCELLED | OUTPATIENT
Start: 2024-11-07

## 2024-11-07 RX ORDER — DIPHENHYDRAMINE HYDROCHLORIDE 50 MG/ML
50 INJECTION INTRAMUSCULAR; INTRAVENOUS
Status: CANCELLED
Start: 2024-11-07

## 2024-11-07 RX ORDER — DIPHENHYDRAMINE HYDROCHLORIDE 50 MG/ML
50 INJECTION INTRAMUSCULAR; INTRAVENOUS
Start: 2024-11-07

## 2024-11-07 RX ORDER — ALBUTEROL SULFATE 90 UG/1
1-2 INHALANT RESPIRATORY (INHALATION)
Status: CANCELLED
Start: 2024-11-07

## 2024-11-07 RX ORDER — HEPARIN SODIUM,PORCINE 10 UNIT/ML
5-20 VIAL (ML) INTRAVENOUS DAILY PRN
Status: CANCELLED | OUTPATIENT
Start: 2024-11-07

## 2024-11-07 RX ORDER — DIPHENHYDRAMINE HYDROCHLORIDE 50 MG/ML
25 INJECTION INTRAMUSCULAR; INTRAVENOUS
Start: 2024-11-07

## 2024-11-07 RX ORDER — ALBUTEROL SULFATE 0.83 MG/ML
2.5 SOLUTION RESPIRATORY (INHALATION)
OUTPATIENT
Start: 2024-11-07

## 2024-11-07 RX ORDER — HEPARIN SODIUM (PORCINE) LOCK FLUSH IV SOLN 100 UNIT/ML 100 UNIT/ML
5 SOLUTION INTRAVENOUS
OUTPATIENT
Start: 2024-11-07

## 2024-11-07 RX ORDER — MEPERIDINE HYDROCHLORIDE 25 MG/ML
25 INJECTION INTRAMUSCULAR; INTRAVENOUS; SUBCUTANEOUS
Status: CANCELLED | OUTPATIENT
Start: 2024-11-07

## 2024-12-03 DIAGNOSIS — M06.09 RHEUMATOID ARTHRITIS OF MULTIPLE SITES WITHOUT RHEUMATOID FACTOR (H): ICD-10-CM

## 2024-12-03 RX ORDER — METHOTREXATE 2.5 MG/1
20 TABLET ORAL
Qty: 96 TABLET | Refills: 0 | Status: SHIPPED | OUTPATIENT
Start: 2024-12-03

## 2024-12-22 ENCOUNTER — MYC MEDICAL ADVICE (OUTPATIENT)
Dept: OBGYN | Facility: CLINIC | Age: 54
End: 2024-12-22
Payer: COMMERCIAL

## 2024-12-22 DIAGNOSIS — N63.20 MASS OF LEFT BREAST, UNSPECIFIED QUADRANT: Primary | ICD-10-CM

## 2024-12-22 NOTE — PROGRESS NOTES
RETURN PATIENT RHEUMATOLOGY VISIT     Assessment & Plan     Problem List    Hypothyroidism ([place])  Asthma   Hip dysplasia s/p bilateral hip replacements (1999 and 2010)   HTN  Congenital fifth digit flexion contractures      No orders of the defined types were placed in this encounter.     JOSE DE JESUS; Seronegative Erosive Rheumatoid arthritis   Comment: Diagnosed age of 3. Historically her arthritis has affected her hands and wrists with resulting contractures. She has had chronically elevated inflammatory markers, however, most recently have been within normal limits.  Has been well-controlled on Remicade infusions (various frequencies for over 10 years ) and methotrexate.  Continues to be doing well with normal inflammatory markers, no morning stiffness, and no episodes of joint pain or swelling.    Tx history:  Enbrel (unable to do self injections due to needle phobia)  Gold injections (renal toxicity)    Current therapy  Remicaid 300mg q 6 weeks   Methotrexate p.o. 20 mg weekly    Plan:  -Continue Remicade infusions every 6 weeks -she will message me if she decides she would like to try spacing every 7 weeks before next appointment  -Continue methotrexate 20 mg weekly and folic acid 2 mg daily    High risk medication use  Comment:   Quant gold negative (7/2024)  Hepatitis C serologies negative (2021)  Hep B negative (7/2024)  S/p Shingles, pneumonia and initial covid vaccines   S/p flu shot 2024 season, declines covid booster   Plan:  -Toxicity monitoring labs q 14 weeks (for convenience as gets labs with her infusions)    Preventative care in patient with autoimmune disease  Comment:   Plan:   -Discussed with patient that people with autoimmune disease have increased risk of cardiovascular disease.  Recommended working with their primary care doctor to mitigate cardiovascular risk factors.   Gets yearly skin checks   -Discussed with patient that those of autoimmune disease and on immunosuppressive medications have a  high risk of malignancies.  They are up-to-date on all routine cancer screenings including Pap smears, mammogram, and colonoscopy and yearly skin checks.     Repoductive health  Comment: Currently pre-menopausal, sexually active with men, uses condoms for birth control. No plans for pregnancy.  Plan:  -Counseled patient today on teratogenicity of methotrexate and recommendation for 2 forms of birth control      The longitudinal plan of care for the diagnosis(es)/condition(s) as documented were addressed during this visit. Due to the added complexity in care, I will continue to support Noni in the subsequent management and with ongoing continuity of care.    Return to Clinic in 1 year      20 minutes spent on the day of the encounter doing chart review, history and exam, counseling and documentation.     Subjective     Reports she did not want to space her Remicade every 7 weeks because she feels she still has increased achiness when it gets closer to the 6 the week.  She denies any morning stiffness and reports overall feeling that her arthritis is well-controlled.  No major updates to her health with no major interim illnesses.  No shortness of breath or cough.  No rashes.  No recurrent fevers.  Eyes have been more red and itchy lately but no episodes of red painful eyes.    HPI    Previous pt of Dr. Gonzalez, last seen about a year ago.  Currently doing very well on Remicade 300mg infusions every 6 weeks and methotrexate 20 mg daily.  Denies any morning stiffness.  No pain with or swelling in any of the joints.  Historically, her arthritis affected her hands and wrists and sometimes her neck.  She denies any new shortness of breath or cough, rashes, neuropathy, or red painful eyes.  In the past she has been able to space Remicade every 8 weeks but due to increased activity the frequency was reduced, at its most intense that she was at 5 weeks infusions and methotrexate was increased to 20 mg weekly.  She has  been on this combination medication for at least 10 years with good control of her arthritis.  She did try Enbrel in the past but was unable to do self injections due to needle phobia.  She is also been on gold injections but suffered renal toxicity from this.  She also has a history of hip dysplasia and underwent bilateral hip replacements.    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  She has had no other extra-articular manifestations of her disease including no pulmonary involvement and no history of uveitis.      Lab and Imaging review:    I reviewed recent labs and imaging including:    10/2024 plts a bit high    5/2/2024 CRP and ESR within normal limits AST, ALT, creatinine, and CBC within normal limits      Current Outpatient Medications:     albuterol (PROAIR HFA/PROVENTIL HFA/VENTOLIN HFA) 108 (90 Base) MCG/ACT inhaler, Inhale 2 puffs into the lungs every 6 hours., Disp: 18 g, Rfl: 1    calcium citrate-vitamin D (CITRACAL) 200-6.25 MG-MCG TABS per tablet, Take 1 tablet by mouth 2 times daily, Disp: , Rfl:     famotidine (PEPCID) 20 MG tablet, Take 1 tablet (20 mg) by mouth 2 times daily., Disp: 180 tablet, Rfl: 3    ferrous sulfate (FEROSUL) 325 (65 Fe) MG tablet, Take 25 mg by mouth daily (with breakfast), Disp: , Rfl:     folic acid (FOLVITE) 1 MG tablet, TAKE 2 TABLETS BY MOUTH EVERY DAY, Disp: 180 tablet, Rfl: 11    gabapentin (NEURONTIN) 300 MG capsule, Take 3 capsules (900 mg) by mouth every evening., Disp: 270 capsule, Rfl: 3    ibuprofen (ADVIL/MOTRIN) 200 MG tablet, Take 200 mg by mouth every 4 hours as needed for pain, Disp: , Rfl:     inFLIXimab (REMICADE) 100 MG injection, Inject 300 mg into the vein as needed, Disp: 30 mL, Rfl: 0    levothyroxine (SYNTHROID/LEVOTHROID) 88 MCG tablet, Take 1 tablet (88 mcg) by mouth daily. Needs labwork for further refills, Disp: 90 tablet, Rfl:  3    losartan (COZAAR) 25 MG tablet, Take 1 tablet (25 mg) by mouth daily., Disp: 90 tablet, Rfl: 3    methotrexate 2.5 MG tablet, TAKE 8 TABLETS (20 MG) BY MOUTH EVERY 7 DAYS, Disp: 96 tablet, Rfl: 0    norethindrone (MICRONOR) 0.35 MG tablet, Take 1 tablet (0.35 mg) by mouth daily, Disp: 84 tablet, Rfl: 3  Allergies:  Allergies   Allergen Reactions    Amoxicillin Nausea and Cramps     Medical Hx:  Past Medical History:   Diagnosis Date    ASCUS of cervix with negative high risk HPV 10/27/2010    Contraception     Grave's disease     Hypothyroid     Inflammatory arthritis     Intermittent asthma 09/10/2013    Nephrolithiasis 06/16/2011    Osteopenia 02/14/2013    Rheumatoid arthritis(714.0)      Surgical Hx:  Past Surgical History:   Procedure Laterality Date    COLONOSCOPY WITH CO2 INSUFFLATION N/A 5/28/2021    Procedure: COLONOSCOPY, WITH CO2 INSUFFLATION;  Surgeon: German Oakes DO;  Location: [facility] OR    JOINT REPLACEMENT, HIP RT/LT      (L)    JOINT REPLACEMENT, HIP RT/LT  3/2013    (R)    SURGICAL HISTORY OF -       Lithotripsy    ZZC PELVIS/HIP JOINT SURGERY UNLISTED       Family Hx:  Family History   Problem Relation Age of Onset    Breast Cancer Mother     C.A.D. Father      Social Hx:  Social History     Tobacco Use    Smoking status: Never    Smokeless tobacco: Never   Vaping Use    Vaping status: Never Used   Substance Use Topics    Alcohol use: No    Drug use: No        Objective   Physical Exam   /69 (BP Location: Right arm)   Pulse 95   Wt 53.4 kg (117 lb 12.8 oz)   SpO2 97%   BMI 20.87 kg/m    General: alert, well appearing, no distress  HEENT: no alopecia, clear conjunctiva,   Cardiac: Warm well-perfused  Pulm: normal respiratory effort,  MSK: Bilateral fifth digit flexion contractures.  Bony hypertrophy over multiple PIPs, right second MCP subluxation, no synovitis over any of the MCPs, PIPs, or DIPs.  Bilateral fused wrists without any synovitis or tenderness palpation.  Bilateral  elbows with full range of motion and no swelling, tenderness to palpation, or warmth.  Bilateral shoulders with full range of motion and no synovitis.  Bilateral hips and knees and ankles with full range of motion and no synovitis.  Skin: No rashes, warm and well-perfused.  No subcutaneous nodules.       Kerri Diamond MD  Rheumatology

## 2024-12-23 ENCOUNTER — OFFICE VISIT (OUTPATIENT)
Dept: RHEUMATOLOGY | Facility: CLINIC | Age: 54
End: 2024-12-23
Payer: COMMERCIAL

## 2024-12-23 VITALS
BODY MASS INDEX: 20.87 KG/M2 | OXYGEN SATURATION: 97 % | DIASTOLIC BLOOD PRESSURE: 69 MMHG | SYSTOLIC BLOOD PRESSURE: 104 MMHG | HEART RATE: 95 BPM | WEIGHT: 117.8 LBS

## 2024-12-23 DIAGNOSIS — M06.09 RHEUMATOID ARTHRITIS OF MULTIPLE SITES WITHOUT RHEUMATOID FACTOR (H): Primary | ICD-10-CM

## 2024-12-23 PROCEDURE — G2211 COMPLEX E/M VISIT ADD ON: HCPCS | Performed by: STUDENT IN AN ORGANIZED HEALTH CARE EDUCATION/TRAINING PROGRAM

## 2024-12-23 PROCEDURE — 99214 OFFICE O/P EST MOD 30 MIN: CPT | Performed by: STUDENT IN AN ORGANIZED HEALTH CARE EDUCATION/TRAINING PROGRAM

## 2024-12-23 NOTE — TELEPHONE ENCOUNTER
"Pt has felt a lump in her left breast and wants to know next steps.    Pt had a mammogram in October, 2024.  \"Findings: The breasts are heterogeneously dense, which may obscure small masses.  There is no radiographic evidence of malignancy. \"    Pt denies any other lumps than the one in her left breast.  Denies any pain or dimpling.    Routing to KEENAN Metzger CNP, to further advise on next steps.    Jennifer Marcus RN- OB/GYN group      "

## 2025-01-06 ENCOUNTER — ANCILLARY PROCEDURE (OUTPATIENT)
Dept: MAMMOGRAPHY | Facility: CLINIC | Age: 55
End: 2025-01-06
Attending: NURSE PRACTITIONER
Payer: COMMERCIAL

## 2025-01-06 ENCOUNTER — ANCILLARY PROCEDURE (OUTPATIENT)
Dept: ULTRASOUND IMAGING | Facility: CLINIC | Age: 55
End: 2025-01-06
Attending: NURSE PRACTITIONER
Payer: COMMERCIAL

## 2025-01-06 DIAGNOSIS — N63.20 MASS OF LEFT BREAST, UNSPECIFIED QUADRANT: ICD-10-CM

## 2025-01-06 PROCEDURE — 76642 ULTRASOUND BREAST LIMITED: CPT | Mod: LT | Performed by: STUDENT IN AN ORGANIZED HEALTH CARE EDUCATION/TRAINING PROGRAM

## 2025-01-06 PROCEDURE — 77065 DX MAMMO INCL CAD UNI: CPT | Mod: LT | Performed by: STUDENT IN AN ORGANIZED HEALTH CARE EDUCATION/TRAINING PROGRAM

## 2025-01-06 PROCEDURE — G0279 TOMOSYNTHESIS, MAMMO: HCPCS | Performed by: STUDENT IN AN ORGANIZED HEALTH CARE EDUCATION/TRAINING PROGRAM

## 2025-01-22 DIAGNOSIS — J45.20 MILD INTERMITTENT ASTHMA WITHOUT COMPLICATION: ICD-10-CM

## 2025-01-23 RX ORDER — ALBUTEROL SULFATE 90 UG/1
2 INHALANT RESPIRATORY (INHALATION) EVERY 6 HOURS
Qty: 8.5 G | Refills: 1 | Status: SHIPPED | OUTPATIENT
Start: 2025-01-23

## 2025-01-23 NOTE — TELEPHONE ENCOUNTER
Prescription approved per [facility] Refill Protocol.  Amy Roland RN  [facility]

## 2025-03-05 DIAGNOSIS — M06.09 RHEUMATOID ARTHRITIS OF MULTIPLE SITES WITHOUT RHEUMATOID FACTOR (H): ICD-10-CM

## 2025-03-05 RX ORDER — METHOTREXATE 2.5 MG/1
20 TABLET ORAL
Qty: 96 TABLET | Refills: 0 | Status: SHIPPED | OUTPATIENT
Start: 2025-03-05

## 2025-04-07 ENCOUNTER — LAB (OUTPATIENT)
Dept: INFUSION THERAPY | Facility: CLINIC | Age: 55
End: 2025-04-07
Attending: STUDENT IN AN ORGANIZED HEALTH CARE EDUCATION/TRAINING PROGRAM
Payer: COMMERCIAL

## 2025-04-07 VITALS
DIASTOLIC BLOOD PRESSURE: 73 MMHG | OXYGEN SATURATION: 99 % | TEMPERATURE: 98 F | SYSTOLIC BLOOD PRESSURE: 111 MMHG | HEART RATE: 68 BPM | BODY MASS INDEX: 21.13 KG/M2 | WEIGHT: 119.3 LBS | RESPIRATION RATE: 16 BRPM

## 2025-04-07 DIAGNOSIS — M08.80 JIA (JUVENILE IDIOPATHIC ARTHRITIS) (H): ICD-10-CM

## 2025-04-07 DIAGNOSIS — M08.3 CHRONIC POLYARTICULAR JUVENILE RHEUMATOID ARTHRITIS (H): ICD-10-CM

## 2025-04-07 DIAGNOSIS — Z79.899 HIGH RISK MEDICATIONS (NOT ANTICOAGULANTS) LONG-TERM USE: Primary | ICD-10-CM

## 2025-04-07 LAB
ALT SERPL W P-5'-P-CCNC: 11 U/L (ref 0–50)
AST SERPL W P-5'-P-CCNC: 18 U/L (ref 0–45)
CREAT SERPL-MCNC: 0.74 MG/DL (ref 0.51–0.95)
CRP SERPL-MCNC: 4.2 MG/L
EGFRCR SERPLBLD CKD-EPI 2021: >90 ML/MIN/1.73M2
ERYTHROCYTE [DISTWIDTH] IN BLOOD BY AUTOMATED COUNT: 13.7 % (ref 10–15)
ERYTHROCYTE [SEDIMENTATION RATE] IN BLOOD BY WESTERGREN METHOD: 32 MM/HR (ref 0–30)
HCT VFR BLD AUTO: 39.8 % (ref 35–47)
HGB BLD-MCNC: 13.2 G/DL (ref 11.7–15.7)
HOLD SPECIMEN: NORMAL
MCH RBC QN AUTO: 30.4 PG (ref 26.5–33)
MCHC RBC AUTO-ENTMCNC: 33.2 G/DL (ref 31.5–36.5)
MCV RBC AUTO: 92 FL (ref 78–100)
PLATELET # BLD AUTO: 446 10E3/UL (ref 150–450)
RBC # BLD AUTO: 4.34 10E6/UL (ref 3.8–5.2)
WBC # BLD AUTO: 7.3 10E3/UL (ref 4–11)

## 2025-04-07 PROCEDURE — 85018 HEMOGLOBIN: CPT

## 2025-04-07 PROCEDURE — 85041 AUTOMATED RBC COUNT: CPT

## 2025-04-07 PROCEDURE — 99207 PR NO CHARGE LOS: CPT

## 2025-04-07 PROCEDURE — 82565 ASSAY OF CREATININE: CPT

## 2025-04-07 PROCEDURE — 84450 TRANSFERASE (AST) (SGOT): CPT

## 2025-04-07 PROCEDURE — 36415 COLL VENOUS BLD VENIPUNCTURE: CPT

## 2025-04-07 PROCEDURE — 96413 CHEMO IV INFUSION 1 HR: CPT

## 2025-04-07 PROCEDURE — 85652 RBC SED RATE AUTOMATED: CPT

## 2025-04-07 PROCEDURE — 258N000003 HC RX IP 258 OP 636: Performed by: STUDENT IN AN ORGANIZED HEALTH CARE EDUCATION/TRAINING PROGRAM

## 2025-04-07 PROCEDURE — 250N000011 HC RX IP 250 OP 636: Mod: JZ | Performed by: STUDENT IN AN ORGANIZED HEALTH CARE EDUCATION/TRAINING PROGRAM

## 2025-04-07 PROCEDURE — 86140 C-REACTIVE PROTEIN: CPT

## 2025-04-07 PROCEDURE — 84460 ALANINE AMINO (ALT) (SGPT): CPT

## 2025-04-07 RX ORDER — ALBUTEROL SULFATE 0.83 MG/ML
2.5 SOLUTION RESPIRATORY (INHALATION)
OUTPATIENT
Start: 2025-05-16

## 2025-04-07 RX ORDER — ACETAMINOPHEN 325 MG/1
650 TABLET ORAL ONCE
Start: 2025-05-16 | End: 2025-05-16

## 2025-04-07 RX ORDER — DIPHENHYDRAMINE HYDROCHLORIDE 50 MG/ML
25 INJECTION, SOLUTION INTRAMUSCULAR; INTRAVENOUS
Start: 2025-05-16

## 2025-04-07 RX ORDER — METHYLPREDNISOLONE SODIUM SUCCINATE 40 MG/ML
40 INJECTION INTRAMUSCULAR; INTRAVENOUS
Start: 2025-05-16

## 2025-04-07 RX ORDER — EPINEPHRINE 1 MG/ML
0.3 INJECTION, SOLUTION INTRAMUSCULAR; SUBCUTANEOUS EVERY 5 MIN PRN
OUTPATIENT
Start: 2025-05-16

## 2025-04-07 RX ORDER — HEPARIN SODIUM (PORCINE) LOCK FLUSH IV SOLN 100 UNIT/ML 100 UNIT/ML
5 SOLUTION INTRAVENOUS
OUTPATIENT
Start: 2025-05-16

## 2025-04-07 RX ORDER — HEPARIN SODIUM,PORCINE 10 UNIT/ML
5-20 VIAL (ML) INTRAVENOUS DAILY PRN
OUTPATIENT
Start: 2025-05-16

## 2025-04-07 RX ORDER — ALBUTEROL SULFATE 90 UG/1
1-2 INHALANT RESPIRATORY (INHALATION)
Start: 2025-05-16

## 2025-04-07 RX ORDER — DIPHENHYDRAMINE HYDROCHLORIDE 50 MG/ML
50 INJECTION, SOLUTION INTRAMUSCULAR; INTRAVENOUS
Start: 2025-05-16

## 2025-04-07 RX ORDER — MEPERIDINE HYDROCHLORIDE 25 MG/ML
25 INJECTION INTRAMUSCULAR; INTRAVENOUS; SUBCUTANEOUS
OUTPATIENT
Start: 2025-05-16

## 2025-04-07 RX ORDER — DIPHENHYDRAMINE HCL 25 MG
25 CAPSULE ORAL ONCE
Start: 2025-05-16 | End: 2025-05-16

## 2025-04-07 RX ADMIN — INFLIXIMAB 300 MG: 100 INJECTION, POWDER, LYOPHILIZED, FOR SOLUTION INTRAVENOUS at 11:50

## 2025-04-07 RX ADMIN — SODIUM CHLORIDE 250 ML: 0.9 INJECTION, SOLUTION INTRAVENOUS at 11:47

## 2025-04-07 NOTE — PROGRESS NOTES
Infusion Nursing Note:  Noni Mccormick presents today for rapid Remicade infusion.    Patient seen by provider today: No   present during visit today: Not Applicable.    Note:   Patient states pain in hands get worse when she is due for an infusion. States pain starts getting better the evening of her infusions.    Patient takes her own supply of Tylenol and Benadryl prior to arrival to the clinic.    Intravenous Access:  Peripheral IV placed.    Treatment Conditions:  Biological Infusion Checklist:  ~~~ NOTE: If the patient answers yes to any of the questions below, hold the infusion and contact ordering provider or on-call provider.    Have you recently had an elevated temperature, fever, chills, productive cough, coughing for 3 weeks or longer or hemoptysis,  abnormal vital signs, night sweats,  chest pain or have you noticed a decrease in your appetite, unexplained weight loss or fatigue? No  Do you have any open wounds or new incisions? No  Do you have any upcoming hospitalizations or surgeries? Does not include esophagogastroduodenoscopy, colonoscopy, endoscopic retrograde cholangiopancreatography (ERCP), endoscopic ultrasound (EUS), dental procedures or joint aspiration/steroid injections No  Do you currently have any signs of illness or infection or are you on any antibiotics? No  Have you had any new, sudden or worsening abdominal pain? No  Have you or anyone in your household received a live vaccination in the past 4 weeks? Please note: No live vaccines while on biologic/chemotherapy until 6 months after the last treatment. Patient can receive the flu vaccine (shot only), pneumovax and the Covid vaccine. It is optimal for the patient to get these vaccines mid cycle, but they can be given at any time as long as it is not on the day of the infusion. No  Have you recently been diagnosed with any new nervous system diseases (ie. Multiple sclerosis, Guillain [place], seizures, neurological  changes) or cancer diagnosis? Are you on any form of radiation or chemotherapy? No  Are you pregnant or breast feeding or do you have plans of pregnancy in the future? No  Have there been any other new onset medical symptoms? No      Post Infusion Assessment:  Patient tolerated infusion without incident.  Blood return noted pre and post infusion.  Site patent and intact, free from redness, edema or discomfort.  No evidence of extravasations.  Access discontinued per protocol.       Discharge Plan:   AVS to patient via MYCHART.  Patient will return 5/19/25 for next appointment.   Patient discharged in stable condition accompanied by: self.  Departure Mode: Ambulatory.      Maria Del Carmen Estrada RN

## 2025-04-23 DIAGNOSIS — M08.3 CHRONIC POLYARTICULAR JUVENILE RHEUMATOID ARTHRITIS (H): Primary | ICD-10-CM

## 2025-04-23 DIAGNOSIS — Z79.899 HIGH RISK MEDICATIONS (NOT ANTICOAGULANTS) LONG-TERM USE: ICD-10-CM

## 2025-05-19 ENCOUNTER — INFUSION THERAPY VISIT (OUTPATIENT)
Dept: INFUSION THERAPY | Facility: CLINIC | Age: 55
End: 2025-05-19
Attending: STUDENT IN AN ORGANIZED HEALTH CARE EDUCATION/TRAINING PROGRAM
Payer: COMMERCIAL

## 2025-05-19 VITALS
DIASTOLIC BLOOD PRESSURE: 75 MMHG | OXYGEN SATURATION: 98 % | SYSTOLIC BLOOD PRESSURE: 112 MMHG | TEMPERATURE: 98.2 F | HEART RATE: 58 BPM | RESPIRATION RATE: 16 BRPM

## 2025-05-19 DIAGNOSIS — M08.3 CHRONIC POLYARTICULAR JUVENILE RHEUMATOID ARTHRITIS (H): ICD-10-CM

## 2025-05-19 DIAGNOSIS — Z79.899 HIGH RISK MEDICATIONS (NOT ANTICOAGULANTS) LONG-TERM USE: Primary | ICD-10-CM

## 2025-05-19 PROCEDURE — 250N000011 HC RX IP 250 OP 636: Mod: JZ | Performed by: STUDENT IN AN ORGANIZED HEALTH CARE EDUCATION/TRAINING PROGRAM

## 2025-05-19 PROCEDURE — 96413 CHEMO IV INFUSION 1 HR: CPT

## 2025-05-19 PROCEDURE — 258N000003 HC RX IP 258 OP 636: Performed by: STUDENT IN AN ORGANIZED HEALTH CARE EDUCATION/TRAINING PROGRAM

## 2025-05-19 PROCEDURE — 99207 PR NO CHARGE LOS: CPT

## 2025-05-19 RX ORDER — DIPHENHYDRAMINE HCL 25 MG
25 CAPSULE ORAL ONCE
Start: 2025-06-30 | End: 2025-06-30

## 2025-05-19 RX ORDER — HEPARIN SODIUM,PORCINE 10 UNIT/ML
5-20 VIAL (ML) INTRAVENOUS DAILY PRN
OUTPATIENT
Start: 2025-06-30

## 2025-05-19 RX ORDER — ALBUTEROL SULFATE 90 UG/1
1-2 INHALANT RESPIRATORY (INHALATION)
Start: 2025-06-30

## 2025-05-19 RX ORDER — HEPARIN SODIUM (PORCINE) LOCK FLUSH IV SOLN 100 UNIT/ML 100 UNIT/ML
5 SOLUTION INTRAVENOUS
OUTPATIENT
Start: 2025-06-30

## 2025-05-19 RX ORDER — DIPHENHYDRAMINE HYDROCHLORIDE 50 MG/ML
50 INJECTION, SOLUTION INTRAMUSCULAR; INTRAVENOUS
Start: 2025-06-30

## 2025-05-19 RX ORDER — EPINEPHRINE 1 MG/ML
0.3 INJECTION, SOLUTION INTRAMUSCULAR; SUBCUTANEOUS EVERY 5 MIN PRN
OUTPATIENT
Start: 2025-06-30

## 2025-05-19 RX ORDER — ACETAMINOPHEN 325 MG/1
650 TABLET ORAL ONCE
Start: 2025-06-30 | End: 2025-06-30

## 2025-05-19 RX ORDER — METHYLPREDNISOLONE SODIUM SUCCINATE 40 MG/ML
40 INJECTION INTRAMUSCULAR; INTRAVENOUS
Start: 2025-06-30

## 2025-05-19 RX ORDER — DIPHENHYDRAMINE HYDROCHLORIDE 50 MG/ML
25 INJECTION, SOLUTION INTRAMUSCULAR; INTRAVENOUS
Start: 2025-06-30

## 2025-05-19 RX ORDER — ALBUTEROL SULFATE 0.83 MG/ML
2.5 SOLUTION RESPIRATORY (INHALATION)
OUTPATIENT
Start: 2025-06-30

## 2025-05-19 RX ORDER — MEPERIDINE HYDROCHLORIDE 25 MG/ML
25 INJECTION INTRAMUSCULAR; INTRAVENOUS; SUBCUTANEOUS
OUTPATIENT
Start: 2025-06-30

## 2025-05-19 RX ADMIN — INFLIXIMAB 300 MG: 100 INJECTION, POWDER, LYOPHILIZED, FOR SOLUTION INTRAVENOUS at 10:53

## 2025-05-19 NOTE — PROGRESS NOTES
Infusion Nursing Note:  Noni Mccormick presents today for Rapid Remicade.    Patient seen by provider today: No   present during visit today: Not Applicable.    Note: Patient with no new medical complaints today and tolerating rapid infusions without event. Rating arthritis pain 3/10 in hands/wrists. Patient took PO Benadryl and Tylenol at home prior to arrival today.    Intravenous Access:  Peripheral IV placed.    Treatment Conditions:  Biological Infusion Checklist:  ~~~ NOTE: If the patient answers yes to any of the questions below, hold the infusion and contact ordering provider or on-call provider.    Have you recently had an elevated temperature, fever, chills, productive cough, coughing for 3 weeks or longer or hemoptysis,  abnormal vital signs, night sweats,  chest pain or have you noticed a decrease in your appetite, unexplained weight loss or fatigue? No  Do you have any open wounds or new incisions? No  Do you have any upcoming hospitalizations or surgeries? Does not include esophagogastroduodenoscopy, colonoscopy, endoscopic retrograde cholangiopancreatography (ERCP), endoscopic ultrasound (EUS), dental procedures or joint aspiration/steroid injections No  Do you currently have any signs of illness or infection or are you on any antibiotics? No  Have you had any new, sudden or worsening abdominal pain? No  Have you or anyone in your household received a live vaccination in the past 4 weeks? Please note: No live vaccines while on biologic/chemotherapy until 6 months after the last treatment. Patient can receive the flu vaccine (shot only), pneumovax and the Covid vaccine. It is optimal for the patient to get these vaccines mid cycle, but they can be given at any time as long as it is not on the day of the infusion. No  Have you recently been diagnosed with any new nervous system diseases (ie. Multiple sclerosis, Guillain [place], seizures, neurological changes) or cancer diagnosis? Are you  on any form of radiation or chemotherapy? No  Are you pregnant or breast feeding or do you have plans of pregnancy in the future? No  Have you been having any signs of worsening depression or suicidal ideations?  (benlysta only) No  Have there been any other new onset medical symptoms? No  Have you had any new blood clots? (IVIG only) No      Post Infusion Assessment:  Patient tolerated infusion without incident.  Blood return noted pre and post infusion.  Site patent and intact, free from redness, edema or discomfort.  No evidence of extravasations.  Access discontinued per protocol.       Discharge Plan:   AVS to patient via MYCHART.  Patient will return 6/30/25 for next appointment. She's aware that she has labs next visit.  Patient discharged in stable condition accompanied by: self.  Departure Mode: Ambulatory.      Chioma Kam RN

## 2025-06-08 DIAGNOSIS — M06.09 RHEUMATOID ARTHRITIS OF MULTIPLE SITES WITHOUT RHEUMATOID FACTOR (H): ICD-10-CM

## 2025-06-09 RX ORDER — METHOTREXATE 2.5 MG/1
20 TABLET ORAL
Qty: 96 TABLET | Refills: 0 | Status: SHIPPED | OUTPATIENT
Start: 2025-06-09

## 2025-06-24 DIAGNOSIS — Z79.899 HIGH RISK MEDICATIONS (NOT ANTICOAGULANTS) LONG-TERM USE: ICD-10-CM

## 2025-06-24 RX ORDER — ACETAMINOPHEN AND CODEINE PHOSPHATE 120; 12 MG/5ML; MG/5ML
0.35 SOLUTION ORAL DAILY
Qty: 84 TABLET | Refills: 0 | Status: SHIPPED | OUTPATIENT
Start: 2025-06-24

## 2025-06-24 NOTE — TELEPHONE ENCOUNTER
Requested Prescriptions   Pending Prescriptions Disp Refills    JENCYCLA 0.35 MG tablet [Pharmacy Med Name: JENCYCLA 0.35 MG TABLET] 84 tablet 3     Sig: TAKE 1 TABLET BY MOUTH EVERY DAY       Contraceptives Protocol Failed - 6/24/2025  7:46 AM        Failed - Medication indicated for associated diagnosis     Medication is associated with one or more of the following diagnoses:  Contraception  Acne  Dysmenorrhea  Menorrhagia  Amenorrhea  PCOS  Premenstrual Dysphoric Disorder  Irregular menses  Endometriosis  Contraceptive counseling  Finding of menstrual bleeding  Education about oral contraception  Uses contraception  Initial prescription of oral contraception  Oral contraception-no problem  Oral contraceptive repeat          Passed - Patient is not a current smoker if age is 35 or older        Passed - Medication is active on med list and the sig matches. RN to manually verify dose and sig if red X/fail.     If the protocol passes (green check), you do not need to verify med dose and sig.    A prescription matches if they are the same clinical intention.    For Example: once daily and every morning are the same.    The protocol can not identify upper and lower case letters as matching and will fail.     For Example: Take 1 tablet (50 mg) by mouth daily     TAKE 1 TABLET (50 MG) BY MOUTH DAILY    For all fails (red x), verify dose and sig.    If the refill does match what is on file, the RN can still proceed to approve the refill request.       If they do not match, route to the appropriate provider.             Passed - Recent (12 month) or future (90 days) visit with authorizing provider's specialty (provided they have been seen in the past 15 months)     The patient must have completed an in-person or virtual visit within the past 12 months or has a future visit scheduled within the next 90 days with the authorizing provider s specialty.  Urgent care and e-visits do not qualify as an office visit for this  protocol.          Passed - No active pregnancy on record        Passed - No positive pregnancy test in past 12 months           Routing refill request to provider for review/approval because:  Diagnosis on rx is not on the list above for approved dx's for RNs to approve refills.    Jennifer Marcus RN- OB/GYN group

## 2025-06-30 ENCOUNTER — RESULTS FOLLOW-UP (OUTPATIENT)
Dept: RHEUMATOLOGY | Facility: CLINIC | Age: 55
End: 2025-06-30

## 2025-06-30 ENCOUNTER — LAB (OUTPATIENT)
Dept: INFUSION THERAPY | Facility: CLINIC | Age: 55
End: 2025-06-30
Attending: STUDENT IN AN ORGANIZED HEALTH CARE EDUCATION/TRAINING PROGRAM
Payer: COMMERCIAL

## 2025-06-30 VITALS
OXYGEN SATURATION: 97 % | WEIGHT: 117.3 LBS | BODY MASS INDEX: 20.78 KG/M2 | RESPIRATION RATE: 16 BRPM | SYSTOLIC BLOOD PRESSURE: 105 MMHG | TEMPERATURE: 98.2 F | HEART RATE: 72 BPM | DIASTOLIC BLOOD PRESSURE: 73 MMHG

## 2025-06-30 DIAGNOSIS — M08.3 CHRONIC POLYARTICULAR JUVENILE RHEUMATOID ARTHRITIS (H): ICD-10-CM

## 2025-06-30 DIAGNOSIS — M08.80 JIA (JUVENILE IDIOPATHIC ARTHRITIS) (H): ICD-10-CM

## 2025-06-30 DIAGNOSIS — Z79.899 HIGH RISK MEDICATIONS (NOT ANTICOAGULANTS) LONG-TERM USE: Primary | ICD-10-CM

## 2025-06-30 LAB
ALT SERPL W P-5'-P-CCNC: 14 U/L (ref 0–50)
AST SERPL W P-5'-P-CCNC: 25 U/L (ref 0–45)
CREAT SERPL-MCNC: 0.75 MG/DL (ref 0.51–0.95)
CRP SERPL-MCNC: 3.87 MG/L
EGFRCR SERPLBLD CKD-EPI 2021: >90 ML/MIN/1.73M2
ERYTHROCYTE [DISTWIDTH] IN BLOOD BY AUTOMATED COUNT: 13.4 % (ref 10–15)
ERYTHROCYTE [SEDIMENTATION RATE] IN BLOOD BY WESTERGREN METHOD: 30 MM/HR (ref 0–30)
HCT VFR BLD AUTO: 38.4 % (ref 35–47)
HGB BLD-MCNC: 13 G/DL (ref 11.7–15.7)
HOLD SPECIMEN: NORMAL
MCH RBC QN AUTO: 30.3 PG (ref 26.5–33)
MCHC RBC AUTO-ENTMCNC: 33.9 G/DL (ref 31.5–36.5)
MCV RBC AUTO: 90 FL (ref 78–100)
PLATELET # BLD AUTO: 365 10E3/UL (ref 150–450)
RBC # BLD AUTO: 4.29 10E6/UL (ref 3.8–5.2)
WBC # BLD AUTO: 7.8 10E3/UL (ref 4–11)

## 2025-06-30 PROCEDURE — 86140 C-REACTIVE PROTEIN: CPT

## 2025-06-30 PROCEDURE — 36415 COLL VENOUS BLD VENIPUNCTURE: CPT

## 2025-06-30 PROCEDURE — 84450 TRANSFERASE (AST) (SGOT): CPT

## 2025-06-30 PROCEDURE — 99207 PR NO CHARGE LOS: CPT

## 2025-06-30 PROCEDURE — 258N000003 HC RX IP 258 OP 636: Performed by: STUDENT IN AN ORGANIZED HEALTH CARE EDUCATION/TRAINING PROGRAM

## 2025-06-30 PROCEDURE — 250N000011 HC RX IP 250 OP 636: Mod: JZ | Performed by: STUDENT IN AN ORGANIZED HEALTH CARE EDUCATION/TRAINING PROGRAM

## 2025-06-30 PROCEDURE — 84460 ALANINE AMINO (ALT) (SGPT): CPT

## 2025-06-30 PROCEDURE — 82565 ASSAY OF CREATININE: CPT

## 2025-06-30 PROCEDURE — 85014 HEMATOCRIT: CPT

## 2025-06-30 PROCEDURE — 85652 RBC SED RATE AUTOMATED: CPT

## 2025-06-30 PROCEDURE — 96413 CHEMO IV INFUSION 1 HR: CPT

## 2025-06-30 RX ORDER — METHYLPREDNISOLONE SODIUM SUCCINATE 40 MG/ML
40 INJECTION INTRAMUSCULAR; INTRAVENOUS
Start: 2025-08-11

## 2025-06-30 RX ORDER — MEPERIDINE HYDROCHLORIDE 25 MG/ML
25 INJECTION INTRAMUSCULAR; INTRAVENOUS; SUBCUTANEOUS
OUTPATIENT
Start: 2025-08-11

## 2025-06-30 RX ORDER — HEPARIN SODIUM,PORCINE 10 UNIT/ML
5-20 VIAL (ML) INTRAVENOUS DAILY PRN
OUTPATIENT
Start: 2025-08-11

## 2025-06-30 RX ORDER — HEPARIN SODIUM (PORCINE) LOCK FLUSH IV SOLN 100 UNIT/ML 100 UNIT/ML
5 SOLUTION INTRAVENOUS
OUTPATIENT
Start: 2025-08-11

## 2025-06-30 RX ORDER — DIPHENHYDRAMINE HYDROCHLORIDE 50 MG/ML
25 INJECTION, SOLUTION INTRAMUSCULAR; INTRAVENOUS
Start: 2025-08-11

## 2025-06-30 RX ORDER — DIPHENHYDRAMINE HCL 25 MG
25 CAPSULE ORAL ONCE
Start: 2025-08-11 | End: 2025-08-11

## 2025-06-30 RX ORDER — ACETAMINOPHEN 325 MG/1
650 TABLET ORAL ONCE
Start: 2025-08-11 | End: 2025-08-11

## 2025-06-30 RX ORDER — DIPHENHYDRAMINE HYDROCHLORIDE 50 MG/ML
50 INJECTION, SOLUTION INTRAMUSCULAR; INTRAVENOUS
Start: 2025-08-11

## 2025-06-30 RX ORDER — ALBUTEROL SULFATE 0.83 MG/ML
2.5 SOLUTION RESPIRATORY (INHALATION)
OUTPATIENT
Start: 2025-08-11

## 2025-06-30 RX ORDER — ALBUTEROL SULFATE 90 UG/1
1-2 INHALANT RESPIRATORY (INHALATION)
Start: 2025-08-11

## 2025-06-30 RX ORDER — EPINEPHRINE 1 MG/ML
0.3 INJECTION, SOLUTION INTRAMUSCULAR; SUBCUTANEOUS EVERY 5 MIN PRN
OUTPATIENT
Start: 2025-08-11

## 2025-06-30 RX ADMIN — SODIUM CHLORIDE 250 ML: 0.9 INJECTION, SOLUTION INTRAVENOUS at 11:20

## 2025-06-30 RX ADMIN — INFLIXIMAB 300 MG: 100 INJECTION, POWDER, LYOPHILIZED, FOR SOLUTION INTRAVENOUS at 11:21

## 2025-06-30 ASSESSMENT — PAIN SCALES - GENERAL: PAINLEVEL_OUTOF10: MILD PAIN (3)

## 2025-06-30 NOTE — PROGRESS NOTES
Infusion Nursing Note:  Noni Mccormick presents today for Rapid Remicade.    Patient seen by provider today: No   present during visit today: Not Applicable.    Note: The patient reports feeling well and at her baseline.    Patient took PO Benadryl and Tylenol at home prior to arrival today.     Intravenous Access:  Peripheral IV placed.    Treatment Conditions:  Biological Infusion Checklist:  ~~~ NOTE: If the patient answers yes to any of the questions below, hold the infusion and contact ordering provider or on-call provider.    Have you recently had an elevated temperature, fever, chills, productive cough, coughing for 3 weeks or longer or hemoptysis,  abnormal vital signs, night sweats,  chest pain or have you noticed a decrease in your appetite, unexplained weight loss or fatigue? No  Do you have any open wounds or new incisions? No  Do you have any upcoming hospitalizations or surgeries? Does not include esophagogastroduodenoscopy, colonoscopy, endoscopic retrograde cholangiopancreatography (ERCP), endoscopic ultrasound (EUS), dental procedures or joint aspiration/steroid injections No  Do you currently have any signs of illness or infection or are you on any antibiotics? No  Have you had any new, sudden or worsening abdominal pain? No  Have you or anyone in your household received a live vaccination in the past 4 weeks? Please note: No live vaccines while on biologic/chemotherapy until 6 months after the last treatment. Patient can receive the flu vaccine (shot only), pneumovax and the Covid vaccine. It is optimal for the patient to get these vaccines mid cycle, but they can be given at any time as long as it is not on the day of the infusion. No  Have you recently been diagnosed with any new nervous system diseases (ie. Multiple sclerosis, Guillain [place], seizures, neurological changes) or cancer diagnosis? Are you on any form of radiation or chemotherapy? No  Are you pregnant or breast  feeding or do you have plans of pregnancy in the future? N/A  Have you been having any signs of worsening depression or suicidal ideations?  (benlysta only) N/A  Have there been any other new onset medical symptoms? No  Have you had any new blood clots? (IVIG only) N/A      Post Infusion Assessment:  Patient tolerated infusion without incident.  Blood return noted pre and post infusion.  Site patent and intact, free from redness, edema or discomfort.  No evidence of extravasations.  Access discontinued per protocol.  Biologic Infusion Post Education: Call the triage nurse at your clinic or seek medical attention if you have chills and/or temperature greater than or equal to 100.5, uncontrolled nausea/vomiting, diarrhea, constipation, dizziness, shortness of breath, chest pain, heart palpitations, weakness or any other new or concerning symptoms, questions or concerns.  You cannot have any live virus vaccines prior to or during treatment or up to 6 months post infusion.  If you have an upcoming surgery, medical procedure or dental procedure during treatment, this should be discussed with your ordering physician and your surgeon/dentist.  If you are having any concerning symptom, if you are unsure if you should get your next infusion or wish to speak to a provider before your next infusion, please call your care coordinator or triage nurse at your clinic to notify them so we can adequately serve you.       Discharge Plan:   AVS to patient via FlintoHART.  Patient will return 8/11/25 for next appointment. Future appts have been reviewed and crosschecked with appt note and plan.   Patient discharged in stable condition accompanied by: self.  Departure Mode: Ambulatory.      Sachi Cardoso RN

## 2025-07-07 ENCOUNTER — PATIENT OUTREACH (OUTPATIENT)
Dept: OBGYN | Facility: CLINIC | Age: 55
End: 2025-07-07
Payer: COMMERCIAL

## 2025-07-13 DIAGNOSIS — J45.20 MILD INTERMITTENT ASTHMA WITHOUT COMPLICATION: ICD-10-CM

## 2025-07-13 RX ORDER — ALBUTEROL SULFATE 90 UG/1
2 INHALANT RESPIRATORY (INHALATION) EVERY 6 HOURS
Qty: 18 G | Refills: 0 | Status: SHIPPED | OUTPATIENT
Start: 2025-07-13

## 2025-08-11 ENCOUNTER — OFFICE VISIT (OUTPATIENT)
Dept: OBGYN | Facility: CLINIC | Age: 55
End: 2025-08-11
Attending: NURSE PRACTITIONER
Payer: COMMERCIAL

## 2025-08-11 ENCOUNTER — INFUSION THERAPY VISIT (OUTPATIENT)
Dept: INFUSION THERAPY | Facility: CLINIC | Age: 55
End: 2025-08-11
Attending: NURSE PRACTITIONER
Payer: COMMERCIAL

## 2025-08-11 VITALS
HEART RATE: 80 BPM | WEIGHT: 120.4 LBS | TEMPERATURE: 98.3 F | RESPIRATION RATE: 14 BRPM | SYSTOLIC BLOOD PRESSURE: 113 MMHG | DIASTOLIC BLOOD PRESSURE: 68 MMHG | BODY MASS INDEX: 21.33 KG/M2 | OXYGEN SATURATION: 98 %

## 2025-08-11 VITALS
HEART RATE: 69 BPM | DIASTOLIC BLOOD PRESSURE: 74 MMHG | HEIGHT: 63 IN | OXYGEN SATURATION: 97 % | BODY MASS INDEX: 21.16 KG/M2 | WEIGHT: 119.4 LBS | SYSTOLIC BLOOD PRESSURE: 118 MMHG

## 2025-08-11 DIAGNOSIS — Z13.6 CARDIOVASCULAR SCREENING; LDL GOAL LESS THAN 130: ICD-10-CM

## 2025-08-11 DIAGNOSIS — R87.610 ASCUS OF CERVIX WITH NEGATIVE HIGH RISK HPV: ICD-10-CM

## 2025-08-11 DIAGNOSIS — Z79.899 HIGH RISK MEDICATIONS (NOT ANTICOAGULANTS) LONG-TERM USE: Primary | ICD-10-CM

## 2025-08-11 DIAGNOSIS — Z13.1 SCREENING FOR DIABETES MELLITUS: ICD-10-CM

## 2025-08-11 DIAGNOSIS — Z01.419 ENCOUNTER FOR ANNUAL ROUTINE GYNECOLOGICAL EXAMINATION: Primary | ICD-10-CM

## 2025-08-11 DIAGNOSIS — M08.3 CHRONIC POLYARTICULAR JUVENILE RHEUMATOID ARTHRITIS (H): ICD-10-CM

## 2025-08-11 DIAGNOSIS — Z79.899 HIGH RISK MEDICATIONS (NOT ANTICOAGULANTS) LONG-TERM USE: ICD-10-CM

## 2025-08-11 LAB
CHOLEST SERPL-MCNC: 153 MG/DL
FASTING STATUS PATIENT QL REPORTED: YES
FASTING STATUS PATIENT QL REPORTED: YES
GLUCOSE SERPL-MCNC: 85 MG/DL (ref 70–99)
HDLC SERPL-MCNC: 72 MG/DL
LDLC SERPL CALC-MCNC: 70 MG/DL
NONHDLC SERPL-MCNC: 81 MG/DL
TRIGL SERPL-MCNC: 55 MG/DL

## 2025-08-11 PROCEDURE — 96413 CHEMO IV INFUSION 1 HR: CPT

## 2025-08-11 PROCEDURE — 87624 HPV HI-RISK TYP POOLED RSLT: CPT | Performed by: NURSE PRACTITIONER

## 2025-08-11 PROCEDURE — 99396 PREV VISIT EST AGE 40-64: CPT | Performed by: NURSE PRACTITIONER

## 2025-08-11 PROCEDURE — 80061 LIPID PANEL: CPT | Performed by: NURSE PRACTITIONER

## 2025-08-11 PROCEDURE — 82947 ASSAY GLUCOSE BLOOD QUANT: CPT | Performed by: NURSE PRACTITIONER

## 2025-08-11 PROCEDURE — 258N000003 HC RX IP 258 OP 636: Performed by: STUDENT IN AN ORGANIZED HEALTH CARE EDUCATION/TRAINING PROGRAM

## 2025-08-11 PROCEDURE — 99207 PR NO CHARGE LOS: CPT

## 2025-08-11 PROCEDURE — 3074F SYST BP LT 130 MM HG: CPT | Performed by: NURSE PRACTITIONER

## 2025-08-11 PROCEDURE — 3078F DIAST BP <80 MM HG: CPT | Performed by: NURSE PRACTITIONER

## 2025-08-11 PROCEDURE — 99459 PELVIC EXAMINATION: CPT | Performed by: NURSE PRACTITIONER

## 2025-08-11 PROCEDURE — 36415 COLL VENOUS BLD VENIPUNCTURE: CPT | Performed by: NURSE PRACTITIONER

## 2025-08-11 PROCEDURE — 250N000011 HC RX IP 250 OP 636: Mod: JZ | Performed by: STUDENT IN AN ORGANIZED HEALTH CARE EDUCATION/TRAINING PROGRAM

## 2025-08-11 RX ORDER — DIPHENHYDRAMINE HYDROCHLORIDE 50 MG/ML
50 INJECTION, SOLUTION INTRAMUSCULAR; INTRAVENOUS
Start: 2025-09-22

## 2025-08-11 RX ORDER — DIPHENHYDRAMINE HYDROCHLORIDE 50 MG/ML
25 INJECTION, SOLUTION INTRAMUSCULAR; INTRAVENOUS
Start: 2025-09-22

## 2025-08-11 RX ORDER — ALBUTEROL SULFATE 90 UG/1
1-2 INHALANT RESPIRATORY (INHALATION)
Start: 2025-09-22

## 2025-08-11 RX ORDER — HEPARIN SODIUM,PORCINE 10 UNIT/ML
5-20 VIAL (ML) INTRAVENOUS DAILY PRN
OUTPATIENT
Start: 2025-09-22

## 2025-08-11 RX ORDER — EPINEPHRINE 1 MG/ML
0.3 INJECTION, SOLUTION INTRAMUSCULAR; SUBCUTANEOUS EVERY 5 MIN PRN
OUTPATIENT
Start: 2025-09-22

## 2025-08-11 RX ORDER — DIPHENHYDRAMINE HCL 25 MG
25 CAPSULE ORAL ONCE
Start: 2025-09-22 | End: 2025-09-22

## 2025-08-11 RX ORDER — METHYLPREDNISOLONE SODIUM SUCCINATE 40 MG/ML
40 INJECTION INTRAMUSCULAR; INTRAVENOUS
Start: 2025-09-22

## 2025-08-11 RX ORDER — ALBUTEROL SULFATE 0.83 MG/ML
2.5 SOLUTION RESPIRATORY (INHALATION)
OUTPATIENT
Start: 2025-09-22

## 2025-08-11 RX ORDER — HEPARIN SODIUM (PORCINE) LOCK FLUSH IV SOLN 100 UNIT/ML 100 UNIT/ML
5 SOLUTION INTRAVENOUS
OUTPATIENT
Start: 2025-09-22

## 2025-08-11 RX ORDER — ACETAMINOPHEN AND CODEINE PHOSPHATE 120; 12 MG/5ML; MG/5ML
0.35 SOLUTION ORAL DAILY
Qty: 84 TABLET | Refills: 3 | Status: SHIPPED | OUTPATIENT
Start: 2025-08-11

## 2025-08-11 RX ORDER — MEPERIDINE HYDROCHLORIDE 25 MG/ML
25 INJECTION INTRAMUSCULAR; INTRAVENOUS; SUBCUTANEOUS
OUTPATIENT
Start: 2025-09-22

## 2025-08-11 RX ORDER — ACETAMINOPHEN 325 MG/1
650 TABLET ORAL ONCE
Start: 2025-09-22 | End: 2025-09-22

## 2025-08-11 RX ADMIN — INFLIXIMAB 300 MG: 100 INJECTION, POWDER, LYOPHILIZED, FOR SOLUTION INTRAVENOUS at 10:45

## 2025-08-11 RX ADMIN — SODIUM CHLORIDE 250 ML: 0.9 INJECTION, SOLUTION INTRAVENOUS at 10:43

## 2025-08-12 LAB
HPV HR 12 DNA CVX QL NAA+PROBE: NEGATIVE
HPV16 DNA CVX QL NAA+PROBE: NEGATIVE
HPV18 DNA CVX QL NAA+PROBE: NEGATIVE
HUMAN PAPILLOMA VIRUS FINAL DIAGNOSIS: NORMAL

## (undated) DEVICE — PREP CHLORAPREP 26ML TINTED ORANGE  260815

## (undated) DEVICE — SOL WATER IRRIG 1000ML BOTTLE 07139-09

## (undated) RX ORDER — FENTANYL CITRATE 50 UG/ML
INJECTION, SOLUTION INTRAMUSCULAR; INTRAVENOUS
Status: DISPENSED
Start: 2021-05-28